# Patient Record
Sex: FEMALE | Race: WHITE | NOT HISPANIC OR LATINO | Employment: OTHER | URBAN - METROPOLITAN AREA
[De-identification: names, ages, dates, MRNs, and addresses within clinical notes are randomized per-mention and may not be internally consistent; named-entity substitution may affect disease eponyms.]

---

## 2017-01-18 ENCOUNTER — TELEPHONE (OUTPATIENT)
Dept: FAMILY MEDICINE | Facility: CLINIC | Age: 62
End: 2017-01-18

## 2017-01-18 DIAGNOSIS — E11.9 TYPE 2 DIABETES MELLITUS WITHOUT COMPLICATION (H): Primary | ICD-10-CM

## 2017-01-18 NOTE — TELEPHONE ENCOUNTER
Left detailed message that pt can do labs as lab only appt, labs orders are placed as future.  Mary BEAL

## 2017-02-15 ENCOUNTER — OFFICE VISIT (OUTPATIENT)
Dept: FAMILY MEDICINE | Facility: CLINIC | Age: 62
End: 2017-02-15
Payer: COMMERCIAL

## 2017-02-15 ENCOUNTER — RADIANT APPOINTMENT (OUTPATIENT)
Dept: MAMMOGRAPHY | Facility: CLINIC | Age: 62
End: 2017-02-15
Attending: FAMILY MEDICINE
Payer: COMMERCIAL

## 2017-02-15 VITALS
SYSTOLIC BLOOD PRESSURE: 139 MMHG | WEIGHT: 137 LBS | TEMPERATURE: 96 F | HEIGHT: 64 IN | BODY MASS INDEX: 23.39 KG/M2 | OXYGEN SATURATION: 99 % | DIASTOLIC BLOOD PRESSURE: 75 MMHG | HEART RATE: 91 BPM

## 2017-02-15 DIAGNOSIS — Z11.59 NEED FOR HEPATITIS C SCREENING TEST: ICD-10-CM

## 2017-02-15 DIAGNOSIS — I10 HYPERTENSION, GOAL BELOW 140/90: Primary | ICD-10-CM

## 2017-02-15 DIAGNOSIS — Z12.31 VISIT FOR SCREENING MAMMOGRAM: ICD-10-CM

## 2017-02-15 DIAGNOSIS — Z13.6 CARDIOVASCULAR SCREENING; LDL GOAL LESS THAN 100: ICD-10-CM

## 2017-02-15 DIAGNOSIS — E11.9 TYPE 2 DIABETES MELLITUS WITHOUT COMPLICATION, WITHOUT LONG-TERM CURRENT USE OF INSULIN (H): ICD-10-CM

## 2017-02-15 LAB
ANION GAP SERPL CALCULATED.3IONS-SCNC: 8 MMOL/L (ref 3–14)
BUN SERPL-MCNC: 13 MG/DL (ref 7–30)
CALCIUM SERPL-MCNC: 10.1 MG/DL (ref 8.5–10.1)
CHLORIDE SERPL-SCNC: 107 MMOL/L (ref 94–109)
CHOLEST SERPL-MCNC: 117 MG/DL
CO2 SERPL-SCNC: 28 MMOL/L (ref 20–32)
CREAT SERPL-MCNC: 0.7 MG/DL (ref 0.52–1.04)
GFR SERPL CREATININE-BSD FRML MDRD: 85 ML/MIN/1.7M2
GLUCOSE SERPL-MCNC: 139 MG/DL (ref 70–99)
HBA1C MFR BLD: 8.9 % (ref 4.3–6)
HDLC SERPL-MCNC: 41 MG/DL
LDLC SERPL CALC-MCNC: 56 MG/DL
NONHDLC SERPL-MCNC: 76 MG/DL
POTASSIUM SERPL-SCNC: 4.4 MMOL/L (ref 3.4–5.3)
SODIUM SERPL-SCNC: 143 MMOL/L (ref 133–144)
TRIGL SERPL-MCNC: 99 MG/DL
TSH SERPL DL<=0.005 MIU/L-ACNC: 1.36 MU/L (ref 0.4–4)

## 2017-02-15 PROCEDURE — 99207 C FOOT EXAM  NO CHARGE: CPT | Mod: 25 | Performed by: FAMILY MEDICINE

## 2017-02-15 PROCEDURE — 80048 BASIC METABOLIC PNL TOTAL CA: CPT | Performed by: FAMILY MEDICINE

## 2017-02-15 PROCEDURE — 83036 HEMOGLOBIN GLYCOSYLATED A1C: CPT | Performed by: FAMILY MEDICINE

## 2017-02-15 PROCEDURE — 86803 HEPATITIS C AB TEST: CPT | Performed by: FAMILY MEDICINE

## 2017-02-15 PROCEDURE — G0202 SCR MAMMO BI INCL CAD: HCPCS | Mod: TC

## 2017-02-15 PROCEDURE — 36415 COLL VENOUS BLD VENIPUNCTURE: CPT | Performed by: FAMILY MEDICINE

## 2017-02-15 PROCEDURE — 80061 LIPID PANEL: CPT | Performed by: FAMILY MEDICINE

## 2017-02-15 PROCEDURE — 99396 PREV VISIT EST AGE 40-64: CPT | Performed by: FAMILY MEDICINE

## 2017-02-15 PROCEDURE — 84443 ASSAY THYROID STIM HORMONE: CPT | Performed by: FAMILY MEDICINE

## 2017-02-15 RX ORDER — LISINOPRIL 10 MG/1
10 TABLET ORAL DAILY
Qty: 90 TABLET | Refills: 3 | Status: SHIPPED | OUTPATIENT
Start: 2017-02-15 | End: 2018-02-19

## 2017-02-15 RX ORDER — ATORVASTATIN CALCIUM 40 MG/1
40 TABLET, FILM COATED ORAL DAILY
Qty: 90 TABLET | Refills: 3 | Status: SHIPPED | OUTPATIENT
Start: 2017-02-15 | End: 2018-03-12

## 2017-02-15 ASSESSMENT — PAIN SCALES - GENERAL: PAINLEVEL: NO PAIN (0)

## 2017-02-15 NOTE — MR AVS SNAPSHOT
After Visit Summary   2/15/2017    Asmita Jerez    MRN: [id number]           Patient Information     Date Of Birth          1955        Visit Information        Provider Department      2/15/2017 12:40 PM Carlos Williamson MD [facility] [place]        Today's Diagnoses     Hypertension, goal below 140/90    -  1    CARDIOVASCULAR SCREENING; LDL GOAL LESS THAN 100        Need for hepatitis C screening test        Type 2 diabetes mellitus without complication, without long-term current use of insulin (H)          Care Instructions      Preventive Health Recommendations  Female Ages 50 - 64    Yearly exam: See your health care provider every year in order to  o Review health changes.   o Discuss preventive care.    o Review your medicines if your doctor has prescribed any.      Get a Pap test every three years (unless you have an abnormal result and your provider advises testing more often).    If you get Pap tests with HPV test, you only need to test every 5 years, unless you have an abnormal result.     You do not need a Pap test if your uterus was removed (hysterectomy) and you have not had cancer.    You should be tested each year for STDs (sexually transmitted diseases) if you're at risk.     Have a mammogram every 1 to 2 years.    Have a colonoscopy at age 50, or have a yearly FIT test (stool test). These exams screen for colon cancer.      Have a cholesterol test every 5 years, or more often if advised.    Have a diabetes test (fasting glucose) every three years. If you are at risk for diabetes, you should have this test more often.     If you are at risk for osteoporosis (brittle bone disease), think about having a bone density scan (DEXA).    Shots: Get a flu shot each year. Get a tetanus shot every 10 years.    Nutrition:     Eat at least 5 servings of fruits and vegetables each day.    Eat whole-grain bread, whole-wheat pasta and brown rice instead of white grains and  "rice.    Talk to your provider about Calcium and Vitamin D.     Lifestyle    Exercise at least 150 minutes a week (30 minutes a day, 5 days a week). This will help you control your weight and prevent disease.    Limit alcohol to one drink per day.    No smoking.     Wear sunscreen to prevent skin cancer.     See your dentist every six months for an exam and cleaning.    See your eye doctor every 1 to 2 years.          Follow-ups after your visit        Who to contact     If you have questions or need follow up information about today's clinic visit or your schedule please contact [facility] directly at [phone].  Normal or non-critical lab and imaging results will be communicated to you by [employer], letter or phone within 4 business days after the clinic has received the results. If you do not hear from us within 7 days, please contact the clinic through [employer] or phone. If you have a critical or abnormal lab result, we will notify you by phone as soon as possible.  Submit refill requests through [employer] or call your pharmacy and they will forward the refill request to us. Please allow 3 business days for your refill to be completed.          Additional Information About Your Visit        [employer] Information     [employer] lets you send messages to your doctor, view your test results, renew your prescriptions, schedule appointments and more. To sign up, go to [email or web address] . Click on \"Log in\" on the left side of the screen, which will take you to the Welcome page. Then click on \"Sign up Now\" on the right side of the page.     You will be asked to enter the access code listed below, as well as some personal information. Please follow the directions to create your username and password.     Your access code is: [email or web address]  Expires: 2017  1:49 PM     Your access code will  in 90 days. If you need help or a new code, please call your [facility] or [phone].        Care " "EveryWhere ID     This is your Care EveryWhere ID. This could be used by other organizations to access your [place] medical records  [id number]        Your Vitals Were     Pulse Temperature Height Last Period Pulse Oximetry Breastfeeding?    91 96  F (35.6  C) (Tympanic) 5' 3.5\" (1.613 m) 12/01/2009 99% No    BMI (Body Mass Index)                   23.89 kg/m2            Blood Pressure from Last 3 Encounters:   02/15/17 139/75   05/25/16 125/68   12/11/15 116/65    Weight from Last 3 Encounters:   02/15/17 137 lb (62.1 kg)   05/25/16 143 lb (64.9 kg)   12/11/15 142 lb (64.4 kg)              We Performed the Following     **Hepatitis C Screen Reflex to RNA FUTURE anytime     Basic metabolic panel  (Ca, Cl, CO2, Creat, Gluc, K, Na, BUN)     FOOT EXAM     Hemoglobin A1c     Lipid Profile with reflex to direct LDL     TSH with free T4 reflex          Where to get your medicines      These medications were sent to [facility] [place] - [address]  [address], [place] [address]    Hours:  [facility] Phone:  [phone]     atorvastatin 40 MG tablet    lisinopril 10 MG tablet    metFORMIN 500 MG tablet          Primary Care Provider Office Phone # Fax #    Carlos Tobias Williamson [phone]       [facility] [address] [place] [address]        Thank you!     Thank you for choosing [facility]  for your care. Our goal is always to provide you with excellent care. Hearing back from our patients is one way we can continue to improve our services. Please take a few minutes to complete the written survey that you may receive in the mail after your visit with us. Thank you!             Your Updated Medication List - Protect others around you: Learn how to safely use, store and throw away your medicines at www.disposemymeds.org.          This list is accurate as of: 2/15/17  2:16 PM.  Always use your most " recent med list.                   Brand Name Dispense Instructions for use    aspirin 81 MG tablet      1 TABLET DAILY       atorvastatin 40 MG tablet    LIPITOR    90 tablet    Take 1 tablet (40 mg) by mouth daily       blood glucose monitoring meter device kit     1 kit    Use to test blood sugars 1 time daily or as directed. To match strips, what Ins will cover.       blood glucose monitoring test strip    no brand specified    1 Box    1 strip by In Vitro route daily *Brand per patient insurance coverage* Asking for Accu chek?       CENTRUM SILVER per tablet      1 TABLET DAILY       * DIABETIC STERILE LANCETS device     1 Box    1 Device daily.       * blood glucose monitoring lancets     1 Box    Use to test blood sugar 1 time daily or as directed. Accu chek, Ins will cover, match machine.       lisinopril 10 MG tablet    PRINIVIL/ZESTRIL    90 tablet    Take 1 tablet (10 mg) by mouth daily       metFORMIN 500 MG tablet    GLUCOPHAGE    270 tablet    Take 1 tablet (500 mg) by mouth 3 times daily (with meals)       * order for DME     1 each    Equipment being ordered: glucometer as per insurance formulary       * order for DME     1 Box    Equipment being ordered: test strips as per insurance formulary       * Notice:  This list has 4 medication(s) that are the same as other medications prescribed for you. Read the directions carefully, and ask your doctor or other care provider to review them with you.

## 2017-02-15 NOTE — PROGRESS NOTES
SUBJECTIVE:     CC: Asmita Jerez is an 61 year old woman who presents for preventive health visit.     Diabetes: Hemoglobin A1c today is 8.9. It was 8.7 last May. She has continued to gradually lose weight. She does not want to take more than 500 mg of metformin daily. She does have trouble with GI symptoms when she has 3 tabs a day. She also does not want to take an additional diabetes medication. She believes she should be able to treat this on her on with diet exercise and weight loss. She only wants to come in 1 time a year for her exam.    Healthy Habits:    Do you get at least three servings of calcium containing foods daily (dairy, green leafy vegetables, etc.)? yes    Amount of exercise or daily activities, outside of work: 3 day(s) per week    Problems taking medications regularly No    Medication side effects: No    Have you had an eye exam in the past two years? no    Do you see a dentist twice per year? 1 time a year    Do you have sleep apnea, excessive snoring or daytime drowsiness?no            Today's PHQ-2 Score:   PHQ-2 ( 1999 Pfizer) 5/25/2016 11/18/2015   Q1: Little interest or pleasure in doing things 0 0   Q2: Feeling down, depressed or hopeless 0 0   PHQ-2 Score 0 0       Abuse: Current or Past(Physical, Sexual or Emotional)-   Do you feel safe in your environment -     Social History   Substance Use Topics     Smoking status: Current Every Day Smoker     Packs/day: 1.00     Years: 40.00     Types: Cigarettes     Smokeless tobacco: Never Used      Comment: pt not interested in quit plan 4/22/09     Alcohol use No         Recent Labs   Lab Test  05/25/16   1138  04/29/15   0958  03/31/14   1649   CHOL  173  117  145   HDL  44*  46*  40*   LDL  100*  49  84   TRIG  144  112  106   CHOLHDLRATIO   --   2.5  4.0   NHDL  129   --    --        Reviewed orders with patient.  Reviewed health maintenance and updated orders accordingly - Yes    Mammo Decision Support:  Patient over age 50, mutual  decision to screen reflected in health maintenance.    Pertinent mammograms are reviewed under the imaging tab.  History of abnormal Pap smear: Pap and cold testing every 3-5 years.  All Histories reviewed and updated in Epic.      ROS:  C: NEGATIVE for fever, chills, change in weight  I: NEGATIVE for worrisome rashes, moles or lesions  E: NEGATIVE for vision changes or irritation  ENT: NEGATIVE for ear, mouth and throat problems  R: NEGATIVE for significant cough or SOB  B: NEGATIVE for masses, tenderness or discharge  CV: NEGATIVE for chest pain, palpitations or peripheral edema  GI: NEGATIVE for nausea, abdominal pain, heartburn, or change in bowel habits  : NEGATIVE for unusual urinary or vaginal symptoms. No vaginal bleeding.  M: NEGATIVE for significant arthralgias or myalgia  N: NEGATIVE for weakness, dizziness or paresthesias  P: NEGATIVE for changes in mood or affect       OBJECTIVE:     LMP 12/01/2009  EXAM:  GENERAL: healthy, alert and no distress  EYES: Eyes grossly normal to inspection, PERRL and conjunctivae and sclerae normal  HENT: ear canals and TM's normal, nose and mouth without ulcers or lesions  NECK: no adenopathy, no asymmetry, masses, or scars and thyroid normal to palpation  RESP: lungs clear to auscultation - no rales, rhonchi or wheezes  BREAST: normal without masses, tenderness or nipple discharge and no palpable axillary masses or adenopathy  CV: regular rate and rhythm, normal S1 S2, no S3 or S4, no murmur, click or rub, no peripheral edema and peripheral pulses strong  ABDOMEN: soft, nontender, no hepatosplenomegaly, no masses and bowel sounds normal   (female): normal female external genitalia, normal urethral meatus, vaginal mucosa pink, moist, well rugated, and normal cervix/adnexa/uterus without masses or discharge  MS: no gross musculoskeletal defects noted, no edema  SKIN: no suspicious lesions or rashes  NEURO: Normal strength and tone, mentation intact and speech  "normal  PSYCH: mentation appears normal, affect normal/bright    ASSESSMENT/PLAN:         ICD-10-CM    1. Hypertension, goal below 140/90 I10 lisinopril (PRINIVIL/ZESTRIL) 10 MG tablet     Basic metabolic panel  (Ca, Cl, CO2, Creat, Gluc, K, Na, BUN)   2. CARDIOVASCULAR SCREENING; LDL GOAL LESS THAN 100 Z13.6 atorvastatin (LIPITOR) 40 MG tablet     Lipid Profile with reflex to direct LDL   3. Need for hepatitis C screening test Z11.59 **Hepatitis C Screen Reflex to RNA FUTURE anytime   4. Type 2 diabetes mellitus without complication, without long-term current use of insulin (H) E11.9 metFORMIN (GLUCOPHAGE) 500 MG tablet       COUNSELING:   Reviewed preventive health counseling, as reflected in patient instructions       Regular exercise       Healthy diet/nutrition         reports that she has been smoking Cigarettes.  She has a 40.00 pack-year smoking history. She has never used smokeless tobacco.  Tobacco Cessation Action Plan: Information offered: Patient not interested at this time  Estimated body mass index is 24.93 kg/(m^2) as calculated from the following:    Height as of 5/25/16: 5' 3.5\" (1.613 m).    Weight as of 5/25/16: 143 lb (64.9 kg).       Counseling Resources:  ATP IV Guidelines  Pooled Cohorts Equation Calculator  Breast Cancer Risk Calculator  FRAX Risk Assessment  ICSI Preventive Guidelines  Dietary Guidelines for Americans, 2010  USDA's MyPlate  ASA Prophylaxis  Lung CA Screening    Carlos Williamson MD  [facility]  "

## 2017-02-15 NOTE — LETTER
[facility]  [address]  [place] [address]  Phone: [phone]    February 17, 2017    Asmita Jerez  [address]  [place] [address]          Dear Asmita,    The results of your recent lab tests were within normal limits, except for HgbA1c which we discussed. Enclosed is a copy of these results.  If you have any further questions or problems, please contact our office.  Results for orders placed or performed in visit on 02/15/17   Hemoglobin A1c   Result Value Ref Range    Hemoglobin A1C 8.9 (H) 4.3 - 6.0 %   **Hepatitis C Screen Reflex to RNA FUTURE anytime   Result Value Ref Range    Hepatitis C Antibody  NR     Nonreactive   Assay performance characteristics have not been established for newborns,   infants, and children     Basic metabolic panel  (Ca, Cl, CO2, Creat, Gluc, K, Na, BUN)   Result Value Ref Range    Sodium 143 133 - 144 mmol/L    Potassium 4.4 3.4 - 5.3 mmol/L    Chloride 107 94 - 109 mmol/L    Carbon Dioxide 28 20 - 32 mmol/L    Anion Gap 8 3 - 14 mmol/L    Glucose 139 (H) 70 - 99 mg/dL    Urea Nitrogen 13 7 - 30 mg/dL    Creatinine 0.70 0.52 - 1.04 mg/dL    GFR Estimate 85 >60 mL/min/1.7m2    GFR Estimate If Black >90   GFR Calc   >60 mL/min/1.7m2    Calcium 10.1 8.5 - 10.1 mg/dL   Lipid Profile with reflex to direct LDL   Result Value Ref Range    Cholesterol 117 <200 mg/dL    Triglycerides 99 <150 mg/dL    HDL Cholesterol 41 (L) >49 mg/dL    LDL Cholesterol Calculated 56 <100 mg/dL    Non HDL Cholesterol 76 <130 mg/dL   TSH with free T4 reflex   Result Value Ref Range    TSH 1.36 0.40 - 4.00 mU/L     Sincerely,      Carlos Williamson MD/ llc

## 2017-02-15 NOTE — NURSING NOTE
"Chief Complaint   Patient presents with     Physical       Initial /75 (BP Location: Right arm, Patient Position: Chair, Cuff Size: Adult Regular)  Pulse 91  Temp 96  F (35.6  C) (Tympanic)  Ht 5' 3.5\" (1.613 m)  Wt 137 lb (62.1 kg)  LMP 12/01/2009  SpO2 99%  Breastfeeding? No  BMI 23.89 kg/m2 Estimated body mass index is 23.89 kg/(m^2) as calculated from the following:    Height as of this encounter: 5' 3.5\" (1.613 m).    Weight as of this encounter: 137 lb (62.1 kg).  Medication Reconciliation: incomplete   Mary BEAL      "

## 2017-02-16 LAB — HCV AB SERPL QL IA: NORMAL

## 2017-07-07 ENCOUNTER — OFFICE VISIT (OUTPATIENT)
Dept: FAMILY MEDICINE | Facility: CLINIC | Age: 62
End: 2017-07-07
Payer: COMMERCIAL

## 2017-07-07 ENCOUNTER — HOSPITAL ENCOUNTER (OUTPATIENT)
Dept: ULTRASOUND IMAGING | Facility: CLINIC | Age: 62
Discharge: HOME | End: 2017-07-07
Attending: FAMILY MEDICINE | Admitting: FAMILY MEDICINE
Payer: COMMERCIAL

## 2017-07-07 VITALS
DIASTOLIC BLOOD PRESSURE: 78 MMHG | WEIGHT: 137 LBS | OXYGEN SATURATION: 98 % | HEART RATE: 89 BPM | BODY MASS INDEX: 23.89 KG/M2 | TEMPERATURE: 97.8 F | SYSTOLIC BLOOD PRESSURE: 124 MMHG

## 2017-07-07 DIAGNOSIS — S76.911A STRAIN OF HIP AND THIGH, RIGHT, INITIAL ENCOUNTER: ICD-10-CM

## 2017-07-07 DIAGNOSIS — S76.011A STRAIN OF HIP AND THIGH, RIGHT, INITIAL ENCOUNTER: ICD-10-CM

## 2017-07-07 DIAGNOSIS — R10.2 PELVIC PAIN IN FEMALE: ICD-10-CM

## 2017-07-07 DIAGNOSIS — E11.9 TYPE 2 DIABETES MELLITUS WITHOUT COMPLICATION, WITHOUT LONG-TERM CURRENT USE OF INSULIN (H): Primary | ICD-10-CM

## 2017-07-07 LAB — HBA1C MFR BLD: 11 % (ref 4.3–6)

## 2017-07-07 PROCEDURE — 83036 HEMOGLOBIN GLYCOSYLATED A1C: CPT | Performed by: FAMILY MEDICINE

## 2017-07-07 PROCEDURE — 76830 TRANSVAGINAL US NON-OB: CPT

## 2017-07-07 PROCEDURE — 99214 OFFICE O/P EST MOD 30 MIN: CPT | Performed by: FAMILY MEDICINE

## 2017-07-07 PROCEDURE — 36415 COLL VENOUS BLD VENIPUNCTURE: CPT | Performed by: FAMILY MEDICINE

## 2017-07-07 ASSESSMENT — PAIN SCALES - GENERAL: PAINLEVEL: MILD PAIN (2)

## 2017-07-07 NOTE — NURSING NOTE
"Chief Complaint   Patient presents with     Abdominal Pain     lower right side pain hurts down to thigh has hurt since feb. after gyno exam       Initial /78 (BP Location: Right arm, Patient Position: Chair, Cuff Size: Adult Large)  Pulse 89  Temp 97.8  F (36.6  C) (Tympanic)  Wt 137 lb (62.1 kg)  LMP 12/01/2009  SpO2 98%  Breastfeeding? No  BMI 23.89 kg/m2 Estimated body mass index is 23.89 kg/(m^2) as calculated from the following:    Height as of 2/15/17: 5' 3.5\" (1.613 m).    Weight as of this encounter: 137 lb (62.1 kg).  Medication Reconciliation: sharmin Kapoor CMA       "

## 2017-07-07 NOTE — LETTER
[facility] [place]  [address]  [facility] [address]  Phone: [phone]    July 7, 2017    Asmita Jerez  [address]  [address]  [place] [address]          Dear Ms. Jerez,    Your hemoglobin A1c was 11. Your average blood sugar in the last 3 months was 270. I would recommend that you start on a long-acting insulin like Lantus insulin, 1 injection per day. Please let us know if your interested or make an appointment to discuss. Enclosed is a copy of these results.  If you have any further questions or problems, please contact our office.    Sincerely,      Carlos Williamson MD/ la

## 2017-07-07 NOTE — PROGRESS NOTES
SUBJECTIVE:                                                    Asmita Jerez is a 61 year old female who presents to clinic today for the following health issues:    For the last 3 months she has had pain with certain movements of her right hip. When she has the pain she has it in the right groin radiating retirement down the anterior thigh.  She gets pain with hyperextending her hip.  The pain started with her last pelvic exam 3 months ago. There was some pain when the speculum was removed possibly from pinching.    Her hemoglobin A1c is 11.0. So far she has been unwilling to start insulin.    Total face to face time: 25 minutes.  Time spent counseling on 15 minutes as outline above.      ABDOMINAL PAIN     Onset: February     Description:   Character: Gnawing  Location: right lower quadrant all the way down thigh  Radiation: None    Intensity: moderate, severe    Progression of Symptoms:  worsening and intermittent    Accompanying Signs & Symptoms:  Fever/Chills?: no   Gas/Bloating: no   Nausea: no   Vomitting: no   Diarrhea?: no   Constipation:no   Dysuria or Hematuria: no    History:     Trauma: no   Previous similar pain: no    Previous tests done: none    Precipitating factors:   Does the pain change with:     Food: no      BM: no     Urination: no     Alleviating factors:  none    Therapies Tried and outcome: none    LMP:  not applicable           Problem list and histories reviewed & adjusted, as indicated.  Additional history: as documented        Reviewed and updated as needed this visit by clinical staff  Tobacco  Allergies  Med Hx  Surg Hx  Fam Hx  Soc Hx      Reviewed and updated as needed this visit by Provider        Medical, surgical, family, social histories, allergies and meds reviewed and updated.    ROS:  General: No change in weight, sleep or appetite.  Normal energy.  No fever or chills  Resp: No coughing, wheezing or shortness of breath  CV: No chest pains or palpitations  GI: No nausea,  vomiting,  heartburn, abdominal pain, diarrhea, constipation or change in bowel habits    Exam:  GENERAL APPEARANCE ADULT: Alert, no acute distress  MS: hip exam pain with hyperextension.    ASSESSMENT:  (E11.9) Type 2 diabetes mellitus without complication, without long-term current use of insulin (H)  (primary encounter diagnosis)  Comment:   Plan: Hemoglobin A1c, CANCELED: Albumin Random Urine         Quantitative            (R10.2) Pelvic pain in female  Comment:   Plan: US Pelvic Complete w Transvaginal            (S76.011A,  S76.911A) Strain of hip and thigh, right, initial encounter  Comment:   Plan: ORTHO  REFERRAL              PLAN:  Orders Placed This Encounter     US Pelvic Complete w Transvaginal     Hemoglobin A1c     ORTHO  REFERRAL       There are no Patient Instructions on file for this visit.      Carlos Williamson

## 2017-07-07 NOTE — MR AVS SNAPSHOT
After Visit Summary   7/7/2017    Asmita Jerez    MRN: [id number]           Patient Information     Date Of Birth          1955        Visit Information        Provider Department      7/7/2017 11:00 AM Carlos Williamson MD [facility]        Today's Diagnoses     Type 2 diabetes mellitus without complication, without long-term current use of insulin (H)    -  1    Pelvic pain in female        Strain of hip and thigh, right, initial encounter           Follow-ups after your visit        Additional Services     ORTHO  REFERRAL       [facility] is referring you to the Orthopedic  Services at [facility].   3 months of right hip pain radiating down leg    The  Representative will assist you in the coordination of your Orthopedic and Musculoskeletal Care as prescribed by your physician.    The  Representative will call you within 1 business day to help schedule your appointment, or you may contact the  Representative at:    All areas ~ [phone]     Type of Referral : Non Surgical       Timeframe requested: Routine    Coverage of these services is subject to the terms and limitations of your health insurance plan.  Please call member services at your health plan with any benefit or coverage questions.      If X-rays, CT or MRI's have been performed, please contact the facility where they were done to arrange for , prior to your scheduled appointment.  Please bring this referral request to your appointment and present it to your specialist.                  Future tests that were ordered for you today     Open Future Orders        Priority Expected Expires Ordered    US Pelvic Complete w Transvaginal Routine  7/7/2018 7/7/2017            Who to contact     If you have questions or need follow up information about today's clinic visit or your schedule please contact [facility]  [facility] directly at [phone].  Normal or non-critical lab and imaging results will be communicated to you by MyChart, letter or phone within 4 business days after the clinic has received the results. If you do not hear from us within 7 days, please contact the clinic through Flaconihart or phone. If you have a critical or abnormal lab result, we will notify you by phone as soon as possible.  Submit refill requests through hopscout or call your pharmacy and they will forward the refill request to us. Please allow 3 business days for your refill to be completed.          Additional Information About Your Visit        FlaconiharBlowtorch Information     hopscout lets you send messages to your doctor, view your test results, renew your prescriptions, schedule appointments and more. To sign up, go to [email or web address] . Click on \"Log in\" on the left side of the screen, which will take you to the Welcome page. Then click on \"Sign up Now\" on the right side of the page.     You will be asked to enter the access code listed below, as well as some personal information. Please follow the directions to create your username and password.     Your access code is: [email or web address]  Expires: 10/5/2017 11:47 AM     Your access code will  in 90 days. If you need help or a new code, please call your [place] clinic or [phone].        Care EveryWhere ID     This is your Care EveryWhere ID. This could be used by other organizations to access your [place] medical records  [id number]        Your Vitals Were     Pulse Temperature Last Period Pulse Oximetry Breastfeeding? BMI (Body Mass Index)    89 97.8  F (36.6  C) (Tympanic) 2009 98% No 23.89 kg/m2       Blood Pressure from Last 3 Encounters:   17 124/78   02/15/17 139/75   16 125/68    Weight from Last 3 Encounters:   17 137 lb (62.1 kg)   02/15/17 137 lb (62.1 kg)   16 143 lb (64.9 kg)              We Performed the Following     Albumin Random Urine " Quantitative     Hemoglobin A1c     ORTHO [facility] REFERRAL        Primary Care Provider Office Phone # Fax #    Carlos Tobias Williamson [phone]       [facility] [address] [address] [facility] [address]        Equal Access to Services     EFREN ADOLPH : Hadii aad ku hadasho Soomaali, waaxda luqadaha, qaybta kaalmada adeegyada, waxay idiin hayaan adeleigh khvanessa lafrancesn america. So [facility] [phone].    ATENCIÓN: Si habla español, tiene a garcia disposición servicios gratuitos de asistencia lingüística. Llame al [phone].    We comply with applicable federal civil rights laws and [place] laws. We do not discriminate on the basis of race, color, national origin, age, disability sex, sexual orientation or gender identity.            Thank you!     Thank you for choosing [facility]  for your care. Our goal is always to provide you with excellent care. Hearing back from our patients is one way we can continue to improve our services. Please take a few minutes to complete the written survey that you may receive in the mail after your visit with us. Thank you!             Your Updated Medication List - Protect others around you: Learn how to safely use, store and throw away your medicines at www.disposemymeds.org.          This list is accurate as of: 7/7/17 11:47 AM.  Always use your most recent med list.                   Brand Name Dispense Instructions for use Diagnosis    aspirin 81 MG tablet      1 TABLET DAILY        atorvastatin 40 MG tablet    LIPITOR    90 tablet    Take 1 tablet (40 mg) by mouth daily    CARDIOVASCULAR SCREENING; LDL GOAL LESS THAN 100       blood glucose monitoring meter device kit     1 kit    Use to test blood sugars 1 time daily or as directed. To match strips, what Ins will cover.    Type 2 diabetes, HbA1c goal < 7% (H)       blood glucose monitoring test strip    no brand specified    1 Box    1 strip by In Vitro route daily *Brand per patient  insurance coverage* Asking for Accu chek?    Type 2 diabetes, HbA1c goal < 7% (H)       CENTRUM SILVER per tablet      1 TABLET DAILY        * DIABETIC STERILE LANCETS device     1 Box    1 Device daily.        * blood glucose monitoring lancets     1 Box    Use to test blood sugar 1 time daily or as directed. Accu chek, Ins will cover, match machine.    Type 2 diabetes, HbA1c goal < 7% (H)       lisinopril 10 MG tablet    PRINIVIL/ZESTRIL    90 tablet    Take 1 tablet (10 mg) by mouth daily    Hypertension, goal below 140/90       metFORMIN 500 MG tablet    GLUCOPHAGE    270 tablet    Take 1 tablet (500 mg) by mouth 3 times daily (with meals)    Type 2 diabetes mellitus without complication, without long-term current use of insulin (H)       * order for DME     1 each    Equipment being ordered: glucometer as per insurance formulary    Type 2 diabetes, HbA1c goal < 7% (H)       * order for DME     1 Box    Equipment being ordered: test strips as per insurance formulary    Type 2 diabetes mellitus without complication (H)       * Notice:  This list has 4 medication(s) that are the same as other medications prescribed for you. Read the directions carefully, and ask your doctor or other care provider to review them with you.

## 2017-07-12 ENCOUNTER — TELEPHONE (OUTPATIENT)
Dept: FAMILY MEDICINE | Facility: CLINIC | Age: 62
End: 2017-07-12

## 2017-07-12 NOTE — TELEPHONE ENCOUNTER
Reason for Call:  Request for results:    Name of test or procedure: Pt has questions about her recent ultrasound findings.  1.  Does anything need to be done about the small fibroid?  2.  Could this affect her health down the road?  3.  Does she need to do any follow-up?      Date of test of procedure: 07/07/17    Location of the test or procedure: [facility]    OK to leave the result message on voice mail or with a family member? NO    Phone number Patient can be reached at:  Work number on file:  [phone] (work)    Additional comments:     Call taken on 7/12/2017 at 8:47 AM by Janet Xiong

## 2017-07-12 NOTE — TELEPHONE ENCOUNTER
Answered pt questions concerning sm fibroid.  Pt is not going to Ortho due to cost.  Pt is f/u with Chiropractor when/if pain comes back.  kPavSissy

## 2017-07-25 ENCOUNTER — OFFICE VISIT (OUTPATIENT)
Dept: FAMILY MEDICINE | Facility: CLINIC | Age: 62
End: 2017-07-25
Payer: COMMERCIAL

## 2017-07-25 VITALS
WEIGHT: 138 LBS | SYSTOLIC BLOOD PRESSURE: 135 MMHG | HEART RATE: 91 BPM | DIASTOLIC BLOOD PRESSURE: 68 MMHG | TEMPERATURE: 96.7 F | BODY MASS INDEX: 24.06 KG/M2

## 2017-07-25 DIAGNOSIS — E11.65 TYPE 2 DIABETES MELLITUS WITH HYPERGLYCEMIA, WITHOUT LONG-TERM CURRENT USE OF INSULIN (H): ICD-10-CM

## 2017-07-25 DIAGNOSIS — E11.9 TYPE 2 DIABETES MELLITUS WITHOUT COMPLICATION, WITHOUT LONG-TERM CURRENT USE OF INSULIN (H): Primary | ICD-10-CM

## 2017-07-25 PROCEDURE — 99213 OFFICE O/P EST LOW 20 MIN: CPT | Performed by: FAMILY MEDICINE

## 2017-07-25 ASSESSMENT — PAIN SCALES - GENERAL: PAINLEVEL: NO PAIN (0)

## 2017-07-25 NOTE — PROGRESS NOTES
SUBJECTIVE:                                                    Asmita Jerez is a 61 year old female who presents to clinic today for the following health issues:    Her hemoglobin A1c has now increased to 11.0. She is willing to try Lantus insulin. She will make an appointment with the diabetic nurse educator. Recheck hemoglobin A1c in 3 months.      Diabetic consult        Problem list and histories reviewed & adjusted, as indicated.  Additional history: as documented        Reviewed and updated as needed this visit by clinical staff  Tobacco  Allergies  Med Hx  Surg Hx  Fam Hx  Soc Hx      Reviewed and updated as needed this visit by Provider        Medical, surgical, family, social histories, allergies and meds reviewed and updated.    ROS:  General: No change in weight, sleep or appetite.  Normal energy.  No fever or chills  Resp: No coughing, wheezing or shortness of breath  CV: No chest pains or palpitations  GI: No nausea, vomiting,  heartburn, abdominal pain, diarrhea, constipation or change in bowel habits    Exam:  GENERAL APPEARANCE ADULT: Alert, no acute distress  NECK: No adenopathy,masses or thyromegaly  RESP: lungs clear to auscultation   CV: normal rate, regular rhythm, no murmur or gallop    ASSESSMENT:  (E11.9) Type 2 diabetes mellitus without complication, without long-term current use of insulin (H)  (primary encounter diagnosis)  Comment:   Plan: DIABETES EDUCATOR REFERRAL            (E11.65) Type 2 diabetes mellitus with hyperglycemia, without long-term current use of insulin (H)  Comment:   Plan: blood glucose monitoring (NO BRAND SPECIFIED)         test strip              PLAN:  Orders Placed This Encounter     DIABETES EDUCATOR REFERRAL     blood glucose monitoring (NO BRAND SPECIFIED) test strip   Hemoglobin A1c in 3 months.    There are no Patient Instructions on file for this visit.      Carlos Williamson

## 2017-07-25 NOTE — MR AVS SNAPSHOT
After Visit Summary   7/25/2017    Asmita Jerez    MRN: [id number]           Patient Information     Date Of Birth          1955        Visit Information        Provider Department      7/25/2017 4:20 PM Carlos Williamson MD [facility]        Today's Diagnoses     Type 2 diabetes mellitus without complication, without long-term current use of insulin (H)    -  1       Follow-ups after your visit        Additional Services     DIABETES EDUCATOR REFERRAL       Your provider has referred you to Diabetes Education: FM[facility]: Diabetes Education - All [facility] [phone]   [email or web address]    This is a Previous Diagnosis: Follow-up DSMT - 2 hours.  Type of diabetes is Type 2 - On Insulin                                                          A1C is: Lab Results       Component                Value               Date                       A1C                      11.0                07/07/2017            If an urgent visit is needed or A1C is above 12, Care Team to call the diabetes education team at [phone] or send a message to the diabetes education pool (P DIAB ED-PATIENT CARE).    Diabetes education focus: Comprehensive Knowledge Assessment and Instruction, Blood glucose meter instruction  and Medication Start: Insulin: Type/Dose/Timing: Lantus or similar.      Education needs: Additional Insulin Training                                                                                                                                                      Please be aware that coverage of these services is subject to the terms and limitations of your health insurance plan.  Call member services at your health plan to determine Diabetes Self-Management Training benefits and ask which blood glucose monitor brands are covered by your plan.      Please bring the following to your appointment:    -   List of current  "medications   -   List of blood glucose monitor brands that are covered by your insurance plan  -   Blood glucose monitor and log book  -   Food records for the 3 days prior to your visit                  Who to contact     If you have questions or need follow up information about today's clinic visit or your schedule please contact [facility] directly at [phone].  Normal or non-critical lab and imaging results will be communicated to you by MyChart, letter or phone within 4 business days after the clinic has received the results. If you do not hear from us within 7 days, please contact the clinic through Qnaryhart or phone. If you have a critical or abnormal lab result, we will notify you by phone as soon as possible.  Submit refill requests through Nimble TV or call your pharmacy and they will forward the refill request to us. Please allow 3 business days for your refill to be completed.          Additional Information About Your Visit        QnaryharFarseer Information     Nimble TV lets you send messages to your doctor, view your test results, renew your prescriptions, schedule appointments and more. To sign up, go to [email or web address] . Click on \"Log in\" on the left side of the screen, which will take you to the Welcome page. Then click on \"Sign up Now\" on the right side of the page.     You will be asked to enter the access code listed below, as well as some personal information. Please follow the directions to create your username and password.     Your access code is: [email or web address]  Expires: 10/5/2017 11:47 AM     Your access code will  in 90 days. If you need help or a new code, please call your [facility] or [phone].        Care EveryWhere ID     This is your Care EveryWhere ID. This could be used by other organizations to access your [place] medical records  [id number]        Your Vitals Were     Pulse Temperature Last Period Breastfeeding? BMI (Body Mass Index)       91 " 96.7  F (35.9  C) (Tympanic) 12/01/2009 No 24.06 kg/m2        Blood Pressure from Last 3 Encounters:   07/25/17 135/68   07/07/17 124/78   02/15/17 139/75    Weight from Last 3 Encounters:   07/25/17 138 lb (62.6 kg)   07/07/17 137 lb (62.1 kg)   02/15/17 137 lb (62.1 kg)              We Performed the Following     DIABETES EDUCATOR REFERRAL        Primary Care Provider Office Phone # Fax #    Carlos Tobias Williamson [phone]       [facility] [address] [facility] [address]        Equal Access to Services     EFREN FARFAN : Hadii monica barbosa hadasho Solambert, waaxda luqadaha, qaybta kaalmada adeegyada, jelani cross. So [facility] [phone].    ATENCIÓN: Si habla español, tiene a garcia disposición servicios gratuitos de asistencia lingüística. Llame al [phone].    We comply with applicable federal civil rights laws and [place] laws. We do not discriminate on the basis of race, color, national origin, age, disability sex, sexual orientation or gender identity.            Thank you!     Thank you for choosing [facility]  for your care. Our goal is always to provide you with excellent care. Hearing back from our patients is one way we can continue to improve our services. Please take a few minutes to complete the written survey that you may receive in the mail after your visit with us. Thank you!             Your Updated Medication List - Protect others around you: Learn how to safely use, store and throw away your medicines at www.disposemymeds.org.          This list is accurate as of: 7/25/17  4:50 PM.  Always use your most recent med list.                   Brand Name Dispense Instructions for use Diagnosis    aspirin 81 MG tablet      1 TABLET DAILY        atorvastatin 40 MG tablet    LIPITOR    90 tablet    Take 1 tablet (40 mg) by mouth daily    CARDIOVASCULAR SCREENING; LDL GOAL LESS THAN 100       blood glucose monitoring meter  device kit     1 kit    Use to test blood sugars 1 time daily or as directed. To match strips, what Ins will cover.    Type 2 diabetes, HbA1c goal < 7% (H)       blood glucose monitoring test strip    no brand specified    1 Box    1 strip by In Vitro route daily *Brand per patient insurance coverage* Asking for Accu chek?    Type 2 diabetes, HbA1c goal < 7% (H)       CENTRUM SILVER per tablet      1 TABLET DAILY        * DIABETIC STERILE LANCETS device     1 Box    1 Device daily.        * blood glucose monitoring lancets     1 Box    Use to test blood sugar 1 time daily or as directed. Accu chek, Ins will cover, match machine.    Type 2 diabetes, HbA1c goal < 7% (H)       lisinopril 10 MG tablet    PRINIVIL/ZESTRIL    90 tablet    Take 1 tablet (10 mg) by mouth daily    Hypertension, goal below 140/90       metFORMIN 500 MG tablet    GLUCOPHAGE    270 tablet    Take 1 tablet (500 mg) by mouth 3 times daily (with meals)    Type 2 diabetes mellitus without complication, without long-term current use of insulin (H)       * order for DME     1 each    Equipment being ordered: glucometer as per insurance formulary    Type 2 diabetes, HbA1c goal < 7% (H)       * order for DME     1 Box    Equipment being ordered: test strips as per insurance formulary    Type 2 diabetes mellitus without complication (H)       * Notice:  This list has 4 medication(s) that are the same as other medications prescribed for you. Read the directions carefully, and ask your doctor or other care provider to review them with you.

## 2017-07-25 NOTE — NURSING NOTE
"Chief Complaint   Patient presents with     Consult     diabetes       Initial /68 (BP Location: Right arm, Patient Position: Chair, Cuff Size: Adult Regular)  Pulse 91  Temp 96.7  F (35.9  C) (Tympanic)  Wt 138 lb (62.6 kg)  LMP 12/01/2009  Breastfeeding? No  BMI 24.06 kg/m2 Estimated body mass index is 24.06 kg/(m^2) as calculated from the following:    Height as of 2/15/17: 5' 3.5\" (1.613 m).    Weight as of this encounter: 138 lb (62.6 kg).  Medication Reconciliation: complete   Mary Kapoor CMA       "

## 2017-08-02 ENCOUNTER — ALLIED HEALTH/NURSE VISIT (OUTPATIENT)
Dept: EDUCATION SERVICES | Facility: CLINIC | Age: 62
End: 2017-08-02
Payer: COMMERCIAL

## 2017-08-02 ENCOUNTER — TELEPHONE (OUTPATIENT)
Dept: EDUCATION SERVICES | Facility: CLINIC | Age: 62
End: 2017-08-02

## 2017-08-02 DIAGNOSIS — E11.9 TYPE 2 DIABETES MELLITUS WITHOUT COMPLICATION, WITHOUT LONG-TERM CURRENT USE OF INSULIN (H): Primary | ICD-10-CM

## 2017-08-02 DIAGNOSIS — E11.9 DIABETES MELLITUS WITHOUT COMPLICATION (H): ICD-10-CM

## 2017-08-02 PROCEDURE — G0108 DIAB MANAGE TRN  PER INDIV: HCPCS

## 2017-08-02 RX ORDER — METFORMIN HCL 500 MG
1000 TABLET, EXTENDED RELEASE 24 HR ORAL 2 TIMES DAILY WITH MEALS
Qty: 360 TABLET | Refills: 3 | Status: SHIPPED | OUTPATIENT
Start: 2017-08-02 | End: 2018-03-23

## 2017-08-02 NOTE — PROGRESS NOTES
Diabetes Self Management Training: Individual Review Visit    Asmita Jerez presents today for education, evaluation of glucose control and modification of medication(s) related to Type 2 diabetes.    She is accompanied by self    Patient's diabetes management related comments/concerns: A1C is 11.0% no life style changes except 4.5 yrs ago lost her .      Patient's emotional response to diabetes: expresses readiness to learn, anger, concern for health and well-being and frustrated    Patient would like this visit to be focused around the following diabetes-related behaviors and goals: Diabetes pathophysiology, Assistance with making lifestyle changes, Self-care behavioral goal setting, Healthy Eating, Being Active, Monitoring, Taking Medication, Problem Solving, Reducing Risks and Healthy Coping    ASSESSMENT:  Patient Problem List and Family Medical History reviewed for relevant medical history, current medical status, and diabetes risk factors.    Current Diabetes Management per Patient:  Taking diabetes medications?   yes:     Diabetes Medication(s)     Biguanides Sig    metFORMIN (GLUCOPHAGE) 500 MG tablet Take 1 tablet (500 mg) by mouth 3 times daily (with meals)      , Oral Medications: Metformin - Dose: 500 mg , Time: breakfast and supper    *Abbreviated insulin dose documentation key: Insulin Trade Name (ezqyvfsur-jwqia-xhpznq-bedtime) - i.e. Humalog 5-5-5-0 (Humalog 5 units at breakfast, 5 units at lunch, and 5 units at dinner).    Past Diabetes Education: Yes, many yrs ago    Patient glucose self monitoring as follows: two times daily.   BG meter: Accu-Chek Margaret meter  BG results: see blood glucose log attached to this encounter     BG values are: Not in goal  Patient's most recent   Lab Results   Component Value Date    A1C 11.0 07/07/2017    is not meeting goal of <7.0    Nutrition:  Patient eats 3 meals per day    Breakfast - English muffin and grapes or 1 egg 2 links and english muffin or  "glucerna shake and cottage cheese and chips.tea with stevia  Lunch -cucumber and grapes , cucumber with ng and grapes and white chicken or PBand J sandwich tator tots and ketchup and cookie  Dinner - tomato sandwich. Or cottage cheese chips and 2 slices of peanut butter and 2 tootsie rolls or cottage cheese and chips  Snacks - coffee all day, and milk and stevia.  water    Beverages: see above    Cultural/Yazidi diet restrictions: No     Biggest Challenge to Healthy Eating: knowing what to eat    Physical Activity:    Type: nothing just daily activities.      Diabetes Risk Factors:  family history and age over 45 years    Diabetes Complications:  Not discussed today.    Vitals:  LMP 12/01/2009  Estimated body mass index is 24.06 kg/(m^2) as calculated from the following:    Height as of 2/15/17: 1.613 m (5' 3.5\").    Weight as of 7/25/17: 62.6 kg (138 lb).   Last 3 BP:   BP Readings from Last 3 Encounters:   07/25/17 135/68   07/07/17 124/78   02/15/17 139/75       History   Smoking Status     Current Every Day Smoker     Packs/day: 1.00     Years: 40.00     Types: Cigarettes   Smokeless Tobacco     Never Used     Comment: pt not interested in quit plan 4/22/09       Labs:  Lab Results   Component Value Date    A1C 11.0 07/07/2017     Lab Results   Component Value Date     02/15/2017     Lab Results   Component Value Date    LDL 56 02/15/2017     HDL Cholesterol   Date Value Ref Range Status   02/15/2017 41 (L) >49 mg/dL Final   ]  GFR Estimate   Date Value Ref Range Status   02/15/2017 85 >60 mL/min/1.7m2 Final     Comment:     Non  GFR Calc     GFR Estimate If Black   Date Value Ref Range Status   02/15/2017 >90   GFR Calc   >60 mL/min/1.7m2 Final     Lab Results   Component Value Date    CR 0.70 02/15/2017     No results found for: MICROALBUMIN    Socio/Economic Considerations:    Support system: family    Health Beliefs and Attitudes:   Patient Activation Measure " Survey Score:  SHANNON Score (Last Two) 5/25/2016   SHANNON Raw Score 39   Activation Score 56.4   SHANNON Level 3       Stage of Change: PREPARATION (Decided to change - considering how)      Diabetes knowledge and skills assessment:     Patient is knowledgeable in diabetes management concepts related to: Taking Medication    Patient needs further education on the following diabetes management concepts: Healthy Eating, Being Active, Monitoring, Taking Medication, Problem Solving, Reducing Risks and Healthy Coping    Barriers to Learning Assessment: No Barriers identified    Based on learning assessment above, most appropriate setting for further diabetes education would be: Individual setting.    INTERVENTION:   Education provided today on:  AADE Self-Care Behaviors:  Healthy Eating: carbohydrate counting, consistency in amount, composition, and timing of food intake, weight reduction, heart healthy diet, eating out, portion control, plate planning method and label reading  Being Active: relationship to blood glucose and describe appropriate activity program  Monitoring: purpose, proper technique, log and interpret results, individual blood glucose targets and frequency of monitoring  Taking Medication: action of prescribed medication, side effects of prescribed medications and when to take medications  Patient was instructed on Accu-Chek Margaret Connect meter and was able to provide an accurate return demonstration. Patient's blood glucose reading today was  mg/dL.    Opportunities for ongoing education and support in diabetes-self management were discussed.    Pt verbalized understanding of concepts discussed and recommendations provided today.       Education Materials Provided:  Mary Understanding Diabetes Booklet, Carbohydrate Counting and My Plate Planner    PLAN:  See Patient Instructions for co-developed, patient-stated behavior change goals.  AVS printed and provided to patient today.    FOLLOW-UP:  Follow-up  appointment scheduled on to follow up in 4-6 wks..  Chart routed to referring provider.    Ongoing plan for education and support: Follow-up visit with diabetes educator in     Theresa Gonzalez RN/ДМИТРИЙ Hernandez Diabetes Educator    Time Spent: 60 minutes  Encounter Type: Individual    Any diabetes medication dose changes were made via the CDE Protocol and Collaborative Practice Agreement with the patient's primary care provider. A copy of this encounter was shared with the provider.

## 2017-08-02 NOTE — MR AVS SNAPSHOT
After Visit Summary   8/2/2017    Asmita Jerez    MRN: [id number]           Patient Information     Date Of Birth          1955        Visit Information        Provider Department      8/2/2017 1:00 PM  DIABETIC ED RESOURCE [facility]        Today's Diagnoses     Type 2 diabetes mellitus without complication, without long-term current use of insulin (H)    -  1      Care Instructions    My Diabetes Care Goals:    Healthy Eating: Make sure protein with all meals.  Have about 45-60 gms of carbs each meal.  2-3 snacks per day, have about 15-30 gms each snack.      Being Active: Try to get in at least 10 minutes each day of power walking.    Monitoring: check in the morning () 2 hrs after a dinner.  ( <180)    Taking Medication: Metformin  mg take 1 pill with dinner and 1 pill with breakfast for 4 days.  If tolerating then increase to 2 pills with breakfast and 2 pills with dinner.      Follow up:  Follow-up diabetes education appointment for 4-6 wks in [facility], only here on Wed.       Bring blood glucose meter and logbook with you to all doctor and follow-up appointments.     [facility] for the [facility]:  For Your Diabetes Education and Nutrition Appointments Call:  [phone]   For Diabetes Education or Nutrition Related Questions:   Phone: [phone]  E-mail: [email or web address]  Fax: [phone]   If you need a medication refill please contact your pharmacy. Please allow 3 business days for your refills to be completed.    Instructions for emailing the Diabetes Educators    If you need to communicate a non-urgent message to a Diabetes Educator via email, please send to [email or web address].    Please follow the following email guidelines:    Subject line: Secure: your clinic name (example: Secure: [facility])  In the email please include: First name, middle initial, last name and date of  "birth.    We will be in touch with you within one (1) business day.             Follow-ups after your visit        Who to contact     If you have questions or need follow up information about today's clinic visit or your schedule please contact [facility] directly at [phone].  Normal or non-critical lab and imaging results will be communicated to you by MyChart, letter or phone within 4 business days after the clinic has received the results. If you do not hear from us within 7 days, please contact the clinic through MyChart or phone. If you have a critical or abnormal lab result, we will notify you by phone as soon as possible.  Submit refill requests through [employer] or call your pharmacy and they will forward the refill request to us. Please allow 3 business days for your refill to be completed.          Additional Information About Your Visit        MyChart Information     [employer] lets you send messages to your doctor, view your test results, renew your prescriptions, schedule appointments and more. To sign up, go to [email or web address] . Click on \"Log in\" on the left side of the screen, which will take you to the Welcome page. Then click on \"Sign up Now\" on the right side of the page.     You will be asked to enter the access code listed below, as well as some personal information. Please follow the directions to create your username and password.     Your access code is: [email or web address]  Expires: 10/5/2017 11:47 AM     Your access code will  in 90 days. If you need help or a new code, please call your [facility] clinic or [phone].        Care EveryWhere ID     This is your Care EveryWhere ID. This could be used by other organizations to access your [facility] medical records  [id number]        Your Vitals Were     Last Period                   2009            Blood Pressure from Last 3 Encounters:   17 135/68   17 124/78   02/15/17 139/75    Weight from Last 3 " Encounters:   07/25/17 62.6 kg (138 lb)   07/07/17 62.1 kg (137 lb)   02/15/17 62.1 kg (137 lb)              Today, you had the following     No orders found for display         Today's Medication Changes          These changes are accurate as of: 8/2/17  2:13 PM.  If you have any questions, ask your nurse or doctor.               Start taking these medicines.        Dose/Directions    metFORMIN 500 MG 24 hr tablet   Commonly known as:  GLUCOPHAGE-XR   Used for:  Type 2 diabetes mellitus without complication, without long-term current use of insulin (H)   Replaces:  metFORMIN 500 MG tablet        Dose:  1000 mg   Take 2 tablets (1,000 mg) by mouth 2 times daily (with meals)   Quantity:  360 tablet   Refills:  3         Stop taking these medicines if you haven't already. Please contact your care team if you have questions.     metFORMIN 500 MG tablet   Commonly known as:  GLUCOPHAGE   Replaced by:  metFORMIN 500 MG 24 hr tablet                Where to get your medicines      These medications were sent to [place] [facility] - [place] - [address]  [address], [place] [address]    Hours:  [facility] Phone:  [phone]     metFORMIN 500 MG 24 hr tablet                Primary Care Provider Office Phone # Fax #    Carlos Tobias iWlliamson [phone]       [address] [address]        Equal Access to Services     [facility]: Hadii monica barbosa hadpérezo Solambert, waaxda luqadaha, qaybta kaalmada adeegyada, jelani valenzuela . So [facility] [phone].    ATENCIÓN: Si habla español, tiene a garcia disposición servicios gratuitos de asistencia lingüística. Llame al [phone].    We comply with applicable federal civil rights laws and [place] laws. We do not discriminate on the basis of race, color, national origin, age, disability sex, sexual orientation or gender identity.            Thank you!      Thank you for choosing [facility]  for your care. Our goal is always to provide you with excellent care. Hearing back from our patients is one way we can continue to improve our services. Please take a few minutes to complete the written survey that you may receive in the mail after your visit with us. Thank you!             Your Updated Medication List - Protect others around you: Learn how to safely use, store and throw away your medicines at www.disposemymeds.org.          This list is accurate as of: 8/2/17  2:13 PM.  Always use your most recent med list.                   Brand Name Dispense Instructions for use Diagnosis    aspirin 81 MG tablet      1 TABLET DAILY        atorvastatin 40 MG tablet    LIPITOR    90 tablet    Take 1 tablet (40 mg) by mouth daily    CARDIOVASCULAR SCREENING; LDL GOAL LESS THAN 100       blood glucose monitoring meter device kit     1 kit    Use to test blood sugars 1 time daily or as directed. To match strips, what Ins will cover.    Type 2 diabetes, HbA1c goal < 7% (H)       blood glucose monitoring test strip    no brand specified    1 Box    1 strip by In Vitro route daily *Brand per patient insurance coverage* Asking for Accu chek?    Type 2 diabetes mellitus with hyperglycemia, without long-term current use of insulin (H)       CENTRUM SILVER per tablet      1 TABLET DAILY        * DIABETIC STERILE LANCETS device     1 Box    1 Device daily.        * blood glucose monitoring lancets     1 Box    Use to test blood sugar 1 time daily or as directed. Accu chek, Ins will cover, match machine.    Type 2 diabetes, HbA1c goal < 7% (H)       lisinopril 10 MG tablet    PRINIVIL/ZESTRIL    90 tablet    Take 1 tablet (10 mg) by mouth daily    Hypertension, goal below 140/90       metFORMIN 500 MG 24 hr tablet    GLUCOPHAGE-XR    360 tablet    Take 2 tablets (1,000 mg) by mouth 2 times daily (with meals)    Type 2 diabetes mellitus without complication, without  long-term current use of insulin (H)       * order for DME     1 each    Equipment being ordered: glucometer as per insurance formulary    Type 2 diabetes, HbA1c goal < 7% (H)       * order for DME     1 Box    Equipment being ordered: test strips as per insurance formulary    Type 2 diabetes mellitus without complication (H)       * Notice:  This list has 4 medication(s) that are the same as other medications prescribed for you. Read the directions carefully, and ask your doctor or other care provider to review them with you.

## 2017-08-02 NOTE — TELEPHONE ENCOUNTER
Met with Asmita today.  She came in quite upset with being here.  She was so worried about taking insulin.  At this time, would like to see if she can change her diet, some life style, and switch up her Metformin to  mg and gradually work up to 2000 mg first.  Then next to take a GLP-1 before insulin.  It was a difficult appointment. With some anger, but I did point out to her that I am here to help, and to be her support to help her make some changes.  We shall see.  I did ask her to call for a follow up appointment. For 4-6 wks time, she needed to check her schedule first.    Any other thoughts or ideas?     Theresa Gonzalez RN/JAYNEE  [place] Diabetes Educator

## 2017-08-02 NOTE — PATIENT INSTRUCTIONS
My Diabetes Care Goals:    Healthy Eating: Make sure protein with all meals.  Have about 45-60 gms of carbs each meal.  2-3 snacks per day, have about 15-30 gms each snack.      Being Active: Try to get in at least 10 minutes each day of power walking.    Monitoring: check in the morning () 2 hrs after a dinner.  ( <180)    Taking Medication: Metformin  mg take 1 pill with dinner and 1 pill with breakfast for 4 days.  If tolerating then increase to 2 pills with breakfast and 2 pills with dinner.      Follow up:  Follow-up diabetes education appointment for 4-6 wks in [facility], only here on Wed.       Bring blood glucose meter and logbook with you to all doctor and follow-up appointments.     [facility] for the [facility]:  For Your Diabetes Education and Nutrition Appointments Call:  [phone]   For Diabetes Education or Nutrition Related Questions:   Phone: [phone]  E-mail: [email or web address]  Fax: [phone]   If you need a medication refill please contact your pharmacy. Please allow 3 business days for your refills to be completed.    Instructions for emailing the Diabetes Educators    If you need to communicate a non-urgent message to a Diabetes Educator via email, please send to [email or web address].    Please follow the following email guidelines:    Subject line: Secure: your clinic name (example: Secure: [facility])  In the email please include: First name, middle initial, last name and date of birth.    We will be in touch with you within one (1) business day.

## 2018-01-10 ENCOUNTER — TELEPHONE (OUTPATIENT)
Dept: FAMILY MEDICINE | Facility: CLINIC | Age: 63
End: 2018-01-10

## 2018-01-10 NOTE — TELEPHONE ENCOUNTER
Please call patient, you may not of had the best experience with the diabetic educator at the [facility].  Would you be interested in seeing a MTM pharmacist at [facility] to help manage her medications?

## 2018-01-10 NOTE — TELEPHONE ENCOUNTER
Panel Management Review      Patient has the following on her problem list:     Diabetes    ASA: Passed    Last A1C  Lab Results   Component Value Date    A1C 11.0 07/07/2017    A1C 8.9 02/15/2017    A1C 8.7 05/25/2016    A1C 9.4 11/18/2015    A1C 8.8 04/29/2015     A1C tested: MONITOR    Last LDL:    Lab Results   Component Value Date    CHOL 117 02/15/2017     Lab Results   Component Value Date    HDL 41 02/15/2017     Lab Results   Component Value Date    LDL 56 02/15/2017     Lab Results   Component Value Date    TRIG 99 02/15/2017     Lab Results   Component Value Date    CHOLHDLRATIO 2.5 04/29/2015     Lab Results   Component Value Date    NHDL 76 02/15/2017       Is the patient on a Statin? YES             Is the patient on Aspirin? YES    Medications     HMG CoA Reductase Inhibitors    atorvastatin (LIPITOR) 40 MG tablet    Salicylates    ASPIRIN 81 MG OR TABS          Last three blood pressure readings:  BP Readings from Last 3 Encounters:   07/25/17 135/68   07/07/17 124/78   02/15/17 139/75       Date of last diabetes office visit: 7/25/17     Tobacco History:     History   Smoking Status     Current Every Day Smoker     Packs/day: 1.00     Years: 40.00     Types: Cigarettes   Smokeless Tobacco     Never Used     Comment: pt not interested in quit plan 4/22/09         Hypertension   Last three blood pressure readings:  BP Readings from Last 3 Encounters:   07/25/17 135/68   07/07/17 124/78   02/15/17 139/75     Blood pressure: MONITOR    HTN Guidelines:  Age 18-59 BP range:  Less than 140/90  Age 60-85 with Diabetes:  Less than 140/90  Age 60-85 without Diabetes:  less than 150/90          Composite cancer screening  Chart review shows that this patient is due/due soon for the following None  Summary:    Patient is due/failing the following:   Wants to know is she would be interested in Medication Therapy Management Pharmacist at [facility] to help manage her medications the phone #  is [phone]    Action needed:   Wants to know is she would be interested in Medication Therapy Management Pharmacist at [facility] to help manage her medications the phone # is [phone]    Type of outreach:    Phone, left message for patient to call back.     Questions for provider review:    None                                                                                                                                    Mary Kapoor CMA

## 2018-02-19 DIAGNOSIS — I10 HYPERTENSION, GOAL BELOW 140/90: ICD-10-CM

## 2018-02-19 NOTE — TELEPHONE ENCOUNTER
"Requested Prescriptions   Pending Prescriptions Disp Refills     lisinopril (PRINIVIL/ZESTRIL) 10 MG tablet  Last Written Prescription Date:  02/15/2017  Last Fill Quantity: 90,  # refills: 3   Last office visit: 7/25/2017 with prescribing provider:  CRISTIAN Williamson   Future Office Visit:     90 tablet 3     Sig: Take 1 tablet (10 mg) by mouth daily    ACE Inhibitors (Including Combos) Protocol Failed    2/19/2018  2:49 PM       Failed - Normal serum creatinine on file in past 12 months    Recent Labs   Lab Test  02/15/17   1308   CR  0.70            Failed - Normal serum potassium on file in past 12 months    Recent Labs   Lab Test  02/15/17   1308   POTASSIUM  4.4            Passed - Blood pressure under 140/90 in past 12 months    BP Readings from Last 3 Encounters:   07/25/17 135/68   07/07/17 124/78   02/15/17 139/75                Passed - Recent or future visit with authorizing provider's specialty    Patient had office visit in the last year or has a visit in the next 30 days with authorizing provider.  See \"Patient Info\" tab in inbasket, or \"Choose Columns\" in Meds & Orders section of the refill encounter.            Passed - Patient is age 18 or older       Passed - No active pregnancy on record       Passed - No positive pregnancy test in past 12 months        Maury KAUR (R)    "

## 2018-02-22 NOTE — TELEPHONE ENCOUNTER
Routing refill request to provider for review/approval because:  Labs not current:  CR and K+    Thank you  Violeta FINLEY RN

## 2018-02-23 RX ORDER — LISINOPRIL 10 MG/1
10 TABLET ORAL DAILY
Qty: 90 TABLET | Refills: 1 | Status: SHIPPED | OUTPATIENT
Start: 2018-02-23 | End: 2018-03-23

## 2018-03-12 ENCOUNTER — TELEPHONE (OUTPATIENT)
Dept: FAMILY MEDICINE | Facility: CLINIC | Age: 63
End: 2018-03-12

## 2018-03-12 DIAGNOSIS — Z13.6 CARDIOVASCULAR SCREENING; LDL GOAL LESS THAN 100: ICD-10-CM

## 2018-03-12 RX ORDER — ATORVASTATIN CALCIUM 40 MG/1
40 TABLET, FILM COATED ORAL DAILY
Qty: 90 TABLET | Refills: 0 | Status: SHIPPED | OUTPATIENT
Start: 2018-03-12 | End: 2018-03-23

## 2018-03-12 NOTE — TELEPHONE ENCOUNTER
Prescription approved per [facility] Refill Protocol.  Refill given to cover until appt on 3/23/18.  Kpavelrn

## 2018-03-12 NOTE — TELEPHONE ENCOUNTER
Reason for Call:  Medication or medication refill:    Do you use a [facility]?  Name of the pharmacy and phone number for the current request:  [facility] [phone]    Name of the medication requested: Atorvastatin    Other request: Pt is calling for a refill.  She is unable to make an appt until the end of the month.  She has 3 tablets left.    Can we leave a detailed message on this number? YES    Phone number patient can be reached at: Home number on file [phone] (home)    Best Time: any    Call taken on 3/12/2018 at 9:35 AM by Luzma Anaya

## 2018-03-23 ENCOUNTER — HOSPITAL ENCOUNTER (OUTPATIENT)
Dept: MAMMOGRAPHY | Facility: CLINIC | Age: 63
Discharge: HOME | End: 2018-03-23
Attending: FAMILY MEDICINE | Admitting: FAMILY MEDICINE
Payer: COMMERCIAL

## 2018-03-23 ENCOUNTER — OFFICE VISIT (OUTPATIENT)
Dept: FAMILY MEDICINE | Facility: CLINIC | Age: 63
End: 2018-03-23
Payer: COMMERCIAL

## 2018-03-23 VITALS
TEMPERATURE: 97.4 F | OXYGEN SATURATION: 99 % | BODY MASS INDEX: 22.71 KG/M2 | WEIGHT: 133 LBS | SYSTOLIC BLOOD PRESSURE: 110 MMHG | DIASTOLIC BLOOD PRESSURE: 72 MMHG | HEIGHT: 64 IN | HEART RATE: 112 BPM | RESPIRATION RATE: 12 BRPM

## 2018-03-23 DIAGNOSIS — E11.9 TYPE 2 DIABETES MELLITUS WITHOUT COMPLICATION, WITHOUT LONG-TERM CURRENT USE OF INSULIN (H): ICD-10-CM

## 2018-03-23 DIAGNOSIS — Z12.31 ENCOUNTER FOR SCREENING MAMMOGRAM FOR BREAST CANCER: ICD-10-CM

## 2018-03-23 DIAGNOSIS — Z00.00 ROUTINE GENERAL MEDICAL EXAMINATION AT A HEALTH CARE FACILITY: Primary | ICD-10-CM

## 2018-03-23 DIAGNOSIS — Z13.6 CARDIOVASCULAR SCREENING; LDL GOAL LESS THAN 100: ICD-10-CM

## 2018-03-23 DIAGNOSIS — I10 HYPERTENSION, GOAL BELOW 140/90: ICD-10-CM

## 2018-03-23 DIAGNOSIS — R87.619 ABNORMAL CERVICAL PAPANICOLAOU SMEAR, UNSPECIFIED ABNORMAL PAP FINDING: ICD-10-CM

## 2018-03-23 LAB
ANION GAP SERPL CALCULATED.3IONS-SCNC: 7 MMOL/L (ref 3–14)
BUN SERPL-MCNC: 18 MG/DL (ref 7–30)
CALCIUM SERPL-MCNC: 9.8 MG/DL (ref 8.5–10.1)
CHLORIDE SERPL-SCNC: 102 MMOL/L (ref 94–109)
CHOLEST SERPL-MCNC: 110 MG/DL
CO2 SERPL-SCNC: 27 MMOL/L (ref 20–32)
CREAT SERPL-MCNC: 0.64 MG/DL (ref 0.52–1.04)
GFR SERPL CREATININE-BSD FRML MDRD: >90 ML/MIN/1.7M2
GLUCOSE SERPL-MCNC: 391 MG/DL (ref 70–99)
HBA1C MFR BLD: 14.1 % (ref 4.3–6)
HDLC SERPL-MCNC: 36 MG/DL
LDLC SERPL CALC-MCNC: 43 MG/DL
NONHDLC SERPL-MCNC: 74 MG/DL
POTASSIUM SERPL-SCNC: 4.5 MMOL/L (ref 3.4–5.3)
SODIUM SERPL-SCNC: 136 MMOL/L (ref 133–144)
TRIGL SERPL-MCNC: 154 MG/DL

## 2018-03-23 PROCEDURE — G0145 SCR C/V CYTO,THINLAYER,RESCR: HCPCS | Performed by: FAMILY MEDICINE

## 2018-03-23 PROCEDURE — 87624 HPV HI-RISK TYP POOLED RSLT: CPT | Performed by: FAMILY MEDICINE

## 2018-03-23 PROCEDURE — 80061 LIPID PANEL: CPT | Performed by: FAMILY MEDICINE

## 2018-03-23 PROCEDURE — 77067 SCR MAMMO BI INCL CAD: CPT

## 2018-03-23 PROCEDURE — 80048 BASIC METABOLIC PNL TOTAL CA: CPT | Performed by: FAMILY MEDICINE

## 2018-03-23 PROCEDURE — 99396 PREV VISIT EST AGE 40-64: CPT | Performed by: FAMILY MEDICINE

## 2018-03-23 PROCEDURE — 36415 COLL VENOUS BLD VENIPUNCTURE: CPT | Performed by: FAMILY MEDICINE

## 2018-03-23 PROCEDURE — 83036 HEMOGLOBIN GLYCOSYLATED A1C: CPT | Performed by: FAMILY MEDICINE

## 2018-03-23 RX ORDER — LISINOPRIL 10 MG/1
10 TABLET ORAL DAILY
Qty: 90 TABLET | Refills: 3 | Status: SHIPPED | OUTPATIENT
Start: 2018-03-23 | End: 2019-04-15

## 2018-03-23 RX ORDER — ATORVASTATIN CALCIUM 40 MG/1
40 TABLET, FILM COATED ORAL DAILY
Qty: 90 TABLET | Refills: 3 | Status: SHIPPED | OUTPATIENT
Start: 2018-03-23 | End: 2019-04-15

## 2018-03-23 RX ORDER — METFORMIN HCL 500 MG
1000 TABLET, EXTENDED RELEASE 24 HR ORAL 2 TIMES DAILY WITH MEALS
Qty: 360 TABLET | Refills: 3 | Status: SHIPPED | OUTPATIENT
Start: 2018-03-23 | End: 2019-04-15

## 2018-03-23 ASSESSMENT — PAIN SCALES - GENERAL: PAINLEVEL: NO PAIN (0)

## 2018-03-23 NOTE — MR AVS SNAPSHOT
After Visit Summary   3/23/2018    sAmita Jerez    MRN: [id number]           Patient Information     Date Of Birth          1955        Visit Information        Provider Department      3/23/2018 9:20 AM Carlos Williamson MD [facility]        Today's Diagnoses     Abnormal cervical Papanicolaou smear, unspecified abnormal pap finding    -  1    CARDIOVASCULAR SCREENING; LDL GOAL LESS THAN 100        Hypertension, goal below 140/90        Type 2 diabetes mellitus without complication, without long-term current use of insulin (H)        Encounter for screening mammogram for breast cancer          Care Instructions      Preventive Health Recommendations  Female Ages 50 - 64    Yearly exam: See your health care provider every year in order to  o Review health changes.   o Discuss preventive care.    o Review your medicines if your doctor has prescribed any.      Get a Pap test every three years (unless you have an abnormal result and your provider advises testing more often).    If you get Pap tests with HPV test, you only need to test every 5 years, unless you have an abnormal result.     You do not need a Pap test if your uterus was removed (hysterectomy) and you have not had cancer.    You should be tested each year for STDs (sexually transmitted diseases) if you're at risk.     Have a mammogram every 1 to 2 years.    Have a colonoscopy at age 50, or have a yearly FIT test (stool test). These exams screen for colon cancer.      Have a cholesterol test every 5 years, or more often if advised.    Have a diabetes test (fasting glucose) every three years. If you are at risk for diabetes, you should have this test more often.     If you are at risk for osteoporosis (brittle bone disease), think about having a bone density scan (DEXA).    Shots: Get a flu shot each year. Get a tetanus shot every 10 years.    Nutrition:     Eat at least 5 servings of fruits and vegetables each  day.    Eat whole-grain bread, whole-wheat pasta and brown rice instead of white grains and rice.    Talk to your provider about Calcium and Vitamin D.     Lifestyle    Exercise at least 150 minutes a week (30 minutes a day, 5 days a week). This will help you control your weight and prevent disease.    Limit alcohol to one drink per day.    No smoking.     Wear sunscreen to prevent skin cancer.     See your dentist every six months for an exam and cleaning.    See your eye doctor every 1 to 2 years.            Follow-ups after your visit        Additional Services     DIABETES EDUCATOR REFERRAL       DIABETES SELF MANAGEMENT TRAINING (DSMT)      Theresa Gonzalez, Please start some kind of long acting insulin, no other meds than this please    Your provider has referred you to Diabetes Education: FMG: Diabetes Education - All [facility] [phone]   [email or web address]     If an urgent visit is needed or A1C is above 12, Care Team to call the Diabetes  Education Team at [phone] or send an In Basket message to the Diabetes Education Pool (P DIAB ED-PATIENT CARE).    A  will call you to make your appointment. If it has been more than 3 business days since your referral was placed, please call the above phone number to schedule.    Type of training and number of hours:     Medicare covers: 10 hours of initial DSMT in 12 month period from the time of first visit, plus 2 hours of follow-up DSMT annually, and additional hours as requested for insulin training.    Diabetes Type: Type 2 - On Oral Medication             Diabetes Co-Morbidities: none               A1C Goal:  <8.0       A1C is: Lab Results       Component                Value               Date                       A1C                      14.1                03/23/2018              Diabetes Education Topics:     Special Educational Needs Requiring Individual DSMT:        MEDICAL NUTRITION  THERAPY (MNT) for Diabetes    Medical Nutrition Therapy with a Registered Dietitian can be provided in coordination with Diabetes Self-Management Training to assist in achieving optimal diabetes management.    MNT Type and Hours:                        Medicare will cover: 3 hours initial MNT in 12 month period after first visit, plus 2 hours of follow-up MNT annually    Please be aware that coverage of these services is subject to the terms and limitations of your health insurance plan.  Call member services at your health plan to determine Diabetes Self-Management Training (Codes  &amp; ) and Medical Nutrition Therapy (Codes 63818 & 88789) benefits and ask which blood glucose monitor brands are covered by your plan.  Please bring the following with you to your appointment:    (1)  List of current medications   (2)  List of Blood Glucose Monitor brands that are covered by your insurance plan  (3)  Blood Glucose Monitor and log book  (4)   Food records for the 3 days prior to your visit    The Certified Diabetes Educator may make diabetes medication adjustments per the CDE Protocol and Collaborative Practice Agreement.                  Your next 10 appointments already scheduled     Mar 23, 2018 12:30 PM CDT   (Arrive by 12:15 PM)   MA SCREENING DIGITAL BILATERAL with [place]MA2   [facility] ([facility])    [address] [address] [phone]           Do not use any powder, lotion or deodorant under your arms or on your breast. If you do, we will ask you to remove it before your exam.  Wear comfortable, two-piece clothing.  If you have any allergies, tell your care team.  Bring any previous mammograms from other facilities or have them mailed to the breast center. Three-dimensional (3D) mammograms are available at [place] locations in [facility], [facility], [facility], [facility], and [place]. [facility] locations include [place]  "[facility] in [place]. Benefits of 3D mammograms include: - Improved rate of cancer detection - Decreases your chance of having to go back for more tests, which means fewer: - \"False-positive\" results (This means that there is an abnormal area but it isn't cancer.) - Invasive testing procedures, such as a biopsy or surgery - Can provide clearer images of the breast if you have dense breast tissue. 3D mammography is an optional exam that anyone can have with a 2D mammogram. It doesn't replace or take the place of a 2D mammogram. 2D mammograms remain an effective screening test for all women.  Not all insurance companies cover the cost of a 3D mammogram. Check with your insurance.              Future tests that were ordered for you today     Open Future Orders        Priority Expected Expires Ordered    *MA Screening Digital Bilateral Routine  3/23/2019 3/23/2018            Who to contact     If you have questions or need follow up information about today's clinic visit or your schedule please contact [facility] directly at [phone].  Normal or non-critical lab and imaging results will be communicated to you by [employer], letter or phone within 4 business days after the clinic has received the results. If you do not hear from us within 7 days, please contact the clinic through [employer] or phone. If you have a critical or abnormal lab result, we will notify you by phone as soon as possible.  Submit refill requests through [employer] or call your pharmacy and they will forward the refill request to us. Please allow 3 business days for your refill to be completed.          Additional Information About Your Visit        [employer] Information     Rad[employer] Watch lets you send messages to your doctor, view your test results, renew your prescriptions, schedule appointments and more. To sign up, go to [email or web address] . Click on \"Log in\" on the left side of the screen, which will take " "you to the Welcome page. Then click on \"Sign up Now\" on the right side of the page.     You will be asked to enter the access code listed below, as well as some personal information. Please follow the directions to create your username and password.     Your access code is: [email or web address]  Expires: 2018 10:30 AM     Your access code will  in 90 days. If you need help or a new code, please call your [facility] clinic or [phone].        Care EveryWhere ID     This is your Care EveryWhere ID. This could be used by other organizations to access your [facility] medical records  [id number]        Your Vitals Were     Pulse Temperature Respirations Height Last Period Pulse Oximetry    112 97.4  F (36.3  C) (Tympanic) 12 5' 3.5\" (1.613 m) 2009 99%    Breastfeeding? BMI (Body Mass Index)                No 23.19 kg/m2           Blood Pressure from Last 3 Encounters:   18 110/72   17 135/68   17 124/78    Weight from Last 3 Encounters:   18 133 lb (60.3 kg)   17 138 lb (62.6 kg)   17 137 lb (62.1 kg)              We Performed the Following     Albumin Random Urine Quantitative with Creat Ratio     Basic metabolic panel  (Ca, Cl, CO2, Creat, Gluc, K, Na, BUN)     DIABETES EDUCATOR REFERRAL     Hemoglobin A1c     HPV High Risk Types DNA Cervical     Lipid panel reflex to direct LDL Non-fasting     Pap imaged thin layer screen with HPV - recommended age 30 - 65          Where to get your medicines      These medications were sent to [facility] [place] - [address]  [address], [place] [address]    Hours:  [facility] Phone:  [phone]     atorvastatin 40 MG tablet    lisinopril 10 MG tablet    metFORMIN 500 MG 24 hr tablet          Primary Care Provider Office Phone # Fax #    Carlos Williamson [phone] [address] [facility] [address]        Equal Access to Services     [facility] " GAAR : Hadii aad ku mendoza Bonds, waaxda luqadaha, qaybta kaalmada adewilliam, waxchris neville hayrubina garzaaryanlizett valenzuela . So [facility] [phone].    ATENCIÓN: Si habla español, tiene a garcia disposición servicios gratuitos de asistencia lingüística. Llame al [phone].    We comply with applicable federal civil rights laws and [place] laws. We do not discriminate on the basis of race, color, national origin, age, disability, sex, sexual orientation, or gender identity.            Thank you!     Thank you for choosing [facility]  for your care. Our goal is always to provide you with excellent care. Hearing back from our patients is one way we can continue to improve our services. Please take a few minutes to complete the written survey that you may receive in the mail after your visit with us. Thank you!             Your Updated Medication List - Protect others around you: Learn how to safely use, store and throw away your medicines at www.disposemymeds.org.          This list is accurate as of 3/23/18 10:30 AM.  Always use your most recent med list.                   Brand Name Dispense Instructions for use Diagnosis    aspirin 81 MG tablet      1 TABLET DAILY        atorvastatin 40 MG tablet    LIPITOR    90 tablet    Take 1 tablet (40 mg) by mouth daily    CARDIOVASCULAR SCREENING; LDL GOAL LESS THAN 100       blood glucose monitoring meter device kit     1 kit    Use to test blood sugars 1 time daily or as directed. To match strips, what Ins will cover.    Type 2 diabetes, HbA1c goal < 7% (H)       blood glucose monitoring test strip    no brand specified    1 Box    1 strip by In Vitro route daily *Brand per patient insurance coverage* Asking for Accu chek?    Type 2 diabetes mellitus with hyperglycemia, without long-term current use of insulin (H)       CENTRUM SILVER per tablet      1 TABLET DAILY        * DIABETIC STERILE LANCETS device     1 Box    1 Device daily.        * blood  glucose monitoring lancets     1 Box    Use to test blood sugar 1 time daily or as directed. Accu chek, Ins will cover, match machine.    Type 2 diabetes, HbA1c goal < 7% (H)       lisinopril 10 MG tablet    PRINIVIL/ZESTRIL    90 tablet    Take 1 tablet (10 mg) by mouth daily    Hypertension, goal below 140/90       metFORMIN 500 MG 24 hr tablet    GLUCOPHAGE-XR    360 tablet    Take 2 tablets (1,000 mg) by mouth 2 times daily (with meals)    Type 2 diabetes mellitus without complication, without long-term current use of insulin (H)       * order for DME     1 each    Equipment being ordered: glucometer as per insurance formulary    Type 2 diabetes, HbA1c goal < 7% (H)       * order for DME     1 Box    Equipment being ordered: test strips as per insurance formulary    Type 2 diabetes mellitus without complication (H)       * Notice:  This list has 4 medication(s) that are the same as other medications prescribed for you. Read the directions carefully, and ask your doctor or other care provider to review them with you.

## 2018-03-23 NOTE — LETTER
[facility]  [address]  [place] [address]  Phone: [phone]      3/26/2018     Asmita Jerez  [address]  [place] [address]      Dear Asmita:    Thank you for allowing me to participate in your care. Your recent test results were reviewed and listed below.      Blood sugar was 391. Bad cholesterol or LDL cholesterol was excellent at 43. Remainder of labs are normal.     Your results are provided below for your review  Results for orders placed or performed in visit on 03/23/18   Basic metabolic panel  (Ca, Cl, CO2, Creat, Gluc, K, Na, BUN)   Result Value Ref Range    Sodium 136 133 - 144 mmol/L    Potassium 4.5 3.4 - 5.3 mmol/L    Chloride 102 94 - 109 mmol/L    Carbon Dioxide 27 20 - 32 mmol/L    Anion Gap 7 3 - 14 mmol/L    Glucose 391 (H) 70 - 99 mg/dL    Urea Nitrogen 18 7 - 30 mg/dL    Creatinine 0.64 0.52 - 1.04 mg/dL    GFR Estimate >90 >60 mL/min/1.7m2    GFR Estimate If Black >90 >60 mL/min/1.7m2    Calcium 9.8 8.5 - 10.1 mg/dL   Lipid panel reflex to direct LDL Non-fasting   Result Value Ref Range    Cholesterol 110 <200 mg/dL    Triglycerides 154 (H) <150 mg/dL    HDL Cholesterol 36 (L) >49 mg/dL    LDL Cholesterol Calculated 43 <100 mg/dL    Non HDL Cholesterol 74 <130 mg/dL   Hemoglobin A1c   Result Value Ref Range    Hemoglobin A1C 14.1 (H) 4.3 - 6.0 %                     Thank you for choosing [facility]. As a result, please continue with the treatment plan discussed in the office. Return as discussed or sooner if symptoms worsen or fail to improve. If you have any further questions or concerns, please do not hesitate to contact us.      Sincerely,        Dr. Carlos Williamson/[facility]

## 2018-03-23 NOTE — PROGRESS NOTES
SUBJECTIVE:   CC: Asmita Jerez is an 62 year old woman who presents for preventive health visit.     Diabetes: Hemoglobin A1c is 14.1.  In the past she has refused to start Lantus insulin.  It sounds like she is willing to see the diabetes educator and start on a long-acting insulin.  Recheck hemoglobin A1c in 3 months.    Healthy Habits:    Do you get at least three servings of calcium containing foods daily (dairy, green leafy vegetables, etc.)? yes    Amount of exercise or daily activities, outside of work: 3 day(s) per week    Problems taking medications regularly No    Medication side effects: No    Have you had an eye exam in the past two years? yes    Do you see a dentist twice per year? no    Do you have sleep apnea, excessive snoring or daytime drowsiness?no          Today's PHQ-2 Score:   PHQ-2 ( 1999 Pfizer) 3/23/2018 7/25/2017   Q1: Little interest or pleasure in doing things 0 0   Q2: Feeling down, depressed or hopeless 0 0   PHQ-2 Score 0 0       Abuse: Current or Past(Physical, Sexual or Emotional)- No  Do you feel safe in your environment - Yes    Social History   Substance Use Topics     Smoking status: Current Every Day Smoker     Packs/day: 1.00     Years: 40.00     Types: Cigarettes     Smokeless tobacco: Never Used      Comment: pt not interested in quit plan 4/22/09     Alcohol use No     If you drink alcohol do you typically have >3 drinks per day or >7 drinks per week? No                     Reviewed orders with patient.  Reviewed health maintenance and updated orders accordingly - Yes  Labs reviewed in [facility]    Patient over age 50, mutual decision to screen reflected in health maintenance.    Pertinent mammograms are reviewed under the imaging tab.  History of abnormal Pap smear: NO - age 30-65 PAP every 5 years with negative HPV co-testing recommended    Reviewed and updated as needed this visit by clinical staff  Tobacco  Allergies  Med Hx  Surg Hx  Fam Hx  Soc Hx        Reviewed  "and updated as needed this visit by Provider            ROS:  C: NEGATIVE for fever, chills, change in weight  I: NEGATIVE for worrisome rashes, moles or lesions  E: NEGATIVE for vision changes or irritation  ENT: NEGATIVE for ear, mouth and throat problems  R: NEGATIVE for significant cough or SOB  B: NEGATIVE for masses, tenderness or discharge  CV: NEGATIVE for chest pain, palpitations or peripheral edema  GI: NEGATIVE for nausea, abdominal pain, heartburn, or change in bowel habits  : NEGATIVE for unusual urinary or vaginal symptoms. No vaginal bleeding.  M: NEGATIVE for significant arthralgias or myalgia  N: NEGATIVE for weakness, dizziness or paresthesias  P: NEGATIVE for changes in mood or affect     OBJECTIVE:   /72 (BP Location: Right arm, Patient Position: Chair, Cuff Size: Adult Large)  Pulse 112  Temp 97.4  F (36.3  C) (Tympanic)  Resp 12  Ht 5' 3.5\" (1.613 m)  Wt 133 lb (60.3 kg)  LMP 12/01/2009  SpO2 99%  Breastfeeding? No  BMI 23.19 kg/m2  EXAM:  GENERAL: healthy, alert and no distress  EYES: Eyes grossly normal to inspection, PERRL and conjunctivae and sclerae normal  HENT: ear canals and TM's normal, nose and mouth without ulcers or lesions  NECK: no adenopathy, no asymmetry, masses, or scars and thyroid normal to palpation  RESP: lungs clear to auscultation - no rales, rhonchi or wheezes  BREAST: normal without masses, tenderness or nipple discharge and no palpable axillary masses or adenopathy  CV: regular rate and rhythm, normal S1 S2, no S3 or S4, no murmur, click or rub, no peripheral edema and peripheral pulses strong  ABDOMEN: soft, nontender, no hepatosplenomegaly, no masses and bowel sounds normal   (female): normal female external genitalia, normal urethral meatus, vaginal mucosa pink, moist, well rugated, and normal cervix/adnexa/uterus without masses or discharge  MS: no gross musculoskeletal defects noted, no edema  SKIN: no suspicious lesions or rashes  NEURO: Normal " "strength and tone, mentation intact and speech normal  PSYCH: mentation appears normal, affect normal/bright    ASSESSMENT/PLAN:       ICD-10-CM    1. Abnormal cervical Papanicolaou smear, unspecified abnormal pap finding R87.619 Pap imaged thin layer screen with HPV - recommended age 30 - 65     HPV High Risk Types DNA Cervical   2. CARDIOVASCULAR SCREENING; LDL GOAL LESS THAN 100 Z13.6 atorvastatin (LIPITOR) 40 MG tablet     Lipid panel reflex to direct LDL Non-fasting   3. Hypertension, goal below 140/90 I10 lisinopril (PRINIVIL/ZESTRIL) 10 MG tablet     Basic metabolic panel  (Ca, Cl, CO2, Creat, Gluc, K, Na, BUN)   4. Type 2 diabetes mellitus without complication, without long-term current use of insulin (H) E11.9 metFORMIN (GLUCOPHAGE-XR) 500 MG 24 hr tablet     Hemoglobin A1c     DIABETES EDUCATOR REFERRAL     Albumin Random Urine Quantitative with Creat Ratio     CANCELED: Albumin Random Urine Quantitative with Creat Ratio     CANCELED: Albumin Random Urine Quantitative with Creat Ratio   5. Encounter for screening mammogram for breast cancer Z12.31 *MA Screening Digital Bilateral       COUNSELING:   Reviewed preventive health counseling, as reflected in patient instructions       Regular exercise       Healthy diet/nutrition         reports that she has been smoking Cigarettes.  She has a 40.00 pack-year smoking history. She has never used smokeless tobacco.  Tobacco Cessation Action Plan: Information offered: Patient not interested at this time  Estimated body mass index is 23.19 kg/(m^2) as calculated from the following:    Height as of this encounter: 5' 3.5\" (1.613 m).    Weight as of this encounter: 133 lb (60.3 kg).       Counseling Resources:  ATP IV Guidelines  Pooled Cohorts Equation Calculator  Breast Cancer Risk Calculator  FRAX Risk Assessment  ICSI Preventive Guidelines  Dietary Guidelines for Americans, 2010  [employer]'s MyPlate  ASA Prophylaxis  Lung CA Screening    Carlos Williamson, " MD  [facility] [place]

## 2018-03-23 NOTE — LETTER
April 1, 2018      Asmita Jerez  [address] [place] [address]    Dear ,      This letter is in regards to the PAP smear and HPV (Human Papillomavirus) test you had done recently. Your PAP test result is normal, but your HPV (Human Papillomavirus) test was positive.     About 80 percent of women have been exposed to HPV virus throughout their lifetime. There is no medication for the treatment of HPV. Typically your own immune system gets rid of the virus before it does harm. HPV is spread by direct skin-to-skin contact, including sexual intercourse, oral sex, anal sex, or any other contact involving the genital area (example: hand to genital contact). It is not possible to become infected with HPV by touching an object, such as a toilet seat. Most people who are infected with HPV have no signs or symptoms.    Things that you can do to boost your immune system and help your body get rid of HPV: get plenty of rest, eat a well-balanced diet of healthy foods, and stop smoking.     Please return in 1 year to repeat your pap smear and HPV test.     If you have additional questions regarding this result, please call [phone].    Sincerely,      Carlos Williamson MD/maddie

## 2018-03-27 LAB
COPATH REPORT: NORMAL
PAP: NORMAL

## 2018-03-30 LAB
FINAL DIAGNOSIS: ABNORMAL
HPV HR 12 DNA CVX QL NAA+PROBE: POSITIVE
HPV16 DNA SPEC QL NAA+PROBE: NEGATIVE
HPV18 DNA SPEC QL NAA+PROBE: NEGATIVE
SPECIMEN DESCRIPTION: ABNORMAL
SPECIMEN SOURCE CVX/VAG CYTO: ABNORMAL

## 2018-03-31 ENCOUNTER — RESULT FOLLOW UP (OUTPATIENT)
Dept: FAMILY MEDICINE | Facility: CLINIC | Age: 63
End: 2018-03-31

## 2018-03-31 DIAGNOSIS — R87.810 CERVICAL HIGH RISK HPV (HUMAN PAPILLOMAVIRUS) TEST POSITIVE: Primary | ICD-10-CM

## 2018-03-31 NOTE — LETTER
March 8, 2019      Asmita WILKINSON Meri  [address] [address]  [place] [address]    Dear ,      At [facility], your health and wellness is our primary concern. That is why we are following up on a positive high risk HPV test from 3/31/18. Your provider had recommended that you have a Pap smear and HPV test completed by 3/31/19. Our records do not show that this has been scheduled.    It is important to complete the follow up that your provider has suggested for you to ensure that there are no worsening changes which may, over time, develop into cancer.      Please contact our office at  [phone] to schedule an appointment for a Pap smear and HPV test at your earliest convenience. If you have questions or concerns, please call the clinic and we will be happy to assist you.    If you have completed the tests outside of [facility], please have the results forwarded to our office. We will update the chart for your primary Physician to review before your next annual physical.     Thank you for choosing [facility]!    Sincerely,      Carlos Williamson MD/maddie

## 2018-03-31 NOTE — PROGRESS NOTES
3/23/18 Pap: NIL, +HR HPV (Neg 16/18). Plan Pap+HPV in 1 year.  3/8/19 Cotest reminder letter sent ([facility])  4/15/19 NIL pap, neg HPV. Plan: [place]est 3 years ([facility]  04/24/19 Result letter sent at request of RN. (jamarcus)

## 2018-04-18 ENCOUNTER — ALLIED HEALTH/NURSE VISIT (OUTPATIENT)
Dept: EDUCATION SERVICES | Facility: CLINIC | Age: 63
End: 2018-04-18
Payer: COMMERCIAL

## 2018-04-18 DIAGNOSIS — E11.9 TYPE 2 DIABETES MELLITUS WITHOUT COMPLICATION, WITHOUT LONG-TERM CURRENT USE OF INSULIN (H): Primary | ICD-10-CM

## 2018-04-18 DIAGNOSIS — E11.9 DIABETES MELLITUS WITHOUT COMPLICATION (H): ICD-10-CM

## 2018-04-18 PROCEDURE — G0108 DIAB MANAGE TRN  PER INDIV: HCPCS

## 2018-04-18 PROCEDURE — 99207 ZZC DROP WITH A PROCEDURE: CPT

## 2018-04-18 NOTE — LETTER
4/18/2018         RE: Asmita Jerez  [address]  [address]  [place] [address]        Dear Colleague,    Thank you for referring your patient, Asmita Jerez, to the [place] [facility]. Please see a copy of my visit note below.    Diabetes Self Management Training: Follow-up Visit    Asmita Jerez presents today for education, evaluation of glucose control and modification of medication(s) related to Type 2 diabetes.    She is accompanied by self    Patient's diabetes management related comments/concerns: wanting to go on Trulicity.  Patient came to my office very upset and no eye contact.  My appointment before her ran 15 minutes late and this upset her.  She was not happy to be here and said such.  Was to follow up with me in the fall, did not.    Does not check her BG. Says that the plan for managing diabetes does not make sense.  As our appointment continued , she did settle down some and we did have a better relationship and some learning began.      Patient would like this visit to be focused around the following diabetes-related behaviors and goals: Healthy Eating, Being Active, Monitoring and Taking Medication    ASSESSMENT:  Patient Problem List reviewed for relevant medical history and current medical status.    Current Diabetes Management per Patient:  Taking diabetes medications?   yes:     Diabetes Medication(s)     Biguanides Sig    metFORMIN (GLUCOPHAGE-XR) 500 MG 24 hr tablet Take 2 tablets (1,000 mg) by mouth 2 times daily (with meals)      , Oral Medications: Metformin - Dose:  mg takes 1000 mg , Time: breakfast and supper,     *Abbreviated insulin dose documentation key: Insulin Trade Name (nqkmnefox-souer-uegewb-bedtime) - i.e. Humalog 5-5-5-0 (Humalog 5 units at breakfast, 5 units at lunch, and 5 units at dinner).    Patient glucose self monitoring as follows: never. She does not like to test.    BG meter: Accu-Chek Margaret Connect meter  BG results: not available  "    BG values are: unable to assess  Patient's most recent   Lab Results   Component Value Date    A1C 14.1 03/23/2018    is not meeting goal of <7.0    Nutrition:  Patient eats 3 meals per day    Breakfast - english muffin some days, or egg summers and hashbrowns, small amount  Lunch - soups, ritz crackers with apple sauce, PB and jelly sandwich.     Dinner - turkey sandwich with tomatoes and some chips, ice cream.     Snacks - 3 musketeers bar or some grapes.      Beverages: coffee, cream and stevia, green tea and cranberry juice.      Cultural/Anabaptism diet restrictions: No     Biggest Challenge to Healthy Eating: none    Physical Activity:    Type: will try to get out with the weather getting better.      Diabetes Complications:  Not discussed today.    Vitals:  [facility] 12/01/2009  Estimated body mass index is 23.19 kg/(m^2) as calculated from the following:    Height as of 3/23/18: 1.613 m (5' 3.5\").    Weight as of 3/23/18: 60.3 kg (133 lb).   Last 3 BP:   BP Readings from Last 3 Encounters:   03/23/18 110/72   07/25/17 135/68   07/07/17 124/78       History   Smoking Status     Current Every Day Smoker     Packs/day: 1.00     Years: 40.00     Types: Cigarettes   Smokeless Tobacco     Never Used     Comment: pt not interested in quit plan 4/22/09       Labs:  Lab Results   Component Value Date    A1C 14.1 03/23/2018     Lab Results   Component Value Date     03/23/2018     Lab Results   Component Value Date    LDL 43 03/23/2018     HDL Cholesterol   Date Value Ref Range Status   03/23/2018 36 (L) >49 mg/dL Final   ]  GFR Estimate   Date Value Ref Range Status   03/23/2018 >90 >60 mL/min/1.7m2 Final     Comment:     Non  GFR Calc     GFR Estimate If Black   Date Value Ref Range Status   03/23/2018 >90 >60 mL/min/1.7m2 Final     Comment:      GFR Calc     Lab Results   Component Value Date    CR 0.64 03/23/2018     No results found for: MICROALBUMIN    Health Beliefs and " Attitudes:   Patient Activation Measure Survey Score:  SHANNON Score (Last Two) 5/25/2016   SHANNON Raw Score 39   Activation Score 56.4   SHANNON Level 3       Stage of Change: MAINTENANCE (Working to maintain change, with risk of relapse)    Progress toward meeting diabetes-related behavioral goals:    GOALS % Met Goal   Healthy Eating  25   Physical Activity  0   Monitoring  0   Medication Taking  75   Problem Solving     Healthy Coping     Risk Reduction           Diabetes knowledge and skills assessment:     Patient is knowledgeable in diabetes management concepts related to: Healthy Eating, Being Active, Monitoring and Taking Medication    Patient needs further education on the following diabetes management concepts: Healthy Eating, Being Active, Monitoring and Taking Medication    Barriers to Learning Assessment: No Barriers identified    Based on learning assessment above, most appropriate setting for further diabetes education would be: Individual setting.    INTERVENTION:    Education provided today on:  AADE Self-Care Behaviors:  Healthy Eating: carbohydrate counting, consistency in amount, composition, and timing of food intake, weight reduction, heart healthy diet, eating out, portion control, plate planning method and label reading  Being Active: relationship to blood glucose and describe appropriate activity program  Monitoring: purpose, proper technique, log and interpret results, individual blood glucose targets and frequency of monitoring  Taking Medication: action of prescribed medication, drawing up, administering and storing injectable diabetes medications, proper site selection and rotation for injections, side effects of prescribed medications and when to take medications    Opportunities for ongoing education and support in diabetes-self management were discussed.    Pt verbalized understanding of concepts discussed and recommendations provided today.       Education Materials Provided:  [facility]  Understanding Diabetes Booklet, Carbohydrate Counting and Medication Information on GLP-1's    PLAN:  See Patient Instructions for co-developed, patient-stated behavior change goals.  AVS printed and provided to patient today.    FOLLOW-UP:  Follow-up appointment scheduled on May 16.  Chart routed to referring provider.    Ongoing plan for education and support: Follow-up visit with diabetes educator in     Theresa Gonzalez RN/ДМИТРИЙ Hernandez Diabetes Educator    Time Spent: 60 minutes  Encounter Type: Individual    Any diabetes medication dose changes were made via the CDE Protocol and Collaborative Practice Agreement with the patient's primary care provider. A copy of this encounter was shared with the provider.

## 2018-04-18 NOTE — PATIENT INSTRUCTIONS
My Diabetes Care Goals:    Healthy Eating: I will to eat to be able to bring BG down    Being Active: I will power walk at least 10 minutes each day.    Monitoring: I will check every morning.  ().      Taking Medication: I will Take Metformin  mg take 2 pills with breakfast and 2 pills with dinner  Trulicity 0.75 mg weekly  Think about insulin daily while we try to help your pancrease lower your BG.      Follow up:  Follow-up diabetes education appointment scheduled on Wed. May 16 @10:00.      Bring blood glucose meter and logbook with you to all doctor and follow-up appointments.     [facility] the [facility]:  For Your Diabetes Education and Nutrition Appointments Call:  [phone]   For Diabetes Education or Nutrition Related Questions:   Phone: [phone]  E-mail: [email or web address]  Fax: [phone]   If you need a medication refill please contact your pharmacy. Please allow 3 business days for your refills to be completed.    Instructions for emailing the Diabetes Educators    If you need to communicate a non-urgent message to a Diabetes Educator via email, please send to [email or web address].    Please follow the following email guidelines:    Subject line: Secure: your clinic name (example: Secure: [facility])  In the email please include: First name, middle initial, last name and date of birth.    We will be in touch with you within one (1) business day.

## 2018-04-18 NOTE — MR AVS SNAPSHOT
After Visit Summary   4/18/2018    Asmita Jerez    MRN: [id number]           Patient Information     Date Of Birth          1955        Visit Information        Provider Department      4/18/2018 10:00 AM  DIABETIC ED RESOURCE [facility]        Today's Diagnoses     Type 2 diabetes mellitus without complication, without long-term current use of insulin (H)    -  1      Care Instructions    My Diabetes Care Goals:    Healthy Eating: I will to eat to be able to bring BG down    Being Active: I will power walk at least 10 minutes each day.    Monitoring: I will check every morning.  ().      Taking Medication: I will Take Metformin  mg take 2 pills with breakfast and 2 pills with dinner  Trulicity 0.75 mg weekly  Think about insulin daily while we try to help your pancrease lower your BG.      Follow up:  Follow-up diabetes education appointment scheduled on Wed. May 16 @10:00.      Bring blood glucose meter and logbook with you to all doctor and follow-up appointments.     [facility] for the [facility]:  For Your Diabetes Education and Nutrition Appointments Call:  [phone]   For Diabetes Education or Nutrition Related Questions:   Phone: [phone]  E-mail: [email or web address]  Fax: [phone]   If you need a medication refill please contact your pharmacy. Please allow 3 business days for your refills to be completed.    Instructions for emailing the Diabetes Educators    If you need to communicate a non-urgent message to a Diabetes Educator via email, please send to [email or web address].    Please follow the following email guidelines:    Subject line: Secure: your clinic name (example: Secure: [facility])  In the email please include: First name, middle initial, last name and date of birth.    We will be in touch with you within one (1) business day.             Follow-ups after your visit       "  Your next 10 appointments already scheduled     May 16, 2018 10:00 AM CDT   Diabetic Education with CL DIABETIC ED RESOURCE   [facility] ([facility])    [address]  [facility] [address] [phone]              Who to contact     If you have questions or need follow up information about today's clinic visit or your schedule please contact [facility] directly at [phone].  Normal or non-critical lab and imaging results will be communicated to you by [employer], letter or phone within 4 business days after the clinic has received the results. If you do not hear from us within 7 days, please contact the clinic through [employer] or phone. If you have a critical or abnormal lab result, we will notify you by phone as soon as possible.  Submit refill requests through [employer] or call your pharmacy and they will forward the refill request to us. Please allow 3 business days for your refill to be completed.          Additional Information About Your Visit        [employer] Information     [employer] lets you send messages to your doctor, view your test results, renew your prescriptions, schedule appointments and more. To sign up, go to [email or web address] . Click on \"Log in\" on the left side of the screen, which will take you to the Welcome page. Then click on \"Sign up Now\" on the right side of the page.     You will be asked to enter the access code listed below, as well as some personal information. Please follow the directions to create your username and password.     Your access code is: [email or web address]  Expires: 2018 10:30 AM     Your access code will  in 90 days. If you need help or a new code, please call your [facility] clinic or [phone].        Care EveryWhere ID     This is your Care EveryWhere ID. This could be used by other organizations to access your [facility] medical records  [id number]        Your Vitals Were     " Last Period                   12/01/2009            Blood Pressure from Last 3 Encounters:   03/23/18 110/72   07/25/17 135/68   07/07/17 124/78    Weight from Last 3 Encounters:   03/23/18 60.3 kg (133 lb)   07/25/17 62.6 kg (138 lb)   07/07/17 62.1 kg (137 lb)              Today, you had the following     No orders found for display         Today's Medication Changes          These changes are accurate as of 4/18/18 11:07 AM.  If you have any questions, ask your nurse or doctor.               Start taking these medicines.        Dose/Directions    blood glucose monitoring test strip   Commonly known as:  ACCU-CHEK SIDDHARTHA PLUS   Used for:  Type 2 diabetes mellitus without complication, without long-term current use of insulin (H)        Use to test blood sugar 2 times daily or as directed.  Ok to substitute alternative if insurance prefers.   Quantity:  200 strip   Refills:  11            Where to get your medicines      [facility] [address]  [address], [place] [address]    Hours:  [facility] Phone:  [phone]     blood glucose monitoring test strip                Primary Care Provider Office Phone # Fax #    Carlos Tobias Williamson [phone] [address] [address] [address]        Equal Access to Services     ALBAN FARFAN AH: Hadii monica ku hadasho Soomaali, waaxda luqadaha, qaybta kaalmada adeegyada, jelani cross. So [facility] [phone].    ATENCIÓN: Si habla español, tiene a garcia disposición servicios gratuitos de asistencia lingüística. Llame al [phone].    We comply with applicable federal civil rights laws and [place] laws. We do not discriminate on the basis of race, color, national origin, age, disability, sex, sexual orientation, or gender identity.            Thank you!     Thank you for choosing [facility]  for your  care. Our goal is always to provide you with excellent care. Hearing back from our patients is one way we can continue to improve our services. Please take a few minutes to complete the written survey that you may receive in the mail after your visit with us. Thank you!             Your Updated Medication List - Protect others around you: Learn how to safely use, store and throw away your medicines at www.disposemymeds.org.          This list is accurate as of 4/18/18 11:07 AM.  Always use your most recent med list.                   Brand Name Dispense Instructions for use Diagnosis    aspirin 81 MG tablet      1 TABLET DAILY        atorvastatin 40 MG tablet    LIPITOR    90 tablet    Take 1 tablet (40 mg) by mouth daily    CARDIOVASCULAR SCREENING; LDL GOAL LESS THAN 100       blood glucose monitoring meter device kit     1 kit    Use to test blood sugars 1 time daily or as directed. To match strips, what Ins will cover.    Type 2 diabetes, HbA1c goal < 7% (H)       blood glucose monitoring test strip    ACCU-CHEK SIDDHARTHA PLUS    200 strip    Use to test blood sugar 2 times daily or as directed.  Ok to substitute alternative if insurance prefers.    Type 2 diabetes mellitus without complication, without long-term current use of insulin (H)       CENTRUM SILVER per tablet      1 TABLET DAILY        * DIABETIC STERILE LANCETS device     1 Box    1 Device daily.        * blood glucose monitoring lancets     1 Box    Use to test blood sugar 1 time daily or as directed. Accu chek, Ins will cover, match machine.    Type 2 diabetes, HbA1c goal < 7% (H)       lisinopril 10 MG tablet    PRINIVIL/ZESTRIL    90 tablet    Take 1 tablet (10 mg) by mouth daily    Hypertension, goal below 140/90       metFORMIN 500 MG 24 hr tablet    GLUCOPHAGE-XR    360 tablet    Take 2 tablets (1,000 mg) by mouth 2 times daily (with meals)    Type 2 diabetes mellitus without complication, without long-term current use of insulin (H)       *  order for DME     1 each    Equipment being ordered: glucometer as per insurance formulary    Type 2 diabetes, HbA1c goal < 7% (H)       * order for DME     1 Box    Equipment being ordered: test strips as per insurance formulary    Type 2 diabetes mellitus without complication (H)       * Notice:  This list has 4 medication(s) that are the same as other medications prescribed for you. Read the directions carefully, and ask your doctor or other care provider to review them with you.

## 2018-04-18 NOTE — PROGRESS NOTES
Diabetes Self Management Training: Follow-up Visit    Amsita Jerez presents today for education, evaluation of glucose control and modification of medication(s) related to Type 2 diabetes.    She is accompanied by self    Patient's diabetes management related comments/concerns: wanting to go on Trulicity.  Patient came to my office very upset and no eye contact.  My appointment before her ran 15 minutes late and this upset her.  She was not happy to be here and said such.  Was to follow up with me in the fall, did not.    Does not check her BG. Says that the plan for managing diabetes does not make sense.  As our appointment continued , she did settle down some and we did have a better relationship and some learning began.      Patient would like this visit to be focused around the following diabetes-related behaviors and goals: Healthy Eating, Being Active, Monitoring and Taking Medication    ASSESSMENT:  Patient Problem List reviewed for relevant medical history and current medical status.    Current Diabetes Management per Patient:  Taking diabetes medications?   yes:     Diabetes Medication(s)     Biguanides Sig    metFORMIN (GLUCOPHAGE-XR) 500 MG 24 hr tablet Take 2 tablets (1,000 mg) by mouth 2 times daily (with meals)      , Oral Medications: Metformin - Dose:  mg takes 1000 mg , Time: breakfast and supper,     *Abbreviated insulin dose documentation key: Insulin Trade Name (tqonjgonr-umifh-gugzia-bedtime) - i.e. Humalog 5-5-5-0 (Humalog 5 units at breakfast, 5 units at lunch, and 5 units at dinner).    Patient glucose self monitoring as follows: never. She does not like to test.    BG meter: Accu-Chek Margaret Connect meter  BG results: not available     BG values are: unable to assess  Patient's most recent   Lab Results   Component Value Date    A1C 14.1 03/23/2018    is not meeting goal of <7.0    Nutrition:  Patient eats 3 meals per day    Breakfast - english muffin some days, or egg summers and  "hashbrowns, small amount  Lunch - soups, ritz crackers with apple sauce, PB and jelly sandwich.     Dinner - turkey sandwich with tomatoes and some chips, ice cream.     Snacks - 3 musketeers bar or some grapes.      Beverages: coffee, cream and stevia, green tea and cranberry juice.      Cultural/Muslim diet restrictions: No     Biggest Challenge to Healthy Eating: none    Physical Activity:    Type: will try to get out with the weather getting better.      Diabetes Complications:  Not discussed today.    Vitals:  LMP 12/01/2009  Estimated body mass index is 23.19 kg/(m^2) as calculated from the following:    Height as of 3/23/18: 1.613 m (5' 3.5\").    Weight as of 3/23/18: 60.3 kg (133 lb).   Last 3 BP:   BP Readings from Last 3 Encounters:   03/23/18 110/72   07/25/17 135/68   07/07/17 124/78       History   Smoking Status     Current Every Day Smoker     Packs/day: 1.00     Years: 40.00     Types: Cigarettes   Smokeless Tobacco     Never Used     Comment: pt not interested in quit plan 4/22/09       Labs:  Lab Results   Component Value Date    A1C 14.1 03/23/2018     Lab Results   Component Value Date     03/23/2018     Lab Results   Component Value Date    LDL 43 03/23/2018     HDL Cholesterol   Date Value Ref Range Status   03/23/2018 36 (L) >49 mg/dL Final   ]  GFR Estimate   Date Value Ref Range Status   03/23/2018 >90 >60 mL/min/1.7m2 Final     Comment:     Non  GFR Calc     GFR Estimate If Black   Date Value Ref Range Status   03/23/2018 >90 >60 mL/min/1.7m2 Final     Comment:      GFR Calc     Lab Results   Component Value Date    CR 0.64 03/23/2018     No results found for: MICROALBUMIN    Health Beliefs and Attitudes:   Patient Activation Measure Survey Score:  SHANNON Score (Last Two) 5/25/2016   SHANNON Raw Score 39   Activation Score 56.4   SHANNON Level 3       Stage of Change: MAINTENANCE (Working to maintain change, with risk of relapse)    Progress toward meeting " diabetes-related behavioral goals:    GOALS % Met Goal   Healthy Eating  25   Physical Activity  0   Monitoring  0   Medication Taking  75   Problem Solving     Healthy Coping     Risk Reduction           Diabetes knowledge and skills assessment:     Patient is knowledgeable in diabetes management concepts related to: Healthy Eating, Being Active, Monitoring and Taking Medication    Patient needs further education on the following diabetes management concepts: Healthy Eating, Being Active, Monitoring and Taking Medication    Barriers to Learning Assessment: No Barriers identified    Based on learning assessment above, most appropriate setting for further diabetes education would be: Individual setting.    INTERVENTION:    Education provided today on:  AADE Self-Care Behaviors:  Healthy Eating: carbohydrate counting, consistency in amount, composition, and timing of food intake, weight reduction, heart healthy diet, eating out, portion control, plate planning method and label reading  Being Active: relationship to blood glucose and describe appropriate activity program  Monitoring: purpose, proper technique, log and interpret results, individual blood glucose targets and frequency of monitoring  Taking Medication: action of prescribed medication, drawing up, administering and storing injectable diabetes medications, proper site selection and rotation for injections, side effects of prescribed medications and when to take medications    Opportunities for ongoing education and support in diabetes-self management were discussed.    Pt verbalized understanding of concepts discussed and recommendations provided today.       Education Materials Provided:  [place] Understanding Diabetes Booklet, Carbohydrate Counting and Medication Information on GLP-1's    PLAN:  See Patient Instructions for co-developed, patient-stated behavior change goals.  AVS printed and provided to patient today.    FOLLOW-UP:  Follow-up appointment  scheduled on May 16.  Chart routed to referring provider.    Ongoing plan for education and support: Follow-up visit with diabetes educator in     Theresa Gonzalez RN/ДМИТРИЙ Hernandez Diabetes Educator    Time Spent: 60 minutes  Encounter Type: Individual    Any diabetes medication dose changes were made via the CDE Protocol and Collaborative Practice Agreement with the patient's primary care provider. A copy of this encounter was shared with the provider.

## 2018-04-25 ENCOUNTER — TELEPHONE (OUTPATIENT)
Dept: EDUCATION SERVICES | Facility: CLINIC | Age: 63
End: 2018-04-25

## 2018-04-25 NOTE — TELEPHONE ENCOUNTER
Pt is having side affects from Trulicity which she takes on Fridays. She is questioning if she wants to take it this Friday.

## 2018-04-25 NOTE — TELEPHONE ENCOUNTER
Left voice message for patient to call back a time frame in which we can connect to discuss her questions.     Gricel Melendez RD LD CDE

## 2018-04-25 NOTE — TELEPHONE ENCOUNTER
Spoke with patient. Reports some vomiting and diarrhea the day after her first dose of Trulicity, now is experiencing constant stomach pains. Stated that up to 20% of patients will experience these side effects with this drug class, although most of the time it will lessen or resolve over time. Stated that it was up to her to decide if she wants to continue the medication or not depending on the severity of her symptoms. Stated that she will continue the medication this week, maybe trying some Pepto Bismol for her symptoms. Patient will check back in within 3-4 weeks if her symptoms are not improving.    Gricel Melendez RD LD CDE

## 2018-04-26 ENCOUNTER — TELEPHONE (OUTPATIENT)
Dept: EDUCATION SERVICES | Facility: CLINIC | Age: 63
End: 2018-04-26

## 2018-04-26 NOTE — TELEPHONE ENCOUNTER
Second call this week. Pt spoke with ДМИТРИЙ Melendez yesterday re Trulicity. Pt wants to speak with ДМИТРИЙ Gonzalez re. Pt said her BG #'s are considerably lower and is asking if she should take Trulicity on Friday.    Called Asmita back, and had to leave a message on her phone that she can discuss with our triage nurses and they certainly can help out.  I did ask for her numbers and if she had any lows, but based on what she left as a message , she should continue the Trulicity .    Be sure to call back if any issues going further    Theresa Gonzalez RN/ДМИТРИЙ  [place] Diabetes Educator

## 2018-05-16 ENCOUNTER — ALLIED HEALTH/NURSE VISIT (OUTPATIENT)
Dept: EDUCATION SERVICES | Facility: CLINIC | Age: 63
End: 2018-05-16
Payer: COMMERCIAL

## 2018-05-16 DIAGNOSIS — E11.9 DIABETES MELLITUS WITHOUT COMPLICATION (H): ICD-10-CM

## 2018-05-16 DIAGNOSIS — E11.9 TYPE 2 DIABETES MELLITUS WITHOUT COMPLICATION, WITHOUT LONG-TERM CURRENT USE OF INSULIN (H): Primary | ICD-10-CM

## 2018-05-16 PROCEDURE — G0108 DIAB MANAGE TRN  PER INDIV: HCPCS

## 2018-05-16 NOTE — PROGRESS NOTES
Diabetes Self Management Training: Follow-up Visit    Asmita Jerez presents today for education, evaluation of glucose control and modification of medication(s) related to Type 2 diabetes.    She is accompanied by self    Patient's diabetes management related comments/concerns: BG are doing very well,     Patient would like this visit to be focused around the following diabetes-related behaviors and goals: Healthy Eating, Being Active, Monitoring and Taking Medication    ASSESSMENT:  Patient Problem List reviewed for relevant medical history and current medical status.    Current Diabetes Management per Patient:  Taking diabetes medications?   yes:     Diabetes Medication(s)     Biguanides Sig    metFORMIN (GLUCOPHAGE-XR) 500 MG 24 hr tablet Take 2 tablets (1,000 mg) by mouth 2 times daily (with meals)    Incretin Mimetic Agents (GLP-1 Receptor Agonists) Sig    dulaglutide (TRULICITY) 0.75 MG/0.5ML pen Inject 0.75 mg Subcutaneous every 7 days      , Oral Medications: Metformin - Dose:  mg takes 1000 mg , Time: breakfast and supper, Injectable Medications: Trulicity 0.75mg once weekly    *Abbreviated insulin dose documentation key: Insulin Trade Name (ijuytazxd-eqeaq-gnymax-bedtime) - i.e. Humalog 5-5-5-0 (Humalog 5 units at breakfast, 5 units at lunch, and 5 units at dinner).    Patient glucose self monitoring as follows: two times daily.   BG meter: One Touch Ultra 2 meter  BG results: see blood glucose log attached to this encounter     BG values are: In goal  Patient's most recent   Lab Results   Component Value Date    A1C 14.1 03/23/2018    is not meeting goal of <7.0    Nutrition:  Patient eats 3 meals per day    Breakfast - 1/2 Glucerna shake1/2, English muffin with PB, 1 egg and raisin toast,   Snack: Banana bread, /cheese  Lunch - as above , Raisin toast and Glu, shake  Dinner - 2 fish filets coffee with milk, grapes, chips and cottage.  3 bones of pork ribs and 1/2 baked potato and grapes.  Chx  "noodle soup, crackers and PB   Snacks - ice cream.    Beverages: water    Cultural/Scientologist diet restrictions: No     Biggest Challenge to Healthy Eating: none    Physical Activity:    Type: walking    Diabetes Complications:  Not discussed today.    Vitals:  LMP 12/01/2009  Estimated body mass index is 23.19 kg/(m^2) as calculated from the following:    Height as of 3/23/18: 1.613 m (5' 3.5\").    Weight as of 3/23/18: 60.3 kg (133 lb).   Last 3 BP:   BP Readings from Last 3 Encounters:   03/23/18 110/72   07/25/17 135/68   07/07/17 124/78       History   Smoking Status     Current Every Day Smoker     Packs/day: 1.00     Years: 40.00     Types: Cigarettes   Smokeless Tobacco     Never Used     Comment: pt not interested in quit plan 4/22/09       Labs:  Lab Results   Component Value Date    A1C 14.1 03/23/2018     Lab Results   Component Value Date     03/23/2018     Lab Results   Component Value Date    LDL 43 03/23/2018     HDL Cholesterol   Date Value Ref Range Status   03/23/2018 36 (L) >49 mg/dL Final   ]  GFR Estimate   Date Value Ref Range Status   03/23/2018 >90 >60 mL/min/1.7m2 Final     Comment:     Non  GFR Calc     GFR Estimate If Black   Date Value Ref Range Status   03/23/2018 >90 >60 mL/min/1.7m2 Final     Comment:      GFR Calc     Lab Results   Component Value Date    CR 0.64 03/23/2018     No results found for: MICROALBUMIN    Health Beliefs and Attitudes:   Patient Activation Measure Survey Score:  SHANNON Score (Last Two) 5/25/2016   SHANNON Raw Score 39   Activation Score 56.4   SHANNON Level 3       Stage of Change: ACTION (Actively working towards change)    Progress toward meeting diabetes-related behavioral goals:      Diabetes knowledge and skills assessment:     Patient is knowledgeable in diabetes management concepts related to: Healthy Eating, Being Active, Monitoring and Taking Medication    Patient needs further education on the following diabetes " management concepts: Healthy Eating, Being Active, Monitoring and Taking Medication    Barriers to Learning Assessment: No Barriers identified    Based on learning assessment above, most appropriate setting for further diabetes education would be: Individual setting.    INTERVENTION:    Education provided today on:  AADE Self-Care Behaviors:  Healthy Eating: carbohydrate counting, consistency in amount, composition, and timing of food intake, weight reduction, heart healthy diet, eating out, portion control, plate planning method and label reading  Being Active: relationship to blood glucose and describe appropriate activity program  Monitoring: purpose, proper technique, log and interpret results, individual blood glucose targets and frequency of monitoring  Taking Medication: action of prescribed medication, drawing up, administering and storing injectable diabetes medications, proper site selection and rotation for injections, side effects of prescribed medications and when to take medications    Opportunities for ongoing education and support in diabetes-self management were discussed.    Pt verbalized understanding of concepts discussed and recommendations provided today.       Education Materials Provided:  My Plate Planner    PLAN:  See Patient Instructions for co-developed, patient-stated behavior change goals.  AVS printed and provided to patient today.  She will need to establish care with another PCP in this clinic due to Dr. Williamson retiring.    FOLLOW-UP:  Follow-up with PCP recommended.  Follow-up as needed.   Chart routed to referring provider.    Ongoing plan for education and support: Follow-up visit with diabetes educator in     Theresa Gonzalez RN/ДМИТРИЙ [place] Diabetes Educator    Time Spent: 60 minutes  Encounter Type: Individual    Any diabetes medication dose changes were made via the ДМИТРИЙ Protocol and Collaborative Practice Agreement with the patient's primary care provider. A copy of this  encounter was shared with the provider.

## 2018-05-16 NOTE — MR AVS SNAPSHOT
After Visit Summary   5/16/2018    Asmita Jerez    MRN: [id number]           Patient Information     Date Of Birth          1955        Visit Information        Provider Department      5/16/2018 10:00 AM  DIABETIC ED RESOURCE [place] [facility]        Today's Diagnoses     Type 2 diabetes mellitus without complication, without long-term current use of insulin (H)    -  1      Care Instructions    My Diabetes Care Goals:    Healthy Eating: it is ok to drink protein shakes,  Getting in 50 gms of protein per day, then at least 130-160 gms of carbs per day.    Monitoring: I will Check every morning    Taking Medication: I will Metformin  mg take 2 pills twice per day  Trulicity 0.75 mg weekly    Follow up:  Follow-up diabetes education appointment schedule in Aug. Some time. Wed.       Bring blood glucose meter and logbook with you to all doctor and follow-up appointments.     [place] [facility] for the [facility] Area:  For Your Diabetes Education and Nutrition Appointments Call:  [phone]   For Diabetes Education or Nutrition Related Questions:   Phone: [phone]  E-mail: [email or web address]  Fax: [phone]   If you need a medication refill please contact your pharmacy. Please allow 3 business days for your refills to be completed.    Instructions for emailing the Diabetes Educators    If you need to communicate a non-urgent message to a Diabetes Educator via email, please send to [email or web address].    Please follow the following email guidelines:    Subject line: Secure: your clinic name (example: Secure: [facility])  In the email please include: First name, middle initial, last name and date of birth.    We will be in touch with you within one (1) business day.               Follow-ups after your visit        Who to contact     If you have questions or need follow up information about today's clinic visit or your  "schedule please contact [place] [facility] directly at [phone].  Normal or non-critical lab and imaging results will be communicated to you by MyChart, letter or phone within 4 business days after the clinic has received the results. If you do not hear from us within 7 days, please contact the clinic through MyChart or phone. If you have a critical or abnormal lab result, we will notify you by phone as soon as possible.  Submit refill requests through [employer] or call your pharmacy and they will forward the refill request to us. Please allow 3 business days for your refill to be completed.          Additional Information About Your Visit        Thanxhart Information     K[employer]ami lets you send messages to your doctor, view your test results, renew your prescriptions, schedule appointments and more. To sign up, go to [email or web address] . Click on \"Log in\" on the left side of the screen, which will take you to the Welcome page. Then click on \"Sign up Now\" on the right side of the page.     You will be asked to enter the access code listed below, as well as some personal information. Please follow the directions to create your username and password.     Your access code is: [email or web address]  Expires: 2018 10:30 AM     Your access code will  in 90 days. If you need help or a new code, please call your [place] clinic or [phone].        Care EveryWhere ID     This is your Care EveryWhere ID. This could be used by other organizations to access your [place] medical records  [id number]        Your Vitals Were     Last Period                   2009            Blood Pressure from Last 3 Encounters:   18 110/72   17 135/68   17 124/78    Weight from Last 3 Encounters:   18 60.3 kg (133 lb)   17 62.6 kg (138 lb)   17 62.1 kg (137 lb)              Today, you had the following     No orders found for display         Today's Medication Changes        "   These changes are accurate as of 5/16/18 10:55 AM.  If you have any questions, ask your nurse or doctor.               These medicines have changed or have updated prescriptions.        Dose/Directions    * blood glucose monitoring test strip   Commonly known as:  ACCU-CHEK SIDDHARTHA PLUS   This may have changed:  Another medication with the same name was added. Make sure you understand how and when to take each.   Used for:  Type 2 diabetes mellitus without complication, without long-term current use of insulin (H)        Use to test blood sugar 2 times daily or as directed.  Ok to substitute alternative if insurance prefers.   Quantity:  200 strip   Refills:  11       * blood glucose monitoring test strip   Commonly known as:  ONETOUCH ULTRA   This may have changed:  You were already taking a medication with the same name, and this prescription was added. Make sure you understand how and when to take each.   Used for:  Type 2 diabetes mellitus without complication, without long-term current use of insulin (H)        Use to test blood sugar 2 times daily or as directed.   Quantity:  200 strip   Refills:  11       * blood glucose monitoring test strip   Commonly known as:  ONETOUCH ULTRA   This may have changed:  You were already taking a medication with the same name, and this prescription was added. Make sure you understand how and when to take each.   Used for:  Type 2 diabetes mellitus without complication, without long-term current use of insulin (H)        Use to test blood sugar 2 times daily or as directed.   Quantity:  200 strip   Refills:  11       * dulaglutide 0.75 MG/0.5ML pen   Commonly known as:  TRULICITY   This may have changed:  Another medication with the same name was added. Make sure you understand how and when to take each.   Used for:  Type 2 diabetes mellitus without complication, without long-term current use of insulin (H)        Dose:  0.75 mg   Inject 0.75 mg Subcutaneous every 7 days    Quantity:  2 mL   Refills:  1       * dulaglutide 0.75 MG/0.5ML pen   Commonly known as:  TRULICITY   This may have changed:  You were already taking a medication with the same name, and this prescription was added. Make sure you understand how and when to take each.   Used for:  Type 2 diabetes mellitus without complication, without long-term current use of insulin (H)        Dose:  0.75 mg   Inject 0.75 mg Subcutaneous every 7 days   Quantity:  2 mL   Refills:  3       * Notice:  This list has 5 medication(s) that are the same as other medications prescribed for you. Read the directions carefully, and ask your doctor or other care provider to review them with you.         Where to get your medicines      [place] [facility] [address]  [address] [facility] [address]     Phone:  [phone]     blood glucose monitoring test strip    dulaglutide 0.75 MG/0.5ML pen         These medications were sent to [facility] [address]  [address], [facility] [address]    Hours:  [facility] Phone:  [phone]     blood glucose monitoring test strip                Primary Care Provider Office Phone # Fax #    Carlos Williamson [phone] [address] [facility] [address]        Equal Access to Services     [facility]: Hadii aad ku hadasho Soomaali, waaxda luqadaha, qaybta kaalmada adeegyada, jelani lozada hayrubina cross. So [facility] [phone].    ATENCIÓN: Si habla español, tiene a garcia disposición servicios gratuitos de asistencia lingüística. Llame al [phone].    We comply with applicable federal civil rights laws and [place] laws. We do not discriminate on the basis of race, color, national origin, age, disability, sex, sexual orientation, or gender identity.            Thank you!     Thank you for choosing  [place] [facility]  for your care. Our goal is always to provide you with excellent care. Hearing back from our patients is one way we can continue to improve our services. Please take a few minutes to complete the written survey that you may receive in the mail after your visit with us. Thank you!             Your Updated Medication List - Protect others around you: Learn how to safely use, store and throw away your medicines at www.disposemymeds.org.          This list is accurate as of 5/16/18 10:55 AM.  Always use your most recent med list.                   Brand Name Dispense Instructions for use Diagnosis    aspirin 81 MG tablet      1 TABLET DAILY        atorvastatin 40 MG tablet    LIPITOR    90 tablet    Take 1 tablet (40 mg) by mouth daily    CARDIOVASCULAR SCREENING; LDL GOAL LESS THAN 100       blood glucose monitoring meter device kit     1 kit    Use to test blood sugars 1 time daily or as directed. To match strips, what Ins will cover.    Type 2 diabetes, HbA1c goal < 7% (H)       * blood glucose monitoring test strip    ACCU-CHEK SIDDHARTHA PLUS    200 strip    Use to test blood sugar 2 times daily or as directed.  Ok to substitute alternative if insurance prefers.    Type 2 diabetes mellitus without complication, without long-term current use of insulin (H)       * blood glucose monitoring test strip    ONETOUCH ULTRA    200 strip    Use to test blood sugar 2 times daily or as directed.    Type 2 diabetes mellitus without complication, without long-term current use of insulin (H)       * blood glucose monitoring test strip    ONETOUCH ULTRA    200 strip    Use to test blood sugar 2 times daily or as directed.    Type 2 diabetes mellitus without complication, without long-term current use of insulin (H)       CENTRUM SILVER per tablet      1 TABLET DAILY        * DIABETIC STERILE LANCETS device     1 Box    1 Device daily.        * blood glucose monitoring lancets     1 Box    Use to  test blood sugar 1 time daily or as directed. Accu chek, Ins will cover, match machine.    Type 2 diabetes, HbA1c goal < 7% (H)       * dulaglutide 0.75 MG/0.5ML pen    TRULICITY    2 mL    Inject 0.75 mg Subcutaneous every 7 days    Type 2 diabetes mellitus without complication, without long-term current use of insulin (H)       * dulaglutide 0.75 MG/0.5ML pen    TRULICITY    2 mL    Inject 0.75 mg Subcutaneous every 7 days    Type 2 diabetes mellitus without complication, without long-term current use of insulin (H)       lisinopril 10 MG tablet    PRINIVIL/ZESTRIL    90 tablet    Take 1 tablet (10 mg) by mouth daily    Hypertension, goal below 140/90       metFORMIN 500 MG 24 hr tablet    GLUCOPHAGE-XR    360 tablet    Take 2 tablets (1,000 mg) by mouth 2 times daily (with meals)    Type 2 diabetes mellitus without complication, without long-term current use of insulin (H)       * order for DME     1 each    Equipment being ordered: glucometer as per insurance formulary    Type 2 diabetes, HbA1c goal < 7% (H)       * order for DME     1 Box    Equipment being ordered: test strips as per insurance formulary    Type 2 diabetes mellitus without complication (H)       * Notice:  This list has 9 medication(s) that are the same as other medications prescribed for you. Read the directions carefully, and ask your doctor or other care provider to review them with you.

## 2018-05-16 NOTE — PATIENT INSTRUCTIONS
My Diabetes Care Goals:    Healthy Eating: it is ok to drink protein shakes,  Getting in 50 gms of protein per day, then at least 130-160 gms of carbs per day.    Monitoring: I will Check every morning    Taking Medication: I will Metformin  mg take 2 pills twice per day  Trulicity 0.75 mg weekly    Follow up:  Follow-up diabetes education appointment schedule in Aug. Some time. Wed.       Bring blood glucose meter and logbook with you to all doctor and follow-up appointments.     [place] [facility] for the [facility] Area:  For Your Diabetes Education and Nutrition Appointments Call:  [phone]   For Diabetes Education or Nutrition Related Questions:   Phone: [phone]  E-mail: [email or web address]  Fax: [phone]   If you need a medication refill please contact your pharmacy. Please allow 3 business days for your refills to be completed.    Instructions for emailing the Diabetes Educators    If you need to communicate a non-urgent message to a Diabetes Educator via email, please send to [email or web address].    Please follow the following email guidelines:    Subject line: Secure: your clinic name (example: Secure: [facility])  In the email please include: First name, middle initial, last name and date of birth.    We will be in touch with you within one (1) business day.

## 2018-09-07 ENCOUNTER — OFFICE VISIT (OUTPATIENT)
Dept: FAMILY MEDICINE | Facility: CLINIC | Age: 63
End: 2018-09-07
Payer: COMMERCIAL

## 2018-09-07 VITALS
SYSTOLIC BLOOD PRESSURE: 138 MMHG | RESPIRATION RATE: 18 BRPM | WEIGHT: 130 LBS | DIASTOLIC BLOOD PRESSURE: 62 MMHG | HEIGHT: 64 IN | TEMPERATURE: 96.6 F | OXYGEN SATURATION: 100 % | BODY MASS INDEX: 22.2 KG/M2 | HEART RATE: 78 BPM

## 2018-09-07 DIAGNOSIS — I10 HYPERTENSION, GOAL BELOW 140/90: ICD-10-CM

## 2018-09-07 DIAGNOSIS — Z87.891 PERSONAL HISTORY OF TOBACCO USE: ICD-10-CM

## 2018-09-07 DIAGNOSIS — E11.9 TYPE 2 DIABETES MELLITUS WITHOUT COMPLICATION, WITHOUT LONG-TERM CURRENT USE OF INSULIN (H): Primary | ICD-10-CM

## 2018-09-07 DIAGNOSIS — Z72.0 TOBACCO USE: ICD-10-CM

## 2018-09-07 LAB
CREAT UR-MCNC: 103 MG/DL
HBA1C MFR BLD: 6.8 % (ref 0–5.6)
MICROALBUMIN UR-MCNC: 18 MG/L
MICROALBUMIN/CREAT UR: 17.28 MG/G CR (ref 0–25)

## 2018-09-07 PROCEDURE — G0296 VISIT TO DETERM LDCT ELIG: HCPCS | Performed by: FAMILY MEDICINE

## 2018-09-07 PROCEDURE — 82043 UR ALBUMIN QUANTITATIVE: CPT | Performed by: FAMILY MEDICINE

## 2018-09-07 PROCEDURE — 99214 OFFICE O/P EST MOD 30 MIN: CPT | Performed by: FAMILY MEDICINE

## 2018-09-07 PROCEDURE — 36415 COLL VENOUS BLD VENIPUNCTURE: CPT | Performed by: FAMILY MEDICINE

## 2018-09-07 PROCEDURE — 83036 HEMOGLOBIN GLYCOSYLATED A1C: CPT | Performed by: FAMILY MEDICINE

## 2018-09-07 ASSESSMENT — PAIN SCALES - GENERAL: PAINLEVEL: NO PAIN (0)

## 2018-09-07 NOTE — MR AVS SNAPSHOT
After Visit Summary   9/7/2018    Asmita Jerez    MRN: [id number]           Patient Information     Date Of Birth          1955        Visit Information        Provider Department      9/7/2018 8:00 AM Melissa Pitts MD [facility] [place]        Today's Diagnoses     Type 2 diabetes mellitus without complication, without long-term current use of insulin (H)    -  1    Tobacco use        Personal history of tobacco use          Care Instructions          Thank you for choosing [facility].  You may be receiving a survey in the mail from [employer] regarding your visit today.  Please take a few minutes to complete and return the survey to let us know how we are doing.      Our Clinic hours are:  Mondays    7:20 am - 7 pm  Tues -  Fri  7:20 am - 5 pm    Clinic Phone: [phone]    The clinic lab opens at 7:30 am Mon - Fri and appointments are required.    [facility]. [phone]  Monday  8 am - 7pm  Tues - Fri 8 am - 5:30 pm           Lung Cancer Screening   Frequently Asked Questions  If you are at high-risk for lung cancer, getting screened with low-dose computed tomography (LDCT) every year can help save your life. This handout offers answers to some of the most common questions about lung cancer screening. If you have other questions, please call [phone] ([phone]).     What is it?  Lung cancer screening uses special X-ray technology to create an image of your lung tissue. The exam is quick and easy and takes less than 10 seconds. We don t give you any medicine or use any needles. You can eat before and after the exam. You don t need to change your clothes as long as the clothing on your chest doesn t contain metal. But, you do need to be able to hold your breath for at least 6 seconds during the exam.    What is the goal of lung cancer screening?  The goal of lung cancer screening is to save lives. Many times, lung cancer is not  found until a person starts having physical symptoms. Lung cancer screening can help detect lung cancer in the earliest stages when it may be easier to treat.    Who should be screened for lung cancer?  We suggest lung cancer screening for anyone who is at high-risk for lung cancer. You are in the high-risk group if you:      are between the ages of 55 and 79, and    have smoked at least 1 pack of cigarettes a day for 30 or more years, and    still smoke or have quit within the past 15 years.    However, if you have a new cough or shortness of breath, you should talk to your doctor before being screened.    Some national lung health advocacy groups also recommend screening for people ages 50 to 79 who have smoked an average of 1 pack of cigarettes a day for 20 years. They must also have at least 1 other risk factor for lung cancer, not including exposure to secondhand smoke. Other risk factors are having had cancer in the past, emphysema, pulmonary fibrosis, COPD, a family history of lung cancer, or exposure to certain materials such as arsenic, asbestos, beryllium, cadmium, chromium, diesel fumes, nickel, radon or silica. Your care team can help you know if you have one of these risk factors.     Why does it matter if I have symptoms?  Certain symptoms can be a sign that you have a condition in your lungs that should be checked and treated by your doctor. These symptoms include fever, chest pain, a new or changing cough, shortness of breath that you have never felt before, coughing up blood or unexplained weight loss. Having any of these symptoms can greatly affect the results of lung cancer screening.       Should all smokers get an LDCT lung cancer screening exam?  It depends. Lung cancer screening is for a very specific group of men and women who have a history of heavy smoking over a long period of time (see  Who should be screened for lung cancer  above).  I am in the high-risk group, but have been diagnosed  with cancer in the past. Is LDCT lung cancer screening right for me?  In some cases, you should not have LDCT lung screening, such as when your doctor is already following your cancer with CT scan studies. Your doctor will help you decide if LDCT lung screening is right for you.  Do I need to have a screening exam every year?  Yes. If you are in the high-risk group described earlier, you should get an LDCT lung cancer screening exam every year until you are 79, or are no longer willing or able to undergo screening and possible procedures to diagnose and treat lung cancer.  How effective is LDCT at preventing death from lung cancer?  Studies have shown that LDCT lung cancer screening can lower the risk of death from lung cancer by 20 percent in people who are at high-risk.  What are the risks?  There are some risks and limitations of LDCT lung cancer screening. We want to make sure you understand the risks and benefits, so please let us know if you have any questions. Your doctor may want to talk with you more about these risks.    Radiation exposure: As with any exam that uses radiation, there is a very small increased risk of cancer. The amount of radiation in LDCT is small--about the same amount a person would get from a mammogram. Your doctor orders the exam when he or she feels the potential benefits outweigh the risks.    False negatives: No test is perfect, including LDCT. It is possible that you may have a medical condition, including lung cancer, that is not found during your exam. This is called a false negative result.    False positives and more testing: LDCT very often finds something in the lung that could be cancer, but in fact is not. This is called a false positive result. False positive tests often cause anxiety. To make sure these findings are not cancer, you may need to have more tests. These tests will be done only if you give us permission. Sometimes patients need a treatment that can have side  effects, such as a biopsy. For more information on false positives, see  What can I expect from the results?     Findings not related to lung cancer: Your LDCT exam also takes pictures of areas of your body next to your lungs. In a very small number of cases, the CT scan will show an abnormal finding in one of these areas, such as your kidneys, adrenal glands, liver or thyroid. This finding may not be serious, but you may need more tests. Your doctor can help you decide what other tests you may need, if any.  What can I expect from the results?  About 1 out of 4 LDCT exams will find something that may need more tests. Most of the time, these findings are lung nodules. Lung nodules are very small collections of tissue in the lung. These nodules are very common, and the vast majority--more than 97 percent--are not cancer (benign). Most are normal lymph nodes or small areas of scarring from past infections.  But, if a small lung nodule is found to be cancer, the cancer can be cured more than 90 percent of the time. To know if the nodule is cancer, we may need to get more images before your next yearly screening exam. If the nodule has suspicious features (for example, it is large, has an odd shape or grows over time), we will refer you to a specialist for further testing.  Will my doctor also get the results?  Yes. Your doctor will get a copy of your results.  Is it okay to keep smoking now that there s a cancer screening exam?  No. Tobacco is one of the strongest cancer-causing agents. It causes not only lung cancer, but other cancers and cardiovascular (heart) diseases as well. The damage caused by smoking builds over time. This means that the longer you smoke, the higher your risk of disease. While it is never too late to quit, the sooner you quit, the better.  Where can I find help to quit smoking?  The best way to prevent lung cancer is to stop smoking. If you have already quit smoking, congratulations and keep it  "up! For help on quitting smoking, please call [employer] at 7-743-083-PPCA ([phone]) or the American [employer] Society at 1-501.579.4531 to find local resources near you.  One-on-one health coaching:  If you d prefer to work individually with a health care provider on tobacco cessation, we offer:      Medication Therapy Management:  Our specially trained pharmacists work closely with you and your doctor to help you quit smoking.  Call [phone] or [phone] (toll free).     Can Do: Health coaching offered by [facility].  [email or web address]            Follow-ups after your visit        Future tests that were ordered for you today     Open Future Orders        Priority Expected Expires Ordered    CT Chest Lung Cancer Scrn Low Dose wo Routine  9/7/2019 9/7/2018            Who to contact     If you have questions or need follow up information about today's clinic visit or your schedule please contact [facility] directly at [phone].  Normal or non-critical lab and imaging results will be communicated to you by Writtenhart, letter or phone within 4 business days after the clinic has received the results. If you do not hear from us within 7 days, please contact the clinic through [employer] or phone. If you have a critical or abnormal lab result, we will notify you by phone as soon as possible.  Submit refill requests through [employer] or call your pharmacy and they will forward the refill request to us. Please allow 3 business days for your refill to be completed.          Additional Information About Your Visit        [employer]Way Information     [employer] lets you send messages to your doctor, view your test results, renew your prescriptions, schedule appointments and more. To sign up, go to [email or web address] . Click on \"Log in\" on the left side of the screen, which will take you to the Welcome page. Then click on \"Sign up Now\" on the right side of the page.     You will be asked to " "enter the access code listed below, as well as some personal information. Please follow the directions to create your username and password.     Your access code is: [email or web address]  Expires: 2018  8:17 AM     Your access code will  in 90 days. If you need help or a new code, please call your [facility] clinic or [phone].        Care EveryWhere ID     This is your Care EveryWhere ID. This could be used by other organizations to access your [facility] medical records  [id number]        Your Vitals Were     Pulse Temperature Respirations Height Last Period Pulse Oximetry    78 96.6  F (35.9  C) (Tympanic) 18 5' 3.5\" (1.613 m) 2009 100%    BMI (Body Mass Index)                   22.67 kg/m2            Blood Pressure from Last 3 Encounters:   18 138/62   18 110/72   17 135/68    Weight from Last 3 Encounters:   18 130 lb (59 kg)   18 133 lb (60.3 kg)   17 138 lb (62.6 kg)              We Performed the Following     Albumin Random Urine Quantitative with Creat Ratio     Hemoglobin A1c     Okay for Smoking Cessation Study (PLUTO) to Contact Patient     Prof fee: Shared Decisionmaking for Lung Cancer Screening          Today's Medication Changes          These changes are accurate as of 18  8:17 AM.  If you have any questions, ask your nurse or doctor.               These medicines have changed or have updated prescriptions.        Dose/Directions    dulaglutide 0.75 MG/0.5ML pen   Commonly known as:  TRULICITY   This may have changed:  Another medication with the same name was removed. Continue taking this medication, and follow the directions you see here.   Used for:  Type 2 diabetes mellitus without complication, without long-term current use of insulin (H)   Changed by:  Melissa Pitts MD        Dose:  0.75 mg   Inject 0.75 mg Subcutaneous every 7 days   Quantity:  2 mL   Refills:  3            Where to get your medicines      These medications were sent to " [facility] - - [place]  [id number] [facility]  [id number] [place] [address], [place] [address]    Hours:  [facility] Phone:  [phone]     dulaglutide 0.75 MG/0.5ML pen                Primary Care Provider    Carlos Williamson MD       No address on file        Equal Access to Services     [facility]: Hadii aad ku hadasho Soomaali, waaxda luqadaha, qaybta kaalmada adeegyada, waxay idiin hayaan adeeg kharash la'kerrien . So [facility] [phone].    ATENCIÓN: Si habla español, tiene a garcia disposición servicios gratuitos de asistencia lingüística. Jessieame al [phone].    We comply with applicable federal civil rights laws and [place] laws. We do not discriminate on the basis of race, color, national origin, age, disability, sex, sexual orientation, or gender identity.            Thank you!     Thank you for choosing [facility]  for your care. Our goal is always to provide you with excellent care. Hearing back from our patients is one way we can continue to improve our services. Please take a few minutes to complete the written survey that you may receive in the mail after your visit with us. Thank you!             Your Updated Medication List - Protect others around you: Learn how to safely use, store and throw away your medicines at www.disposemymeds.org.          This list is accurate as of 9/7/18  8:17 AM.  Always use your most recent med list.                   Brand Name Dispense Instructions for use Diagnosis    aspirin 81 MG tablet      1 TABLET DAILY        atorvastatin 40 MG tablet    LIPITOR    90 tablet    Take 1 tablet (40 mg) by mouth daily    CARDIOVASCULAR SCREENING; LDL GOAL LESS THAN 100       blood glucose monitoring meter device kit     1 kit    Use to test blood sugars 1 time daily or as directed. To match strips, what Ins will cover.    Type 2 diabetes, HbA1c goal < 7% (H)       * blood glucose monitoring test strip    ACCU-CHEK  SIDDHARTHA PLUS    200 strip    Use to test blood sugar 2 times daily or as directed.  Ok to substitute alternative if insurance prefers.    Type 2 diabetes mellitus without complication, without long-term current use of insulin (H)       * blood glucose monitoring test strip    ONETOUCH ULTRA    200 strip    Use to test blood sugar 2 times daily or as directed.    Type 2 diabetes mellitus without complication, without long-term current use of insulin (H)       * blood glucose monitoring test strip    ONETOUCH ULTRA    200 strip    Use to test blood sugar 2 times daily or as directed.    Type 2 diabetes mellitus without complication, without long-term current use of insulin (H)       CENTRUM SILVER per tablet      1 TABLET DAILY        * DIABETIC STERILE LANCETS device     1 Box    1 Device daily.        * blood glucose monitoring lancets     1 Box    Use to test blood sugar 1 time daily or as directed. Accu chek, Ins will cover, match machine.    Type 2 diabetes, HbA1c goal < 7% (H)       dulaglutide 0.75 MG/0.5ML pen    TRULICITY    2 mL    Inject 0.75 mg Subcutaneous every 7 days    Type 2 diabetes mellitus without complication, without long-term current use of insulin (H)       lisinopril 10 MG tablet    PRINIVIL/ZESTRIL    90 tablet    Take 1 tablet (10 mg) by mouth daily    Hypertension, goal below 140/90       metFORMIN 500 MG 24 hr tablet    GLUCOPHAGE-XR    360 tablet    Take 2 tablets (1,000 mg) by mouth 2 times daily (with meals)    Type 2 diabetes mellitus without complication, without long-term current use of insulin (H)       * order for DME     1 each    Equipment being ordered: glucometer as per insurance formulary    Type 2 diabetes, HbA1c goal < 7% (H)       * order for DME     1 Box    Equipment being ordered: test strips as per insurance formulary    Type 2 diabetes mellitus without complication (H)       * Notice:  This list has 7 medication(s) that are the same as other medications prescribed for  you. Read the directions carefully, and ask your doctor or other care provider to review them with you.

## 2018-09-07 NOTE — PROGRESS NOTES
"  SUBJECTIVE:   Asmita Jerez is a 62 year old female who presents to clinic today for the following health issues:      Diabetes Follow-up    Patient is checking blood sugars: once daily.  Results are as follows:         am - 120-150    Diabetic concerns: None     Symptoms of hypoglycemia (low blood sugar): none     Paresthesias (numbness or burning in feet) or sores: No     Date of last diabetic eye exam: due    Diabetes Management Resources    Hyperlipidemia Follow-Up      Rate your low fat/cholesterol diet?: good    Taking statin?  Yes, no muscle aches from statin    Other lipid medications/supplements?:  none    Hypertension Follow-up      Outpatient blood pressures are not being checked.    Low Salt Diet: not monitoring salt    BP Readings from Last 2 Encounters:   09/07/18 138/62   03/23/18 110/72     Hemoglobin A1C (%)   Date Value   09/07/2018 6.8 (H)   03/23/2018 14.1 (H)     LDL Cholesterol Calculated (mg/dL)   Date Value   03/23/2018 43   02/15/2017 56       Amount of exercise or physical activity: None    Problems taking medications regularly: No    Medication side effects: none    Diet: regular (no restrictions)    /62  Pulse 78  Temp 96.6  F (35.9  C) (Tympanic)  Resp 18  Ht 5' 3.5\" (1.613 m)  Wt 130 lb (59 kg)  LMP 12/01/2009  SpO2 100%  BMI 22.67 kg/m2  EXAM: GENERAL APPEARANCE: Alert, no acute distress  RESP: lungs clear to auscultation   CV: normal rate, regular rhythm, no murmur or gallop  ABDOMEN: soft, no organomegaly, masses or tenderness    Diabetic Foot Screen:  Any complaints of increased pain or numbness ?  YES occasionally gets a little numbness in her toes at night, then it resolves  Is there a foot ulcer now or a history of foot ulcer? No  Does the foot have an abnormal shape? No  Are the nails thick, too long or ingrown?  YES few toenails are thickened  Are there any redness or open areas? No         Sensation Testing done at all points on the diagram with monofilament "     Right Foot: Sensation Normal at all points  Left Foot: Sensation Normal at all points     Risk Category: 0- No loss of protective sensation  Performed by Melissa Pitts MD        ASSESSMENT/PLAN:      ICD-10-CM    1. Type 2 diabetes mellitus without complication, without long-term current use of insulin (H) E11.9 Hemoglobin A1c     dulaglutide (TRULICITY) 0.75 MG/0.5ML pen   2. Tobacco use Z72.0 Prof fee: Shared Decisionmaking for Lung Cancer Screening     CT Chest Lung Cancer Scrn Low Dose wo     Okay for Smoking Cessation Study (PLUTO) to Contact Patient   3. Personal history of tobacco use Z87.891    4. Hypertension, goal below 140/90 I10      Offered assistance with smoking cessation, declined.     Blood pressure well controlled, on an ace.    On atorvastatin.     HgbA1c has dramatically improved on the trulicity.      CT scan for lung cancer screening recommended, information and order given.  Recheck 6 months.    Melissa Pitts M.D.      Patient Instructions         Thank you for choosing [facility].  You may be receiving a survey in the mail from [employer] regarding your visit today.  Please take a few minutes to complete and return the survey to let us know how we are doing.      Our Clinic hours are:  Mondays    7:20 am - 7 pm  Tues -  Fri  7:20 am - 5 pm    Clinic Phone: [phone]    The clinic lab opens at 7:30 am Mon - Fri and appointments are required.    [facility]. [phone]  Monday  8 am - 7pm  Tues - Fri 8 am - 5:30 pm           Lung Cancer Screening   Frequently Asked Questions  If you are at high-risk for lung cancer, getting screened with low-dose computed tomography (LDCT) every year can help save your life. This handout offers answers to some of the most common questions about lung cancer screening. If you have other questions, please call [phone] ([phone]).     What is it?  Lung cancer screening uses special X-ray technology to  create an image of your lung tissue. The exam is quick and easy and takes less than 10 seconds. We don t give you any medicine or use any needles. You can eat before and after the exam. You don t need to change your clothes as long as the clothing on your chest doesn t contain metal. But, you do need to be able to hold your breath for at least 6 seconds during the exam.    What is the goal of lung cancer screening?  The goal of lung cancer screening is to save lives. Many times, lung cancer is not found until a person starts having physical symptoms. Lung cancer screening can help detect lung cancer in the earliest stages when it may be easier to treat.    Who should be screened for lung cancer?  We suggest lung cancer screening for anyone who is at high-risk for lung cancer. You are in the high-risk group if you:      are between the ages of 55 and 79, and    have smoked at least 1 pack of cigarettes a day for 30 or more years, and    still smoke or have quit within the past 15 years.    However, if you have a new cough or shortness of breath, you should talk to your doctor before being screened.    Some national lung health advocacy groups also recommend screening for people ages 50 to 79 who have smoked an average of 1 pack of cigarettes a day for 20 years. They must also have at least 1 other risk factor for lung cancer, not including exposure to secondhand smoke. Other risk factors are having had cancer in the past, emphysema, pulmonary fibrosis, COPD, a family history of lung cancer, or exposure to certain materials such as arsenic, asbestos, beryllium, cadmium, chromium, diesel fumes, nickel, radon or silica. Your care team can help you know if you have one of these risk factors.     Why does it matter if I have symptoms?  Certain symptoms can be a sign that you have a condition in your lungs that should be checked and treated by your doctor. These symptoms include fever, chest pain, a new or changing cough,  shortness of breath that you have never felt before, coughing up blood or unexplained weight loss. Having any of these symptoms can greatly affect the results of lung cancer screening.       Should all smokers get an LDCT lung cancer screening exam?  It depends. Lung cancer screening is for a very specific group of men and women who have a history of heavy smoking over a long period of time (see  Who should be screened for lung cancer  above).  I am in the high-risk group, but have been diagnosed with cancer in the past. Is LDCT lung cancer screening right for me?  In some cases, you should not have LDCT lung screening, such as when your doctor is already following your cancer with CT scan studies. Your doctor will help you decide if LDCT lung screening is right for you.  Do I need to have a screening exam every year?  Yes. If you are in the high-risk group described earlier, you should get an LDCT lung cancer screening exam every year until you are 79, or are no longer willing or able to undergo screening and possible procedures to diagnose and treat lung cancer.  How effective is LDCT at preventing death from lung cancer?  Studies have shown that LDCT lung cancer screening can lower the risk of death from lung cancer by 20 percent in people who are at high-risk.  What are the risks?  There are some risks and limitations of LDCT lung cancer screening. We want to make sure you understand the risks and benefits, so please let us know if you have any questions. Your doctor may want to talk with you more about these risks.    Radiation exposure: As with any exam that uses radiation, there is a very small increased risk of cancer. The amount of radiation in LDCT is small--about the same amount a person would get from a mammogram. Your doctor orders the exam when he or she feels the potential benefits outweigh the risks.    False negatives: No test is perfect, including LDCT. It is possible that you may have a medical  condition, including lung cancer, that is not found during your exam. This is called a false negative result.    False positives and more testing: LDCT very often finds something in the lung that could be cancer, but in fact is not. This is called a false positive result. False positive tests often cause anxiety. To make sure these findings are not cancer, you may need to have more tests. These tests will be done only if you give us permission. Sometimes patients need a treatment that can have side effects, such as a biopsy. For more information on false positives, see  What can I expect from the results?     Findings not related to lung cancer: Your LDCT exam also takes pictures of areas of your body next to your lungs. In a very small number of cases, the CT scan will show an abnormal finding in one of these areas, such as your kidneys, adrenal glands, liver or thyroid. This finding may not be serious, but you may need more tests. Your doctor can help you decide what other tests you may need, if any.  What can I expect from the results?  About 1 out of 4 LDCT exams will find something that may need more tests. Most of the time, these findings are lung nodules. Lung nodules are very small collections of tissue in the lung. These nodules are very common, and the vast majority--more than 97 percent--are not cancer (benign). Most are normal lymph nodes or small areas of scarring from past infections.  But, if a small lung nodule is found to be cancer, the cancer can be cured more than 90 percent of the time. To know if the nodule is cancer, we may need to get more images before your next yearly screening exam. If the nodule has suspicious features (for example, it is large, has an odd shape or grows over time), we will refer you to a specialist for further testing.  Will my doctor also get the results?  Yes. Your doctor will get a copy of your results.  Is it okay to keep smoking now that there s a cancer screening  exam?  No. Tobacco is one of the strongest cancer-causing agents. It causes not only lung cancer, but other cancers and cardiovascular (heart) diseases as well. The damage caused by smoking builds over time. This means that the longer you smoke, the higher your risk of disease. While it is never too late to quit, the sooner you quit, the better.  Where can I find help to quit smoking?  The best way to prevent lung cancer is to stop smoking. If you have already quit smoking, congratulations and keep it up! For help on quitting smoking, please call [employer] at 7-334-944-BUAI ([phone]) or the American Cancer Society at 1-938.544.1987 to find local resources near you.  One-on-one health coaching:  If you d prefer to work individually with a health care provider on tobacco cessation, we offer:      Medication Therapy Management:  Our specially trained pharmacists work closely with you and your doctor to help you quit smoking.  Call [phone] or [phone] (toll free).     Can Do: Health coaching offered by [facility].  [email or web address]                      Lung Cancer Screening Shared Decision Making Visit     Asmita Jerez is eligible for lung cancer screening on the basis of the information provided in my signed lung cancer screening order.     I have discussed with patient the risks and benefits of screening for lung cancer with low-dose CT.     The risks include:  radiation exposure: one low dose chest CT has as much ionizing radiation as about 15 chest x-rays or 6 months of background radiation living in [place]    false positives: 96% of positive findings/nodules are NOT cancer, but some might still require additional diagnostic evaluation, including biopsy  over-diagnosis: some slow growing cancers that might never have been clinically significant will be detected and treated unnecessarily     The benefit of early detection of lung cancer is contingent upon adherence to annual screening  or more frequent follow up if indicated.     Furthermore, reaping the benefits of screening requires Asmita Jerez to be willing and physically able to undergo diagnostic procedures, if indicated. Although no specific guide is available for determining severity of comorbidities, it is reasonable to withhold screening in patients who have greater mortality risk from other diseases.     We did discuss that the only way to prevent lung cancer is to not smoke. Smoking cessation assistance was offered.    I did offer risk estimation using a calculator such as this one:    ShouldIScreen

## 2018-09-07 NOTE — PROGRESS NOTES
HgbA1c is 6.8% as we discussed, keep up the good work.     No significant amounts of protein in the urine.    Melissa Pitts M.D.

## 2018-09-07 NOTE — PATIENT INSTRUCTIONS
Thank you for choosing [facility].  You may be receiving a survey in the mail from [employer] regarding your visit today.  Please take a few minutes to complete and return the survey to let us know how we are doing.      Our Clinic hours are:  Mondays    7:20 am - 7 pm  Tues - Fri  7:20 am - 5 pm    Clinic Phone: [phone]    The clinic lab opens at 7:30 am Mon - Fri and appointments are required.    [facility]. [phone]  Monday  8 am - 7pm  Tues - Fri 8 am - 5:30 pm           Lung Cancer Screening   Frequently Asked Questions  If you are at high-risk for lung cancer, getting screened with low-dose computed tomography (LDCT) every year can help save your life. This handout offers answers to some of the most common questions about lung cancer screening. If you have other questions, please call [phone] ([phone]).     What is it?  Lung cancer screening uses special X-ray technology to create an image of your lung tissue. The exam is quick and easy and takes less than 10 seconds. We don t give you any medicine or use any needles. You can eat before and after the exam. You don t need to change your clothes as long as the clothing on your chest doesn t contain metal. But, you do need to be able to hold your breath for at least 6 seconds during the exam.    What is the goal of lung cancer screening?  The goal of lung cancer screening is to save lives. Many times, lung cancer is not found until a person starts having physical symptoms. Lung cancer screening can help detect lung cancer in the earliest stages when it may be easier to treat.    Who should be screened for lung cancer?  We suggest lung cancer screening for anyone who is at high-risk for lung cancer. You are in the high-risk group if you:      are between the ages of 55 and 79, and    have smoked at least 1 pack of cigarettes a day for 30 or more years, and    still smoke or have quit within the past  15 years.    However, if you have a new cough or shortness of breath, you should talk to your doctor before being screened.    Some national lung health advocacy groups also recommend screening for people ages 50 to 79 who have smoked an average of 1 pack of cigarettes a day for 20 years. They must also have at least 1 other risk factor for lung cancer, not including exposure to secondhand smoke. Other risk factors are having had cancer in the past, emphysema, pulmonary fibrosis, COPD, a family history of lung cancer, or exposure to certain materials such as arsenic, asbestos, beryllium, cadmium, chromium, diesel fumes, nickel, radon or silica. Your care team can help you know if you have one of these risk factors.     Why does it matter if I have symptoms?  Certain symptoms can be a sign that you have a condition in your lungs that should be checked and treated by your doctor. These symptoms include fever, chest pain, a new or changing cough, shortness of breath that you have never felt before, coughing up blood or unexplained weight loss. Having any of these symptoms can greatly affect the results of lung cancer screening.       Should all smokers get an LDCT lung cancer screening exam?  It depends. Lung cancer screening is for a very specific group of men and women who have a history of heavy smoking over a long period of time (see  Who should be screened for lung cancer  above).  I am in the high-risk group, but have been diagnosed with cancer in the past. Is LDCT lung cancer screening right for me?  In some cases, you should not have LDCT lung screening, such as when your doctor is already following your cancer with CT scan studies. Your doctor will help you decide if LDCT lung screening is right for you.  Do I need to have a screening exam every year?  Yes. If you are in the high-risk group described earlier, you should get an LDCT lung cancer screening exam every year until you are 79, or are no longer  willing or able to undergo screening and possible procedures to diagnose and treat lung cancer.  How effective is LDCT at preventing death from lung cancer?  Studies have shown that LDCT lung cancer screening can lower the risk of death from lung cancer by 20 percent in people who are at high-risk.  What are the risks?  There are some risks and limitations of LDCT lung cancer screening. We want to make sure you understand the risks and benefits, so please let us know if you have any questions. Your doctor may want to talk with you more about these risks.    Radiation exposure: As with any exam that uses radiation, there is a very small increased risk of cancer. The amount of radiation in LDCT is small--about the same amount a person would get from a mammogram. Your doctor orders the exam when he or she feels the potential benefits outweigh the risks.    False negatives: No test is perfect, including LDCT. It is possible that you may have a medical condition, including lung cancer, that is not found during your exam. This is called a false negative result.    False positives and more testing: LDCT very often finds something in the lung that could be cancer, but in fact is not. This is called a false positive result. False positive tests often cause anxiety. To make sure these findings are not cancer, you may need to have more tests. These tests will be done only if you give us permission. Sometimes patients need a treatment that can have side effects, such as a biopsy. For more information on false positives, see  What can I expect from the results?     Findings not related to lung cancer: Your LDCT exam also takes pictures of areas of your body next to your lungs. In a very small number of cases, the CT scan will show an abnormal finding in one of these areas, such as your kidneys, adrenal glands, liver or thyroid. This finding may not be serious, but you may need more tests. Your doctor can help you decide what  other tests you may need, if any.  What can I expect from the results?  About 1 out of 4 LDCT exams will find something that may need more tests. Most of the time, these findings are lung nodules. Lung nodules are very small collections of tissue in the lung. These nodules are very common, and the vast majority--more than 97 percent--are not cancer (benign). Most are normal lymph nodes or small areas of scarring from past infections.  But, if a small lung nodule is found to be cancer, the cancer can be cured more than 90 percent of the time. To know if the nodule is cancer, we may need to get more images before your next yearly screening exam. If the nodule has suspicious features (for example, it is large, has an odd shape or grows over time), we will refer you to a specialist for further testing.  Will my doctor also get the results?  Yes. Your doctor will get a copy of your results.  Is it okay to keep smoking now that there s a cancer screening exam?  No. Tobacco is one of the strongest cancer-causing agents. It causes not only lung cancer, but other cancers and cardiovascular (heart) diseases as well. The damage caused by smoking builds over time. This means that the longer you smoke, the higher your risk of disease. While it is never too late to quit, the sooner you quit, the better.  Where can I find help to quit smoking?  The best way to prevent lung cancer is to stop smoking. If you have already quit smoking, congratulations and keep it up! For help on quitting smoking, please call [employer] at 0-639-486-UXQW (4713) or the American Cancer Society at 1-833.787.5141 to find local resources near you.  One-on-one health coaching:  If you d prefer to work individually with a health care provider on tobacco cessation, we offer:      Medication Therapy Management:  Our specially trained pharmacists work closely with you and your doctor to help you quit smoking.  Call [phone] or [phone] (toll free).      Can Do: Health coaching offered by [facility].  [email or web address]

## 2018-09-07 NOTE — LETTER
[facility]  [address]  [facility] [address]  Phone: [phone]      9/7/2018     Asmita Jerez  [address]  [address]  [place] [address]      Dear Asmita:    Thank you for allowing me to participate in your care. Your recent test results were reviewed and listed below.      Your results are provided below for your review  Results for orders placed or performed in visit on 09/07/18   Hemoglobin A1c   Result Value Ref Range    Hemoglobin A1C 6.8 (H) 0 - 5.6 %   Albumin Random Urine Quantitative with Creat Ratio   Result Value Ref Range    Creatinine Urine 103 mg/dL    Albumin Urine mg/L 18 mg/L    Albumin Urine mg/g Cr 17.28 0 - 25 mg/g Cr                 Thank you for choosing [facility]. As a result, please continue with the treatment plan discussed in the office. Return as discussed or sooner if symptoms worsen or fail to improve.     If you have any further questions or concerns, please do not hesitate to contact us.      Sincerely,        Dr. Melissa Pitts/[facility]

## 2018-09-17 ENCOUNTER — HOSPITAL ENCOUNTER (OUTPATIENT)
Dept: CT IMAGING | Facility: CLINIC | Age: 63
Discharge: HOME | End: 2018-09-17
Attending: FAMILY MEDICINE | Admitting: FAMILY MEDICINE
Payer: COMMERCIAL

## 2018-09-17 DIAGNOSIS — Z72.0 TOBACCO USE: ICD-10-CM

## 2018-09-17 PROCEDURE — G0297 LDCT FOR LUNG CA SCREEN: HCPCS

## 2018-12-10 ENCOUNTER — TELEPHONE (OUTPATIENT)
Dept: FAMILY MEDICINE | Facility: CLINIC | Age: 63
End: 2018-12-10

## 2018-12-10 DIAGNOSIS — E11.9 TYPE 2 DIABETES MELLITUS WITHOUT COMPLICATION, WITHOUT LONG-TERM CURRENT USE OF INSULIN (H): ICD-10-CM

## 2018-12-10 NOTE — TELEPHONE ENCOUNTER
Last Written Prescription Date:  Trulicity 9/7/2018  Last Fill Quantity: 2 ml,  # refills: 3   Last office visit: 9/7/2018 with prescribing provider:  Hong   Future Office Visit:      Patient has about two weeks left on her Trulicity Pen. She needs refills.  Carolina Barker  [facility]

## 2018-12-24 ENCOUNTER — TELEPHONE (OUTPATIENT)
Dept: FAMILY MEDICINE | Facility: CLINIC | Age: 63
End: 2018-12-24

## 2018-12-24 DIAGNOSIS — E11.9 TYPE 2 DIABETES MELLITUS WITHOUT COMPLICATION (H): Primary | ICD-10-CM

## 2018-12-24 NOTE — TELEPHONE ENCOUNTER
Reason for Call: Request for an order or referral:    Order or referral being requested: Test Strips    Date needed: as soon as possible    Has the patient been seen by the PCP for this problem? YES    Additional comments: Pt insurance changed brand of meter so pt will need new RX for Accucheck Aveva Plus test strips sent to [facility] No need to call pt unless there are questions    Phone number Patient can be reached at:  Home number on file [phone] (home)    Best Time:  any    Can we leave a detailed message on this number?  YES    Call taken on 12/24/2018 at 8:17 AM by Meeta Truong

## 2019-01-07 DIAGNOSIS — E11.9 TYPE 2 DIABETES MELLITUS WITHOUT COMPLICATION, WITHOUT LONG-TERM CURRENT USE OF INSULIN (H): ICD-10-CM

## 2019-01-07 DIAGNOSIS — E11.9 TYPE 2 DIABETES, HBA1C GOAL < 7% (H): ICD-10-CM

## 2019-01-09 NOTE — TELEPHONE ENCOUNTER
"Need a New Prescription, per medicaid can not have an or.    Requested Prescriptions   Pending Prescriptions Disp Refills     blood glucose (ACCU-CHEK SIDDHARTHA PLUS) test strip 200 strip 11     Sig: Use to test blood sugar 2 times daily or as directed.  Ok to substitute alternative if insurance prefers.    Diabetic Supplies Protocol Passed - 1/7/2019  5:02 PM       Passed - Medication is active on med list       Passed - Patient is 18 years of age or older       Passed - Recent (6 mo) or future (30 days) visit within the authorizing provider's specialty    Patient had office visit in the last 6 months or has a visit in the next 30 days with authorizing provider.  See \"Patient Info\" tab in inbasket, or \"Choose Columns\" in Meds & Orders section of the refill encounter.      Last Written Prescription Date:  5/16/18  Last Fill Quantity: 30,  # refills: 0   Last office visit: 9/7/2018 with prescribing provider:     Future Office Visit:              blood glucose monitoring (ACCU-CHEK MULTICLIX) lancets       Sig: Use to test blood sugar 1 time daily or as directed. Accu chek, Ins will cover, match machine.    Diabetic Supplies Protocol Passed - 1/7/2019  5:02 PM       Passed - Medication is active on med list       Passed - Patient is 18 years of age or older       Passed - Recent (6 mo) or future (30 days) visit within the authorizing provider's specialty    Patient had office visit in the last 6 months or has a visit in the next 30 days with authorizing provider.  See \"Patient Info\" tab in inbasket, or \"Choose Columns\" in Meds & Orders section of the refill encounter.      Last Written Prescription Date:  10/6/18  Last Fill Quantity: 1,  # refills: 12   Last office visit: 9/7/2018 with prescribing provider:     Future Office Visit:                  "

## 2019-03-15 ENCOUNTER — TELEPHONE (OUTPATIENT)
Dept: FAMILY MEDICINE | Facility: CLINIC | Age: 64
End: 2019-03-15

## 2019-03-15 DIAGNOSIS — E11.9 TYPE 2 DIABETES MELLITUS WITHOUT COMPLICATION, WITHOUT LONG-TERM CURRENT USE OF INSULIN (H): ICD-10-CM

## 2019-03-15 NOTE — TELEPHONE ENCOUNTER
Prescription approved per [facility] Refill Protocol.    Violeta FINLEY RN

## 2019-03-15 NOTE — TELEPHONE ENCOUNTER
Pt has appointment on 4/15/19 and she only has two weeks medication. Please fill until appointment.

## 2019-03-27 ENCOUNTER — ANCILLARY PROCEDURE (OUTPATIENT)
Dept: MAMMOGRAPHY | Facility: CLINIC | Age: 64
End: 2019-03-27
Attending: FAMILY MEDICINE
Payer: COMMERCIAL

## 2019-03-27 DIAGNOSIS — Z12.31 VISIT FOR SCREENING MAMMOGRAM: ICD-10-CM

## 2019-03-27 PROCEDURE — 77067 SCR MAMMO BI INCL CAD: CPT | Mod: TC

## 2019-04-11 ENCOUNTER — TELEPHONE (OUTPATIENT)
Dept: FAMILY MEDICINE | Facility: CLINIC | Age: 64
End: 2019-04-11

## 2019-04-11 NOTE — TELEPHONE ENCOUNTER
Met in consultation for:  Diabetes Mellitus    Parameters Addressed:  B/P management, LDL, A1C and Tobacco    Recommended follow-up:  PCP    Considerations:  due for a six month diabetes follow up     Melissa Pitts M.D.

## 2019-04-15 ENCOUNTER — OFFICE VISIT (OUTPATIENT)
Dept: FAMILY MEDICINE | Facility: CLINIC | Age: 64
End: 2019-04-15
Payer: COMMERCIAL

## 2019-04-15 VITALS
WEIGHT: 129 LBS | TEMPERATURE: 97.6 F | DIASTOLIC BLOOD PRESSURE: 74 MMHG | BODY MASS INDEX: 22.02 KG/M2 | OXYGEN SATURATION: 97 % | HEART RATE: 108 BPM | HEIGHT: 64 IN | SYSTOLIC BLOOD PRESSURE: 120 MMHG | RESPIRATION RATE: 18 BRPM

## 2019-04-15 DIAGNOSIS — R87.810 CERVICAL HIGH RISK HPV (HUMAN PAPILLOMAVIRUS) TEST POSITIVE: ICD-10-CM

## 2019-04-15 DIAGNOSIS — Z00.00 ROUTINE GENERAL MEDICAL EXAMINATION AT A HEALTH CARE FACILITY: Primary | ICD-10-CM

## 2019-04-15 DIAGNOSIS — I10 HYPERTENSION, GOAL BELOW 140/90: ICD-10-CM

## 2019-04-15 DIAGNOSIS — Z12.4 SCREENING FOR CERVICAL CANCER: ICD-10-CM

## 2019-04-15 DIAGNOSIS — Z13.6 CARDIOVASCULAR SCREENING; LDL GOAL LESS THAN 100: ICD-10-CM

## 2019-04-15 DIAGNOSIS — E11.9 TYPE 2 DIABETES MELLITUS WITHOUT COMPLICATION, WITHOUT LONG-TERM CURRENT USE OF INSULIN (H): ICD-10-CM

## 2019-04-15 LAB
ANION GAP SERPL CALCULATED.3IONS-SCNC: 4 MMOL/L (ref 3–14)
BUN SERPL-MCNC: 17 MG/DL (ref 7–30)
CALCIUM SERPL-MCNC: 9.8 MG/DL (ref 8.5–10.1)
CHLORIDE SERPL-SCNC: 106 MMOL/L (ref 94–109)
CHOLEST SERPL-MCNC: 108 MG/DL
CO2 SERPL-SCNC: 26 MMOL/L (ref 20–32)
CREAT SERPL-MCNC: 0.71 MG/DL (ref 0.52–1.04)
GFR SERPL CREATININE-BSD FRML MDRD: 89 ML/MIN/{1.73_M2}
GLUCOSE SERPL-MCNC: 92 MG/DL (ref 70–99)
HBA1C MFR BLD: 6.9 % (ref 0–5.6)
HDLC SERPL-MCNC: 45 MG/DL
LDLC SERPL CALC-MCNC: 45 MG/DL
NONHDLC SERPL-MCNC: 63 MG/DL
POTASSIUM SERPL-SCNC: 4.1 MMOL/L (ref 3.4–5.3)
SODIUM SERPL-SCNC: 136 MMOL/L (ref 133–144)
TRIGL SERPL-MCNC: 90 MG/DL
TSH SERPL DL<=0.005 MIU/L-ACNC: 1.95 MU/L (ref 0.4–4)

## 2019-04-15 PROCEDURE — 80061 LIPID PANEL: CPT | Performed by: FAMILY MEDICINE

## 2019-04-15 PROCEDURE — 36415 COLL VENOUS BLD VENIPUNCTURE: CPT | Performed by: FAMILY MEDICINE

## 2019-04-15 PROCEDURE — G0145 SCR C/V CYTO,THINLAYER,RESCR: HCPCS | Performed by: FAMILY MEDICINE

## 2019-04-15 PROCEDURE — 99396 PREV VISIT EST AGE 40-64: CPT | Performed by: FAMILY MEDICINE

## 2019-04-15 PROCEDURE — 87624 HPV HI-RISK TYP POOLED RSLT: CPT | Performed by: FAMILY MEDICINE

## 2019-04-15 PROCEDURE — 84443 ASSAY THYROID STIM HORMONE: CPT | Performed by: FAMILY MEDICINE

## 2019-04-15 PROCEDURE — 83036 HEMOGLOBIN GLYCOSYLATED A1C: CPT | Performed by: FAMILY MEDICINE

## 2019-04-15 PROCEDURE — 80048 BASIC METABOLIC PNL TOTAL CA: CPT | Performed by: FAMILY MEDICINE

## 2019-04-15 RX ORDER — METFORMIN HCL 500 MG
1000 TABLET, EXTENDED RELEASE 24 HR ORAL 2 TIMES DAILY WITH MEALS
Qty: 360 TABLET | Refills: 3 | Status: SHIPPED | OUTPATIENT
Start: 2019-04-15 | End: 2019-12-16

## 2019-04-15 RX ORDER — ATORVASTATIN CALCIUM 40 MG/1
40 TABLET, FILM COATED ORAL DAILY
Qty: 90 TABLET | Refills: 3 | Status: SHIPPED | OUTPATIENT
Start: 2019-04-15 | End: 2019-12-16

## 2019-04-15 RX ORDER — LISINOPRIL 10 MG/1
10 TABLET ORAL DAILY
Qty: 90 TABLET | Refills: 3 | Status: SHIPPED | OUTPATIENT
Start: 2019-04-15 | End: 2020-02-20 | Stop reason: DRUGHIGH

## 2019-04-15 ASSESSMENT — MIFFLIN-ST. JEOR: SCORE: 1112.2

## 2019-04-15 ASSESSMENT — PAIN SCALES - GENERAL: PAINLEVEL: NO PAIN (0)

## 2019-04-15 NOTE — LETTER
April 24, 2019    Asmita Jerez  [address] [address] [address]    Dear ,  This letter is regarding your recent Pap smear (cervical cancer screening) and Human Papillomavirus (HPV) test.  We are happy to inform you that your Pap smear result is normal. Cervical cancer is closely linked with certain types of HPV. Your results showed no evidence of high-risk HPV.  Therefore we recommend you return in 3 years for your next pap smear and HPV test.  You will still need to return to the clinic every year for an annual exam and other preventive tests.  If you have additional questions regarding this result, please call our registered nurse, Jessica at [phone].  Sincerely,    Melissa Pitts MD/[facility]

## 2019-04-15 NOTE — PATIENT INSTRUCTIONS
Our Clinic hours are:  Mondays    7:20 am - 7 pm  Tues - Fri  7:20 am - 5 pm    Clinic Phone: [phone]    The clinic lab opens at 7:30 am Mon - Fri and appointments are required.    [facility]. [phone]  Monday  8 am - 7pm  Tues - Fri 8 am - 5:30 pm         Preventive Health Recommendations  Female Ages 50 - 64    Yearly exam: See your health care provider every year in order to  o Review health changes.   o Discuss preventive care.    o Review your medicines if your doctor has prescribed any.      Get a Pap test every three years (unless you have an abnormal result and your provider advises testing more often).    If you get Pap tests with HPV test, you only need to test every 5 years, unless you have an abnormal result.     You do not need a Pap test if your uterus was removed (hysterectomy) and you have not had cancer.    You should be tested each year for STDs (sexually transmitted diseases) if you're at risk.     Have a mammogram every 1 to 2 years.    Have a colonoscopy at age 50, or have a yearly FIT test (stool test). These exams screen for colon cancer.      Have a cholesterol test every 5 years, or more often if advised.    Have a diabetes test (fasting glucose) every three years. If you are at risk for diabetes, you should have this test more often.     If you are at risk for osteoporosis (brittle bone disease), think about having a bone density scan (DEXA).    Shots: Get a flu shot each year. Get a tetanus shot every 10 years.    Nutrition:     Eat at least 5 servings of fruits and vegetables each day.    Eat whole-grain bread, whole-wheat pasta and brown rice instead of white grains and rice.    Get adequate Calcium and Vitamin D.     Lifestyle    Exercise at least 150 minutes a week (30 minutes a day, 5 days a week). This will help you control your weight and prevent disease.    Limit alcohol to one drink per day.    No smoking.     Wear sunscreen to prevent skin  cancer.     See your dentist every six months for an exam and cleaning.    See your eye doctor every 1 to 2 years.

## 2019-04-15 NOTE — LETTER
[facility]  [address]  [place] [address]  Phone: [phone]      4/17/2019     Asmita Jerez  [address]  [place] [address]      Dear Asmita:    Thank you for allowing me to participate in your care. Your recent test results were reviewed and listed below.      Your results are provided below for your review  Your Basic metabolic lab results:  Lab Results   Component Value Date     04/15/2019     (desirable 133-144) - SODIUM  Lab Results   Component Value Date    POTASSIUM 4.1 04/15/2019    (desirable 3.4-5.3)  Lab Results   Component Value Date    GLC 92 04/15/2019     (normal value without eating is below 100; concerning if greater than 126 while fasting) - GLUCOSE  Lab Results   Component Value Date    BUN 17 04/15/2019    (kidney function test and for hydration; desirable 7-30)  Lab Results   Component Value Date    CR 0.71 04/15/2019     (kidney function test; desirable 0.52-1.04) - CREATNINE  Lab Results   Component Value Date    JORY 9.8 04/15/2019     (desirable 8.5-10.4) - CALCIUM   Your Cholesterol Results:  Lab Results   Component Value Date    CHOL 108 04/15/2019                  desired cholesterol level less than 200  Lab Results   Component Value Date    HDL 45 04/15/2019                      desired greater than 40   (good chol)  Lab Results   Component Value Date    LDL 45 04/15/2019                      desired less than 130, less [id number] for diabetic patients and less than 70 for heart disease patients  (bad chol)  Lab Results   Component Value Date    TRIG 90 04/15/2019                    desired less [id number]; fat in the blood  Your long term Diabetes control results:  Lab Results   Component Value Date    A1C 6.9 04/15/2019                      desired less than 8.0 if diagnosed with Diabetes     The results of your TSH:    Lab Results   Component Value Date    TSH 1.95 04/15/2019     (Desirable TSH level is  0.4-5  mU-L, if you have  symptoms of an underactive thyroid >3 may be abnormal, talk to your provider)       Normal/acceptable results.          Thank you for choosing [facility]. As a result, please continue with the treatment plan discussed in the office. Return as discussed or sooner if symptoms worsen or fail to improve.     If you have any further questions or concerns, please do not hesitate to contact us.      Sincerely,        Dr. Melissa Pitts

## 2019-04-15 NOTE — PROGRESS NOTES
SUBJECTIVE:   CC: Asmita Jerez is an 63 year old woman who presents for preventive health visit.     Healthy Habits:    Do you get at least three servings of calcium containing foods daily (dairy, green leafy vegetables, etc.)? yes    Amount of exercise or daily activities, outside of work: nothing specific    Problems taking medications regularly No    Medication side effects: No    Have you had an eye exam in the past two years? No- will send report when completed    Do you see a dentist twice per year? yes    Do you have sleep apnea, excessive snoring or daytime drowsiness?no      Diabetes Follow-up    Patient is checking blood sugars: once daily.  Results are as follows:         am -  averaging 112-118    Diabetic concerns: other - runs low when she forgets to eat     Symptoms of hypoglycemia (low blood sugar): none     Paresthesias (numbness or burning in feet) or sores: No     Date of last diabetic eye exam: DUE- Will send report when completed    Diabetes Management Resources    Hyperlipidemia Follow-Up      Rate your low fat/cholesterol diet?: good    Taking statin?  Yes, no muscle aches from statin    Other lipid medications/supplements?:  none    Hypertension Follow-up      Outpatient blood pressures are not being checked.    Low Salt Diet: not monitoring salt    BP Readings from Last 2 Encounters:   04/15/19 120/74   09/07/18 138/62     Hemoglobin A1C (%)   Date Value   09/07/2018 6.8 (H)   03/23/2018 14.1 (H)     LDL Cholesterol Calculated (mg/dL)   Date Value   03/23/2018 43   02/15/2017 56       Today's PHQ-2 Score:   PHQ-2 ( 1999 [employer]) 4/15/2019 9/7/2018   Q1: Little interest or pleasure in doing things 0 0   Q2: Feeling down, depressed or hopeless 0 0   PHQ-2 Score 0 0       Abuse: Current or Past(Physical, Sexual or Emotional)- No  Do you feel safe in your environment? Yes    Social History     Tobacco Use     Smoking status: Current Every Day Smoker     Packs/day: 1.00     Years: 40.00      Pack years: 40.00     Types: Cigarettes     Smokeless tobacco: Never Used     Tobacco comment: pt not interested in quit plan 4/22/09   Substance Use Topics     Alcohol use: No     If you drink alcohol do you typically have >3 drinks per day or >7 drinks per week? No                     Reviewed orders with patient.  Reviewed health maintenance and updated orders accordingly - Yes  Labs reviewed in EPIC  BP Readings from Last 3 Encounters:   04/15/19 120/74   09/07/18 138/62   03/23/18 110/72    Wt Readings from Last 3 Encounters:   04/15/19 58.5 kg (129 lb)   09/07/18 59 kg (130 lb)   03/23/18 60.3 kg (133 lb)                    Mammogram Screening: Patient over age 50, mutual decision to screen reflected in health maintenance.    Pertinent mammograms are reviewed under the imaging tab.  History of abnormal Pap smear: YES - updated in Problem List and Health Maintenance accordingly  PAP / HPV Latest Ref Rng & Units 3/23/2018 4/29/2015 4/2/2014   PAP - NIL NIL NIL   HPV 16 DNA NEG:Negative Negative Negative -   HPV 18 DNA NEG:Negative Negative Negative -   OTHER HR HPV NEG:Negative Positive(A) Negative -     Reviewed and updated as needed this visit by clinical staff  Tobacco  Allergies  Meds  Problems  Med Hx  Surg Hx  Fam Hx  Soc Hx          Reviewed and updated as needed this visit by Provider  Problems            ROS:  CONSTITUTIONAL: NEGATIVE for fever, chills, change in weight  INTEGUMENTARY/SKIN: NEGATIVE for worrisome rashes, moles or lesions  EYES: NEGATIVE for vision changes or irritation  ENT: NEGATIVE for ear, mouth and throat problems  RESP: NEGATIVE for significant cough or SOB  BREAST: NEGATIVE for masses, tenderness or discharge  CV: NEGATIVE for chest pain, palpitations or peripheral edema  GI: NEGATIVE for nausea, abdominal pain, heartburn, or change in bowel habits  : NEGATIVE for unusual urinary or vaginal symptoms. No vaginal bleeding.  MUSCULOSKELETAL: NEGATIVE for significant  "arthralgias or myalgia  NEURO: NEGATIVE for weakness, dizziness or paresthesias  PSYCHIATRIC: NEGATIVE for changes in mood or affect     OBJECTIVE:   /74   Pulse 108   Temp 97.6  F (36.4  C) (Tympanic)   Resp 18   Ht 1.613 m (5' 3.5\")   Wt 58.5 kg (129 lb)   LMP 12/01/2009   SpO2 97%   BMI 22.49 kg/m    EXAM:  GENERAL: healthy, alert and no distress  EYES: Eyes grossly normal to inspection, PERRL and conjunctivae and sclerae normal  HENT: ear canals and TM's normal, nose and mouth without ulcers or lesions  NECK: no adenopathy, no asymmetry, masses, or scars and thyroid normal to palpation  RESP: lungs clear to auscultation - no rales, rhonchi or wheezes  BREAST: normal without masses, tenderness or nipple discharge and no palpable axillary masses or adenopathy  CV: regular rate and rhythm, normal S1 S2, no S3 or S4, no murmur, click or rub, no peripheral edema and peripheral pulses strong  ABDOMEN: soft, nontender, no hepatosplenomegaly, no masses and bowel sounds normal   (female): normal female external genitalia, normal urethral meatus , vaginal mucosa pink, moist, well rugated, normal cervix, adnexae, and uterus without masses. and pap obtained  MS: no gross musculoskeletal defects noted, no edema  SKIN: no suspicious lesions or rashes  NEURO: Normal strength and tone, mentation intact and speech normal  PSYCH: mentation appears normal, affect normal/bright    Diagnostic Test Results:  pending    ASSESSMENT/PLAN:   1. Routine general medical examination at a health care facility       2. CARDIOVASCULAR SCREENING; LDL GOAL LESS THAN 100     - atorvastatin (LIPITOR) 40 MG tablet; Take 1 tablet (40 mg) by mouth daily  Dispense: 90 tablet; Refill: 3    3. Type 2 diabetes mellitus without complication, without long-term current use of insulin (H)     - dulaglutide (TRULICITY) 0.75 MG/0.5ML pen; Inject 0.75 mg Subcutaneous every 7 days  Dispense: 3 mL; Refill: 0  - metFORMIN (GLUCOPHAGE-XR) 500 MG 24 " "hr tablet; Take 2 tablets (1,000 mg) by mouth 2 times daily (with meals)  Dispense: 360 tablet; Refill: 3  - Lipid panel reflex to direct LDL Fasting  - Hemoglobin A1c  - Basic metabolic panel  - TSH with free T4 reflex    4. Hypertension, goal below 140/90     - lisinopril (PRINIVIL/ZESTRIL) 10 MG tablet; Take 1 tablet (10 mg) by mouth daily  Dispense: 90 tablet; Refill: 3    5. Cervical high risk HPV (human papillomavirus) test positive     - Pap imaged thin layer screen with HPV - recommended age 30 - 65  - HPV High Risk Types DNA Cervical    6. Screening for cervical cancer     - Pap imaged thin layer screen with HPV - recommended age 30 - 65  - HPV High Risk Types DNA Cervical    COUNSELING:   Reviewed preventive health counseling, as reflected in patient instructions       Regular exercise       Healthy diet/nutrition    BP Readings from Last 1 Encounters:   04/15/19 120/74     Estimated body mass index is 22.49 kg/m  as calculated from the following:    Height as of this encounter: 1.613 m (5' 3.5\").    Weight as of this encounter: 58.5 kg (129 lb).           reports that she has been smoking cigarettes.  She has a 40.00 pack-year smoking history. She has never used smokeless tobacco.  Tobacco Cessation Action Plan: Information offered: Patient not interested at this time    Counseling Resources:  ATP IV Guidelines  Pooled Cohorts Equation Calculator  Breast Cancer Risk Calculator  FRAX Risk Assessment  ICSI Preventive Guidelines  Dietary Guidelines for Americans, 2010  USDA's MyPlate  ASA Prophylaxis  Lung CA Screening    Melissa Pitts MD  [facility]  "

## 2019-04-17 LAB
COPATH REPORT: NORMAL
PAP: NORMAL

## 2019-04-19 LAB
FINAL DIAGNOSIS: NORMAL
HPV HR 12 DNA CVX QL NAA+PROBE: NEGATIVE
HPV16 DNA SPEC QL NAA+PROBE: NEGATIVE
HPV18 DNA SPEC QL NAA+PROBE: NEGATIVE
SPECIMEN DESCRIPTION: NORMAL
SPECIMEN SOURCE CVX/VAG CYTO: NORMAL

## 2019-05-13 DIAGNOSIS — E11.9 TYPE 2 DIABETES MELLITUS WITHOUT COMPLICATION, WITHOUT LONG-TERM CURRENT USE OF INSULIN (H): ICD-10-CM

## 2019-05-14 ENCOUNTER — APPOINTMENT (OUTPATIENT)
Dept: GENERAL RADIOLOGY | Facility: CLINIC | Age: 64
End: 2019-05-14
Attending: PHYSICIAN ASSISTANT
Payer: COMMERCIAL

## 2019-05-14 ENCOUNTER — HOSPITAL ENCOUNTER (EMERGENCY)
Facility: CLINIC | Age: 64
Discharge: HOME | End: 2019-05-14
Attending: PHYSICIAN ASSISTANT | Admitting: PHYSICIAN ASSISTANT
Payer: COMMERCIAL

## 2019-05-14 VITALS — TEMPERATURE: 98.4 F | SYSTOLIC BLOOD PRESSURE: 157 MMHG | DIASTOLIC BLOOD PRESSURE: 86 MMHG | OXYGEN SATURATION: 99 %

## 2019-05-14 DIAGNOSIS — S60.152A CONTUSION OF LEFT LITTLE FINGER WITH DAMAGE TO NAIL, INITIAL ENCOUNTER: ICD-10-CM

## 2019-05-14 DIAGNOSIS — S60.10XA SUBUNGUAL HEMATOMA OF FINGER, INITIAL ENCOUNTER: ICD-10-CM

## 2019-05-14 PROCEDURE — 11740 EVACUATION SUBUNGUAL HMTMA: CPT | Mod: F4 | Performed by: PHYSICIAN ASSISTANT

## 2019-05-14 PROCEDURE — G0463 HOSPITAL OUTPT CLINIC VISIT: HCPCS | Mod: 25 | Performed by: PHYSICIAN ASSISTANT

## 2019-05-14 PROCEDURE — 29130 APPL FINGER SPLINT STATIC: CPT | Mod: F4 | Performed by: PHYSICIAN ASSISTANT

## 2019-05-14 PROCEDURE — 73140 X-RAY EXAM OF FINGER(S): CPT | Mod: LT

## 2019-05-14 PROCEDURE — 99214 OFFICE O/P EST MOD 30 MIN: CPT | Mod: 25 | Performed by: PHYSICIAN ASSISTANT

## 2019-05-14 RX ORDER — HYDROCODONE BITARTRATE AND ACETAMINOPHEN 5; 325 MG/1; MG/1
1 TABLET ORAL EVERY 6 HOURS PRN
Qty: 10 TABLET | Refills: 0 | Status: SHIPPED | OUTPATIENT
Start: 2019-05-14 | End: 2019-12-16

## 2019-05-14 ASSESSMENT — ENCOUNTER SYMPTOMS
WEAKNESS: 0
NUMBNESS: 0

## 2019-05-14 NOTE — ED AVS SNAPSHOT
[facility] Emergency Department  [address] [address]  Phone:  [phone]  Fax:  [phone]                                    Asmita Jerez   MRN: [id number]    Department:  [facility] Emergency Department   Date of Visit:  5/14/2019           After Visit Summary Signature Page    I have received my discharge instructions, and my questions have been answered. I have discussed any challenges I see with this plan with the nurse or doctor.    ..........................................................................................................................................  Patient/Patient Representative Signature      ..........................................................................................................................................  Patient Representative Print Name and Relationship to Patient    ..................................................               ................................................  Date                                   Time    ..........................................................................................................................................  Reviewed by Signature/Title    ...................................................              ..............................................  Date                                               Time          22EPIC Rev 08/18

## 2019-05-14 NOTE — TELEPHONE ENCOUNTER
"Requested Prescriptions   Pending Prescriptions Disp Refills     TRULICITY 0.75 MG/0.5ML pen [Pharmacy Med Name: TRULICITY 0.75MG/0.5 PEN INJCTR]  Last Written Prescription Date:  04/15/19  Last Fill Quantity: 2,  # refills: 0   Last office visit: 4/15/2019 with prescribing provider:  MUSHTAQ Pitts   Future Office Visit:     2 mL      Sig: Inject 0.75 mg Subcutaneous every 7 days       GLP-1 Agonists Protocol Passed - 5/13/2019  8:06 AM        Passed - Blood pressure less than 140/90 in past 6 months     BP Readings from Last 3 Encounters:   04/15/19 120/74   09/07/18 138/62   03/23/18 110/72                 Passed - LDL on file in past 12 months     Recent Labs   Lab Test 04/15/19  1637   LDL 45             Passed - Microalbumin on file in past 12 months     Recent Labs   Lab Test 09/07/18  0755   MICROL 18   UMALCR 17.28             Passed - HgbA1C in past 3 or 6 months     If HgbA1C is 8 or greater, it needs to be on file within the past 3 months.  If less than 8, must be on file within the past 6 months.     Recent Labs   Lab Test 04/15/19  1637   A1C 6.9*             Passed - Medication is active on med list        Passed - Patient is age 18 or older        Passed - No active pregnancy on record        Passed - Normal serum creatinine on file in past 12 months     Recent Labs   Lab Test 04/15/19  1637   CR 0.71             Passed - No positive pregnancy test in past 12 months        Passed - Recent (6 mo) or future (30 days) visit within the authorizing provider's specialty     Patient had office visit in the last 6 months or has a visit in the next 30 days with authorizing provider.  See \"Patient Info\" tab in inbasket, or \"Choose Columns\" in Meds & Orders section of the refill encounter.              "

## 2019-05-15 RX ORDER — DULAGLUTIDE 0.75 MG/.5ML
INJECTION, SOLUTION SUBCUTANEOUS
Qty: 3 ML | Refills: 1 | Status: SHIPPED | OUTPATIENT
Start: 2019-05-15 | End: 2019-07-27

## 2019-05-15 NOTE — ED PROVIDER NOTES
History     Chief Complaint   Patient presents with     Hand Pain     left pinky, smashed between 2 pieces of concrete earlier today     HPI    Asmita Jerez is a 63 year old female who sustained a left 5th finger injury today around noon at home.    Mechanism of injury: patient states she smashed her finger between 2 pieces of concrete accidentally.   Immediate symptoms: immediate pain, immediate swelling, was able to use arm directly after injury, was able to use hand directly after injury, decreased range of motion in fifth finger with swelling, bruising, and subungual hematoma noted to the distal aspect of the finger from the DIP joint to the tip of the finger.   Symptoms have been sudden since that time.   Prior history of related problems: no prior problems with this area in the past.    Patient states some mild bleeding noted from underneath the nail during initial injury, but no bleeding currently.  Patient states she has not taken any Tylenol or ibuprofen for symptom relief and has not iced the finger today.    last tetanus booster within 10 years (2015)     Patient denies numbness, weakness, hand pain.     Problem list, Medication list, Allergies, and Medical/Social/Surgical histories reviewed in [facility] and updated as appropriate.    Allergies:  Allergies   Allergen Reactions     Amoxicillin Rash       Problem List:    Patient Active Problem List    Diagnosis Date Noted     Tobacco use 09/07/2018     Priority: Medium     Cervical high risk HPV (human papillomavirus) test positive 03/31/2018     Priority: Medium     3/23/18 Pap: NIL, +HR HPV (Neg 16/18). Plan Pap+HPV in 1 year.  4/15/19 NIL, neg HPV. Plan: cotest 3 years       Essential hypertension with goal blood pressure less than 140/90 05/25/2016     Priority: Medium     Type 2 diabetes mellitus without complication (H) 10/28/2015     Priority: Medium     Hypertension, goal below 140/90 02/21/2013     Priority: Medium     Advanced directives,  counseling/discussion 01/24/2012     Priority: Medium     Patient does not have an Advance/Health Care Directive (HCD), declines information/referral.    Nani Brown  January 24, 2012         CARDIOVASCULAR SCREENING; LDL GOAL LESS THAN 100 10/31/2010     Priority: Medium     Syringoma 07/15/2010     Priority: Medium     Digital mucinous cyst 07/12/2010     Priority: Medium     Eyelid lesion 07/12/2010     Priority: Medium        Past Medical History:    Past Medical History:   Diagnosis Date     Cervical high risk HPV (human papillomavirus) test positive 3/31/2018     Peptic ulcer, unspecified site, unspecified as acute or chronic, without mention of hemorrhage, perforation, or obstruction      Polycystic ovaries      Pure hypercholesterolemia      Tobacco use disorder      Unspecified multiple gestation, unspecified as to episode of care        Past Surgical History:    Past Surgical History:   Procedure Laterality Date     APPENDECTOMY       COLONOSCOPY N/A 12/11/2015    Procedure: COLONOSCOPY;  Surgeon: Tino Pryor MD;  Location: [facility]     RELEASE TRIGGER FINGER  5/23/2014    Procedure: RELEASE TRIGGER FINGER;  Surgeon: Ag Hoffman MD;  Location: [facility]     SURGICAL HISTORY OF -   1970    appendectomy     SURGICAL HISTORY OF -   1970    pilonidal cyst and sinuses       Family History:    Family History   Problem Relation Age of Onset     Unknown/Adopted Father        Social History:  Marital Status:  Single [1]  Social History     Tobacco Use     Smoking status: Current Every Day Smoker     Packs/day: 1.00     Years: 40.00     Pack years: 40.00     Types: Cigarettes     Smokeless tobacco: Never Used     Tobacco comment: pt not interested in quit plan 4/22/09   Substance Use Topics     Alcohol use: No     Drug use: No        Medications:      HYDROcodone-acetaminophen (NORCO) 5-325 MG tablet   ASPIRIN 81 MG OR TABS   atorvastatin (LIPITOR) 40 MG tablet   blood glucose (ACCU-CHEK SIDDHARTHA PLUS)  test strip   blood glucose (NO BRAND SPECIFIED) test strip   blood glucose monitoring (ACCU-CHEK SIDDHARTHA PLUS) meter device kit   blood glucose monitoring (ACCU-CHEK MULTICLIX) lancets   blood glucose monitoring (ONETOUCH ULTRA) test strip   blood glucose monitoring (ONETOUCH ULTRA) test strip   CENTRUM SILVER OR TABS   DIABETIC STERILE LANCETS device   dulaglutide (TRULICITY) 0.75 MG/0.5ML pen   lisinopril (PRINIVIL/ZESTRIL) 10 MG tablet   metFORMIN (GLUCOPHAGE-XR) 500 MG 24 hr tablet   order for DME   order for DME         Review of Systems   Skin:        Injury to tip of 5th thing her left hand.   Neurological: Negative for weakness and numbness.   All other systems reviewed and are negative.      Physical Exam   BP: 157/86  Heart Rate: 90  Temp: 98.4  F (36.9  C)  SpO2: 99 %      Physical Exam   Constitutional: She is oriented to person, place, and time. She appears well-developed and well-nourished. No distress.   Musculoskeletal:        Left hand: She exhibits decreased range of motion (Due to pain and swelling of the fifth finger), tenderness, bony tenderness and swelling. She exhibits normal capillary refill and no laceration. Normal sensation noted. Normal strength noted.        Hands:  Patient able to flex and extend all fingers of the left hand with and without resistance.  Positive pain with flexion and extension of the fifth finger due to injury and swelling.   Neurological: She is alert and oriented to person, place, and time. She has normal strength. No sensory deficit. GCS eye subscore is 4. GCS verbal subscore is 5. GCS motor subscore is 6.   Skin: Skin is warm. Capillary refill takes less than 2 seconds. Bruising noted. No laceration and no rash noted. She is not diaphoretic.   Psychiatric: She has a normal mood and affect. Her behavior is normal. Judgment and thought content normal.   Nursing note and vitals reviewed.      ED Course        Procedures  Finger cleaned with saline solution and  Hibiclens.  Using cautery pen single hole was placed in the nail of the fifth left finger without complications.  Patient tolerated this well.  Patient then soaked finger and saline solution and Hibiclens.  Dressing and a finger splint applied in office today, but patient finger splint off stating it hurt too much.            Critical Care time:  none               Results for orders placed or performed during the hospital encounter of 05/14/19 (from the past 24 hour(s))   Fingers XR, 2-3 views, left    Narrative    FINGER(S) TWO-THREE VIEWS LEFT  5/14/2019 8:36 PM     HISTORY: patient smashed 5th finger between 2 pieces of concrete  today; rule out fracture and dislocation    COMPARISON: None.    FINDINGS: There is normal osseous alignment.  No fractures are  identified.  Mild degenerative changes seen in the PIP joint.      Impression    IMPRESSION: No fractures are identified.    JIM JO MD       Medications - No data to display    Assessments & Plan (with Medical Decision Making)     I have reviewed the nursing notes.    I have reviewed the findings, diagnosis, plan and need for follow up with the patient.   63-year-old female presents to the urgent care with left fifth finger injury and subungual hematoma that occurred around noon today.  Patient states she has not taken anything for her symptoms and states that there was bleeding coming from underneath the nail earlier in the day.  She is up-to-date with her tetanus.  X-ray obtained in office today with no fractures identified.  Cautery pen used to place a hole in the nail of the fifth finger of left hand to allow drainage of subungual hematoma.  Patient tolerated procedure well.  Patient's finger soaked in saline and Hibiclens solution with bacitracin, bandage, and aluminum foam splint applied to finger.  Patient took off the finger splint and states she will try and put it on later as needed.  Patient to ice, rest, elevate, Tylenol and ibuprofen  over-the-counter as needed for pain.  Patient given prescription Norco for pain control.  Side effects discussed with patient regarding [place].  Patient to not drive while using his medication these were discussed with patient and given on discharge paperwork.  Patient follow-up with primary care doctor for recheck in 1 week.  Patient return sooner if signs or symptoms of infection occur these were discussed with patient and given on discharge paperwork.  Patient discharged in stable condition.       Medication List      Started    HYDROcodone-acetaminophen 5-325 MG tablet  Commonly known as:  NORCO  1 tablet, Oral, EVERY 6 HOURS PRN            Final diagnoses:   Contusion of left little finger with damage to nail, initial encounter   Subungual hematoma of finger, initial encounter       5/14/2019   [facility] EMERGENCY DEPARTMENT     Lindsay Dwyer PA-C  05/14/19 [id number]

## 2019-05-15 NOTE — DISCHARGE INSTRUCTIONS
Ice, elevate, rest, Tylenol and ibuprofen over-the-counter as needed.    Given Norco prescription to use for breakthrough pain.  Make sure you add the Tylenol and the Norco with your daily intake of Tylenol over-the-counter.    Daily soaks to prevent infection.    Patient to return if signs or symptoms of infection occur this includes redness, swelling, red streaking, hot to the touch, fevers or purulent drainage.    Patient to return to the emergency department if numbness, pain out of proportion, change in symptoms occur.    Follow-up with primary care doctor in 3 days for recheck.  Aluminum/foam splint to finger for the next 1 to 3 days as needed.

## 2019-07-08 ENCOUNTER — TELEPHONE (OUTPATIENT)
Dept: FAMILY MEDICINE | Facility: CLINIC | Age: 64
End: 2019-07-08

## 2019-07-08 NOTE — TELEPHONE ENCOUNTER
Panel Management Review      Patient has the following on her problem list:     Diabetes    ASA: Passed    Last A1C  Lab Results   Component Value Date    A1C 6.9 04/15/2019    A1C 6.8 09/07/2018    A1C 14.1 03/23/2018    A1C 11.0 07/07/2017    A1C 8.9 02/15/2017     A1C tested: MONITOR    Last LDL:    Lab Results   Component Value Date    CHOL 108 04/15/2019     Lab Results   Component Value Date    HDL 45 04/15/2019     Lab Results   Component Value Date    LDL 45 04/15/2019     Lab Results   Component Value Date    TRIG 90 04/15/2019     Lab Results   Component Value Date    CHOLHDLRATIO 2.5 04/29/2015     Lab Results   Component Value Date    NHDL 63 04/15/2019       Is the patient on a Statin? YES             Is the patient on Aspirin? YES    Medications     HMG CoA Reductase Inhibitors     atorvastatin (LIPITOR) 40 MG tablet       Salicylates     ASPIRIN 81 MG OR TABS             Last three blood pressure readings:  BP Readings from Last 3 Encounters:   05/14/19 157/86   04/15/19 120/74   09/07/18 138/62       Date of last diabetes office visit: 4/15/19     Tobacco History:     History   Smoking Status     Current Every Day Smoker     Packs/day: 1.00     Years: 40.00     Types: Cigarettes   Smokeless Tobacco     Never Used     Comment: pt not interested in quit plan 4/22/09           Composite cancer screening  Chart review shows that this patient is due/due soon for the following DM eye exam  Summary:    Patient is due/failing the following:   Eye exam      Action needed:   Need eye exam reports  Ask if patient had flu vaccine within the [place].    Type of outreach:    Phone, left message for patient to call back.     Questions for provider review:    None                                                                                                                                    Meeta Ku MA       Chart routed to Care Team .

## 2019-07-27 DIAGNOSIS — E11.9 TYPE 2 DIABETES MELLITUS WITHOUT COMPLICATION, WITHOUT LONG-TERM CURRENT USE OF INSULIN (H): ICD-10-CM

## 2019-07-29 RX ORDER — DULAGLUTIDE 0.75 MG/.5ML
INJECTION, SOLUTION SUBCUTANEOUS
Qty: 3 ML | Refills: 1 | Status: SHIPPED | OUTPATIENT
Start: 2019-07-29 | End: 2019-10-26

## 2019-07-29 NOTE — TELEPHONE ENCOUNTER
"Requested Prescriptions   Pending Prescriptions Disp Refills     TRULICITY 0.75 MG/0.5ML pen [Pharmacy Med Name: TRULICITY 0.75MG/0.5 PEN INJCTR] 3 mL 1     Sig: Inject 0.75 mg Subcutaneous every 7 days       GLP-1 Agonists Protocol Failed - 7/27/2019  8:00 AM        Failed - Blood pressure less than 140/90 in past 6 months     BP Readings from Last 3 Encounters:   05/14/19 157/86   04/15/19 120/74   09/07/18 138/62                 Passed - LDL on file in past 12 months     Recent Labs   Lab Test 04/15/19  1637   LDL 45             Passed - Microalbumin on file in past 12 months     Recent Labs   Lab Test 09/07/18  0755   MICROL 18   UMALCR 17.28             Passed - HgbA1C in past 3 or 6 months     If HgbA1C is 8 or greater, it needs to be on file within the past 3 months.  If less than 8, must be on file within the past 6 months.     Recent Labs   Lab Test 04/15/19  1637   A1C 6.9*             Passed - Medication is active on med list        Passed - Patient is age 18 or older        Passed - No active pregnancy on record        Passed - Normal serum creatinine on file in past 12 months     Recent Labs   Lab Test 04/15/19  1637   CR 0.71             Passed - No positive pregnancy test in past 12 months        Passed - Recent (6 mo) or future (30 days) visit within the authorizing provider's specialty     Patient had office visit in the last 6 months or has a visit in the next 30 days with authorizing provider.  See \"Patient Info\" tab in inbasket, or \"Choose Columns\" in Meds & Orders section of the refill encounter.            Last Written Prescription Date:  5/15/19  Last Fill Quantity: 3 ml,  # refills: 1   Last office visit: 4/15/2019 with prescribing provider:     Future Office Visit:      "

## 2019-10-26 DIAGNOSIS — E11.9 TYPE 2 DIABETES MELLITUS WITHOUT COMPLICATION, WITHOUT LONG-TERM CURRENT USE OF INSULIN (H): ICD-10-CM

## 2019-10-26 NOTE — LETTER
[facility]  [address]  [facility] [address]  Phone: [phone]       October 28, 2019         Asmita Jerez  [address] [address]            Dear Asmita:    We are concerned about your health care.  We recently provided you with medication refills.  Many medications require routine follow-up with your doctor.    Your prescription(s) have been refilled for 30 days so you may have time for the above noted follow-up. Please call to schedule soon so we can assure you have an appointment before your next refills are needed.    Thank you,      Melissa Pitts MD/ kian

## 2019-10-26 NOTE — TELEPHONE ENCOUNTER
"Requested Prescriptions   Pending Prescriptions Disp Refills     TRULICITY 0.75 MG/0.5ML pen [Pharmacy Med Name: TRULICITY 0.75MG/0.5 PEN INJCTR]  Last Written Prescription Date:  10/07/19  Last Fill Quantity: 2,  # refills: 0   Last office visit: 4/15/2019 with prescribing provider:  MUSHTAQ Pitts   Future Office Visit:     2 mL      Sig: Inject 0.75 mg Subcutaneous every 7 days       GLP-1 Agonists Protocol Failed - 10/26/2019  8:00 AM        Failed - Blood pressure less than 140/90 in past 6 months     BP Readings from Last 3 Encounters:   05/14/19 157/86   04/15/19 120/74   09/07/18 138/62                 Failed - HgbA1C in past 3 or 6 months     If HgbA1C is 8 or greater, it needs to be on file within the past 3 months.  If less than 8, must be on file within the past 6 months.     Recent Labs   Lab Test 04/15/19  1637   A1C 6.9*             Failed - Recent (6 mo) or future (30 days) visit within the authorizing provider's specialty     Patient had office visit in the last 6 months or has a visit in the next 30 days with authorizing provider.  See \"Patient Info\" tab in inbasket, or \"Choose Columns\" in Meds & Orders section of the refill encounter.            Passed - LDL on file in past 12 months     Recent Labs   Lab Test 04/15/19  1637   LDL 45             Passed - Microalbumin on file in past 12 months     Recent Labs   Lab Test 09/07/18  0755   MICROL 18   UMALCR 17.28             Passed - Medication is active on med list        Passed - Patient is age 18 or older        Passed - No active pregnancy on record        Passed - Normal serum creatinine on file in past 12 months     Recent Labs   Lab Test 04/15/19  1637   CR 0.71             Passed - No positive pregnancy test in past 12 months          "

## 2019-10-28 RX ORDER — DULAGLUTIDE 0.75 MG/.5ML
INJECTION, SOLUTION SUBCUTANEOUS
Qty: 2 ML | Refills: 0 | Status: SHIPPED | OUTPATIENT
Start: 2019-10-28 | End: 2019-11-18

## 2019-11-18 ENCOUNTER — DOCUMENTATION ONLY (OUTPATIENT)
Dept: FAMILY MEDICINE | Facility: CLINIC | Age: 64
End: 2019-11-18

## 2019-11-18 DIAGNOSIS — E11.9 TYPE 2 DIABETES MELLITUS WITHOUT COMPLICATION, WITHOUT LONG-TERM CURRENT USE OF INSULIN (H): ICD-10-CM

## 2019-11-18 NOTE — PROGRESS NOTES
Patient was scheduled with provider. Patient will wait for OV for labs.    Patient did not appreciate the letter and does not want to have this again.  Will bring to manager.  Patient was given small refill.      Violeta FINLEY RN

## 2019-11-18 NOTE — PROGRESS NOTES
Pt has a lab appt for an A1C and fasting labs on 11-20. Please place future orders if you want these ahead of time.    Thank you

## 2019-12-16 ENCOUNTER — OFFICE VISIT (OUTPATIENT)
Dept: FAMILY MEDICINE | Facility: CLINIC | Age: 64
End: 2019-12-16
Payer: MEDICARE

## 2019-12-16 VITALS
DIASTOLIC BLOOD PRESSURE: 88 MMHG | HEART RATE: 86 BPM | OXYGEN SATURATION: 96 % | TEMPERATURE: 97.7 F | BODY MASS INDEX: 21.85 KG/M2 | RESPIRATION RATE: 18 BRPM | SYSTOLIC BLOOD PRESSURE: 150 MMHG | HEIGHT: 64 IN | WEIGHT: 128 LBS

## 2019-12-16 DIAGNOSIS — E11.9 TYPE 2 DIABETES MELLITUS WITHOUT COMPLICATION, WITHOUT LONG-TERM CURRENT USE OF INSULIN (H): Primary | ICD-10-CM

## 2019-12-16 DIAGNOSIS — I10 ESSENTIAL HYPERTENSION WITH GOAL BLOOD PRESSURE LESS THAN 140/90: ICD-10-CM

## 2019-12-16 DIAGNOSIS — Z13.6 CARDIOVASCULAR SCREENING; LDL GOAL LESS THAN 100: ICD-10-CM

## 2019-12-16 DIAGNOSIS — Z72.0 TOBACCO USE: ICD-10-CM

## 2019-12-16 DIAGNOSIS — Z87.891 PERSONAL HISTORY OF TOBACCO USE: ICD-10-CM

## 2019-12-16 LAB
ALBUMIN SERPL-MCNC: 3.9 G/DL (ref 3.4–5)
ALP SERPL-CCNC: 64 U/L (ref 40–150)
ALT SERPL W P-5'-P-CCNC: 29 U/L (ref 0–50)
ANION GAP SERPL CALCULATED.3IONS-SCNC: 3 MMOL/L (ref 3–14)
AST SERPL W P-5'-P-CCNC: 22 U/L (ref 0–45)
BILIRUB SERPL-MCNC: 0.6 MG/DL (ref 0.2–1.3)
BUN SERPL-MCNC: 11 MG/DL (ref 7–30)
CALCIUM SERPL-MCNC: 9.9 MG/DL (ref 8.5–10.1)
CHLORIDE SERPL-SCNC: 107 MMOL/L (ref 94–109)
CHOLEST SERPL-MCNC: 96 MG/DL
CO2 SERPL-SCNC: 31 MMOL/L (ref 20–32)
CREAT SERPL-MCNC: 0.82 MG/DL (ref 0.52–1.04)
GFR SERPL CREATININE-BSD FRML MDRD: 75 ML/MIN/{1.73_M2}
GLUCOSE SERPL-MCNC: 111 MG/DL (ref 70–99)
HBA1C MFR BLD: 6.4 % (ref 0–5.6)
HDLC SERPL-MCNC: 49 MG/DL
LDLC SERPL CALC-MCNC: 31 MG/DL
NONHDLC SERPL-MCNC: 47 MG/DL
POTASSIUM SERPL-SCNC: 4.6 MMOL/L (ref 3.4–5.3)
PROT SERPL-MCNC: 7.4 G/DL (ref 6.8–8.8)
SODIUM SERPL-SCNC: 141 MMOL/L (ref 133–144)
TRIGL SERPL-MCNC: 82 MG/DL
VIT B12 SERPL-MCNC: 787 PG/ML (ref 193–986)

## 2019-12-16 PROCEDURE — 80061 LIPID PANEL: CPT | Performed by: FAMILY MEDICINE

## 2019-12-16 PROCEDURE — 36415 COLL VENOUS BLD VENIPUNCTURE: CPT | Performed by: FAMILY MEDICINE

## 2019-12-16 PROCEDURE — 80053 COMPREHEN METABOLIC PANEL: CPT | Performed by: FAMILY MEDICINE

## 2019-12-16 PROCEDURE — 83036 HEMOGLOBIN GLYCOSYLATED A1C: CPT | Performed by: FAMILY MEDICINE

## 2019-12-16 PROCEDURE — 82607 VITAMIN B-12: CPT | Performed by: FAMILY MEDICINE

## 2019-12-16 PROCEDURE — 99214 OFFICE O/P EST MOD 30 MIN: CPT | Performed by: FAMILY MEDICINE

## 2019-12-16 PROCEDURE — G0296 VISIT TO DETERM LDCT ELIG: HCPCS | Performed by: FAMILY MEDICINE

## 2019-12-16 RX ORDER — LISINOPRIL 10 MG/1
10 TABLET ORAL DAILY
Qty: 90 TABLET | Refills: 3 | Status: CANCELLED | OUTPATIENT
Start: 2019-12-16

## 2019-12-16 RX ORDER — LISINOPRIL 20 MG/1
20 TABLET ORAL DAILY
Qty: 90 TABLET | Refills: 3 | Status: SHIPPED | OUTPATIENT
Start: 2019-12-16 | End: 2020-02-20

## 2019-12-16 RX ORDER — ATORVASTATIN CALCIUM 40 MG/1
40 TABLET, FILM COATED ORAL DAILY
Qty: 90 TABLET | Refills: 3 | Status: SHIPPED | OUTPATIENT
Start: 2019-12-16 | End: 2021-02-25

## 2019-12-16 RX ORDER — METFORMIN HCL 500 MG
1000 TABLET, EXTENDED RELEASE 24 HR ORAL 2 TIMES DAILY WITH MEALS
Qty: 360 TABLET | Refills: 1 | Status: SHIPPED | OUTPATIENT
Start: 2019-12-16 | End: 2020-08-06

## 2019-12-16 ASSESSMENT — PAIN SCALES - GENERAL: PAINLEVEL: NO PAIN (0)

## 2019-12-16 ASSESSMENT — MIFFLIN-ST. JEOR: SCORE: 1102.66

## 2019-12-16 NOTE — PATIENT INSTRUCTIONS
Increase lisinopril to 20 mg daily - you may take two of the 10 mg pills until they are gone.  Next prescription will be for 20 mg pills    Schedule CT for lung cancer screening - do this annually.  [phone]    Our Clinic hours are:  Mondays    7:20 am - 7 pm  Tues - Fri  7:20 am - 5 pm    Clinic Phone: [phone]    The clinic lab opens at 7:30 am Mon - Fri and appointments are required.    [facility], [place]. [phone]  Monday  8 am - 7pm  Tues - Fri 8 am - 5:30 pm         Lung Cancer Screening   Frequently Asked Questions  If you are at high-risk for lung cancer, getting screened with low-dose computed tomography (LDCT) every year can help save your life. This handout offers answers to some of the most common questions about lung cancer screening. If you have other questions, please call [phone] ([phone]).     What is it?  Lung cancer screening uses special X-ray technology to create an image of your lung tissue. The exam is quick and easy and takes less than 10 seconds. We don t give you any medicine or use any needles. You can eat before and after the exam. You don t need to change your clothes as long as the clothing on your chest doesn t contain metal. But, you do need to be able to hold your breath for at least 6 seconds during the exam.    What is the goal of lung cancer screening?  The goal of lung cancer screening is to save lives. Many times, lung cancer is not found until a person starts having physical symptoms. Lung cancer screening can help detect lung cancer in the earliest stages when it may be easier to treat.    Who should be screened for lung cancer?  We suggest lung cancer screening for anyone who is at high-risk for lung cancer. You are in the high-risk group if you:      are between the ages of 55 and 79, and    have smoked at least 1 pack of cigarettes a day for 30 or more years, and    still smoke or have quit within the past 15 years.    However,  if you have a new cough or shortness of breath, you should talk to your doctor before being screened.    Some national lung health advocacy groups also recommend screening for people ages 50 to 79 who have smoked an average of 1 pack of cigarettes a day for 20 years. They must also have at least 1 other risk factor for lung cancer, not including exposure to secondhand smoke. Other risk factors are having had cancer in the past, emphysema, pulmonary fibrosis, COPD, a family history of lung cancer, or exposure to certain materials such as arsenic, asbestos, beryllium, cadmium, chromium, diesel fumes, nickel, radon or silica. Your care team can help you know if you have one of these risk factors.     Why does it matter if I have symptoms?  Certain symptoms can be a sign that you have a condition in your lungs that should be checked and treated by your doctor. These symptoms include fever, chest pain, a new or changing cough, shortness of breath that you have never felt before, coughing up blood or unexplained weight loss. Having any of these symptoms can greatly affect the results of lung cancer screening.       Should all smokers get an LDCT lung cancer screening exam?  It depends. Lung cancer screening is for a very specific group of men and women who have a history of heavy smoking over a long period of time (see  Who should be screened for lung cancer  above).  I am in the high-risk group, but have been diagnosed with cancer in the past. Is LDCT lung cancer screening right for me?  In some cases, you should not have LDCT lung screening, such as when your doctor is already following your cancer with CT scan studies. Your doctor will help you decide if LDCT lung screening is right for you.  Do I need to have a screening exam every year?  Yes. If you are in the high-risk group described earlier, you should get an LDCT lung cancer screening exam every year until you are 79, or are no longer willing or able to undergo  screening and possible procedures to diagnose and treat lung cancer.  How effective is LDCT at preventing death from lung cancer?  Studies have shown that LDCT lung cancer screening can lower the risk of death from lung cancer by 20 percent in people who are at high-risk.  What are the risks?  There are some risks and limitations of LDCT lung cancer screening. We want to make sure you understand the risks and benefits, so please let us know if you have any questions. Your doctor may want to talk with you more about these risks.    Radiation exposure: As with any exam that uses radiation, there is a very small increased risk of cancer. The amount of radiation in LDCT is small--about the same amount a person would get from a mammogram. Your doctor orders the exam when he or she feels the potential benefits outweigh the risks.    False negatives: No test is perfect, including LDCT. It is possible that you may have a medical condition, including lung cancer, that is not found during your exam. This is called a false negative result.    False positives and more testing: LDCT very often finds something in the lung that could be cancer, but in fact is not. This is called a false positive result. False positive tests often cause anxiety. To make sure these findings are not cancer, you may need to have more tests. These tests will be done only if you give us permission. Sometimes patients need a treatment that can have side effects, such as a biopsy. For more information on false positives, see  What can I expect from the results?     Findings not related to lung cancer: Your LDCT exam also takes pictures of areas of your body next to your lungs. In a very small number of cases, the CT scan will show an abnormal finding in one of these areas, such as your kidneys, adrenal glands, liver or thyroid. This finding may not be serious, but you may need more tests. Your doctor can help you decide what other tests you may need, if  any.  What can I expect from the results?  About 1 out of 4 LDCT exams will find something that may need more tests. Most of the time, these findings are lung nodules. Lung nodules are very small collections of tissue in the lung. These nodules are very common, and the vast majority--more than 97 percent--are not cancer (benign). Most are normal lymph nodes or small areas of scarring from past infections.  But, if a small lung nodule is found to be cancer, the cancer can be cured more than 90 percent of the time. To know if the nodule is cancer, we may need to get more images before your next yearly screening exam. If the nodule has suspicious features (for example, it is large, has an odd shape or grows over time), we will refer you to a specialist for further testing.  Will my doctor also get the results?  Yes. Your doctor will get a copy of your results.  Is it okay to keep smoking now that there s a cancer screening exam?  No. Tobacco is one of the strongest cancer-causing agents. It causes not only lung cancer, but other cancers and cardiovascular (heart) diseases as well. The damage caused by smoking builds over time. This means that the longer you smoke, the higher your risk of disease. While it is never too late to quit, the sooner you quit, the better.  Where can I find help to quit smoking?  The best way to prevent lung cancer is to stop smoking. If you have already quit smoking, congratulations and keep it up! For help on quitting smoking, please call [facility] at 1-428-513-ZWHH ([phone]) or the American [employer] Society at 1-376.822.5734 to find local resources near you.  One-on-one health coaching:  If you d prefer to work individually with a health care provider on tobacco cessation, we offer:      Medication Therapy Management:  Our specially trained pharmacists work closely with you and your doctor to help you quit smoking.  Call [phone] or [phone] (toll free).     Can Do: Health coaching offered  by [facility].  [email or web address]

## 2019-12-16 NOTE — LETTER
[facility]  [address]  [facility] [address]  Phone: [phone]      12/18/2019     Asmita Jerez  [address]  [place] [address]      Dear Asmita:    Thank you for allowing me to participate in your care. Your recent test results were reviewed and listed below.      Your results are provided below for your review  Results for orders placed or performed in visit on 12/16/19   Comprehensive metabolic panel     Status: Abnormal   Result Value Ref Range    Sodium 141 133 - 144 mmol/L    Potassium 4.6 3.4 - 5.3 mmol/L    Chloride 107 94 - 109 mmol/L    Carbon Dioxide 31 20 - 32 mmol/L    Anion Gap 3 3 - 14 mmol/L    Glucose 111 (H) 70 - 99 mg/dL    Urea Nitrogen 11 7 - 30 mg/dL    Creatinine 0.82 0.52 - 1.04 mg/dL    GFR Estimate 75 >60 mL/min/[1.73_m2]    GFR Estimate If Black 86 >60 mL/min/[1.73_m2]    Calcium 9.9 8.5 - 10.1 mg/dL    Bilirubin Total 0.6 0.2 - 1.3 mg/dL    Albumin 3.9 3.4 - 5.0 g/dL    Protein Total 7.4 6.8 - 8.8 g/dL    Alkaline Phosphatase 64 40 - 150 U/L    ALT 29 0 - 50 U/L    AST 22 0 - 45 U/L   Lipid panel reflex to direct LDL Non-fasting     Status: Abnormal   Result Value Ref Range    Cholesterol 96 <200 mg/dL    Triglycerides 82 <150 mg/dL    HDL Cholesterol 49 (L) >49 mg/dL    LDL Cholesterol Calculated 31 <100 mg/dL    Non HDL Cholesterol 47 <130 mg/dL   Hemoglobin A1c     Status: Abnormal   Result Value Ref Range    Hemoglobin A1C 6.4 (H) 0 - 5.6 %   Vitamin B12     Status: None   Result Value Ref Range    Vitamin B12 787 193 - 986 pg/mL                 Thank you for choosing [facility]. As a result, please continue with the treatment plan discussed in the office. Return as discussed or sooner if symptoms worsen or fail to improve.     If you have any further questions or concerns, please do not hesitate to contact us.      Sincerely,        Dr. Melissa Pitts/[facility]

## 2019-12-16 NOTE — PROGRESS NOTES
Subjective     Asmita Jerez is a 64 year old female who presents to clinic today for the following health issues:  Chief Complaint   Patient presents with     Diabetes     Flu Shot     declined       HPI   Diabetes Follow-up    How often are you checking your blood sugar? One time daily  What time of day are you checking your blood sugars (select all that apply)?  Before meals  Have you had any blood sugars above 200?  No  Have you had any blood sugars below 70?  No    What symptoms do you notice when your blood sugar is low?  None    What concerns do you have today about your diabetes? None     Do you have any of these symptoms? (Select all that apply)  No numbness or tingling in feet.  No redness, sores or blisters on feet.  No complaints of excessive thirst.  No reports of blurry vision.  No significant changes to weight.     Have you had a diabetic eye exam in the last 12 months? No    Diabetes Management Resources    Hyperlipidemia Follow-Up      Are you regularly taking any medication or supplement to lower your cholesterol?   Yes- lipitor    Are you having muscle aches or other side effects that you think could be caused by your cholesterol lowering medication?  No    Hypertension Follow-up      Do you check your blood pressure regularly outside of the clinic? No     Are you following a low salt diet? No    Are your blood pressures ever more than 140 on the top number (systolic) OR more   than 90 on the bottom number (diastolic), for example 140/90? No    BP Readings from Last 2 Encounters:   12/16/19 (!) 150/88   05/14/19 157/86     Hemoglobin A1C (%)   Date Value   12/16/2019 6.4 (H)   04/15/2019 6.9 (H)     LDL Cholesterol Calculated (mg/dL)   Date Value   04/15/2019 45   03/23/2018 43         How many servings of fruits and vegetables do you eat daily?  0-1    On average, how many sweetened beverages do you drink each day (Examples: soda, juice, sweet tea, etc.  Do NOT count diet or artificially  "sweetened beverages)?   0    How many days per week do you miss taking your medication? 0              Reviewed and updated as needed this visit by Provider         Review of Systems   ROS COMP: Constitutional, HEENT, cardiovascular, pulmonary, gi and gu systems are negative, except as otherwise noted.      Objective    BP (!) 150/88   Pulse 86   Temp 97.7  F (36.5  C) (Tympanic)   Resp 18   Ht 1.613 m (5' 3.5\")   Wt 58.1 kg (128 lb)   LMP 12/01/2009   SpO2 96%   Breastfeeding No   BMI 22.32 kg/m    Body mass index is 22.32 kg/m .  Physical Exam   GENERAL: healthy, alert and no distress  NECK: no adenopathy, no asymmetry, masses, or scars and thyroid normal to palpation  RESP: lungs clear to auscultation - no rales, rhonchi or wheezes  CV: regular rate and rhythm, normal S1 S2, no S3 or S4, no murmur, click or rub, no peripheral edema and peripheral pulses strong  ABDOMEN: soft, nontender, no hepatosplenomegaly, no masses and bowel sounds normal  MS: no gross musculoskeletal defects noted, no edema    Diagnostic Test Results:  Labs reviewed in Epic  Results for orders placed or performed in visit on 12/16/19 (from the past 24 hour(s))   Hemoglobin A1c   Result Value Ref Range    Hemoglobin A1C 6.4 (H) 0 - 5.6 %           Assessment & Plan     1. Type 2 diabetes mellitus without complication, without long-term current use of insulin (H)  Recheck 6 months  Well controlled  - Albumin Random Urine Quantitative with Creat Ratio  - Comprehensive metabolic panel  - Lipid panel reflex to direct LDL Non-fasting  - Hemoglobin A1c  - Vitamin B12  - dulaglutide (TRULICITY) 0.75 MG/0.5ML pen; Inject 0.75 mg Subcutaneous every 7 days  Dispense: 10 mL; Refill: 1  - metFORMIN (GLUCOPHAGE-XR) 500 MG 24 hr tablet; Take 2 tablets (1,000 mg) by mouth 2 times daily (with meals)  Dispense: 360 tablet; Refill: 1    2. Essential hypertension with goal blood pressure less than 140/90  Uncontrolled- increase lisinopril to 20 mg " daily  Recheck blood pressure with RN in 2-3 weeks  - Comprehensive metabolic panel  - lisinopril (PRINIVIL/ZESTRIL) 20 MG tablet; Take 1 tablet (20 mg) by mouth daily  Dispense: 90 tablet; Refill: 3    3. Tobacco use   patient refused cessation help  - Prof fee: Shared Decisionmaking for Lung Cancer Screening  - CT Chest Lung Cancer Scrn Low Dose wo; Future    4. CARDIOVASCULAR SCREENING; LDL GOAL LESS THAN 100     - Lipid panel reflex to direct LDL Non-fasting  - atorvastatin (LIPITOR) 40 MG tablet; Take 1 tablet (40 mg) by mouth daily  Dispense: 90 tablet; Refill: 3    5. Hypertension, goal below 140/90   as above    6. Personal history of tobacco use  CT lung cancer screening due             Return in about 6 months (around 6/16/2020) for Physical Exam, diabetes recheck.    Melissa Pitts MD  [facility]

## 2019-12-16 NOTE — PROGRESS NOTES
Lung Cancer Screening Shared Decision Making Visit     Asmita Jerez is eligible for lung cancer screening on the basis of the information provided in my signed lung cancer screening order.     I have discussed with patient the risks and benefits of screening for lung cancer with low-dose CT.     The risks include:  radiation exposure: one low dose chest CT has as much ionizing radiation as about 15 chest x-rays or 6 months of background radiation living in [place]    false positives: 96% of positive findings/nodules are NOT cancer, but some might still require additional diagnostic evaluation, including biopsy  over-diagnosis: some slow growing cancers that might never have been clinically significant will be detected and treated unnecessarily     The benefit of early detection of lung cancer is contingent upon adherence to annual screening or more frequent follow up if indicated.     Furthermore, reaping the benefits of screening requires Asmita Jerez to be willing and physically able to undergo diagnostic procedures, if indicated. Although no specific guide is available for determining severity of comorbidities, it is reasonable to withhold screening in patients who have greater mortality risk from other diseases.     We did discuss that the only way to prevent lung cancer is to not smoke. Smoking cessation assistance was offered.    I did not offer risk estimation using a calculator such as this one:    ShouldIScreen

## 2019-12-18 ENCOUNTER — HOSPITAL ENCOUNTER (OUTPATIENT)
Dept: CT IMAGING | Facility: CLINIC | Age: 64
Discharge: HOME | End: 2019-12-18
Attending: FAMILY MEDICINE | Admitting: FAMILY MEDICINE
Payer: MEDICARE

## 2019-12-18 DIAGNOSIS — Z72.0 TOBACCO USE: ICD-10-CM

## 2019-12-18 PROCEDURE — G0297 LDCT FOR LUNG CA SCREEN: HCPCS

## 2020-02-20 ENCOUNTER — OFFICE VISIT (OUTPATIENT)
Dept: FAMILY MEDICINE | Facility: CLINIC | Age: 65
End: 2020-02-20
Payer: MEDICARE

## 2020-02-20 VITALS
RESPIRATION RATE: 16 BRPM | BODY MASS INDEX: 23.39 KG/M2 | HEART RATE: 104 BPM | HEIGHT: 63 IN | SYSTOLIC BLOOD PRESSURE: 138 MMHG | OXYGEN SATURATION: 99 % | TEMPERATURE: 97.2 F | WEIGHT: 132 LBS | DIASTOLIC BLOOD PRESSURE: 86 MMHG

## 2020-02-20 DIAGNOSIS — I10 ESSENTIAL HYPERTENSION WITH GOAL BLOOD PRESSURE LESS THAN 140/90: Primary | ICD-10-CM

## 2020-02-20 DIAGNOSIS — F41.1 GAD (GENERALIZED ANXIETY DISORDER): ICD-10-CM

## 2020-02-20 DIAGNOSIS — R22.2 NODULE OF SKIN OF ABDOMEN: ICD-10-CM

## 2020-02-20 PROCEDURE — 99214 OFFICE O/P EST MOD 30 MIN: CPT | Performed by: NURSE PRACTITIONER

## 2020-02-20 RX ORDER — LISINOPRIL 20 MG/1
40 TABLET ORAL DAILY
Qty: 180 TABLET | Refills: 0 | Status: SHIPPED | OUTPATIENT
Start: 2020-02-20 | End: 2020-06-17

## 2020-02-20 RX ORDER — LORAZEPAM 0.5 MG/1
0.5 TABLET ORAL EVERY 6 HOURS PRN
Qty: 20 TABLET | Refills: 0 | Status: SHIPPED | OUTPATIENT
Start: 2020-02-20 | End: 2021-03-01

## 2020-02-20 ASSESSMENT — MIFFLIN-ST. JEOR: SCORE: 1112.88

## 2020-02-20 ASSESSMENT — ANXIETY QUESTIONNAIRES
5. BEING SO RESTLESS THAT IT IS HARD TO SIT STILL: MORE THAN HALF THE DAYS
6. BECOMING EASILY ANNOYED OR IRRITABLE: NEARLY EVERY DAY
7. FEELING AFRAID AS IF SOMETHING AWFUL MIGHT HAPPEN: NEARLY EVERY DAY
3. WORRYING TOO MUCH ABOUT DIFFERENT THINGS: NEARLY EVERY DAY
2. NOT BEING ABLE TO STOP OR CONTROL WORRYING: MORE THAN HALF THE DAYS
1. FEELING NERVOUS, ANXIOUS, OR ON EDGE: NEARLY EVERY DAY
GAD7 TOTAL SCORE: 18
IF YOU CHECKED OFF ANY PROBLEMS ON THIS QUESTIONNAIRE, HOW DIFFICULT HAVE THESE PROBLEMS MADE IT FOR YOU TO DO YOUR WORK, TAKE CARE OF THINGS AT HOME, OR GET ALONG WITH OTHER PEOPLE: SOMEWHAT DIFFICULT

## 2020-02-20 ASSESSMENT — ENCOUNTER SYMPTOMS
CARDIOVASCULAR NEGATIVE: 1
RESPIRATORY NEGATIVE: 1
CONSTITUTIONAL NEGATIVE: 1
AGITATION: 1
NERVOUS/ANXIOUS: 1

## 2020-02-20 ASSESSMENT — PATIENT HEALTH QUESTIONNAIRE - PHQ9
5. POOR APPETITE OR OVEREATING: MORE THAN HALF THE DAYS
SUM OF ALL RESPONSES TO PHQ QUESTIONS 1-9: 14

## 2020-02-20 ASSESSMENT — PAIN SCALES - GENERAL: PAINLEVEL: MILD PAIN (3)

## 2020-02-20 NOTE — PATIENT INSTRUCTIONS
Your blood pressure was elevated today. It is likely due to your anxiety.  1. Recheck was still high: 138/86. Lisinopril increased to 40mg.   2. You can make a blood pressure only appointment here in the clinic.  3. Continue to monitor salt intake.     Anxiety:  1. Take lorazepam as prescribed.   2. If anxiety persists, follow-up with PCP.    Mass:  1. Appears to be scar tissue.   2. Follow-up with symptoms worsen or signs of infection.    **On your way out make an appt with PCP for 1 month.

## 2020-02-21 ASSESSMENT — ANXIETY QUESTIONNAIRES: GAD7 TOTAL SCORE: 18

## 2020-05-25 ENCOUNTER — VIRTUAL VISIT (OUTPATIENT)
Dept: URGENT CARE | Facility: CLINIC | Age: 65
End: 2020-05-25
Payer: MEDICARE

## 2020-05-25 ENCOUNTER — NURSE TRIAGE (OUTPATIENT)
Dept: NURSING | Facility: CLINIC | Age: 65
End: 2020-05-25

## 2020-05-25 DIAGNOSIS — N39.0 URINARY TRACT INFECTION WITHOUT HEMATURIA, SITE UNSPECIFIED: Primary | ICD-10-CM

## 2020-05-25 PROCEDURE — 99441 ZZC PHYSICIAN TELEPHONE EVALUATION 5-10 MIN GOVT: CPT | Performed by: PHYSICIAN ASSISTANT

## 2020-05-25 RX ORDER — NITROFURANTOIN 25; 75 MG/1; MG/1
100 CAPSULE ORAL 2 TIMES DAILY
Qty: 14 CAPSULE | Refills: 0 | Status: SHIPPED | OUTPATIENT
Start: 2020-05-25 | End: 2020-08-06

## 2020-05-25 NOTE — PROGRESS NOTES
"Asmita Jerez is a 64 year old female who is being evaluated via a billable telephone visit.      The patient has been notified of following:     \"This telephone visit will be conducted via a call between you and your physician/provider. We have found that certain health care needs can be provided without the need for a physical exam.  This service lets us provide the care you need with a short phone conversation.  If a prescription is necessary we can send it directly to your pharmacy.  If lab work is needed we can place an order for that and you can then stop by our lab to have the test done at a later time.    Telephone visits are billed at different rates depending on your insurance coverage. During this emergency period, for some insurers they may be billed the same as an in-person visit.  Please reach out to your insurance provider with any questions.    If during the course of the call the physician/provider feels a telephone visit is not appropriate, you will not be charged for this service.\"    Patient has given verbal consent for Telephone visit?  Yes    How would you like to obtain your AVS? Mail a copy    Subjective     Asmita Jerez is a 64 year old female who presents via phone visit today for the following health issues:    HPI  URINARY TRACT SYMPTOMS  Onset: Friday afternoon     Description:   Painful urination (Dysuria): YES           Frequency: YES  Blood in urine (Hematuria): no   Delay in urine (Hesitency): no     Intensity: moderate    Progression of Symptoms:  worsening    Accompanying Signs & Symptoms:  Fever/chills: no   Flank pain no   Nausea and vomiting: no   Any vaginal symptoms: none  Abdominal/Pelvic Pain: no     History:   History of frequent UTI's: no   History of kidney stones: no   Sexually Active: no   Possibility of pregnancy: No    Precipitating factors:   none    Therapies Tried and outcome: Increase fluid intake    BP Readings from Last 3 Encounters:   02/20/20 138/86 "   12/16/19 (!) 150/88   05/14/19 157/86    Wt Readings from Last 3 Encounters:   02/20/20 59.9 kg (132 lb)   12/16/19 58.1 kg (128 lb)   04/15/19 58.5 kg (129 lb)                    Reviewed and updated as needed this visit by Provider  Tobacco  Allergies  Meds  Med Hx  Surg Hx  Fam Hx  Soc Hx        Review of Systems   Constitutional, HEENT, cardiovascular, pulmonary, GI, , musculoskeletal, neuro, skin, endocrine and psych systems are negative, except as otherwise noted.       Objective   Reported vitals:  LMP 12/01/2009    healthy, alert and no distress  PSYCH: Alert and oriented times 3; coherent speech, normal   rate and volume, able to articulate logical thoughts, able   to abstract reason, no tangential thoughts, no hallucinations   or delusions  Her affect is normal  RESP: No cough, no audible wheezing, able to talk in full sentences  Remainder of exam unable to be completed due to telephone visits    Diagnostic Test Results:  none         Assessment/Plan:  ASSESSMENT AND PLAN    ICD-10-CM    1. Urinary tract infection without hematuria, site unspecified  N39.0 nitroFURantoin macrocrystal-monohydrate (MACROBID) 100 MG capsule         Return in about 3 days (around 5/28/2020) for urine test if symptoms are persisting or worsening.      Phone call duration:  10 minutes    Tiff Harp PA-C

## 2020-05-25 NOTE — TELEPHONE ENCOUNTER
Pt calling with what she believes is a bladder infection.   Pt states she has pressure, only able to urinate a little bit and bladder feels full, more frequent urination.   Pt states she is able to urinate more than a few drops, however, going more frequently.   Pt at the end of the call did say she has burning with urination. Pt to be seen within 4 hours.   RN assisted with setting up a phone visit with UC provider to discuss symptoms.    Neena Moseley RN   [facility] RN Triage      COVID 19 Nurse Triage Plan/Patient Instructions    Please be aware that novel coronavirus (COVID-19) may be circulating in the community. If you develop symptoms such as fever, cough, or SOB or if you have concerns about the presence of another infection including coronavirus (COVID-19), please contact your health care provider or visit [email or web address].     Disposition/Instructions    Patient to have an Urgent Care Telephone Visit with a provider. Follow System Ambulatory Workflow for COVID 19.     Urgent Care Telephone Visits are available between the hours of 8 am to 9 pm. Staff will assist patent in scheduling an appointment for this Urgent Care Telephone Visit.     Call Back If: Your symptoms worsen before you are able to complete your Urgent Care Telephone Visit with a provider.    Thank you for limiting contact with others, wearing a simple mask to cover your cough, practice good hand hygiene habits and accessing our virtual services where possible to limit the spread of this virus.    For more information about COVID19 and options for caring for yourself at home, please visit the CDC website at https://www.cdc.gov/coronavirus/2019-ncov/about/steps-when-sick.html  For more options for care at [facility], please visit our website at [email or web address]    For more information, please use the [place] Department of Health COVID-19 Website:  https://www.health.state.[place]/diseases/coronavirus/index.html  [place] Department of Health ([facility]) COVID-19 Hotlines (Interpreters available):      Health questions: Phone Number: [phone] or [phone] and Hours: 7 a.m. to 7 p.m.    Schools and  questions: Phone Number: [phone] or [phone] and Hours 7 a.m. to 7 p.m.                      Additional Information    Negative: Shock suspected (e.g., cold/pale/clammy skin, too weak to stand, low BP, rapid pulse)    Negative: Sounds like a life-threatening emergency to the triager    Negative: Followed a genital area injury    Negative: Followed a genital area injury (penis, scrotum)    Negative: Vaginal discharge    Negative: Pus (white, yellow) or bloody discharge from end of penis    Negative: [1] Taking antibiotic for urinary tract infection (UTI) AND [2] female    Negative: [1] Taking antibiotic for urinary tract infection (UTI) AND [2] male    Negative: [1] Discomfort (pain, burning or stinging) when passing urine AND [2] pregnant    Negative: [1] Discomfort (pain, burning or stinging) when passing urine AND [2] postpartum (from 0 to 6 weeks after delivery)    Negative: [1] Discomfort (pain, burning or stinging) when passing urine AND [2] female    Negative: [1] Discomfort (pain, burning or stinging) when passing urine AND [2] male    Negative: Pain or itching in the vulvar area    Negative: Pain in scrotum is main symptom    Negative: Blood in the urine is main symptom    Negative: Symptoms arising from use of a urinary catheter (Esquivel or Coude)    Negative: [1] Unable to urinate (or only a few drops) > 4 hours AND     [2] bladder feels very full (e.g., palpable bladder or strong urge to urinate)    Negative: [1] Decreased urination and [2] drinking very little AND [2] dehydration suspected (e.g., dark urine, no urine > 12 hours, very dry mouth, very lightheaded)    Negative: Patient sounds very sick or weak to the triager     Negative: Fever > 100.5 F (38.1 C)    Negative: Side (flank) or lower back pain present    Urinating more frequently than usual (i.e., frequency)    Negative: [1] Can't control passage of urine (i.e., urinary incontinence) AND [2] new onset (< 2 weeks) or worsening    Negative: Shock suspected (e.g., cold/pale/clammy skin, too weak to stand, low BP, rapid pulse)    Negative: Sounds like a life-threatening emergency to the triager    Negative: Followed a genital area injury    Negative: Taking antibiotic for urinary tract infection (UTI)    Negative: Pregnant    Negative: Postpartum (from 0 to 6 weeks after delivery)    Negative: [1] Unable to urinate (or only a few drops) > 4 hours AND [2] bladder feels very full (e.g., palpable bladder or strong urge to urinate)    Negative: Vomiting    Negative: Patient sounds very sick or weak to the triager    Negative: [1] SEVERE pain with urination  (e.g., excruciating) AND [2] not improved after 2 hours of pain medicine and Sitz bath    Negative: Fever > 100.5 F (38.1 C)    Negative: Side (flank) or lower back pain present    Diabetes mellitus or weak immune system (e.g., HIV positive, cancer chemo, splenectomy, organ transplant, chronic steroids)    Protocols used: URINARY SYMPTOMS-A-AH, URINATION PAIN - FEMALE-A-AH

## 2020-05-25 NOTE — PATIENT INSTRUCTIONS
I will treat for UTI with Macrobid and if worsening or not improving symptoms over the next 2-3 days would have you follow up with your primary provider to get a urine sample done.

## 2020-06-08 DIAGNOSIS — E11.9 TYPE 2 DIABETES MELLITUS WITHOUT COMPLICATION, WITHOUT LONG-TERM CURRENT USE OF INSULIN (H): ICD-10-CM

## 2020-06-09 RX ORDER — BLOOD SUGAR DIAGNOSTIC
STRIP MISCELLANEOUS
Qty: 200 STRIP | Refills: 3 | Status: SHIPPED | OUTPATIENT
Start: 2020-06-09 | End: 2021-07-02

## 2020-06-17 DIAGNOSIS — I10 ESSENTIAL HYPERTENSION WITH GOAL BLOOD PRESSURE LESS THAN 140/90: ICD-10-CM

## 2020-06-17 RX ORDER — LISINOPRIL 20 MG/1
TABLET ORAL
Qty: 90 TABLET | Refills: 1 | Status: SHIPPED | OUTPATIENT
Start: 2020-06-17 | End: 2020-09-09

## 2020-07-15 ENCOUNTER — TELEPHONE (OUTPATIENT)
Dept: FAMILY MEDICINE | Facility: CLINIC | Age: 65
End: 2020-07-15

## 2020-07-15 DIAGNOSIS — E11.9 TYPE 2 DIABETES MELLITUS WITHOUT COMPLICATION, WITHOUT LONG-TERM CURRENT USE OF INSULIN (H): ICD-10-CM

## 2020-07-15 RX ORDER — DULAGLUTIDE 0.75 MG/.5ML
0.75 INJECTION, SOLUTION SUBCUTANEOUS
Qty: 5 ML | Refills: 0 | Status: SHIPPED | OUTPATIENT
Start: 2020-07-15 | End: 2020-08-06

## 2020-07-15 NOTE — TELEPHONE ENCOUNTER
Routing refill request to provider for review/approval because:  Labs not current: A1C last done on  12/6/19 with result of 6.4.  Pt was seen for HTN on 2/20/20.  Advise.  Kale

## 2020-08-06 ENCOUNTER — OFFICE VISIT (OUTPATIENT)
Dept: FAMILY MEDICINE | Facility: CLINIC | Age: 65
End: 2020-08-06
Payer: MEDICARE

## 2020-08-06 VITALS
TEMPERATURE: 96.6 F | OXYGEN SATURATION: 100 % | BODY MASS INDEX: 23.21 KG/M2 | RESPIRATION RATE: 16 BRPM | WEIGHT: 131 LBS | DIASTOLIC BLOOD PRESSURE: 80 MMHG | HEART RATE: 94 BPM | SYSTOLIC BLOOD PRESSURE: 132 MMHG | HEIGHT: 63 IN

## 2020-08-06 DIAGNOSIS — I10 HYPERTENSION, GOAL BELOW 140/90: ICD-10-CM

## 2020-08-06 DIAGNOSIS — Z72.0 TOBACCO USE: ICD-10-CM

## 2020-08-06 DIAGNOSIS — L82.1 SEBORRHEIC KERATOSES: ICD-10-CM

## 2020-08-06 DIAGNOSIS — D48.5 NEOPLASM OF UNCERTAIN BEHAVIOR OF SKIN: ICD-10-CM

## 2020-08-06 DIAGNOSIS — E11.9 TYPE 2 DIABETES MELLITUS WITHOUT COMPLICATION, WITHOUT LONG-TERM CURRENT USE OF INSULIN (H): Primary | ICD-10-CM

## 2020-08-06 LAB
ANION GAP SERPL CALCULATED.3IONS-SCNC: 5 MMOL/L (ref 3–14)
BUN SERPL-MCNC: 15 MG/DL (ref 7–30)
CALCIUM SERPL-MCNC: 10.3 MG/DL (ref 8.5–10.1)
CHLORIDE SERPL-SCNC: 105 MMOL/L (ref 94–109)
CHOLEST SERPL-MCNC: 131 MG/DL
CO2 SERPL-SCNC: 27 MMOL/L (ref 20–32)
CREAT SERPL-MCNC: 0.73 MG/DL (ref 0.52–1.04)
CREAT UR-MCNC: 24 MG/DL
GFR SERPL CREATININE-BSD FRML MDRD: 86 ML/MIN/{1.73_M2}
GLUCOSE SERPL-MCNC: 105 MG/DL (ref 70–99)
HBA1C MFR BLD: 7.1 % (ref 0–5.6)
HDLC SERPL-MCNC: 46 MG/DL
LDLC SERPL CALC-MCNC: 57 MG/DL
MICROALBUMIN UR-MCNC: <5 MG/L
MICROALBUMIN/CREAT UR: NORMAL MG/G CR (ref 0–25)
NONHDLC SERPL-MCNC: 85 MG/DL
POTASSIUM SERPL-SCNC: 4 MMOL/L (ref 3.4–5.3)
SODIUM SERPL-SCNC: 137 MMOL/L (ref 133–144)
TRIGL SERPL-MCNC: 142 MG/DL

## 2020-08-06 PROCEDURE — 80061 LIPID PANEL: CPT | Performed by: FAMILY MEDICINE

## 2020-08-06 PROCEDURE — 83036 HEMOGLOBIN GLYCOSYLATED A1C: CPT | Performed by: FAMILY MEDICINE

## 2020-08-06 PROCEDURE — 82043 UR ALBUMIN QUANTITATIVE: CPT | Performed by: FAMILY MEDICINE

## 2020-08-06 PROCEDURE — 99214 OFFICE O/P EST MOD 30 MIN: CPT | Performed by: FAMILY MEDICINE

## 2020-08-06 PROCEDURE — 80048 BASIC METABOLIC PNL TOTAL CA: CPT | Performed by: FAMILY MEDICINE

## 2020-08-06 PROCEDURE — 36415 COLL VENOUS BLD VENIPUNCTURE: CPT | Performed by: FAMILY MEDICINE

## 2020-08-06 RX ORDER — DULAGLUTIDE 0.75 MG/.5ML
0.75 INJECTION, SOLUTION SUBCUTANEOUS
Qty: 5 ML | Refills: 2 | Status: SHIPPED | OUTPATIENT
Start: 2020-08-06 | End: 2021-02-02

## 2020-08-06 RX ORDER — METFORMIN HCL 500 MG
1000 TABLET, EXTENDED RELEASE 24 HR ORAL 2 TIMES DAILY WITH MEALS
Qty: 360 TABLET | Refills: 1 | Status: SHIPPED | OUTPATIENT
Start: 2020-08-06 | End: 2021-02-16

## 2020-08-06 ASSESSMENT — MIFFLIN-ST. JEOR: SCORE: 1108.34

## 2020-08-06 ASSESSMENT — PAIN SCALES - GENERAL: PAINLEVEL: NO PAIN (0)

## 2020-08-06 NOTE — LETTER
August 7, 2020      Asmita Jerez  [address]  [address]  [place] [address]        Dear ,    We are writing to inform you of your test results.    HgbA1c up a bit at 7.1%, continue to work on diet/exercise. Recheck 6 months.     Kidney function stable.     No urinary protein.     Lipids are acceptable.     Resulted Orders   Albumin Random Urine Quantitative with Creat Ratio   Result Value Ref Range    Creatinine Urine 24 mg/dL    Albumin Urine mg/L <5 mg/L    Albumin Urine mg/g Cr Unable to calculate due to low value 0 - 25 mg/g Cr   Hemoglobin A1c   Result Value Ref Range    Hemoglobin A1C 7.1 (H) 0 - 5.6 %      Comment:      Normal <5.7% Prediabetes 5.7-6.4%  Diabetes 6.5% or higher - adopted from ADA   consensus guidelines.     Basic metabolic panel   Result Value Ref Range    Sodium 137 133 - 144 mmol/L    Potassium 4.0 3.4 - 5.3 mmol/L    Chloride 105 94 - 109 mmol/L    Carbon Dioxide 27 20 - 32 mmol/L    Anion Gap 5 3 - 14 mmol/L    Glucose 105 (H) 70 - 99 mg/dL      Comment:      Non Fasting    Urea Nitrogen 15 7 - 30 mg/dL    Creatinine 0.73 0.52 - 1.04 mg/dL    GFR Estimate 86 >60 mL/min/[1.73_m2]      Comment:      Non  GFR Calc  Starting 12/18/2018, serum creatinine based estimated GFR (eGFR) will be   calculated using the Chronic Kidney Disease Epidemiology Collaboration   (CKD-EPI) equation.      GFR Estimate If Black >90 >60 mL/min/[1.73_m2]      Comment:       GFR Calc  Starting 12/18/2018, serum creatinine based estimated GFR (eGFR) will be   calculated using the Chronic Kidney Disease Epidemiology Collaboration   (CKD-EPI) equation.      Calcium 10.3 (H) 8.5 - 10.1 mg/dL   Lipid panel reflex to direct LDL Non-fasting   Result Value Ref Range    Cholesterol 131 <200 mg/dL    Triglycerides 142 <150 mg/dL      Comment:      Non Fasting    HDL Cholesterol 46 (L) >49 mg/dL    LDL Cholesterol Calculated 57 <100 mg/dL      Comment:      Desirable:       <100  mg/dl    Non HDL Cholesterol 85 <130 mg/dL       If you have any questions or concerns, please call the clinic at the number listed above.       Sincerely,        Melissa Pitts MD/[facility]

## 2020-08-06 NOTE — PROGRESS NOTES
Subjective     Asmita Jerez is a 64 year old female who presents to clinic today for the following health issues:    HPI     Chief Complaint   Patient presents with     Diabetes     Vaginal Problem     Patient will notice after urinating and wiping she has a bump. Patient has no discomfort or pain.      Diabetes Follow-up    How often are you checking your blood sugar? One time daily  What time of day are you checking your blood sugars (select all that apply)?  Before meals  Have you had any blood sugars above 200?  No  Have you had any blood sugars below 70?  No    What symptoms do you notice when your blood sugar is low?  None    What concerns do you have today about your diabetes? None     Do you have any of these symptoms? (Select all that apply)  No numbness or tingling in feet.  No redness, sores or blisters on feet.  No complaints of excessive thirst.  No reports of blurry vision.  No significant changes to weight.    Have you had a diabetic eye exam in the last 12 months? No                Hyperlipidemia Follow-Up      Are you regularly taking any medication or supplement to lower your cholesterol?   Yes- lipitor    Are you having muscle aches or other side effects that you think could be caused by your cholesterol lowering medication?  No    Hypertension Follow-up      Do you check your blood pressure regularly outside of the clinic? No     Are you following a low salt diet? Yes    Are your blood pressures ever more than 140 on the top number (systolic) OR more   than 90 on the bottom number (diastolic), for example 140/90? No    BP Readings from Last 2 Encounters:   08/06/20 132/80   02/20/20 138/86     Hemoglobin A1C (%)   Date Value   12/16/2019 6.4 (H)   04/15/2019 6.9 (H)     LDL Cholesterol Calculated (mg/dL)   Date Value   12/16/2019 31   04/15/2019 45         How many servings of fruits and vegetables do you eat daily?  2-3    On average, how many sweetened beverages do you drink each day  "(Examples: soda, juice, sweet tea, etc.  Do NOT count diet or artificially sweetened beverages)?   0    How many days per week do you exercise enough to make your heart beat faster? 3 or less    How many minutes a day do you exercise enough to make your heart beat faster? 20 - 29    How many days per week do you miss taking your medication? 0      Vaginal \"bump\" that she noticed with wiping after urinating.  Not painful,  No itching/burning.  Not able to feel it other than with wiping.  She had one vaginal delivery with a tear/repair many years ago. No vaginal bleeding.    Reviewed and updated as needed this visit by Provider         Review of Systems   Constitutional, HEENT, cardiovascular, pulmonary, gi and gu systems are negative, except as otherwise noted.      Objective    /80   Pulse 94   Temp 96.6  F (35.9  C) (Tympanic)   Resp 16   Ht 1.6 m (5' 3\")   Wt 59.4 kg (131 lb)   LMP 12/01/2009   SpO2 100%   Breastfeeding No   BMI 23.21 kg/m    Body mass index is 23.21 kg/m .  Physical Exam   GENERAL: healthy, alert and no distress  NECK: no adenopathy, no asymmetry, masses, or scars and thyroid normal to palpation  RESP: lungs clear to auscultation - no rales, rhonchi or wheezes  CV: regular rate and rhythm, normal S1 S2, no S3 or S4, no murmur, click or rub, no peripheral edema and peripheral pulses strong  ABDOMEN: soft, nontender, no hepatosplenomegaly, no masses and bowel sounds normal   (female): normal female external genitalia, normal urethral meatus , vaginal mucosa pink, moist, well rugated, normal cervix, adnexae, and uterus without masses. and palpable small papule of the posterior vaginal fornix, however the mucosa is normal appearing, no leukoplakia, etc.   MS: no gross musculoskeletal defects noted, no edema  SKIN: right upper shoulder lesion - recommend tangential biopsy to r/o squamous cell carcinoma  Left temple, likely seborrheic keratosis measuring 3 mm.      Diagnostic Test " Results:  Labs reviewed in Epic  Results for orders placed or performed in visit on 08/06/20 (from the past 24 hour(s))   Hemoglobin A1c   Result Value Ref Range    Hemoglobin A1C 7.1 (H) 0 - 5.6 %           Assessment & Plan     1. Type 2 diabetes mellitus without complication, without long-term current use of insulin (H)   fair control.    - Albumin Random Urine Quantitative with Creat Ratio  - Hemoglobin A1c  - blood glucose monitoring (ACCU-CHEK MULTICLIX) lancets; Use to test blood sugar 1 time daily or as directed. Accu chek, Ins will cover, match machine.  Dispense: 100 each; Refill: 2  - dulaglutide (TRULICITY) 0.75 MG/0.5ML pen; Inject 0.75 mg Subcutaneous every 7 days  Dispense: 5 mL; Refill: 2  - metFORMIN (GLUCOPHAGE-XR) 500 MG 24 hr tablet; Take 2 tablets (1,000 mg) by mouth 2 times daily (with meals)  Dispense: 360 tablet; Refill: 1  - Basic metabolic panel  - Lipid panel reflex to direct LDL Non-fasting    2. Hypertension, goal below 140/90  Well controlled    3. Tobacco use  No interest in quitting    4. Neoplasm of uncertain behavior of skin  Recommend tangential biopsy    5. Seborrheic keratoses  Will biopsy with next visit       Tobacco Cessation:   reports that she has been smoking cigarettes. She has a 40.00 pack-year smoking history. She has never used smokeless tobacco.  Tobacco Cessation Action Plan: Information offered: Patient not interested at this time            No follow-ups on file.    Melissa Pitts MD  [facility]

## 2020-08-06 NOTE — PATIENT INSTRUCTIONS
Our Clinic hours are:  Mondays    7:20 am - 7 pm  Tues -  Fri  7:20 am - 5 pm    Clinic Phone: [phone]    The clinic lab opens at 7:30 am Mon - Fri and appointments are required.    [facility]. [phone]  Monday  8 am - 7pm  Tues - Fri 8 am - 5:30 pm

## 2020-08-07 NOTE — RESULT ENCOUNTER NOTE
HgbA1c up a bit at 7.1%, continue to work on diet/exercise. Recheck 6 months.    Kidney function stable.     No urinary protein.    Lipids are acceptable.      Melissa Pitts M.D.

## 2020-08-13 ENCOUNTER — OFFICE VISIT (OUTPATIENT)
Dept: FAMILY MEDICINE | Facility: CLINIC | Age: 65
End: 2020-08-13
Payer: MEDICARE

## 2020-08-13 VITALS
WEIGHT: 131 LBS | BODY MASS INDEX: 23.21 KG/M2 | HEIGHT: 63 IN | HEART RATE: 107 BPM | TEMPERATURE: 97 F | SYSTOLIC BLOOD PRESSURE: 130 MMHG | RESPIRATION RATE: 16 BRPM | DIASTOLIC BLOOD PRESSURE: 84 MMHG | OXYGEN SATURATION: 97 %

## 2020-08-13 DIAGNOSIS — D48.5 NEOPLASM OF UNCERTAIN BEHAVIOR OF SKIN: Primary | ICD-10-CM

## 2020-08-13 PROCEDURE — 11310 SHAVE SKIN LESION 0.5 CM/<: CPT | Performed by: FAMILY MEDICINE

## 2020-08-13 PROCEDURE — 88305 TISSUE EXAM BY PATHOLOGIST: CPT | Mod: 26 | Performed by: FAMILY MEDICINE

## 2020-08-13 PROCEDURE — 11300 SHAVE SKIN LESION 0.5 CM/<: CPT | Performed by: FAMILY MEDICINE

## 2020-08-13 PROCEDURE — 88305 TISSUE EXAM BY PATHOLOGIST: CPT | Performed by: FAMILY MEDICINE

## 2020-08-13 ASSESSMENT — MIFFLIN-ST. JEOR: SCORE: 1108.34

## 2020-08-13 ASSESSMENT — PAIN SCALES - GENERAL: PAINLEVEL: NO PAIN (0)

## 2020-08-13 NOTE — PROGRESS NOTES
"Subjective     Asmita Jerez is a 64 year old female who presents to clinic today for the following health issues:    HPI     Chief Complaint   Patient presents with     Derm Problem     Patient has a mole on left side of face and right shoulder that she would like to have removed.      Recently seen for her annual wellness, recommend biopsy/tangential excision.   Reviewed and updated as needed this visit by Provider         Review of Systems   Constitutional, HEENT, cardiovascular, pulmonary, gi and gu systems are negative, except as otherwise noted.      Objective    /84   Pulse 107   Temp 97  F (36.1  C) (Tympanic)   Resp 16   Ht 1.6 m (5' 3\")   Wt 59.4 kg (131 lb)   LMP 12/01/2009   SpO2 97%   BMI 23.21 kg/m    Body mass index is 23.21 kg/m .  Physical Exam   SKIN: right shoulder 0.6 cm scaling lesion - r/o squamous cell carcinoma  Left temple 3 mm lesion - suspect seborrheic keratosis             Tangential bx in typical fashion for both lesions .  Area cleaned with betadyne and anesthetized with 1% lidocaine with epi . Then a straight blade used to remove the lesion and it was sent to pathology.  Bleeding was stopped with aluminum chloride solution.  Dressed with bacitracin and bandage. Pt tolerated procedure well.        Assessment & Plan     1. Neoplasm of uncertain behavior of skin     - Surgical pathology exam           No follow-ups on file.    Melissa Pitts MD  [facility]  "

## 2020-08-17 LAB — COPATH REPORT: NORMAL

## 2020-09-06 DIAGNOSIS — I10 ESSENTIAL HYPERTENSION WITH GOAL BLOOD PRESSURE LESS THAN 140/90: ICD-10-CM

## 2020-09-09 RX ORDER — LISINOPRIL 20 MG/1
TABLET ORAL
Qty: 180 TABLET | Refills: 1 | Status: SHIPPED | OUTPATIENT
Start: 2020-09-09 | End: 2021-03-01 | Stop reason: DRUGHIGH

## 2020-09-09 NOTE — TELEPHONE ENCOUNTER
"Requested Prescriptions   Pending Prescriptions Disp Refills     lisinopril (ZESTRIL) 20 MG tablet [Pharmacy Med Name: lisinopril 20 mg tablet] 90 tablet 1     Sig: TAKE 2 TABLETS BY MOUTH EVERY DAY       ACE Inhibitors (Including Combos) Protocol Passed - 9/6/2020  8:01 AM        Passed - Blood pressure under 140/90 in past 12 months     BP Readings from Last 3 Encounters:   08/13/20 130/84   08/06/20 132/80   02/20/20 138/86                 Passed - Recent (12 mo) or future (30 days) visit within the authorizing provider's specialty     Patient has had an office visit with the authorizing provider or a provider within the authorizing providers department within the previous 12 mos or has a future within next 30 days. See \"Patient Info\" tab in inbasket, or \"Choose Columns\" in Meds & Orders section of the refill encounter.              Passed - Medication is active on med list        Passed - Patient is age 18 or older        Passed - No active pregnancy on record        Passed - Normal serum creatinine on file in past 12 months     Recent Labs   Lab Test 08/06/20  1137   CR 0.73       Ok to refill medication if creatinine is low          Passed - Normal serum potassium on file in past 12 months     Recent Labs   Lab Test 08/06/20  1137   POTASSIUM 4.0             Passed - No positive pregnancy test within past 12 months             "

## 2021-02-02 DIAGNOSIS — E11.9 TYPE 2 DIABETES MELLITUS WITHOUT COMPLICATION, WITHOUT LONG-TERM CURRENT USE OF INSULIN (H): ICD-10-CM

## 2021-02-02 RX ORDER — DULAGLUTIDE 0.75 MG/.5ML
0.75 INJECTION, SOLUTION SUBCUTANEOUS
Qty: 5 ML | Refills: 0 | Status: SHIPPED | OUTPATIENT
Start: 2021-02-02 | End: 2021-03-01

## 2021-02-02 NOTE — TELEPHONE ENCOUNTER
"Requested Prescriptions   Pending Prescriptions Disp Refills     dulaglutide (TRULICITY) 0.75 MG/0.5ML pen 5 mL 2     Sig: Inject 0.75 mg Subcutaneous every 7 days       GLP-1 Agonists Protocol Passed - 2/2/2021  9:50 AM        Passed - HgbA1C in past 3 or 6 months     If HgbA1C is 8 or greater, it needs to be on file within the past 3 months.  If less than 8, must be on file within the past 6 months.     Recent Labs   Lab Test 08/06/20  1137   A1C 7.1*             Passed - Medication is active on med list        Passed - Patient is age 18 or older        Passed - No active pregnancy on record        Passed - Normal serum creatinine on file in past 12 months     Recent Labs   Lab Test 08/06/20  1137   CR 0.73       Ok to refill medication if creatinine is low          Passed - No positive pregnancy test in past 12 months        Passed - Recent (6 mo) or future (30 days) visit within the authorizing provider's specialty     Patient had office visit in the last 6 months or has a visit in the next 30 days with authorizing provider.  See \"Patient Info\" tab in inbasket, or \"Choose Columns\" in Meds & Orders section of the refill encounter.                 "

## 2021-02-02 NOTE — LETTER
[facility]  [address]  [facility] [address]  Phone: [phone]        February 2, 2021      Asmita Jerez                                                                                                                                [address] [address] [address]            Dear Ms. Jerez,    We recently provided you with a 30-day medication refill of Trulictiy.  This medication requires routine follow-up with your care provider.  You are due for your 6 month diabetic check up.    Please schedule an appointment for follow up of your diabetes before this refill supply is out.    Thank you,        Melissa Pitts MD/ sravani lassiter

## 2021-02-03 NOTE — TELEPHONE ENCOUNTER
Medication is being filled for 1 time refill only due to:  Patient needs to be seen because due for 6 month diabetic follow up.     Reminder letter sent to pt.  Addie Oleary RN

## 2021-02-22 DIAGNOSIS — Z13.6 CARDIOVASCULAR SCREENING; LDL GOAL LESS THAN 100: ICD-10-CM

## 2021-02-22 NOTE — TELEPHONE ENCOUNTER
"Requested Prescriptions   Pending Prescriptions Disp Refills     atorvastatin (LIPITOR) 40 MG tablet [Pharmacy Med Name: atorvastatin 40 mg tablet] 90 tablet 3     Sig: Take 1 tablet (40 mg) by mouth daily       Statins Protocol Passed - 2/22/2021  8:01 AM        Passed - LDL on file in past 12 months     Recent Labs   Lab Test 08/06/20  1137   LDL 57             Passed - No abnormal creatine kinase in past 12 months     No lab results found.             Passed - Recent (12 mo) or future (30 days) visit within the authorizing provider's specialty     Patient has had an office visit with the authorizing provider or a provider within the authorizing providers department within the previous 12 mos or has a future within next 30 days. See \"Patient Info\" tab in inbasket, or \"Choose Columns\" in Meds & Orders section of the refill encounter.              Passed - Medication is active on med list        Passed - Patient is age 18 or older        Passed - No active pregnancy on record        Passed - No positive pregnancy test in past 12 months             "

## 2021-02-25 RX ORDER — ATORVASTATIN CALCIUM 40 MG/1
40 TABLET, FILM COATED ORAL DAILY
Qty: 90 TABLET | Refills: 3 | Status: SHIPPED | OUTPATIENT
Start: 2021-02-25 | End: 2022-02-17

## 2021-03-01 ENCOUNTER — OFFICE VISIT (OUTPATIENT)
Dept: FAMILY MEDICINE | Facility: CLINIC | Age: 66
End: 2021-03-01
Payer: COMMERCIAL

## 2021-03-01 VITALS
HEART RATE: 99 BPM | OXYGEN SATURATION: 98 % | RESPIRATION RATE: 16 BRPM | TEMPERATURE: 95.8 F | WEIGHT: 130 LBS | SYSTOLIC BLOOD PRESSURE: 124 MMHG | HEIGHT: 63 IN | DIASTOLIC BLOOD PRESSURE: 70 MMHG | BODY MASS INDEX: 23.04 KG/M2

## 2021-03-01 DIAGNOSIS — E11.9 TYPE 2 DIABETES MELLITUS WITHOUT COMPLICATION, WITHOUT LONG-TERM CURRENT USE OF INSULIN (H): ICD-10-CM

## 2021-03-01 DIAGNOSIS — F41.1 GAD (GENERALIZED ANXIETY DISORDER): ICD-10-CM

## 2021-03-01 DIAGNOSIS — Z00.00 ENCOUNTER FOR MEDICARE ANNUAL WELLNESS EXAM: Primary | ICD-10-CM

## 2021-03-01 DIAGNOSIS — Z87.891 PERSONAL HISTORY OF TOBACCO USE: ICD-10-CM

## 2021-03-01 DIAGNOSIS — I10 ESSENTIAL HYPERTENSION WITH GOAL BLOOD PRESSURE LESS THAN 140/90: ICD-10-CM

## 2021-03-01 LAB
ANION GAP SERPL CALCULATED.3IONS-SCNC: 3 MMOL/L (ref 3–14)
BUN SERPL-MCNC: 18 MG/DL (ref 7–30)
CALCIUM SERPL-MCNC: 9.8 MG/DL (ref 8.5–10.1)
CHLORIDE SERPL-SCNC: 112 MMOL/L (ref 94–109)
CO2 SERPL-SCNC: 26 MMOL/L (ref 20–32)
CREAT SERPL-MCNC: 0.79 MG/DL (ref 0.52–1.04)
GFR SERPL CREATININE-BSD FRML MDRD: 77 ML/MIN/{1.73_M2}
GLUCOSE SERPL-MCNC: 142 MG/DL (ref 70–99)
HBA1C MFR BLD: 6.9 % (ref 0–5.6)
POTASSIUM SERPL-SCNC: 4.2 MMOL/L (ref 3.4–5.3)
SODIUM SERPL-SCNC: 141 MMOL/L (ref 133–144)

## 2021-03-01 PROCEDURE — G0296 VISIT TO DETERM LDCT ELIG: HCPCS | Mod: 25 | Performed by: FAMILY MEDICINE

## 2021-03-01 PROCEDURE — 80048 BASIC METABOLIC PNL TOTAL CA: CPT | Performed by: FAMILY MEDICINE

## 2021-03-01 PROCEDURE — 99214 OFFICE O/P EST MOD 30 MIN: CPT | Mod: 25 | Performed by: FAMILY MEDICINE

## 2021-03-01 PROCEDURE — 36415 COLL VENOUS BLD VENIPUNCTURE: CPT | Performed by: FAMILY MEDICINE

## 2021-03-01 PROCEDURE — 99397 PER PM REEVAL EST PAT 65+ YR: CPT | Performed by: FAMILY MEDICINE

## 2021-03-01 PROCEDURE — 83036 HEMOGLOBIN GLYCOSYLATED A1C: CPT | Performed by: FAMILY MEDICINE

## 2021-03-01 RX ORDER — LORAZEPAM 0.5 MG/1
0.5 TABLET ORAL EVERY 6 HOURS PRN
Qty: 20 TABLET | Refills: 0 | Status: CANCELLED | OUTPATIENT
Start: 2021-03-01

## 2021-03-01 RX ORDER — DULAGLUTIDE 0.75 MG/.5ML
0.75 INJECTION, SOLUTION SUBCUTANEOUS
Qty: 5 ML | Refills: 2 | Status: SHIPPED | OUTPATIENT
Start: 2021-03-01 | End: 2021-08-10

## 2021-03-01 RX ORDER — LISINOPRIL 20 MG/1
TABLET ORAL
Qty: 180 TABLET | Refills: 1 | Status: CANCELLED | OUTPATIENT
Start: 2021-03-01

## 2021-03-01 RX ORDER — LISINOPRIL 40 MG/1
40 TABLET ORAL DAILY
Qty: 90 TABLET | Refills: 3 | Status: SHIPPED | OUTPATIENT
Start: 2021-03-01 | End: 2022-03-11

## 2021-03-01 RX ORDER — HYDROXYZINE HYDROCHLORIDE 25 MG/1
25 TABLET, FILM COATED ORAL 3 TIMES DAILY PRN
Qty: 10 TABLET | Refills: 1 | Status: SHIPPED | OUTPATIENT
Start: 2021-03-01 | End: 2021-05-24

## 2021-03-01 RX ORDER — METFORMIN HCL 500 MG
1000 TABLET, EXTENDED RELEASE 24 HR ORAL 2 TIMES DAILY WITH MEALS
Qty: 360 TABLET | Refills: 1 | Status: SHIPPED | OUTPATIENT
Start: 2021-03-01 | End: 2021-10-07

## 2021-03-01 ASSESSMENT — PATIENT HEALTH QUESTIONNAIRE - PHQ9
5. POOR APPETITE OR OVEREATING: NOT AT ALL
SUM OF ALL RESPONSES TO PHQ QUESTIONS 1-9: 2

## 2021-03-01 ASSESSMENT — ACTIVITIES OF DAILY LIVING (ADL): CURRENT_FUNCTION: NO ASSISTANCE NEEDED

## 2021-03-01 ASSESSMENT — ANXIETY QUESTIONNAIRES
7. FEELING AFRAID AS IF SOMETHING AWFUL MIGHT HAPPEN: NOT AT ALL
2. NOT BEING ABLE TO STOP OR CONTROL WORRYING: MORE THAN HALF THE DAYS
1. FEELING NERVOUS, ANXIOUS, OR ON EDGE: NEARLY EVERY DAY
6. BECOMING EASILY ANNOYED OR IRRITABLE: SEVERAL DAYS
GAD7 TOTAL SCORE: 9
IF YOU CHECKED OFF ANY PROBLEMS ON THIS QUESTIONNAIRE, HOW DIFFICULT HAVE THESE PROBLEMS MADE IT FOR YOU TO DO YOUR WORK, TAKE CARE OF THINGS AT HOME, OR GET ALONG WITH OTHER PEOPLE: VERY DIFFICULT
5. BEING SO RESTLESS THAT IT IS HARD TO SIT STILL: NOT AT ALL
3. WORRYING TOO MUCH ABOUT DIFFERENT THINGS: NEARLY EVERY DAY

## 2021-03-01 ASSESSMENT — PAIN SCALES - GENERAL: PAINLEVEL: NO PAIN (0)

## 2021-03-01 ASSESSMENT — MIFFLIN-ST. JEOR: SCORE: 1098.81

## 2021-03-01 NOTE — PATIENT INSTRUCTIONS
Health Maintenance reviewed and plan for update discussed.  Patient asked to schedule her mammogram [phone]      Sertraline 50 mg - for the week, cut in 1/2 and then go to a full tablet.     Hydroxyzine 25 up to 3 times daily as needed for anxiety/panic    Our Clinic hours are:  Mondays    7:20 am - 7 pm  Tues - Fri  7:20 am - 5 pm    Clinic Phone: [phone]    The clinic lab opens at 7:30 am Mon - Fri and appointments are required.    [facility]. [phone]  Monday  8 am - 7pm  Tues - Fri 8 am - 5:30 pm         Lung Cancer Screening   Frequently Asked Questions  If you are at high-risk for lung cancer, getting screened with low-dose computed tomography (LDCT) every year can help save your life. This handout offers answers to some of the most common questions about lung cancer screening. If you have other questions, please call [phone] ([phone]).     What is it?  Lung cancer screening uses special X-ray technology to create an image of your lung tissue. The exam is quick and easy and takes less than 10 seconds. We don t give you any medicine or use any needles. You can eat before and after the exam. You don t need to change your clothes as long as the clothing on your chest doesn t contain metal. But, you do need to be able to hold your breath for at least 6 seconds during the exam.    What is the goal of lung cancer screening?  The goal of lung cancer screening is to save lives. Many times, lung cancer is not found until a person starts having physical symptoms. Lung cancer screening can help detect lung cancer in the earliest stages when it may be easier to treat.    Who should be screened for lung cancer?  We suggest lung cancer screening for anyone who is at high-risk for lung cancer. You are in the high-risk group if you:      are between the ages of 55 and 79, and    have smoked at least 1 pack of cigarettes a day for 30 or more years, and    still smoke or  have quit within the past 15 years.    However, if you have a new cough or shortness of breath, you should talk to your doctor before being screened.    Some national lung health advocacy groups also recommend screening for people ages 50 to 79 who have smoked an average of 1 pack of cigarettes a day for 20 years. They must also have at least 1 other risk factor for lung cancer, not including exposure to secondhand smoke. Other risk factors are having had cancer in the past, emphysema, pulmonary fibrosis, COPD, a family history of lung cancer, or exposure to certain materials such as arsenic, asbestos, beryllium, cadmium, chromium, diesel fumes, nickel, radon or silica. Your care team can help you know if you have one of these risk factors.     Why does it matter if I have symptoms?  Certain symptoms can be a sign that you have a condition in your lungs that should be checked and treated by your doctor. These symptoms include fever, chest pain, a new or changing cough, shortness of breath that you have never felt before, coughing up blood or unexplained weight loss. Having any of these symptoms can greatly affect the results of lung cancer screening.       Should all smokers get an LDCT lung cancer screening exam?  It depends. Lung cancer screening is for a very specific group of men and women who have a history of heavy smoking over a long period of time (see  Who should be screened for lung cancer  above).  I am in the high-risk group, but have been diagnosed with cancer in the past. Is LDCT lung cancer screening right for me?  In some cases, you should not have LDCT lung screening, such as when your doctor is already following your cancer with CT scan studies. Your doctor will help you decide if LDCT lung screening is right for you.  Do I need to have a screening exam every year?  Yes. If you are in the high-risk group described earlier, you should get an LDCT lung cancer screening exam every year until you are  79, or are no longer willing or able to undergo screening and possible procedures to diagnose and treat lung cancer.  How effective is LDCT at preventing death from lung cancer?  Studies have shown that LDCT lung cancer screening can lower the risk of death from lung cancer by 20 percent in people who are at high-risk.  What are the risks?  There are some risks and limitations of LDCT lung cancer screening. We want to make sure you understand the risks and benefits, so please let us know if you have any questions. Your doctor may want to talk with you more about these risks.    Radiation exposure: As with any exam that uses radiation, there is a very small increased risk of cancer. The amount of radiation in LDCT is small--about the same amount a person would get from a mammogram. Your doctor orders the exam when he or she feels the potential benefits outweigh the risks.    False negatives: No test is perfect, including LDCT. It is possible that you may have a medical condition, including lung cancer, that is not found during your exam. This is called a false negative result.    False positives and more testing: LDCT very often finds something in the lung that could be cancer, but in fact is not. This is called a false positive result. False positive tests often cause anxiety. To make sure these findings are not cancer, you may need to have more tests. These tests will be done only if you give us permission. Sometimes patients need a treatment that can have side effects, such as a biopsy. For more information on false positives, see  What can I expect from the results?     Findings not related to lung cancer: Your LDCT exam also takes pictures of areas of your body next to your lungs. In a very small number of cases, the CT scan will show an abnormal finding in one of these areas, such as your kidneys, adrenal glands, liver or thyroid. This finding may not be serious, but you may need more tests. Your doctor can  help you decide what other tests you may need, if any.  What can I expect from the results?  About 1 out of 4 LDCT exams will find something that may need more tests. Most of the time, these findings are lung nodules. Lung nodules are very small collections of tissue in the lung. These nodules are very common, and the vast majority--more than 97 percent--are not cancer (benign). Most are normal lymph nodes or small areas of scarring from past infections.  But, if a small lung nodule is found to be cancer, the cancer can be cured more than 90 percent of the time. To know if the nodule is cancer, we may need to get more images before your next yearly screening exam. If the nodule has suspicious features (for example, it is large, has an odd shape or grows over time), we will refer you to a specialist for further testing.  Will my doctor also get the results?  Yes. Your doctor will get a copy of your results.  Is it okay to keep smoking now that there s a cancer screening exam?  No. Tobacco is one of the strongest cancer-causing agents. It causes not only lung cancer, but other cancers and cardiovascular (heart) diseases as well. The damage caused by smoking builds over time. This means that the longer you smoke, the higher your risk of disease. While it is never too late to quit, the sooner you quit, the better.  Where can I find help to quit smoking?  The best way to prevent lung cancer is to stop smoking. If you have already quit smoking, congratulations and keep it up! For help on quitting smoking, please call [employer] at 8-825-414-PVJX (3143) or the American [employer] Society at 1-563.429.7775 to find local resources near you.  One-on-one health coaching:  If you d prefer to work individually with a health care provider on tobacco cessation, we offer:      Medication Therapy Management:  Our specially trained pharmacists work closely with you and your doctor to help you quit smoking.  Call [phone] or  [phone] (toll free).     Can Do: Health coaching offered by [employer] [facility].  [email or web address]

## 2021-03-01 NOTE — PROGRESS NOTES
"SUBJECTIVE:   Asmita Jerez is a 65 year old female who presents for Preventive Visit.      Patient has been advised of split billing requirements and indicates understanding: Yes   Are you in the first 12 months of your Medicare coverage?  No    Healthy Habits:    In general, how would you rate your overall health?  Very good    Frequency of exercise:  2-3 days/week    Duration of exercise:  Less than 15 minutes    Do you usually eat at least 4 servings of fruit and vegetables a day, include whole grains    & fiber and avoid regularly eating high fat or \"junk\" foods?  Yes    Taking medications regularly:  No    Barriers to taking medications:  None    Medication side effects:  Other (electrial shock feeling at times)    Ability to successfully perform activities of daily living:  No assistance needed    Home Safety:  No safety concerns identified    Hearing Impairment:  No hearing concerns    In the past 6 months, have you been bothered by leaking of urine?  No    In general, how would you rate your overall mental or emotional health?  Good      PHQ-2 Total Score:    Additional concerns today:  No     Chief Complaint   Patient presents with     Physical     Medication Request     Ativan     Diabetes       Do you feel safe in your environment? Yes    Have you ever done Advance Care Planning? (For example, a Health Directive, POLST, or a discussion with a medical provider or your loved ones about your wishes): No, advance care planning information given to patient to review.  Advanced care planning was discussed at today's visit.       Fall risk  Fallen 2 or more times in the past year?: No  Any fall with injury in the past year?: No    Cognitive Screening   1) Repeat 3 items (Leader, Season, Table)    2) Clock draw: NORMAL  3) 3 item recall: Recalls 3 objects  Results: 3 items recalled: COGNITIVE IMPAIRMENT LESS LIKELY    Mini-CogTM Copyright DENVER Neil. Licensed by the author for use in [facility]; " reprinted with permission ([email or web address].[email or web address]). All rights reserved.      Do you have sleep apnea, excessive snoring or daytime drowsiness?: no    Reviewed and updated as needed this visit by clinical staff  Tobacco  Allergies  Meds  Problems  Med Hx  Surg Hx  Fam Hx  Soc Hx          Reviewed and updated as needed this visit by Provider                Social History     Tobacco Use     Smoking status: Current Every Day Smoker     Packs/day: 1.00     Years: 40.00     Pack years: 40.00     Types: Cigarettes     Smokeless tobacco: Never Used     Tobacco comment: pt not interested in quit plan 4/22/09   Substance Use Topics     Alcohol use: No     If you drink alcohol do you typically have >3 drinks per day or >7 drinks per week? No    Alcohol Use 5/25/2016   Prescreen: >3 drinks/day or >7 drinks/week? The patient does not drink >3 drinks per day nor >7 drinks per week.   No flowsheet data found.        Diabetes Follow-up    How often are you checking your blood sugar? One time daily  What time of day are you checking your blood sugars (select all that apply)?  Before meals  Have you had any blood sugars above 200?  No  Have you had any blood sugars below 70?  No    What symptoms do you notice when your blood sugar is low?  None    What concerns do you have today about your diabetes? None     Do you have any of these symptoms? (Select all that apply)  No numbness or tingling in feet.  No redness, sores or blisters on feet.  No complaints of excessive thirst.  No reports of blurry vision.  No significant changes to weight.    Have you had a diabetic eye exam in the last 12 months? No          Hyperlipidemia Follow-Up      Are you regularly taking any medication or supplement to lower your cholesterol?   Yes- lipitor    Are you having muscle aches or other side effects that you think could be caused by your cholesterol lowering medication?  Yes- possibly due to medication. She will get a electrical shock from lower legs  up to hips    Hypertension Follow-up      Do you check your blood pressure regularly outside of the clinic? No     Are you following a low salt diet? Yes    Are your blood pressures ever more than 140 on the top number (systolic) OR more   than 90 on the bottom number (diastolic), for example 140/90? No    BP Readings from Last 2 Encounters:   03/01/21 124/70   08/13/20 130/84     Hemoglobin A1C (%)   Date Value   03/01/2021 6.9 (H)   08/06/2020 7.1 (H)     LDL Cholesterol Calculated (mg/dL)   Date Value   08/06/2020 57   12/16/2019 31     Anxiety Follow-Up    How are you doing with your anxiety since your last visit? Worsened - daily anxiety, not sleeping well    Are you having other symptoms that might be associated with anxiety? No    Have you had a significant life event? No     Are you feeling depressed? No    Do you have any concerns with your use of alcohol or other drugs? No    Social History     Tobacco Use     Smoking status: Current Every Day Smoker     Packs/day: 1.00     Years: 40.00     Pack years: 40.00     Types: Cigarettes     Smokeless tobacco: Never Used     Tobacco comment: pt not interested in quit plan 4/22/09   Substance Use Topics     Alcohol use: No     Drug use: No     RENETTA-7 SCORE 2/20/2020 3/1/2021   Total Score 18 9     PHQ 2/20/2020 3/1/2021   PHQ-9 Total Score 14 2   Q9: Thoughts of better off dead/self-harm past 2 weeks Not at all Not at all         Current providers sharing in care for this patient include:   Patient Care Team:  Melissa Pitts MD as PCP - General (Family Practice)  Melissa Pitts MD as Assigned PCP    The following health maintenance items are reviewed in Epic and correct as of today:  Health Maintenance   Topic Date Due     DEXA  1955     EYE EXAM  1955     Pneumococcal Vaccine: Pediatrics (0 to 5 Years) and At-Risk Patients (6 to 64 Years) (1 of 2 - PPSV23) 12/06/1960     Pneumococcal Vaccine: 65+ Years (1 of 2 - PPSV23) 12/06/1960     ZOSTER  "IMMUNIZATION (1 of 2) 12/06/2004     ADVANCE CARE PLANNING  01/24/2017     DIABETIC FOOT EXAM  02/15/2018     INFLUENZA VACCINE (1) 09/01/2020     FALL RISK ASSESSMENT  12/16/2020     LUNG CANCER SCREENING ANNUAL  12/18/2020     MAMMO SCREENING  03/27/2021     LIPID  08/06/2021     MICROALBUMIN  08/06/2021     A1C  09/01/2021     PHQ-9  09/01/2021     MEDICARE ANNUAL WELLNESS VISIT  03/01/2022     BMP  03/01/2022     DTAP/TDAP/TD IMMUNIZATION (4 - Td) 11/18/2025     COLORECTAL CANCER SCREENING  12/11/2025     HEPATITIS C SCREENING  Completed     IPV IMMUNIZATION  Aged Out     MENINGITIS IMMUNIZATION  Aged Out     Lab work is in process  Mammogram Screening: Recommended mammography every 1-2 years with patient discussion and risk factor consideration    Review of Systems  Constitutional, HEENT, cardiovascular, pulmonary, gi and gu systems are negative, except as otherwise noted.    OBJECTIVE:   /70   Pulse 99   Temp 95.8  F (35.4  C) (Tympanic)   Resp 16   Ht 1.6 m (5' 3\")   Wt 59 kg (130 lb)   LMP 12/01/2009   SpO2 98%   BMI 23.03 kg/m   Estimated body mass index is 23.03 kg/m  as calculated from the following:    Height as of this encounter: 1.6 m (5' 3\").    Weight as of this encounter: 59 kg (130 lb).  Physical Exam  GENERAL: healthy, alert and no distress  NECK: no adenopathy, no asymmetry, masses, or scars and thyroid normal to palpation  RESP: lungs clear to auscultation - no rales, rhonchi or wheezes  CV: regular rate and rhythm, normal S1 S2, no S3 or S4, no murmur, click or rub, no peripheral edema and peripheral pulses strong  ABDOMEN: soft, nontender, no hepatosplenomegaly, no masses and bowel sounds normal  MS: no gross musculoskeletal defects noted, no edema    Diagnostic Test Results:  Labs reviewed in Epic  Results for orders placed or performed in visit on 03/01/21 (from the past 24 hour(s))   Hemoglobin A1c   Result Value Ref Range    Hemoglobin A1C 6.9 (H) 0 - 5.6 %       ASSESSMENT / " "PLAN:   1. Encounter for Medicare annual wellness exam       2. Type 2 diabetes mellitus without complication, without long-term current use of insulin (H)  Well controlled  - Hemoglobin A1c  - Basic metabolic panel  - metFORMIN (GLUCOPHAGE-XR) 500 MG 24 hr tablet; Take 2 tablets (1,000 mg) by mouth 2 times daily (with meals)  Dispense: 360 tablet; Refill: 1  - dulaglutide (TRULICITY) 0.75 MG/0.5ML pen; Inject 0.75 mg Subcutaneous every 7 days  Dispense: 5 mL; Refill: 2  - OFFICE/OUTPT VISIT,EST,LEVL IV    3. Essential hypertension with goal blood pressure less than 140/90  Well controlled  - lisinopril (ZESTRIL) 40 MG tablet; Take 1 tablet (40 mg) by mouth daily  Dispense: 90 tablet; Refill: 3  - OFFICE/OUTPT VISIT,EST,LEVL IV    4. RENETTA (generalized anxiety disorder)  No order for lorazepam - would avoid benzos  Recommend daily prescription for ssri  Hydroxyzine prn for panic  - sertraline (ZOLOFT) 50 MG tablet; Take 1 tablet (50 mg) by mouth daily  Dispense: 30 tablet; Refill: 1  - hydrOXYzine (ATARAX) 25 MG tablet; Take 1 tablet (25 mg) by mouth 3 times daily as needed for anxiety  Dispense: 10 tablet; Refill: 1  - OFFICE/OUTPT VISIT,EST,LEVL IV    5. Personal history of tobacco use     - Prof fee: Shared Decisionmaking for Lung Cancer Screening  - CT Chest Lung Cancer Scrn Low Dose wo; Future    Patient has been advised of split billing requirements and indicates understanding: Yes  COUNSELING:  Reviewed preventive health counseling, as reflected in patient instructions       Regular exercise       Healthy diet/nutrition    Estimated body mass index is 23.03 kg/m  as calculated from the following:    Height as of this encounter: 1.6 m (5' 3\").    Weight as of this encounter: 59 kg (130 lb).        She reports that she has been smoking cigarettes. She has a 40.00 pack-year smoking history. She has never used smokeless tobacco.  Tobacco Cessation Action Plan:   Information offered: Patient not interested at this " time      Appropriate preventive services were discussed with this patient, including applicable screening as appropriate for cardiovascular disease, diabetes, osteopenia/osteoporosis, and glaucoma.  As appropriate for age/gender, discussed screening for colorectal cancer, prostate cancer, breast cancer, and cervical cancer. Checklist reviewing preventive services available has been given to the patient.    Reviewed patients plan of care and provided an AVS. The Basic Care Plan (routine screening as documented in Health Maintenance) for Asmita meets the Care Plan requirement. This Care Plan has been established and reviewed with the Patient.    Counseling Resources:  ATP IV Guidelines  Pooled Cohorts Equation Calculator  Breast Cancer Risk Calculator  Breast Cancer: Medication to Reduce Risk  FRAX Risk Assessment  ICSI Preventive Guidelines  Dietary Guidelines for Americans, 2010  USDA's MyPlate  ASA Prophylaxis  Lung CA Screening    Melissa Pitts MD  [facility]    Identified Health Risks:

## 2021-03-01 NOTE — PROGRESS NOTES
Lung Cancer Screening Shared Decision Making Visit     Asmita Jerez is eligible for lung cancer screening on the basis of the information provided in my signed lung cancer screening order.     I have discussed with patient the risks and benefits of screening for lung cancer with low-dose CT.     The risks include:  radiation exposure: one low dose chest CT has as much ionizing radiation as about 15 chest x-rays or 6 months of background radiation living in [place]    false positives: 96% of positive findings/nodules are NOT cancer, but some might still require additional diagnostic evaluation, including biopsy  over-diagnosis: some slow growing cancers that might never have been clinically significant will be detected and treated unnecessarily     The benefit of early detection of lung cancer is contingent upon adherence to annual screening or more frequent follow up if indicated.     Furthermore, reaping the benefits of screening requires Asmita Jerez to be willing and physically able to undergo diagnostic procedures, if indicated. Although no specific guide is available for determining severity of comorbidities, it is reasonable to withhold screening in patients who have greater mortality risk from other diseases.     We did discuss that the only way to prevent lung cancer is to not smoke. Smoking cessation counseling was given, duration < 3 minutes.      I did not offer risk estimation using a calculator such as this one:    ShouldIScreen

## 2021-03-01 NOTE — LETTER
March 1, 2021      Asmita Jerez  [address]  [place] [address]        Dear ,    We are writing to inform you of your test results.    Your test results fall within the expected range(s) or remain unchanged from previous results.  Please continue with current treatment plan.    Resulted Orders   Hemoglobin A1c   Result Value Ref Range    Hemoglobin A1C 6.9 (H) 0 - 5.6 %      Comment:      Normal <5.7% Prediabetes 5.7-6.4%  Diabetes 6.5% or higher - adopted from ADA   consensus guidelines.     Basic metabolic panel   Result Value Ref Range    Sodium 141 133 - 144 mmol/L    Potassium 4.2 3.4 - 5.3 mmol/L    Chloride 112 (H) 94 - 109 mmol/L    Carbon Dioxide 26 20 - 32 mmol/L    Anion Gap 3 3 - 14 mmol/L    Glucose 142 (H) 70 - 99 mg/dL      Comment:      Fasting specimen    Urea Nitrogen 18 7 - 30 mg/dL    Creatinine 0.79 0.52 - 1.04 mg/dL    GFR Estimate 77 >60 mL/min/[1.73_m2]      Comment:      Non  GFR Calc  Starting 12/18/2018, serum creatinine based estimated GFR (eGFR) will be   calculated using the Chronic Kidney Disease Epidemiology Collaboration   (CKD-EPI) equation.      GFR Estimate If Black 90 >60 mL/min/[1.73_m2]      Comment:       GFR Calc  Starting 12/18/2018, serum creatinine based estimated GFR (eGFR) will be   calculated using the Chronic Kidney Disease Epidemiology Collaboration   (CKD-EPI) equation.      Calcium 9.8 8.5 - 10.1 mg/dL       If you have any questions or concerns, please call the clinic at the number listed above.       Sincerely,      Melissa Pitts MD/[facility]

## 2021-03-02 ASSESSMENT — ANXIETY QUESTIONNAIRES: GAD7 TOTAL SCORE: 9

## 2021-03-29 ENCOUNTER — VIRTUAL VISIT (OUTPATIENT)
Dept: FAMILY MEDICINE | Facility: CLINIC | Age: 66
End: 2021-03-29
Payer: COMMERCIAL

## 2021-03-29 DIAGNOSIS — F41.1 GENERALIZED ANXIETY DISORDER: Primary | ICD-10-CM

## 2021-03-29 PROCEDURE — 99214 OFFICE O/P EST MOD 30 MIN: CPT | Mod: TEL | Performed by: FAMILY MEDICINE

## 2021-03-29 RX ORDER — SERTRALINE HYDROCHLORIDE 100 MG/1
100 TABLET, FILM COATED ORAL DAILY
Qty: 90 TABLET | Refills: 1 | Status: SHIPPED | OUTPATIENT
Start: 2021-03-29 | End: 2021-10-07

## 2021-03-29 ASSESSMENT — ANXIETY QUESTIONNAIRES
5. BEING SO RESTLESS THAT IT IS HARD TO SIT STILL: NOT AT ALL
IF YOU CHECKED OFF ANY PROBLEMS ON THIS QUESTIONNAIRE, HOW DIFFICULT HAVE THESE PROBLEMS MADE IT FOR YOU TO DO YOUR WORK, TAKE CARE OF THINGS AT HOME, OR GET ALONG WITH OTHER PEOPLE: NOT DIFFICULT AT ALL
GAD7 TOTAL SCORE: 5
3. WORRYING TOO MUCH ABOUT DIFFERENT THINGS: NEARLY EVERY DAY
1. FEELING NERVOUS, ANXIOUS, OR ON EDGE: SEVERAL DAYS
2. NOT BEING ABLE TO STOP OR CONTROL WORRYING: NOT AT ALL
6. BECOMING EASILY ANNOYED OR IRRITABLE: SEVERAL DAYS
7. FEELING AFRAID AS IF SOMETHING AWFUL MIGHT HAPPEN: NOT AT ALL

## 2021-03-29 ASSESSMENT — PATIENT HEALTH QUESTIONNAIRE - PHQ9
SUM OF ALL RESPONSES TO PHQ QUESTIONS 1-9: 3
5. POOR APPETITE OR OVEREATING: NOT AT ALL

## 2021-03-29 NOTE — PATIENT INSTRUCTIONS
COVID Vaccine:  --I strongly encourage you to get a COVID vaccine when it is available to you.    --I have received the vaccine and will vaccinate my family  --The COVID vaccine is safe.  Most serious adverse reactions happen within the first few days or weeks.  Your chance of having a serious complication related to COVID is much higher than having a serious adverse reaction to the vaccine.  --Many people will have low grade fevers, general body aches and fatigue for 1-2 days after getting the vaccine.  This is a sign that your immune system is activated - this is a good thing!  --If you have an allergy to Miralax, you should not get the vaccine.    --Stopping a pandemic requires all of us to do our part. Getting vaccinated is another step you can take to help reduce your chance of being exposed to the virus and spreading it to others. Together, the COVID-19 vaccination and following CDC's recommendations to protect yourself and others will offer the best protection from COVID-19. Wear a mask, wash your hands, stay at least 6 feet from others, and remain home if you're sick.  --If you encounter information about the vaccine that you would like further clarification on, please reach out to my team!  Send me a My Chart message or make an appointment with me.      Vaccine appointments are being added as they become available. Please check your [employer] account frequently for availability. If you have technical difficulty using [employer], call [phone] for assistance.    You can learn more about the [facility]'s phased approach to administering the vaccine, with details on each phase, [email or web address]    As vaccine supply increases and we are able to open appointments to more groups, we will share that information on our website [email or web address] Check this website to stay up to date on COVID-19 vaccination information.    MyChart to  schedule vaccine or Call for appointments @ [phone]    Or    -[facility]  [email or web address]   Or call [phone]

## 2021-03-29 NOTE — PROGRESS NOTES
Asmita is a 65 year old who is being evaluated via a billable telephone visit.      What phone number would you like to be contacted at? [phone]  How would you like to obtain your AVS? MyChart    Assessment & Plan     Generalized anxiety disorder  Increase sertraline to 100 mg daily  Follow up in 1 month  Use the hydroxyzine at bedtime for sleep. Let me know if not effective (has not tried yet)  - sertraline (ZOLOFT) 100 MG tablet; Take 1 tablet (100 mg) by mouth daily                 No follow-ups on file.    Melissa Pitts MD  [facility]   Asmita is a 65 year old who presents for the following health issues:    HPI         Anxiety Follow-Up - new prescription for Sertraline on 3/1    How are you doing with your anxiety since your last visit? No change    Are you having other symptoms that might be associated with anxiety? Yes:  insomnia     Have you had a significant life event? No     Are you feeling depressed? No    Do you have any concerns with your use of alcohol or other drugs? No    Social History     Tobacco Use     Smoking status: Current Every Day Smoker     Packs/day: 1.00     Years: 40.00     Pack years: 40.00     Types: Cigarettes     Smokeless tobacco: Never Used     Tobacco comment: pt not interested in quit plan 4/22/09   Substance Use Topics     Alcohol use: No     Drug use: No     RENETTA-7 SCORE 2/20/2020 3/1/2021 3/29/2021   Total Score 18 9 5     PHQ 2/20/2020 3/1/2021 3/29/2021   PHQ-9 Total Score 14 2 3   Q9: Thoughts of better off dead/self-harm past 2 weeks Not at all Not at all Not at all       Review of Systems   Constitutional, HEENT, cardiovascular, pulmonary, gi and gu systems are negative, except as otherwise noted.      Objective           Vitals:  No vitals were obtained today due to virtual visit.    Physical Exam   healthy, alert and no distress  PSYCH: Alert and oriented times 3; coherent speech, normal   rate and volume, able to  articulate logical thoughts, able   to abstract reason, no tangential thoughts, no hallucinations   or delusions  Her affect is normal  RESP: No cough, no audible wheezing, able to talk in full sentences  Remainder of exam unable to be completed due to telephone visits                Phone call duration: 7 minutes

## 2021-03-30 ASSESSMENT — ANXIETY QUESTIONNAIRES: GAD7 TOTAL SCORE: 5

## 2021-05-03 ENCOUNTER — HOSPITAL ENCOUNTER (OUTPATIENT)
Dept: MAMMOGRAPHY | Facility: CLINIC | Age: 66
End: 2021-05-03
Attending: FAMILY MEDICINE
Payer: COMMERCIAL

## 2021-05-03 ENCOUNTER — HOSPITAL ENCOUNTER (OUTPATIENT)
Dept: CT IMAGING | Facility: CLINIC | Age: 66
End: 2021-05-03
Attending: FAMILY MEDICINE
Payer: COMMERCIAL

## 2021-05-03 DIAGNOSIS — Z12.31 VISIT FOR SCREENING MAMMOGRAM: ICD-10-CM

## 2021-05-03 DIAGNOSIS — Z87.891 PERSONAL HISTORY OF TOBACCO USE: ICD-10-CM

## 2021-05-03 PROCEDURE — 77067 SCR MAMMO BI INCL CAD: CPT

## 2021-05-03 PROCEDURE — 71271 CT THORAX LUNG CANCER SCR C-: CPT

## 2021-05-05 ENCOUNTER — TELEPHONE (OUTPATIENT)
Dept: FAMILY MEDICINE | Facility: CLINIC | Age: 66
End: 2021-05-05

## 2021-05-05 NOTE — TELEPHONE ENCOUNTER
Informed pt we would be unable to recommend CBD products @ this time.  Not enough research per Pharmacist.  KPavelRN  
Reason for Call:  Other call back    Detailed comments: Pt was wondering is it okay to use a CBT honey stick with all her meds.     Phone Number Patient can be reached at: Cell number on file:    Telephone Information:   Mobile [phone]       Best Time: any time     Can we leave a detailed message on this number? YES    Call taken on 5/5/2021 at 4:23 PM by Genna Mendez      
Statement Selected

## 2021-05-24 DIAGNOSIS — F41.1 GAD (GENERALIZED ANXIETY DISORDER): ICD-10-CM

## 2021-05-24 RX ORDER — HYDROXYZINE HYDROCHLORIDE 25 MG/1
25 TABLET, FILM COATED ORAL 3 TIMES DAILY PRN
Qty: 10 TABLET | Refills: 1 | Status: SHIPPED | OUTPATIENT
Start: 2021-05-24 | End: 2021-07-02

## 2021-07-02 DIAGNOSIS — F41.1 GAD (GENERALIZED ANXIETY DISORDER): ICD-10-CM

## 2021-07-02 DIAGNOSIS — E11.9 TYPE 2 DIABETES MELLITUS WITHOUT COMPLICATION, WITHOUT LONG-TERM CURRENT USE OF INSULIN (H): ICD-10-CM

## 2021-07-02 RX ORDER — HYDROXYZINE HYDROCHLORIDE 25 MG/1
25 TABLET, FILM COATED ORAL 3 TIMES DAILY PRN
Qty: 10 TABLET | Refills: 1 | Status: SHIPPED | OUTPATIENT
Start: 2021-07-02 | End: 2021-10-07

## 2021-07-02 RX ORDER — BLOOD SUGAR DIAGNOSTIC
STRIP MISCELLANEOUS
Qty: 200 STRIP | Refills: 3 | Status: SHIPPED | OUTPATIENT
Start: 2021-07-02 | End: 2022-10-12

## 2021-08-07 DIAGNOSIS — E11.9 TYPE 2 DIABETES MELLITUS WITHOUT COMPLICATION, WITHOUT LONG-TERM CURRENT USE OF INSULIN (H): ICD-10-CM

## 2021-08-10 RX ORDER — DULAGLUTIDE 0.75 MG/.5ML
0.75 INJECTION, SOLUTION SUBCUTANEOUS
Qty: 4 ML | Refills: 1 | Status: SHIPPED | OUTPATIENT
Start: 2021-08-10 | End: 2021-11-24

## 2021-10-07 ENCOUNTER — OFFICE VISIT (OUTPATIENT)
Dept: FAMILY MEDICINE | Facility: CLINIC | Age: 66
End: 2021-10-07
Payer: COMMERCIAL

## 2021-10-07 VITALS
SYSTOLIC BLOOD PRESSURE: 164 MMHG | HEIGHT: 63 IN | HEART RATE: 102 BPM | OXYGEN SATURATION: 97 % | DIASTOLIC BLOOD PRESSURE: 94 MMHG | WEIGHT: 129 LBS | BODY MASS INDEX: 22.86 KG/M2 | TEMPERATURE: 97.8 F | RESPIRATION RATE: 14 BRPM

## 2021-10-07 DIAGNOSIS — I10 ESSENTIAL HYPERTENSION WITH GOAL BLOOD PRESSURE LESS THAN 140/90: ICD-10-CM

## 2021-10-07 DIAGNOSIS — R91.8 PULMONARY NODULES: ICD-10-CM

## 2021-10-07 DIAGNOSIS — E11.9 TYPE 2 DIABETES MELLITUS WITHOUT COMPLICATION, WITHOUT LONG-TERM CURRENT USE OF INSULIN (H): Primary | ICD-10-CM

## 2021-10-07 DIAGNOSIS — F41.1 GAD (GENERALIZED ANXIETY DISORDER): ICD-10-CM

## 2021-10-07 DIAGNOSIS — G56.03 BILATERAL CARPAL TUNNEL SYNDROME: ICD-10-CM

## 2021-10-07 DIAGNOSIS — Z72.0 TOBACCO USE: ICD-10-CM

## 2021-10-07 LAB
ALBUMIN SERPL-MCNC: 3.6 G/DL (ref 3.4–5)
ALP SERPL-CCNC: 74 U/L (ref 40–150)
ALT SERPL W P-5'-P-CCNC: 30 U/L (ref 0–50)
ANION GAP SERPL CALCULATED.3IONS-SCNC: 6 MMOL/L (ref 3–14)
AST SERPL W P-5'-P-CCNC: 25 U/L (ref 0–45)
BILIRUB SERPL-MCNC: 0.4 MG/DL (ref 0.2–1.3)
BUN SERPL-MCNC: 17 MG/DL (ref 7–30)
CALCIUM SERPL-MCNC: 9.2 MG/DL (ref 8.5–10.1)
CHLORIDE BLD-SCNC: 107 MMOL/L (ref 94–109)
CHOLEST SERPL-MCNC: 103 MG/DL
CO2 SERPL-SCNC: 26 MMOL/L (ref 20–32)
CREAT SERPL-MCNC: 0.74 MG/DL (ref 0.52–1.04)
CREAT UR-MCNC: 54 MG/DL
FASTING STATUS PATIENT QL REPORTED: NO
GFR SERPL CREATININE-BSD FRML MDRD: 85 ML/MIN/1.73M2
GLUCOSE BLD-MCNC: 179 MG/DL (ref 70–99)
HBA1C MFR BLD: 7 % (ref 0–5.6)
HDLC SERPL-MCNC: 55 MG/DL
LDLC SERPL CALC-MCNC: 32 MG/DL
MICROALBUMIN UR-MCNC: <5 MG/L
MICROALBUMIN/CREAT UR: NORMAL MG/G{CREAT}
NONHDLC SERPL-MCNC: 48 MG/DL
POTASSIUM BLD-SCNC: 4.7 MMOL/L (ref 3.4–5.3)
PROT SERPL-MCNC: 7.3 G/DL (ref 6.8–8.8)
SODIUM SERPL-SCNC: 139 MMOL/L (ref 133–144)
TRIGL SERPL-MCNC: 79 MG/DL
VIT B12 SERPL-MCNC: 870 PG/ML (ref 193–986)

## 2021-10-07 PROCEDURE — 82607 VITAMIN B-12: CPT | Performed by: FAMILY MEDICINE

## 2021-10-07 PROCEDURE — 80061 LIPID PANEL: CPT | Performed by: FAMILY MEDICINE

## 2021-10-07 PROCEDURE — 99207 PR FOOT EXAM NO CHARGE: CPT | Performed by: FAMILY MEDICINE

## 2021-10-07 PROCEDURE — 99214 OFFICE O/P EST MOD 30 MIN: CPT | Performed by: FAMILY MEDICINE

## 2021-10-07 PROCEDURE — 36415 COLL VENOUS BLD VENIPUNCTURE: CPT | Performed by: FAMILY MEDICINE

## 2021-10-07 PROCEDURE — 83036 HEMOGLOBIN GLYCOSYLATED A1C: CPT | Performed by: FAMILY MEDICINE

## 2021-10-07 PROCEDURE — 82043 UR ALBUMIN QUANTITATIVE: CPT | Performed by: FAMILY MEDICINE

## 2021-10-07 PROCEDURE — 80053 COMPREHEN METABOLIC PANEL: CPT | Performed by: FAMILY MEDICINE

## 2021-10-07 RX ORDER — METFORMIN HCL 500 MG
1000 TABLET, EXTENDED RELEASE 24 HR ORAL 2 TIMES DAILY WITH MEALS
Qty: 360 TABLET | Refills: 1 | Status: SHIPPED | OUTPATIENT
Start: 2021-10-07 | End: 2022-05-18

## 2021-10-07 RX ORDER — METOPROLOL SUCCINATE 25 MG/1
25 TABLET, EXTENDED RELEASE ORAL DAILY
Qty: 90 TABLET | Refills: 0 | Status: SHIPPED | OUTPATIENT
Start: 2021-10-07 | End: 2022-01-05

## 2021-10-07 RX ORDER — HYDROXYZINE HYDROCHLORIDE 25 MG/1
25 TABLET, FILM COATED ORAL 3 TIMES DAILY PRN
Qty: 30 TABLET | Refills: 4 | Status: SHIPPED | OUTPATIENT
Start: 2021-10-07 | End: 2021-10-27

## 2021-10-07 ASSESSMENT — MIFFLIN-ST. JEOR: SCORE: 1094.27

## 2021-10-07 NOTE — PROGRESS NOTES
Assessment & Plan     Type 2 diabetes mellitus without complication, without long-term current use of insulin (H)  stable  - Albumin Random Urine Quantitative with Creat Ratio; Future  - Comprehensive metabolic panel; Future  - Lipid panel reflex to direct LDL Fasting; Future  - Hemoglobin A1c; Future  - Vitamin B12; Future  - FOOT EXAM  - Comprehensive metabolic panel  - Lipid panel reflex to direct LDL Fasting  - Hemoglobin A1c  - Vitamin B12  - metFORMIN (GLUCOPHAGE-XR) 500 MG 24 hr tablet; Take 2 tablets (1,000 mg) by mouth 2 times daily (with meals)  - Albumin Random Urine Quantitative with Creat Ratio    Bilateral carpal tunnel syndrome  See orthopedics, likely needs EMG  - Orthopedic  Referral; Future    Pulmonary nodules   due for 6 month follow up soon  - CT Chest Low Dose Non Contrast; Future    Essential hypertension with goal blood pressure less than 140/90     - Comprehensive metabolic panel; Future  - Comprehensive metabolic panel  - metoprolol succinate ER (TOPROL-XL) 25 MG 24 hr tablet; Take 1 tablet (25 mg) by mouth daily    RENETTA (generalized anxiety disorder)     - hydrOXYzine (ATARAX) 25 MG tablet; Take 1 tablet (25 mg) by mouth 3 times daily as needed for anxiety    Tobacco use     - CT Chest Low Dose Non Contrast; Future                 Return in about 4 weeks (around 11/4/2021) for recheck with me for BP recheck.    Melissa Pitts MD  [facility]   Asmita is a 65 year old who presents for the following health issues     HPI   Chief Complaint   Patient presents with     Diabetes     Musculoskeletal Problem     Patient has bilateral wrist pain since July and has never had carpal tunnel previously. Patient did a lot of heavy work with hands and it would wake her up in the middle of the night due to it feeling numb and she would have to hang off side of bed.      Flu Shot     declined       Diabetes Follow-up    How often are you checking your  blood sugar? One time daily  What time of day are you checking your blood sugars (select all that apply)?  Before meals  Have you had any blood sugars above 200?  No  Have you had any blood sugars below 70?  No    What symptoms do you notice when your blood sugar is low?  None    What concerns do you have today about your diabetes? None     Do you have any of these symptoms? (Select all that apply)  No numbness or tingling in feet.  No redness, sores or blisters on feet.  No complaints of excessive thirst.  No reports of blurry vision.  No significant changes to weight.    Have you had a diabetic eye exam in the last 12 months? No          Hyperlipidemia Follow-Up      Are you regularly taking any medication or supplement to lower your cholesterol?   Yes- lipitor    Are you having muscle aches or other side effects that you think could be caused by your cholesterol lowering medication?  Yes- cramping    Hypertension Follow-up      Do you check your blood pressure regularly outside of the clinic? No     Are you following a low salt diet? Yes    Are your blood pressures ever more than 140 on the top number (systolic) OR more   than 90 on the bottom number (diastolic), for example 140/90? No    BP Readings from Last 2 Encounters:   10/07/21 (!) 164/94   03/01/21 124/70     Hemoglobin A1C (%)   Date Value   10/07/2021 7.0 (H)   03/01/2021 6.9 (H)   08/06/2020 7.1 (H)     LDL Cholesterol Calculated (mg/dL)   Date Value   08/06/2020 57   12/16/2019 31         How many servings of fruits and vegetables do you eat daily?  2-3    On average, how many sweetened beverages do you drink each day (Examples: soda, juice, sweet tea, etc.  Do NOT count diet or artificially sweetened beverages)?   0    How many days per week do you exercise enough to make your heart beat faster? 3 or less    How many minutes a day do you exercise enough to make your heart beat faster? 9 or less/ active doing daily things    How many days per week do  "you miss taking your medication? 0        Review of Systems   Constitutional, HEENT, cardiovascular, pulmonary, gi and gu systems are negative, except as otherwise noted.      Objective    BP (!) 164/94   Pulse 102   Temp 97.8  F (36.6  C) (Tympanic)   Resp 14   Ht 1.6 m (5' 3\")   Wt 58.5 kg (129 lb)   LMP 12/01/2009   SpO2 97%   Breastfeeding No   BMI 22.85 kg/m    Body mass index is 22.85 kg/m .  Physical Exam   GENERAL: healthy, alert and no distress  NECK: no adenopathy, no asymmetry, masses, or scars and thyroid normal to palpation  RESP: lungs clear to auscultation - no rales, rhonchi or wheezes  CV: regular rate and rhythm, normal S1 S2, no S3 or S4, no murmur, click or rub, no peripheral edema and peripheral pulses strong  ABDOMEN: soft, nontender, no hepatosplenomegaly, no masses and bowel sounds normal  MS: numbness of bilateral hands in median nerve distribution  Phalen/tinel equivocal due to constant paresthesias                "

## 2021-10-07 NOTE — PATIENT INSTRUCTIONS
Start metoprolol xl 25 mg daily    Recheck in 1 month on blood pressure    See ortho hand surgeon - someone should call you    CT chest - early November [phone]    Our Clinic hours are:  Mondays    7:20 am - 7 pm  Tues -  Fri  7:20 am - 5 pm    Clinic Phone: [phone]    The clinic lab opens at 7:30 am Mon - Fri and appointments are required.    [facility]  Ph. [phone]  Monday  8 am - 7pm  Tues - Fri 8 am - 5:30 pm

## 2021-10-07 NOTE — LETTER
October 7, 2021      Asmita Jerez  [address]  [place] [address]        Dear ,    We are writing to inform you of your test results.    Your test results fall within the expected range(s) or remain unchanged from previous results.  Please continue with current treatment plan.    Resulted Orders   Comprehensive metabolic panel   Result Value Ref Range    Sodium 139 133 - 144 mmol/L    Potassium 4.7 3.4 - 5.3 mmol/L    Chloride 107 94 - 109 mmol/L    Carbon Dioxide (CO2) 26 20 - 32 mmol/L    Anion Gap 6 3 - 14 mmol/L    Urea Nitrogen 17 7 - 30 mg/dL    Creatinine 0.74 0.52 - 1.04 mg/dL    Calcium 9.2 8.5 - 10.1 mg/dL    Glucose 179 (H) 70 - 99 mg/dL    Alkaline Phosphatase 74 40 - 150 U/L    AST 25 0 - 45 U/L    ALT 30 0 - 50 U/L    Protein Total 7.3 6.8 - 8.8 g/dL    Albumin 3.6 3.4 - 5.0 g/dL    Bilirubin Total 0.4 0.2 - 1.3 mg/dL    GFR Estimate 85 >60 mL/min/1.73m2      Comment:      As of July 11, 2021, eGFR is calculated by the CKD-EPI creatinine equation, without race adjustment. eGFR can be influenced by muscle mass, exercise, and diet. The reported eGFR is an estimation only and is only applicable if the renal function is stable.   Lipid panel reflex to direct LDL Fasting   Result Value Ref Range    Cholesterol 103 <200 mg/dL    Triglycerides 79 <150 mg/dL    Direct Measure HDL 55 >=50 mg/dL    LDL Cholesterol Calculated 32 <=100 mg/dL    Non HDL Cholesterol 48 <130 mg/dL    Patient Fasting > 8hrs? No     Narrative    Cholesterol  Desirable:  <200 mg/dL    Triglycerides  Normal:  Less than 150 mg/dL  Borderline High:  150-199 mg/dL  High:  200-499 mg/dL  Very High:  Greater than or equal to 500 mg/dL    Direct Measure HDL  Female:  Greater than or equal to 50 mg/dL   Male:  Greater than or equal to 40 mg/dL    LDL Cholesterol  Desirable:  <100mg/dL  Above Desirable:  100-129 mg/dL   Borderline High:  130-159 mg/dL   High:  160-189 mg/dL   Very High:  >= 190 mg/dL    Non HDL  Cholesterol  Desirable:  130 mg/dL  Above Desirable:  130-159 mg/dL  Borderline High:  160-189 mg/dL  High:  190-219 mg/dL  Very High:  Greater than or equal to 220 mg/dL   Hemoglobin A1c   Result Value Ref Range    Hemoglobin A1C 7.0 (H) 0.0 - 5.6 %      Comment:      Normal <5.7%   Prediabetes 5.7-6.4%    Diabetes 6.5% or higher     Note: Adopted from ADA consensus guidelines.   Albumin Random Urine Quantitative with Creat Ratio   Result Value Ref Range    Creatinine Urine mg/dL 54 mg/dL    Albumin Urine mg/L <5 mg/L    Albumin Urine mg/g Cr        Comment:      Unable to calculate:  Urine creatinine or albumin value below detectable level       If you have any questions or concerns, please call the clinic at the number listed above.       Sincerely,      Melissa Pitts MD/[facility]

## 2021-10-18 ENCOUNTER — TRANSFERRED RECORDS (OUTPATIENT)
Dept: HEALTH INFORMATION MANAGEMENT | Facility: CLINIC | Age: 66
End: 2021-10-18

## 2021-10-19 DIAGNOSIS — Z11.59 ENCOUNTER FOR SCREENING FOR OTHER VIRAL DISEASES: ICD-10-CM

## 2021-10-21 DIAGNOSIS — Z11.59 ENCOUNTER FOR SCREENING FOR OTHER VIRAL DISEASES: ICD-10-CM

## 2021-10-25 ENCOUNTER — OFFICE VISIT (OUTPATIENT)
Dept: FAMILY MEDICINE | Facility: CLINIC | Age: 66
End: 2021-10-25
Payer: COMMERCIAL

## 2021-10-25 VITALS
BODY MASS INDEX: 23.04 KG/M2 | OXYGEN SATURATION: 98 % | HEIGHT: 63 IN | DIASTOLIC BLOOD PRESSURE: 72 MMHG | WEIGHT: 130 LBS | RESPIRATION RATE: 14 BRPM | HEART RATE: 86 BPM | TEMPERATURE: 97.8 F | SYSTOLIC BLOOD PRESSURE: 134 MMHG

## 2021-10-25 DIAGNOSIS — G56.03 BILATERAL CARPAL TUNNEL SYNDROME: ICD-10-CM

## 2021-10-25 DIAGNOSIS — E11.9 TYPE 2 DIABETES MELLITUS WITHOUT COMPLICATION, WITHOUT LONG-TERM CURRENT USE OF INSULIN (H): ICD-10-CM

## 2021-10-25 DIAGNOSIS — Z01.818 PREOP GENERAL PHYSICAL EXAM: Primary | ICD-10-CM

## 2021-10-25 DIAGNOSIS — I10 HYPERTENSION, GOAL BELOW 140/90: ICD-10-CM

## 2021-10-25 PROCEDURE — 99213 OFFICE O/P EST LOW 20 MIN: CPT | Performed by: FAMILY MEDICINE

## 2021-10-25 ASSESSMENT — MIFFLIN-ST. JEOR: SCORE: 1098.81

## 2021-10-25 ASSESSMENT — PAIN SCALES - GENERAL: PAINLEVEL: NO PAIN (0)

## 2021-10-25 NOTE — PATIENT INSTRUCTIONS
Our Clinic hours are:      7:20 am - 7 pm    7:20 am - 5 pm    Clinic Phone: [phone]    The clinic lab opens at 7:30 am Mon - Fri and appointments are required.    [facility]. [phone]  Monday  8 am - 7pm   8 am - 5:30 pm         Preparing for Your Surgery  Getting started  A nurse will call you to review your health history and instructions. They will give you an arrival time based on your scheduled surgery time.  Please be ready to share the following:    Your doctor's clinic name and phone number    Your medical, surgical and anesthesia history    A list of allergies and sensitivities    A list of medicines, including herbal treatments and over-the-counter drugs    Whether the patient has a legal guardian (ask how to send us the papers in advance)  If you have a child who's having surgery, please ask for a copy of Preparing for Your Child's Surgery.    Preparing for surgery    Within 30 days of surgery: Have a pre-op exam (sometimes called an H&P, or History and Physical). This can be done at a clinic or pre-operative center.  ? If you're having a , you may not need this exam. Talk to your care team    At your pre-op exam, talk to your care team about all medicines you take. If you need to stop any medicines before surgery, ask when to start taking them again.  ? We do this for your safety. Many medicines can make you bleed too much during surgery. Some change how well surgery (anesthesia) drugs work.    Call your insurance company to let them know you're having surgery. (If you don't have insurance, call [phone].)    Call your clinic if there's any change in your health. This includes signs of a cold or flu (sore throat, runny nose, cough, rash, fever). It also includes a scrape or scratch near the surgery site.    If you have questions on the day of surgery, call your hospital or surgery center.  Eating and drinking guidelines  For  your safety: Unless your surgeon tells you otherwise, follow the guidelines below.    Eat and drink as usual until 8 hours before surgery. After that, no food or milk.    Drink clear liquids until 2 hours before surgery. These are liquids you can see through, like water, Gatorade and Propel Water. You may also have black coffee and tea (no cream or milk).    Nothing by mouth within 2 hours of surgery. This includes gum, candy and breath mints.    If you drink, stop drinking alcohol the night before surgery.    If your care team tells you to take medicine on the morning of surgery, it's okay to take it with a sip of water.  Preventing infection    Shower or bathe the night before and morning of your surgery. Follow the instructions your clinic gave you. (If no instructions, use regular soap.)    Don't shave or clip hair near your surgery site. We'll remove the hair if needed.    Don't smoke or vape the morning of surgery. You may chew nicotine gum up to 2 hours before surgery. A nicotine patch is okay.  ? Note: Some surgeries require you to completely quit smoking and nicotine. Check with your surgeon.    Your care team will make every effort to keep you safe from infection. We will:  ? Clean our hands often with soap and water (or an alcohol-based hand rub).  ? Clean the skin at your surgery site with a special soap that kills germs.  ? Give you a special gown to keep you warm. (Cold raises the risk of infection.)  ? Wear special hair covers, masks, gowns and gloves during surgery.  ? Give antibiotic medicine, if prescribed. Not all surgeries need antibiotics.  What to bring on the day of surgery    Photo ID and insurance card    Copy of your health care directive, if you have one    Glasses and hearing aides (bring cases)  ? You can't wear contacts during surgery    Inhaler and eye drops, if you use them (tell us about these when you arrive)    CPAP machine or breathing device, if you use them    A few personal  items, if spending the night    If you have . . .  ? A pacemaker or ICD (cardiac defibrillator): Bring the ID card.  ? An implanted stimulator: Bring the remote control.  ? A legal guardian: Bring a copy of the certified (court-stamped) guardianship papers.  Please remove any jewelry, including body piercings. Leave jewelry and other valuables at home.  If you're going home the day of surgery  Important: If you don't follow the rules below, we must cancel your surgery.     Arrange for someone to drive you home after surgery. You may not drive, take a taxi or take public transportation by yourself (unless you'll have local anesthesia only).    Arrange for a responsible adult to stay with you overnight. If you don't, we may keep you in the hospital overnight, and you may need to pay the costs yourself.  Questions?   If you have any questions for your care team, list them here: _________________________________________________________________________________________________________________________________________________________________________________________________________________________________________________________________________________________________________________________  For informational purposes only. Not to replace the advice of your health care provider. Copyright   2003, 2019 [employer] [facility]. All rights reserved. Clinically reviewed by Caren Gotti MD. [employer] [id number] - REV 4/20.

## 2021-10-25 NOTE — H&P (VIEW-ONLY)
[facility]  [address]  [facility] [address]  Phone: [phone]  Primary Provider: Melissa James  Pre-op Performing Provider: MELISSA JAMES      PREOPERATIVE EVALUATION:  Today's date: 10/25/2021    Asmita Jerez is a 65 year old female who presents for a preoperative evaluation.    Surgical Information:  Surgery/Procedure: Endoscopic carpal tunnel release, right followed by left a few weeks later  Surgery Location: [facility]  Surgeon: Mariah  Surgery Date: 11/1/2021  Time of Surgery: 1:00 PM  Where patient plans to recover: At home alone  Fax number for surgical facility: Note does not need to be faxed, will be available electronically in Epic.    Type of Anesthesia Anticipated: Combined MAC with Local    Assessment & Plan     The proposed surgical procedure is considered LOW risk.    Preop general physical exam       Carpal tunnel syndrome, bilateral       Type 2 diabetes mellitus without complication, without long-term current use of insulin (H)  Well controlled - HgbA1c 7% recently    Hypertension, goal below 140/90  Well controlled  Metoprolol added 10/7 and has tolerated this well.    Tobacco use  Recommended cessation       Risks and Recommendations:  The patient has the following additional risks and recommendations for perioperative complications:    Diabetes:  - Patient is not on insulin therapy: regular NPO guidelines can be followed.     Medication Instructions:  Patient is to take all scheduled medications on the day of surgery EXCEPT for modifications listed below:   - aspirin: Discontinue aspirin 7-10 days prior to procedure to reduce bleeding risk. It should be resumed postoperatively.    - metformin: HOLD day of surgery.   - GLP-1 Injectable (exenitide, liraglutide, semaglutide, dulaglutide, etc.): HOLD day of surgery     RECOMMENDATION:  APPROVAL GIVEN to proceed with proposed procedure, without further diagnostic  evaluation.      Subjective     HPI related to upcoming procedure: bilateral carpal tunnel syndrome.  Increased pain in hands and constant numbness/tingling in her hands in the median nerve distribution.     Preop Questions 10/25/2021   1. Have you ever had a heart attack or stroke? No   2. Have you ever had surgery on your heart or blood vessels, such as a stent placement, a coronary artery bypass, or surgery on an artery in your head, neck, heart, or legs? No   3. Do you have chest pain with activity? No   4. Do you have a history of  heart failure? No   5. Do you currently have a cold, bronchitis or symptoms of other infection? No   6. Do you have a cough, shortness of breath, or wheezing? No   7. Do you or anyone in your family have previous history of blood clots? No   8. Do you or does anyone in your family have a serious bleeding problem such as prolonged bleeding following surgeries or cuts? No   9. Have you ever had problems with anemia or been told to take iron pills? No   10. Have you had any abnormal blood loss such as black, tarry or bloody stools, or abnormal vaginal bleeding? No   11. Have you ever had a blood transfusion? No   12. Are you willing to have a blood transfusion if it is medically needed before, during, or after your surgery? Yes   13. Have you or any of your relatives ever had problems with anesthesia? No   14. Do you have sleep apnea, excessive snoring or daytime drowsiness? No   15. Do you have any artifical heart valves or other implanted medical devices like a pacemaker, defibrillator, or continuous glucose monitor? No   16. Do you have artificial joints? No   17. Are you allergic to latex? No       Health Care Directive:  Patient does not have a Health Care Directive or Living Will: Discussed advance care planning with patient; information given to patient to review.    Preoperative Review of :   reviewed - no record of controlled substances prescribed.      Status of Chronic  Conditions:  See problem list for active medical problems.  Problems all longstanding and stable, except as noted/documented.  See ROS for pertinent symptoms related to these conditions.      Review of Systems  CONSTITUTIONAL: NEGATIVE for fever, chills, change in weight  ENT/MOUTH: NEGATIVE for ear, mouth and throat problems  RESP: NEGATIVE for significant cough or SOB  CV: NEGATIVE for chest pain, palpitations or peripheral edema    Patient Active Problem List    Diagnosis Date Noted     Generalized anxiety disorder 03/29/2021     Priority: Medium     Tobacco use 09/07/2018     Priority: Medium     Cervical high risk HPV (human papillomavirus) test positive 03/31/2018     Priority: Medium     3/23/18 Pap: NIL, +HR HPV (Neg 16/18). Plan Pap+HPV in 1 year.  4/15/19 NIL, neg HPV. Plan: cotest 3 years       Essential hypertension with goal blood pressure less than 140/90 05/25/2016     Priority: Medium     Type 2 diabetes mellitus without complication (H) 10/28/2015     Priority: Medium     Hypertension, goal below 140/90 02/21/2013     Priority: Medium     Advanced directives, counseling/discussion 01/24/2012     Priority: Medium     Patient does not have an Advance/Health Care Directive (HCD), declines information/referral.    Nani Brown  January 24, 2012         CARDIOVASCULAR SCREENING; LDL GOAL LESS THAN 100 10/31/2010     Priority: Medium     Syringoma 07/15/2010     Priority: Medium     Digital mucinous cyst 07/12/2010     Priority: Medium     Eyelid lesion 07/12/2010     Priority: Medium      Past Medical History:   Diagnosis Date     Cervical high risk HPV (human papillomavirus) test positive 3/31/2018     Peptic ulcer, unspecified site, unspecified as acute or chronic, without mention of hemorrhage, perforation, or obstruction      Polycystic ovaries      Pure hypercholesterolemia      Tobacco use disorder      Unspecified multiple gestation, unspecified as to episode of care     Multiple gestation      Past Surgical History:   Procedure Laterality Date     APPENDECTOMY       COLONOSCOPY N/A 12/11/2015    Procedure: COLONOSCOPY;  Surgeon: Tino Pryor MD;  Location: [facility]     RELEASE TRIGGER FINGER  5/23/2014    Procedure: RELEASE TRIGGER FINGER;  Surgeon: Ag Hoffman MD;  Location: [facility]     SURGICAL HISTORY OF - 1970    appendectomy     SURGICAL HISTORY OF - 1970    pilonidal cyst and sinuses     Current Outpatient Medications   Medication Sig Dispense Refill     ACCU-CHEK SIDDHARTHA PLUS test strip Use to test blood sugar 2 times daily or as directed. 200 strip 3     ASPIRIN 81 MG OR TABS 1 TABLET DAILY       atorvastatin (LIPITOR) 40 MG tablet Take 1 tablet (40 mg) by mouth daily 90 tablet 3     blood glucose monitoring (ACCU-CHEK MULTICLIX) lancets Use to test blood sugar 1 time daily or as directed. Accu chek, Ins will cover, match machine. 100 each 2     CENTRUM SILVER OR TABS 1 TABLET DAILY       hydrOXYzine (ATARAX) 25 MG tablet Take 1 tablet (25 mg) by mouth 3 times daily as needed for anxiety 30 tablet 4     KRILL OIL PO        lisinopril (ZESTRIL) 40 MG tablet Take 1 tablet (40 mg) by mouth daily 90 tablet 3     metFORMIN (GLUCOPHAGE-XR) 500 MG 24 hr tablet Take 2 tablets (1,000 mg) by mouth 2 times daily (with meals) 360 tablet 1     metoprolol succinate ER (TOPROL-XL) 25 MG 24 hr tablet Take 1 tablet (25 mg) by mouth daily 90 tablet 0     TRULICITY 0.75 MG/0.5ML pen Inject 0.75 mg Subcutaneous every 7 days 4 mL 1       Allergies   Allergen Reactions     Amoxicillin Rash        Social History     Tobacco Use     Smoking status: Current Every Day Smoker     Packs/day: 1.00     Years: 40.00     Pack years: 40.00     Types: Cigarettes     Smokeless tobacco: Never Used     Tobacco comment: pt not interested in quit plan 4/22/09   Substance Use Topics     Alcohol use: No     Family History   Problem Relation Age of Onset     Unknown/Adopted Father      Dementia Father      History  "  Drug Use No         Objective     /72   Pulse 86   Temp 97.8  F (36.6  C) (Tympanic)   Resp 14   Ht 1.6 m (5' 3\")   Wt 59 kg (130 lb)   LMP 12/01/2009   SpO2 98%   Breastfeeding No   BMI 23.03 kg/m      Physical Exam  GENERAL APPEARANCE: healthy, alert and no distress  HENT: ear canals and TM's normal and nose and mouth without ulcers or lesions  RESP: lungs clear to auscultation - no rales, rhonchi or wheezes  CV: regular rate and rhythm, normal S1 S2, no S3 or S4 and no murmur, click or rub   ABDOMEN: soft, nontender, no HSM or masses and bowel sounds normal  NEURO: Normal strength and tone, sensory exam grossly normal, mentation intact and speech normal    Recent Labs   Lab Test 10/07/21  1106 03/01/21  0854    141   POTASSIUM 4.7 4.2   CR 0.74 0.79   A1C 7.0* 6.9*        Diagnostics:  No labs were ordered during this visit.   No EKG required for low risk surgery (cataract, skin procedure, breast biopsy, etc).    Revised Cardiac Risk Index (RCRI):  The patient has the following serious cardiovascular risks for perioperative complications:   - No serious cardiac risks = 0 points     RCRI Interpretation: 0 points: Class I (very low risk - 0.4% complication rate)           Signed Electronically by: Melissa Pitts MD  Copy of this evaluation report is provided to requesting physician.      "

## 2021-10-25 NOTE — PROGRESS NOTES
[facility]  [address]  [facility] [address]  Phone: [phone]  Primary Provider: Melissa James  Pre-op Performing Provider: MELISSA JAMES      PREOPERATIVE EVALUATION:  Today's date: 10/25/2021    Asmita Jerez is a 65 year old female who presents for a preoperative evaluation.    Surgical Information:  Surgery/Procedure: Endoscopic carpal tunnel release, right followed by left a few weeks later  Surgery Location: [facility]  Surgeon: Mariah  Surgery Date: 11/1/2021  Time of Surgery: 1:00 PM  Where patient plans to recover: At home alone  Fax number for surgical facility: Note does not need to be faxed, will be available electronically in Epic.    Type of Anesthesia Anticipated: Combined MAC with Local    Assessment & Plan     The proposed surgical procedure is considered LOW risk.    Preop general physical exam       Carpal tunnel syndrome, bilateral       Type 2 diabetes mellitus without complication, without long-term current use of insulin (H)  Well controlled - HgbA1c 7% recently    Hypertension, goal below 140/90  Well controlled  Metoprolol added 10/7 and has tolerated this well.    Tobacco use  Recommended cessation       Risks and Recommendations:  The patient has the following additional risks and recommendations for perioperative complications:    Diabetes:  - Patient is not on insulin therapy: regular NPO guidelines can be followed.     Medication Instructions:  Patient is to take all scheduled medications on the day of surgery EXCEPT for modifications listed below:   - aspirin: Discontinue aspirin 7-10 days prior to procedure to reduce bleeding risk. It should be resumed postoperatively.    - metformin: HOLD day of surgery.   - GLP-1 Injectable (exenitide, liraglutide, semaglutide, dulaglutide, etc.): HOLD day of surgery     RECOMMENDATION:  APPROVAL GIVEN to proceed with proposed procedure, without further diagnostic  evaluation.      Subjective     HPI related to upcoming procedure: bilateral carpal tunnel syndrome.  Increased pain in hands and constant numbness/tingling in her hands in the median nerve distribution.     Preop Questions 10/25/2021   1. Have you ever had a heart attack or stroke? No   2. Have you ever had surgery on your heart or blood vessels, such as a stent placement, a coronary artery bypass, or surgery on an artery in your head, neck, heart, or legs? No   3. Do you have chest pain with activity? No   4. Do you have a history of  heart failure? No   5. Do you currently have a cold, bronchitis or symptoms of other infection? No   6. Do you have a cough, shortness of breath, or wheezing? No   7. Do you or anyone in your family have previous history of blood clots? No   8. Do you or does anyone in your family have a serious bleeding problem such as prolonged bleeding following surgeries or cuts? No   9. Have you ever had problems with anemia or been told to take iron pills? No   10. Have you had any abnormal blood loss such as black, tarry or bloody stools, or abnormal vaginal bleeding? No   11. Have you ever had a blood transfusion? No   12. Are you willing to have a blood transfusion if it is medically needed before, during, or after your surgery? Yes   13. Have you or any of your relatives ever had problems with anesthesia? No   14. Do you have sleep apnea, excessive snoring or daytime drowsiness? No   15. Do you have any artifical heart valves or other implanted medical devices like a pacemaker, defibrillator, or continuous glucose monitor? No   16. Do you have artificial joints? No   17. Are you allergic to latex? No       Health Care Directive:  Patient does not have a Health Care Directive or Living Will: Discussed advance care planning with patient; information given to patient to review.    Preoperative Review of :   reviewed - no record of controlled substances prescribed.      Status of Chronic  Conditions:  See problem list for active medical problems.  Problems all longstanding and stable, except as noted/documented.  See ROS for pertinent symptoms related to these conditions.      Review of Systems  CONSTITUTIONAL: NEGATIVE for fever, chills, change in weight  ENT/MOUTH: NEGATIVE for ear, mouth and throat problems  RESP: NEGATIVE for significant cough or SOB  CV: NEGATIVE for chest pain, palpitations or peripheral edema    Patient Active Problem List    Diagnosis Date Noted     Generalized anxiety disorder 03/29/2021     Priority: Medium     Tobacco use 09/07/2018     Priority: Medium     Cervical high risk HPV (human papillomavirus) test positive 03/31/2018     Priority: Medium     3/23/18 Pap: NIL, +HR HPV (Neg 16/18). Plan Pap+HPV in 1 year.  4/15/19 NIL, neg HPV. Plan: cotest 3 years       Essential hypertension with goal blood pressure less than 140/90 05/25/2016     Priority: Medium     Type 2 diabetes mellitus without complication (H) 10/28/2015     Priority: Medium     Hypertension, goal below 140/90 02/21/2013     Priority: Medium     Advanced directives, counseling/discussion 01/24/2012     Priority: Medium     Patient does not have an Advance/Health Care Directive (HCD), declines information/referral.    Nani Brown  January 24, 2012         CARDIOVASCULAR SCREENING; LDL GOAL LESS THAN 100 10/31/2010     Priority: Medium     Syringoma 07/15/2010     Priority: Medium     Digital mucinous cyst 07/12/2010     Priority: Medium     Eyelid lesion 07/12/2010     Priority: Medium      Past Medical History:   Diagnosis Date     Cervical high risk HPV (human papillomavirus) test positive 3/31/2018     Peptic ulcer, unspecified site, unspecified as acute or chronic, without mention of hemorrhage, perforation, or obstruction      Polycystic ovaries      Pure hypercholesterolemia      Tobacco use disorder      Unspecified multiple gestation, unspecified as to episode of care     Multiple gestation      Past Surgical History:   Procedure Laterality Date     APPENDECTOMY       COLONOSCOPY N/A 12/11/2015    Procedure: COLONOSCOPY;  Surgeon: Tino Pryor MD;  Location: [facility]     RELEASE TRIGGER FINGER  5/23/2014    Procedure: RELEASE TRIGGER FINGER;  Surgeon: Ag Hoffman MD;  Location: [facility]     SURGICAL HISTORY OF - 1970    appendectomy     SURGICAL HISTORY OF - 1970    pilonidal cyst and sinuses     Current Outpatient Medications   Medication Sig Dispense Refill     ACCU-CHEK SIDDHARTHA PLUS test strip Use to test blood sugar 2 times daily or as directed. 200 strip 3     ASPIRIN 81 MG OR TABS 1 TABLET DAILY       atorvastatin (LIPITOR) 40 MG tablet Take 1 tablet (40 mg) by mouth daily 90 tablet 3     blood glucose monitoring (ACCU-CHEK MULTICLIX) lancets Use to test blood sugar 1 time daily or as directed. Accu chek, Ins will cover, match machine. 100 each 2     CENTRUM SILVER OR TABS 1 TABLET DAILY       hydrOXYzine (ATARAX) 25 MG tablet Take 1 tablet (25 mg) by mouth 3 times daily as needed for anxiety 30 tablet 4     KRILL OIL PO        lisinopril (ZESTRIL) 40 MG tablet Take 1 tablet (40 mg) by mouth daily 90 tablet 3     metFORMIN (GLUCOPHAGE-XR) 500 MG 24 hr tablet Take 2 tablets (1,000 mg) by mouth 2 times daily (with meals) 360 tablet 1     metoprolol succinate ER (TOPROL-XL) 25 MG 24 hr tablet Take 1 tablet (25 mg) by mouth daily 90 tablet 0     TRULICITY 0.75 MG/0.5ML pen Inject 0.75 mg Subcutaneous every 7 days 4 mL 1       Allergies   Allergen Reactions     Amoxicillin Rash        Social History     Tobacco Use     Smoking status: Current Every Day Smoker     Packs/day: 1.00     Years: 40.00     Pack years: 40.00     Types: Cigarettes     Smokeless tobacco: Never Used     Tobacco comment: pt not interested in quit plan 4/22/09   Substance Use Topics     Alcohol use: No     Family History   Problem Relation Age of Onset     Unknown/Adopted Father      Dementia Father      History  "  Drug Use No         Objective     /72   Pulse 86   Temp 97.8  F (36.6  C) (Tympanic)   Resp 14   Ht 1.6 m (5' 3\")   Wt 59 kg (130 lb)   LMP 12/01/2009   SpO2 98%   Breastfeeding No   BMI 23.03 kg/m      Physical Exam  GENERAL APPEARANCE: healthy, alert and no distress  HENT: ear canals and TM's normal and nose and mouth without ulcers or lesions  RESP: lungs clear to auscultation - no rales, rhonchi or wheezes  CV: regular rate and rhythm, normal S1 S2, no S3 or S4 and no murmur, click or rub   ABDOMEN: soft, nontender, no HSM or masses and bowel sounds normal  NEURO: Normal strength and tone, sensory exam grossly normal, mentation intact and speech normal    Recent Labs   Lab Test 10/07/21  1106 03/01/21  0854    141   POTASSIUM 4.7 4.2   CR 0.74 0.79   A1C 7.0* 6.9*        Diagnostics:  No labs were ordered during this visit.   No EKG required for low risk surgery (cataract, skin procedure, breast biopsy, etc).    Revised Cardiac Risk Index (RCRI):  The patient has the following serious cardiovascular risks for perioperative complications:   - No serious cardiac risks = 0 points     RCRI Interpretation: 0 points: Class I (very low risk - 0.4% complication rate)           Signed Electronically by: Melissa Pitts MD  Copy of this evaluation report is provided to requesting physician.      "

## 2021-10-25 NOTE — H&P (VIEW-ONLY)
[facility]  [address]  [facility] [address]  Phone: [phone]  Primary Provider: Melissa James  Pre-op Performing Provider: MELISSA JAMSE      PREOPERATIVE EVALUATION:  Today's date: 10/25/2021    Asmita Jerez is a 65 year old female who presents for a preoperative evaluation.    Surgical Information:  Surgery/Procedure: Endoscopic carpal tunnel release, right followed by left a few weeks later  Surgery Location: [facility]  Surgeon: Mariah  Surgery Date: 11/1/2021  Time of Surgery: 1:00 PM  Where patient plans to recover: At home alone  Fax number for surgical facility: Note does not need to be faxed, will be available electronically in Epic.    Type of Anesthesia Anticipated: Combined MAC with Local    Assessment & Plan     The proposed surgical procedure is considered LOW risk.    Preop general physical exam       Carpal tunnel syndrome, bilateral       Type 2 diabetes mellitus without complication, without long-term current use of insulin (H)  Well controlled - HgbA1c 7% recently    Hypertension, goal below 140/90  Well controlled  Metoprolol added 10/7 and has tolerated this well.    Tobacco use  Recommended cessation       Risks and Recommendations:  The patient has the following additional risks and recommendations for perioperative complications:    Diabetes:  - Patient is not on insulin therapy: regular NPO guidelines can be followed.     Medication Instructions:  Patient is to take all scheduled medications on the day of surgery EXCEPT for modifications listed below:   - aspirin: Discontinue aspirin 7-10 days prior to procedure to reduce bleeding risk. It should be resumed postoperatively.    - metformin: HOLD day of surgery.   - GLP-1 Injectable (exenitide, liraglutide, semaglutide, dulaglutide, etc.): HOLD day of surgery     RECOMMENDATION:  APPROVAL GIVEN to proceed with proposed procedure, without further diagnostic  evaluation.      Subjective     HPI related to upcoming procedure: bilateral carpal tunnel syndrome.  Increased pain in hands and constant numbness/tingling in her hands in the median nerve distribution.     Preop Questions 10/25/2021   1. Have you ever had a heart attack or stroke? No   2. Have you ever had surgery on your heart or blood vessels, such as a stent placement, a coronary artery bypass, or surgery on an artery in your head, neck, heart, or legs? No   3. Do you have chest pain with activity? No   4. Do you have a history of  heart failure? No   5. Do you currently have a cold, bronchitis or symptoms of other infection? No   6. Do you have a cough, shortness of breath, or wheezing? No   7. Do you or anyone in your family have previous history of blood clots? No   8. Do you or does anyone in your family have a serious bleeding problem such as prolonged bleeding following surgeries or cuts? No   9. Have you ever had problems with anemia or been told to take iron pills? No   10. Have you had any abnormal blood loss such as black, tarry or bloody stools, or abnormal vaginal bleeding? No   11. Have you ever had a blood transfusion? No   12. Are you willing to have a blood transfusion if it is medically needed before, during, or after your surgery? Yes   13. Have you or any of your relatives ever had problems with anesthesia? No   14. Do you have sleep apnea, excessive snoring or daytime drowsiness? No   15. Do you have any artifical heart valves or other implanted medical devices like a pacemaker, defibrillator, or continuous glucose monitor? No   16. Do you have artificial joints? No   17. Are you allergic to latex? No       Health Care Directive:  Patient does not have a Health Care Directive or Living Will: Discussed advance care planning with patient; information given to patient to review.    Preoperative Review of :   reviewed - no record of controlled substances prescribed.      Status of Chronic  Conditions:  See problem list for active medical problems.  Problems all longstanding and stable, except as noted/documented.  See ROS for pertinent symptoms related to these conditions.      Review of Systems  CONSTITUTIONAL: NEGATIVE for fever, chills, change in weight  ENT/MOUTH: NEGATIVE for ear, mouth and throat problems  RESP: NEGATIVE for significant cough or SOB  CV: NEGATIVE for chest pain, palpitations or peripheral edema    Patient Active Problem List    Diagnosis Date Noted     Generalized anxiety disorder 03/29/2021     Priority: Medium     Tobacco use 09/07/2018     Priority: Medium     Cervical high risk HPV (human papillomavirus) test positive 03/31/2018     Priority: Medium     3/23/18 Pap: NIL, +HR HPV (Neg 16/18). Plan Pap+HPV in 1 year.  4/15/19 NIL, neg HPV. Plan: cotest 3 years       Essential hypertension with goal blood pressure less than 140/90 05/25/2016     Priority: Medium     Type 2 diabetes mellitus without complication (H) 10/28/2015     Priority: Medium     Hypertension, goal below 140/90 02/21/2013     Priority: Medium     Advanced directives, counseling/discussion 01/24/2012     Priority: Medium     Patient does not have an Advance/Health Care Directive (HCD), declines information/referral.    Nani Brown  January 24, 2012         CARDIOVASCULAR SCREENING; LDL GOAL LESS THAN 100 10/31/2010     Priority: Medium     Syringoma 07/15/2010     Priority: Medium     Digital mucinous cyst 07/12/2010     Priority: Medium     Eyelid lesion 07/12/2010     Priority: Medium      Past Medical History:   Diagnosis Date     Cervical high risk HPV (human papillomavirus) test positive 3/31/2018     Peptic ulcer, unspecified site, unspecified as acute or chronic, without mention of hemorrhage, perforation, or obstruction      Polycystic ovaries      Pure hypercholesterolemia      Tobacco use disorder      Unspecified multiple gestation, unspecified as to episode of care     Multiple gestation      Past Surgical History:   Procedure Laterality Date     APPENDECTOMY       COLONOSCOPY N/A 12/11/2015    Procedure: COLONOSCOPY;  Surgeon: Tino Pryor MD;  Location: [facility]     RELEASE TRIGGER FINGER  5/23/2014    Procedure: RELEASE TRIGGER FINGER;  Surgeon: Ag Hoffman MD;  Location: [facility]     SURGICAL HISTORY OF - 1970    appendectomy     SURGICAL HISTORY OF - 1970    pilonidal cyst and sinuses     Current Outpatient Medications   Medication Sig Dispense Refill     ACCU-CHEK SIDDHARTHA PLUS test strip Use to test blood sugar 2 times daily or as directed. 200 strip 3     ASPIRIN 81 MG OR TABS 1 TABLET DAILY       atorvastatin (LIPITOR) 40 MG tablet Take 1 tablet (40 mg) by mouth daily 90 tablet 3     blood glucose monitoring (ACCU-CHEK MULTICLIX) lancets Use to test blood sugar 1 time daily or as directed. Accu chek, Ins will cover, match machine. 100 each 2     CENTRUM SILVER OR TABS 1 TABLET DAILY       hydrOXYzine (ATARAX) 25 MG tablet Take 1 tablet (25 mg) by mouth 3 times daily as needed for anxiety 30 tablet 4     KRILL OIL PO        lisinopril (ZESTRIL) 40 MG tablet Take 1 tablet (40 mg) by mouth daily 90 tablet 3     metFORMIN (GLUCOPHAGE-XR) 500 MG 24 hr tablet Take 2 tablets (1,000 mg) by mouth 2 times daily (with meals) 360 tablet 1     metoprolol succinate ER (TOPROL-XL) 25 MG 24 hr tablet Take 1 tablet (25 mg) by mouth daily 90 tablet 0     TRULICITY 0.75 MG/0.5ML pen Inject 0.75 mg Subcutaneous every 7 days 4 mL 1       Allergies   Allergen Reactions     Amoxicillin Rash        Social History     Tobacco Use     Smoking status: Current Every Day Smoker     Packs/day: 1.00     Years: 40.00     Pack years: 40.00     Types: Cigarettes     Smokeless tobacco: Never Used     Tobacco comment: pt not interested in quit plan 4/22/09   Substance Use Topics     Alcohol use: No     Family History   Problem Relation Age of Onset     Unknown/Adopted Father      Dementia Father      History  "  Drug Use No         Objective     /72   Pulse 86   Temp 97.8  F (36.6  C) (Tympanic)   Resp 14   Ht 1.6 m (5' 3\")   Wt 59 kg (130 lb)   LMP 12/01/2009   SpO2 98%   Breastfeeding No   BMI 23.03 kg/m      Physical Exam  GENERAL APPEARANCE: healthy, alert and no distress  HENT: ear canals and TM's normal and nose and mouth without ulcers or lesions  RESP: lungs clear to auscultation - no rales, rhonchi or wheezes  CV: regular rate and rhythm, normal S1 S2, no S3 or S4 and no murmur, click or rub   ABDOMEN: soft, nontender, no HSM or masses and bowel sounds normal  NEURO: Normal strength and tone, sensory exam grossly normal, mentation intact and speech normal    Recent Labs   Lab Test 10/07/21  1106 03/01/21  0854    141   POTASSIUM 4.7 4.2   CR 0.74 0.79   A1C 7.0* 6.9*        Diagnostics:  No labs were ordered during this visit.   No EKG required for low risk surgery (cataract, skin procedure, breast biopsy, etc).    Revised Cardiac Risk Index (RCRI):  The patient has the following serious cardiovascular risks for perioperative complications:   - No serious cardiac risks = 0 points     RCRI Interpretation: 0 points: Class I (very low risk - 0.4% complication rate)           Signed Electronically by: Melissa Pitts MD  Copy of this evaluation report is provided to requesting physician.      "

## 2021-10-28 ENCOUNTER — LAB (OUTPATIENT)
Dept: LAB | Facility: CLINIC | Age: 66
End: 2021-10-28
Payer: COMMERCIAL

## 2021-10-28 DIAGNOSIS — Z11.59 ENCOUNTER FOR SCREENING FOR OTHER VIRAL DISEASES: ICD-10-CM

## 2021-10-28 LAB — SARS-COV-2 RNA RESP QL NAA+PROBE: NEGATIVE

## 2021-10-28 PROCEDURE — U0005 INFEC AGEN DETEC AMPLI PROBE: HCPCS

## 2021-10-28 PROCEDURE — U0003 INFECTIOUS AGENT DETECTION BY NUCLEIC ACID (DNA OR RNA); SEVERE ACUTE RESPIRATORY SYNDROME CORONAVIRUS 2 (SARS-COV-2) (CORONAVIRUS DISEASE [COVID-19]), AMPLIFIED PROBE TECHNIQUE, MAKING USE OF HIGH THROUGHPUT TECHNOLOGIES AS DESCRIBED BY CMS-2020-01-R: HCPCS

## 2021-10-29 ENCOUNTER — ANESTHESIA EVENT (OUTPATIENT)
Dept: SURGERY | Facility: CLINIC | Age: 66
End: 2021-10-29
Payer: COMMERCIAL

## 2021-11-01 ENCOUNTER — ANESTHESIA (OUTPATIENT)
Dept: SURGERY | Facility: CLINIC | Age: 66
End: 2021-11-01
Payer: COMMERCIAL

## 2021-11-01 ENCOUNTER — HOSPITAL ENCOUNTER (OUTPATIENT)
Facility: CLINIC | Age: 66
Discharge: HOME | End: 2021-11-01
Attending: ORTHOPAEDIC SURGERY | Admitting: ORTHOPAEDIC SURGERY
Payer: COMMERCIAL

## 2021-11-01 VITALS
BODY MASS INDEX: 23.04 KG/M2 | WEIGHT: 130 LBS | OXYGEN SATURATION: 95 % | HEIGHT: 63 IN | DIASTOLIC BLOOD PRESSURE: 55 MMHG | SYSTOLIC BLOOD PRESSURE: 113 MMHG | TEMPERATURE: 98.4 F | HEART RATE: 80 BPM | RESPIRATION RATE: 16 BRPM

## 2021-11-01 DIAGNOSIS — G56.03 CARPAL TUNNEL SYNDROME, BILATERAL: Primary | ICD-10-CM

## 2021-11-01 LAB — GLUCOSE BLDC GLUCOMTR-MCNC: 98 MG/DL (ref 70–99)

## 2021-11-01 PROCEDURE — 272N000001 HC OR GENERAL SUPPLY STERILE: Performed by: ORTHOPAEDIC SURGERY

## 2021-11-01 PROCEDURE — 710N000012 HC RECOVERY PHASE 2, PER MINUTE: Performed by: ORTHOPAEDIC SURGERY

## 2021-11-01 PROCEDURE — 250N000009 HC RX 250: Performed by: ORTHOPAEDIC SURGERY

## 2021-11-01 PROCEDURE — 250N000013 HC RX MED GY IP 250 OP 250 PS 637: Performed by: NURSE ANESTHETIST, CERTIFIED REGISTERED

## 2021-11-01 PROCEDURE — 250N000011 HC RX IP 250 OP 636: Performed by: NURSE ANESTHETIST, CERTIFIED REGISTERED

## 2021-11-01 PROCEDURE — 999N000141 HC STATISTIC PRE-PROCEDURE NURSING ASSESSMENT: Performed by: ORTHOPAEDIC SURGERY

## 2021-11-01 PROCEDURE — 360N000076 HC SURGERY LEVEL 3, PER MIN: Performed by: ORTHOPAEDIC SURGERY

## 2021-11-01 PROCEDURE — 82962 GLUCOSE BLOOD TEST: CPT

## 2021-11-01 PROCEDURE — 250N000009 HC RX 250: Performed by: NURSE ANESTHETIST, CERTIFIED REGISTERED

## 2021-11-01 PROCEDURE — 250N000011 HC RX IP 250 OP 636: Performed by: ORTHOPAEDIC SURGERY

## 2021-11-01 PROCEDURE — 370N000017 HC ANESTHESIA TECHNICAL FEE, PER MIN: Performed by: ORTHOPAEDIC SURGERY

## 2021-11-01 PROCEDURE — 258N000003 HC RX IP 258 OP 636: Performed by: NURSE ANESTHETIST, CERTIFIED REGISTERED

## 2021-11-01 RX ORDER — HYDROXYZINE HYDROCHLORIDE 10 MG/1
10 TABLET, FILM COATED ORAL
Status: DISCONTINUED | OUTPATIENT
Start: 2021-11-01 | End: 2021-11-01 | Stop reason: HOSPADM

## 2021-11-01 RX ORDER — MEPERIDINE HYDROCHLORIDE 25 MG/ML
12.5 INJECTION INTRAMUSCULAR; INTRAVENOUS; SUBCUTANEOUS
Status: DISCONTINUED | OUTPATIENT
Start: 2021-11-01 | End: 2021-11-01 | Stop reason: HOSPADM

## 2021-11-01 RX ORDER — ONDANSETRON 2 MG/ML
INJECTION INTRAMUSCULAR; INTRAVENOUS PRN
Status: DISCONTINUED | OUTPATIENT
Start: 2021-11-01 | End: 2021-11-01

## 2021-11-01 RX ORDER — CEFAZOLIN SODIUM 2 G/100ML
2 INJECTION, SOLUTION INTRAVENOUS SEE ADMIN INSTRUCTIONS
Status: DISCONTINUED | OUTPATIENT
Start: 2021-11-01 | End: 2021-11-01 | Stop reason: HOSPADM

## 2021-11-01 RX ORDER — ALBUTEROL SULFATE 0.83 MG/ML
2.5 SOLUTION RESPIRATORY (INHALATION) EVERY 4 HOURS PRN
Status: DISCONTINUED | OUTPATIENT
Start: 2021-11-01 | End: 2021-11-01 | Stop reason: HOSPADM

## 2021-11-01 RX ORDER — ACETAMINOPHEN 325 MG/1
975 TABLET ORAL ONCE
Status: COMPLETED | OUTPATIENT
Start: 2021-11-01 | End: 2021-11-01

## 2021-11-01 RX ORDER — HYDROCODONE BITARTRATE AND ACETAMINOPHEN 5; 325 MG/1; MG/1
1 TABLET ORAL
Status: DISCONTINUED | OUTPATIENT
Start: 2021-11-01 | End: 2021-11-01 | Stop reason: HOSPADM

## 2021-11-01 RX ORDER — FENTANYL CITRATE 50 UG/ML
INJECTION, SOLUTION INTRAMUSCULAR; INTRAVENOUS PRN
Status: DISCONTINUED | OUTPATIENT
Start: 2021-11-01 | End: 2021-11-01

## 2021-11-01 RX ORDER — SODIUM CHLORIDE, SODIUM LACTATE, POTASSIUM CHLORIDE, CALCIUM CHLORIDE 600; 310; 30; 20 MG/100ML; MG/100ML; MG/100ML; MG/100ML
INJECTION, SOLUTION INTRAVENOUS CONTINUOUS
Status: DISCONTINUED | OUTPATIENT
Start: 2021-11-01 | End: 2021-11-01 | Stop reason: HOSPADM

## 2021-11-01 RX ORDER — LIDOCAINE HYDROCHLORIDE 10 MG/ML
INJECTION, SOLUTION INFILTRATION; PERINEURAL PRN
Status: DISCONTINUED | OUTPATIENT
Start: 2021-11-01 | End: 2021-11-01 | Stop reason: HOSPADM

## 2021-11-01 RX ORDER — HYDROXYZINE HYDROCHLORIDE 25 MG/1
25 TABLET, FILM COATED ORAL EVERY 6 HOURS PRN
Status: DISCONTINUED | OUTPATIENT
Start: 2021-11-01 | End: 2021-11-01 | Stop reason: HOSPADM

## 2021-11-01 RX ORDER — LIDOCAINE 40 MG/G
CREAM TOPICAL
Status: DISCONTINUED | OUTPATIENT
Start: 2021-11-01 | End: 2021-11-01 | Stop reason: HOSPADM

## 2021-11-01 RX ORDER — HYDROXYZINE HYDROCHLORIDE 25 MG/1
25 TABLET, FILM COATED ORAL EVERY 6 HOURS PRN
COMMUNITY
End: 2021-11-10

## 2021-11-01 RX ORDER — HYDROMORPHONE HCL IN WATER/PF 6 MG/30 ML
0.2 PATIENT CONTROLLED ANALGESIA SYRINGE INTRAVENOUS EVERY 5 MIN PRN
Status: DISCONTINUED | OUTPATIENT
Start: 2021-11-01 | End: 2021-11-01 | Stop reason: HOSPADM

## 2021-11-01 RX ORDER — ONDANSETRON 4 MG/1
4 TABLET, ORALLY DISINTEGRATING ORAL
Status: DISCONTINUED | OUTPATIENT
Start: 2021-11-01 | End: 2021-11-01 | Stop reason: HOSPADM

## 2021-11-01 RX ORDER — HYDROXYZINE HYDROCHLORIDE 50 MG/1
50 TABLET, FILM COATED ORAL EVERY 6 HOURS PRN
Status: DISCONTINUED | OUTPATIENT
Start: 2021-11-01 | End: 2021-11-01 | Stop reason: HOSPADM

## 2021-11-01 RX ORDER — OXYCODONE HYDROCHLORIDE 5 MG/1
10 TABLET ORAL EVERY 4 HOURS PRN
Status: DISCONTINUED | OUTPATIENT
Start: 2021-11-01 | End: 2021-11-01 | Stop reason: ALTCHOICE

## 2021-11-01 RX ORDER — ONDANSETRON 4 MG/1
4 TABLET, ORALLY DISINTEGRATING ORAL EVERY 30 MIN PRN
Status: DISCONTINUED | OUTPATIENT
Start: 2021-11-01 | End: 2021-11-01 | Stop reason: HOSPADM

## 2021-11-01 RX ORDER — BUPIVACAINE HYDROCHLORIDE 5 MG/ML
INJECTION, SOLUTION PERINEURAL PRN
Status: DISCONTINUED | OUTPATIENT
Start: 2021-11-01 | End: 2021-11-01 | Stop reason: HOSPADM

## 2021-11-01 RX ORDER — HYDROCODONE BITARTRATE AND ACETAMINOPHEN 5; 325 MG/1; MG/1
1-2 TABLET ORAL EVERY 4 HOURS PRN
Qty: 6 TABLET | Refills: 0 | Status: SHIPPED | OUTPATIENT
Start: 2021-11-01 | End: 2021-11-10

## 2021-11-01 RX ORDER — DIMENHYDRINATE 50 MG/ML
25 INJECTION, SOLUTION INTRAMUSCULAR; INTRAVENOUS
Status: DISCONTINUED | OUTPATIENT
Start: 2021-11-01 | End: 2021-11-01 | Stop reason: HOSPADM

## 2021-11-01 RX ORDER — ONDANSETRON 2 MG/ML
4 INJECTION INTRAMUSCULAR; INTRAVENOUS EVERY 30 MIN PRN
Status: DISCONTINUED | OUTPATIENT
Start: 2021-11-01 | End: 2021-11-01 | Stop reason: HOSPADM

## 2021-11-01 RX ORDER — PROPOFOL 10 MG/ML
INJECTION, EMULSION INTRAVENOUS PRN
Status: DISCONTINUED | OUTPATIENT
Start: 2021-11-01 | End: 2021-11-01

## 2021-11-01 RX ORDER — GLYCOPYRROLATE 0.2 MG/ML
INJECTION, SOLUTION INTRAMUSCULAR; INTRAVENOUS PRN
Status: DISCONTINUED | OUTPATIENT
Start: 2021-11-01 | End: 2021-11-01

## 2021-11-01 RX ADMIN — ACETAMINOPHEN 975 MG: 325 TABLET, FILM COATED ORAL at 13:08

## 2021-11-01 RX ADMIN — GLYCOPYRROLATE 0.1 MG: 0.2 INJECTION, SOLUTION INTRAMUSCULAR; INTRAVENOUS at 14:12

## 2021-11-01 RX ADMIN — LIDOCAINE HYDROCHLORIDE 1 ML: 10 INJECTION, SOLUTION EPIDURAL; INFILTRATION; INTRACAUDAL; PERINEURAL at 13:11

## 2021-11-01 RX ADMIN — SODIUM CHLORIDE, POTASSIUM CHLORIDE, SODIUM LACTATE AND CALCIUM CHLORIDE: 600; 310; 30; 20 INJECTION, SOLUTION INTRAVENOUS at 13:11

## 2021-11-01 RX ADMIN — PROPOFOL 30 MG: 10 INJECTION, EMULSION INTRAVENOUS at 14:32

## 2021-11-01 RX ADMIN — ONDANSETRON 4 MG: 2 INJECTION INTRAMUSCULAR; INTRAVENOUS at 14:28

## 2021-11-01 RX ADMIN — PROPOFOL 40 MG: 10 INJECTION, EMULSION INTRAVENOUS at 14:12

## 2021-11-01 RX ADMIN — FENTANYL CITRATE 100 MCG: 50 INJECTION, SOLUTION INTRAMUSCULAR; INTRAVENOUS at 14:12

## 2021-11-01 RX ADMIN — MIDAZOLAM 2 MG: 1 INJECTION INTRAMUSCULAR; INTRAVENOUS at 14:14

## 2021-11-01 RX ADMIN — CEFAZOLIN SODIUM 2 G: 2 INJECTION, SOLUTION INTRAVENOUS at 14:15

## 2021-11-01 RX ADMIN — MIDAZOLAM 2 MG: 1 INJECTION INTRAMUSCULAR; INTRAVENOUS at 14:11

## 2021-11-01 ASSESSMENT — MIFFLIN-ST. JEOR: SCORE: 1098.81

## 2021-11-01 ASSESSMENT — LIFESTYLE VARIABLES: TOBACCO_USE: 1

## 2021-11-01 NOTE — ANESTHESIA POSTPROCEDURE EVALUATION
Patient: Asmita Jerez    Procedure: Procedure(s):  Endoscopic carpal tunnel release       Diagnosis:Carpal tunnel syndrome of right wrist [G56.01]  Diagnosis Additional Information: No value filed.    Anesthesia Type:  MAC    Note:  Disposition: Outpatient   Postop Pain Control: Uneventful            Sign Out: Well controlled pain   PONV: No   Neuro/Psych: Uneventful            Sign Out: Acceptable/Baseline neuro status   Airway/Respiratory: Uneventful            Sign Out: Acceptable/Baseline resp. status   CV/Hemodynamics: Uneventful            Sign Out: Acceptable CV status; No obvious hypovolemia; No obvious fluid overload   Other NRE: NONE   DID A NON-ROUTINE EVENT OCCUR? No           Last vitals:  Vitals Value Taken Time   /52 11/01/21 1447   Temp     Pulse 73 11/01/21 1447   Resp     SpO2 97 % 11/01/21 1459   Vitals shown include unvalidated device data.    Electronically Signed By: GAURANG Holland CRNA  November 1, 2021  3:00 PM

## 2021-11-01 NOTE — ANESTHESIA CARE TRANSFER NOTE
Patient: Asmita Jerez    Procedure: Procedure(s):  Endoscopic carpal tunnel release       Diagnosis: Carpal tunnel syndrome of right wrist [G56.01]  Diagnosis Additional Information: No value filed.    Anesthesia Type:   MAC     Note:    Oropharynx: oropharynx clear of all foreign objects and spontaneously breathing  Level of Consciousness: drowsy  Oxygen Supplementation: room air    Independent Airway: airway patency satisfactory and stable  Dentition: dentition unchanged  Vital Signs Stable: post-procedure vital signs reviewed and stable  Report to RN Given: handoff report given  Patient transferred to: Phase II    Handoff Report: Identifed the Patient, Identified the Reponsible Provider, Reviewed the pertinent medical history, Discussed the surgical course, Reviewed Intra-OP anesthesia mangement and issues during anesthesia, Set expectations for post-procedure period and Allowed opportunity for questions and acknowledgement of understanding      Vitals:  Vitals Value Taken Time   BP     Temp     Pulse     Resp     SpO2 91 % 11/01/21 1448   Vitals shown include unvalidated device data.    Electronically Signed By: GAURANG Holland CRNA  November 1, 2021  2:49 PM

## 2021-11-01 NOTE — OP NOTE
POST OPERATIVE NOTE-IMMEDIATE  AND PROCEDURE      Date:   November 1, 2021       Preoperative Diagnosis: Right  Carpal Tunnel Syndrome       Postoperative Diagnosis: Right  Carpal Tunnel Syndrome       Procedures:  Right Endoscopic Carpal Tunnel Release        Prosthetic Devices:  None      Surgeon(s) and Assistants (if any):   Circulator: Usman Mosher RN  Scrub Person: Josealbertaramez Mel M      Anesthesia:   MAC     Drains:  none      Specimens: none      Complications: None      Estimated Blood Loss:  Minimal         Condition on discharge from OR: Satisfactory         Procedure:   The risks and benefits of the procedure were discussed with the patient preoperatively.  Discussion included the potential for neurovascular injury, wound healing problems, infection, discomfort, recurrence or persistence of symptoms, and loss of motion.  The patient consented to proceed with the surgery described below.   The patient was brought to the operating room and was place on the operating table in a supine position. A time out was called and the patient and procedure were verified.  A tourniquet was applied to the right upper extremity.  Anesthesia was administered as described above. The right hand, wrist, and distal forearm were then prepped with Hibiclens and were draped in the usual sterile manner. The hand and wrist were exsanguinated and the tourniquet was inflated to 250mmHg. A 1 to 1.5 cm transverse incision was made distal at the proximal wrist flexor crease at the base of the palm. The subcutaneous tissue was bluntly dissected and the underlying antebrachial fascia was identified. This was incised in a distally based U-shaped fashion with scissors protecting the underlying median nerve. The synovial elevator from the Froy endoscopic carpal tunnel release set was then introduced beneath the transverse carpal ligament in line with the radial longitudinal axis of the ring finger. The washboard texture of the transverse  ligament was felt. Sequential dilators were then introduced beneath the transverse carpal ligament in line with the radial axis of the ring finger. The Froy endoscopic carpal tunnel release device was then inserted and the distal extent of the transverse carpal ligament was identified visually and with palpation in the palm. The knife at the tip of the endoscope was then elevated and the transverse carpal ligament was cut in a distal to proximal fashion under Endoscopic visualization. The scope was reintroduced and the radial and ulnar flaps of the transverse carpal ligament were then visualized and complete transaction of the ligament was confirmed. The skin proximal to the incision was then undermined and the antebrachial fascia was isolated. A scissors was used to carefully cut the antebrachial fascia in a longitudinal direction approximately 2 cm proximal to the incision. The wound was thoroughly irrigated and was closed with a running 4-0 monocryl suture. A light compressive dressing was applied to the hand and wrist. The patient was then taken to the recovery room in good condition.        Ac Lemus MD

## 2021-11-01 NOTE — ANESTHESIA PREPROCEDURE EVALUATION
Anesthesia Pre-Procedure Evaluation    Patient: Asmita Jerez   MRN: [id number] : 1955        Preoperative Diagnosis: Carpal tunnel syndrome of right wrist [G56.01]    Procedure : Procedure(s):  Endoscopic carpal tunnel release          Past Medical History:   Diagnosis Date     Cervical high risk HPV (human papillomavirus) test positive 3/31/2018     Diabetes (H)      Hypertension      Peptic ulcer, unspecified site, unspecified as acute or chronic, without mention of hemorrhage, perforation, or obstruction      Polycystic ovaries      Pure hypercholesterolemia      Tobacco use disorder      Unspecified multiple gestation, unspecified as to episode of care     Multiple gestation      Past Surgical History:   Procedure Laterality Date     APPENDECTOMY       COLONOSCOPY N/A 2015    Procedure: COLONOSCOPY;  Surgeon: Tino Pryor MD;  Location: [facility]     RELEASE TRIGGER FINGER  2014    Procedure: RELEASE TRIGGER FINGER;  Surgeon: Ag Hoffman MD;  Location: [facility]     SURGICAL HISTORY OF -       appendectomy     SURGICAL HISTORY OF 1970    pilonidal cyst and sinuses      Allergies   Allergen Reactions     Amoxicillin Rash      Social History     Tobacco Use     Smoking status: Current Every Day Smoker     Packs/day: 1.00     Years: 40.00     Pack years: 40.00     Types: Cigarettes     Smokeless tobacco: Never Used     Tobacco comment: pt not interested in quit plan 09   Substance Use Topics     Alcohol use: No      Wt Readings from Last 1 Encounters:   21 59 kg (130 lb)        Anesthesia Evaluation   Pt has had prior anesthetic. Type: MAC and General.    No history of anesthetic complications       ROS/MED HX  ENT/Pulmonary:     (+) tobacco use, Current use,     Neurologic:  - neg neurologic ROS     Cardiovascular:     (+) Dyslipidemia hypertension-----    METS/Exercise Tolerance:     Hematologic:  - neg hematologic  ROS     Musculoskeletal:  - neg musculoskeletal  ROS     GI/Hepatic:     (+) GERD,     Renal/Genitourinary:  - neg Renal ROS     Endo: Comment: POD    (+) type II DM, Not using insulin, - not using insulin pump.     Psychiatric/Substance Use:     (+) psychiatric history anxiety     Infectious Disease:  - neg infectious disease ROS     Malignancy:  - neg malignancy ROS     Other:  - neg other ROS             OUTSIDE LABS:  CBC:   Lab Results   Component Value Date    WBC 11.1 (H) 04/29/2015    WBC 17.6 (H) 07/30/2013    HGB 13.5 04/29/2015    HGB 12.8 07/30/2013    HCT 40.8 04/29/2015    HCT 39.6 07/30/2013     04/29/2015     07/30/2013     BMP:   Lab Results   Component Value Date     10/07/2021     03/01/2021    POTASSIUM 4.7 10/07/2021    POTASSIUM 4.2 03/01/2021    CHLORIDE 107 10/07/2021    CHLORIDE 112 (H) 03/01/2021    CO2 26 10/07/2021    CO2 26 03/01/2021    BUN 17 10/07/2021    BUN 18 03/01/2021    CR 0.74 10/07/2021    CR 0.79 03/01/2021    GLC 98 11/01/2021     (H) 10/07/2021     COAGS: No results found for: PTT, INR, FIBR  POC:   Lab Results   Component Value Date     (H) 05/23/2014    HCGS Negative 07/30/2013     HEPATIC:   Lab Results   Component Value Date    ALBUMIN 3.6 10/07/2021    PROTTOTAL 7.3 10/07/2021    ALT 30 10/07/2021    AST 25 10/07/2021    ALKPHOS 74 10/07/2021    BILITOTAL 0.4 10/07/2021     OTHER:   Lab Results   Component Value Date    A1C 7.0 (H) 10/07/2021    JORY 9.2 10/07/2021    LIPASE 62 07/30/2013    TSH 1.95 04/15/2019       Anesthesia Plan    ASA Status:  3   NPO Status:  NPO Appropriate    Anesthesia Type: MAC.      Maintenance: Balanced.        Consents    Anesthesia Plan(s) and associated risks, benefits, and realistic alternatives discussed. Questions answered and patient/representative(s) expressed understanding.     - Discussed with:  Patient         Postoperative Care    Pain management: IV analgesics, Oral pain medications.   PONV prophylaxis: Ondansetron (or other 5HT-3),  Dexamethasone or Solumedrol     Comments:                GAURANG Holland CRNA

## 2021-11-01 NOTE — DISCHARGE INSTRUCTIONS
Same Day Surgery Discharge Instructions  Special Precautions After Surgery - Adult    1. It is not unusual to feel lightheaded or faint, up to 24 hours after surgery or while taking pain medication.  If you have these symptoms; sit for a few minutes before standing and have someone assist you when getting up.  2. You should rest and relax for the next 24 hours and must have someone stay with you for at least 24 hours after your discharge.  3. DO NOT DRIVE any vehicle or operate mechanical equipment for 24 hours following the end of your surgery.  DO NOT DRIVE while taking narcotic pain medications that have been prescribed by your physician.  If you had a limb operated on, you must be able to use it fully to drive.  4. DO NOT drink alcoholic beverages for 24 hours following surgery or while taking prescription pain medication.  5. Drink clear liquids (apple juice, ginger ale, broth, 7-Up, etc.).  Progress to your regular diet as you feel able.  6. Any questions call your physician and do not make important decisions for 24 hours.        ___________________________________________________________________  IMPORTANT NUMBERS:    [facility] Main Number:  [phone], [phone]  Pharmacy:  [phone]  Same Day Surgery:  [phone], Monday - Friday until 8:30 p.m.  Urgent Care:  [phone]  Emergency Room:  [phone]      [facility]:  [phone]                                                                             [facility]:  [phone] option 1  [facility]:  [phone]     [facility]:  [phone]   [facility]:  [phone]   Home Medical Equipment: [phone]  [facility]:  [phone]

## 2021-11-02 ENCOUNTER — HOSPITAL ENCOUNTER (OUTPATIENT)
Dept: CT IMAGING | Facility: CLINIC | Age: 66
Discharge: HOME | End: 2021-11-02
Attending: FAMILY MEDICINE | Admitting: FAMILY MEDICINE
Payer: COMMERCIAL

## 2021-11-02 DIAGNOSIS — Z72.0 TOBACCO USE: ICD-10-CM

## 2021-11-02 DIAGNOSIS — R91.8 PULMONARY NODULES: ICD-10-CM

## 2021-11-02 PROCEDURE — 71250 CT THORAX DX C-: CPT

## 2021-11-11 ENCOUNTER — LAB (OUTPATIENT)
Dept: LAB | Facility: CLINIC | Age: 66
End: 2021-11-11
Payer: COMMERCIAL

## 2021-11-11 DIAGNOSIS — Z11.59 ENCOUNTER FOR SCREENING FOR OTHER VIRAL DISEASES: ICD-10-CM

## 2021-11-11 PROCEDURE — U0005 INFEC AGEN DETEC AMPLI PROBE: HCPCS

## 2021-11-11 PROCEDURE — U0003 INFECTIOUS AGENT DETECTION BY NUCLEIC ACID (DNA OR RNA); SEVERE ACUTE RESPIRATORY SYNDROME CORONAVIRUS 2 (SARS-COV-2) (CORONAVIRUS DISEASE [COVID-19]), AMPLIFIED PROBE TECHNIQUE, MAKING USE OF HIGH THROUGHPUT TECHNOLOGIES AS DESCRIBED BY CMS-2020-01-R: HCPCS

## 2021-11-12 ENCOUNTER — ANESTHESIA EVENT (OUTPATIENT)
Dept: SURGERY | Facility: CLINIC | Age: 66
End: 2021-11-12
Payer: COMMERCIAL

## 2021-11-12 LAB — SARS-COV-2 RNA RESP QL NAA+PROBE: NEGATIVE

## 2021-11-12 ASSESSMENT — LIFESTYLE VARIABLES: TOBACCO_USE: 1

## 2021-11-12 NOTE — ANESTHESIA PREPROCEDURE EVALUATION
Anesthesia Pre-Procedure Evaluation    Patient: Asmita Jerez   MRN: [id number] : 1955        Preoperative Diagnosis: Carpal tunnel syndrome, left [G56.02]    Procedure : Procedure(s):  , ENDOSCOPIC Carpal Tunnel Release          Past Medical History:   Diagnosis Date     Cervical high risk HPV (human papillomavirus) test positive 3/31/2018     Diabetes (H)      Hypertension      Peptic ulcer, unspecified site, unspecified as acute or chronic, without mention of hemorrhage, perforation, or obstruction      Polycystic ovaries      Pure hypercholesterolemia      Tobacco use disorder      Unspecified multiple gestation, unspecified as to episode of care     Multiple gestation      Past Surgical History:   Procedure Laterality Date     APPENDECTOMY       COLONOSCOPY N/A 2015    Procedure: COLONOSCOPY;  Surgeon: Tino Pryor MD;  Location: [facility]     ENDOSCOPIC RELEASE CARPAL TUNNEL Right 2021    Procedure: Endoscopic carpal tunnel release;  Surgeon: Ac Lemus MD;  Location: [facility]     RELEASE TRIGGER FINGER  2014    Procedure: RELEASE TRIGGER FINGER;  Surgeon: Ag Hoffman MD;  Location: [facility]     SURGICAL HISTORY OF -       appendectomy     SURGICAL HISTORY OF 1970    pilonidal cyst and sinuses      Allergies   Allergen Reactions     Amoxicillin Rash      Social History     Tobacco Use     Smoking status: Current Every Day Smoker     Packs/day: 1.00     Years: 40.00     Pack years: 40.00     Types: Cigarettes     Smokeless tobacco: Never Used     Tobacco comment: pt not interested in quit plan 09   Substance Use Topics     Alcohol use: No      Wt Readings from Last 1 Encounters:   21 59 kg (130 lb)        Anesthesia Evaluation   Pt has had prior anesthetic. Type: General and MAC.    No history of anesthetic complications       ROS/MED HX  ENT/Pulmonary:     (+) tobacco use, Current use, 1 packs/day,     Neurologic:  - neg neurologic ROS      Cardiovascular:     (+) Dyslipidemia hypertension-----    METS/Exercise Tolerance:     Hematologic:  - neg hematologic  ROS     Musculoskeletal:  - neg musculoskeletal ROS     GI/Hepatic: Comment: PUD      Renal/Genitourinary:  - neg Renal ROS     Endo:     (+) type II DM, Last HgA1c: 7.0, date: 10/7/21,     Psychiatric/Substance Use:     (+) psychiatric history anxiety     Infectious Disease:  - neg infectious disease ROS     Malignancy:  - neg malignancy ROS     Other:            Physical Exam    Airway        Mallampati: II   TM distance: > 3 FB   Neck ROM: full   Mouth opening: > 3 cm    Respiratory Devices and Support         Dental  no notable dental history         Cardiovascular   cardiovascular exam normal       Rhythm and rate: regular and normal     Pulmonary   pulmonary exam normal        breath sounds clear to auscultation           OUTSIDE LABS:  CBC:   Lab Results   Component Value Date    WBC 11.1 (H) 04/29/2015    WBC 17.6 (H) 07/30/2013    HGB 13.5 04/29/2015    HGB 12.8 07/30/2013    HCT 40.8 04/29/2015    HCT 39.6 07/30/2013     04/29/2015     07/30/2013     BMP:   Lab Results   Component Value Date     10/07/2021     03/01/2021    POTASSIUM 4.7 10/07/2021    POTASSIUM 4.2 03/01/2021    CHLORIDE 107 10/07/2021    CHLORIDE 112 (H) 03/01/2021    CO2 26 10/07/2021    CO2 26 03/01/2021    BUN 17 10/07/2021    BUN 18 03/01/2021    CR 0.74 10/07/2021    CR 0.79 03/01/2021    GLC 98 11/01/2021     (H) 10/07/2021     COAGS: No results found for: PTT, INR, FIBR  POC:   Lab Results   Component Value Date     (H) 05/23/2014    HCGS Negative 07/30/2013     HEPATIC:   Lab Results   Component Value Date    ALBUMIN 3.6 10/07/2021    PROTTOTAL 7.3 10/07/2021    ALT 30 10/07/2021    AST 25 10/07/2021    ALKPHOS 74 10/07/2021    BILITOTAL 0.4 10/07/2021     OTHER:   Lab Results   Component Value Date    A1C 7.0 (H) 10/07/2021    JORY 9.2 10/07/2021    LIPASE 62 07/30/2013     TSH 1.95 04/15/2019       Anesthesia Plan    ASA Status:  2      Anesthesia Type: General.     - Airway: Native airway   Induction: Intravenous, Propofol.   Maintenance: TIVA.        Consents    Anesthesia Plan(s) and associated risks, benefits, and realistic alternatives discussed. Questions answered and patient/representative(s) expressed understanding.     - Discussed with:  Patient         Postoperative Care    Pain management: Oral pain medications.   PONV prophylaxis: Ondansetron (or other 5HT-3)     Comments:                GAURANG Hopson CRNA

## 2021-11-15 ENCOUNTER — ANESTHESIA (OUTPATIENT)
Dept: SURGERY | Facility: CLINIC | Age: 66
End: 2021-11-15
Payer: COMMERCIAL

## 2021-11-15 ENCOUNTER — HOSPITAL ENCOUNTER (OUTPATIENT)
Facility: CLINIC | Age: 66
Discharge: HOME | End: 2021-11-15
Attending: ORTHOPAEDIC SURGERY | Admitting: ORTHOPAEDIC SURGERY
Payer: COMMERCIAL

## 2021-11-15 VITALS
TEMPERATURE: 98.3 F | BODY MASS INDEX: 23.04 KG/M2 | OXYGEN SATURATION: 95 % | WEIGHT: 130 LBS | HEART RATE: 71 BPM | RESPIRATION RATE: 16 BRPM | SYSTOLIC BLOOD PRESSURE: 135 MMHG | DIASTOLIC BLOOD PRESSURE: 72 MMHG | HEIGHT: 63 IN

## 2021-11-15 LAB — GLUCOSE BLDC GLUCOMTR-MCNC: 107 MG/DL (ref 70–99)

## 2021-11-15 PROCEDURE — 370N000017 HC ANESTHESIA TECHNICAL FEE, PER MIN: Performed by: ORTHOPAEDIC SURGERY

## 2021-11-15 PROCEDURE — 250N000013 HC RX MED GY IP 250 OP 250 PS 637: Performed by: NURSE ANESTHETIST, CERTIFIED REGISTERED

## 2021-11-15 PROCEDURE — 272N000001 HC OR GENERAL SUPPLY STERILE: Performed by: ORTHOPAEDIC SURGERY

## 2021-11-15 PROCEDURE — 250N000009 HC RX 250: Performed by: NURSE ANESTHETIST, CERTIFIED REGISTERED

## 2021-11-15 PROCEDURE — 360N000076 HC SURGERY LEVEL 3, PER MIN: Performed by: ORTHOPAEDIC SURGERY

## 2021-11-15 PROCEDURE — 250N000011 HC RX IP 250 OP 636: Performed by: ORTHOPAEDIC SURGERY

## 2021-11-15 PROCEDURE — 710N000012 HC RECOVERY PHASE 2, PER MINUTE: Performed by: ORTHOPAEDIC SURGERY

## 2021-11-15 PROCEDURE — 999N000141 HC STATISTIC PRE-PROCEDURE NURSING ASSESSMENT: Performed by: ORTHOPAEDIC SURGERY

## 2021-11-15 PROCEDURE — 258N000003 HC RX IP 258 OP 636: Performed by: NURSE ANESTHETIST, CERTIFIED REGISTERED

## 2021-11-15 PROCEDURE — 82962 GLUCOSE BLOOD TEST: CPT

## 2021-11-15 PROCEDURE — 250N000011 HC RX IP 250 OP 636: Performed by: NURSE ANESTHETIST, CERTIFIED REGISTERED

## 2021-11-15 PROCEDURE — 250N000009 HC RX 250: Performed by: ORTHOPAEDIC SURGERY

## 2021-11-15 RX ORDER — HYDROXYZINE HYDROCHLORIDE 10 MG/1
10 TABLET, FILM COATED ORAL
Status: CANCELLED | OUTPATIENT
Start: 2021-11-15

## 2021-11-15 RX ORDER — GABAPENTIN 100 MG/1
100 CAPSULE ORAL
Status: COMPLETED | OUTPATIENT
Start: 2021-11-15 | End: 2021-11-15

## 2021-11-15 RX ORDER — LIDOCAINE HYDROCHLORIDE 10 MG/ML
INJECTION, SOLUTION EPIDURAL; INFILTRATION; INTRACAUDAL; PERINEURAL PRN
Status: DISCONTINUED | OUTPATIENT
Start: 2021-11-15 | End: 2021-11-15

## 2021-11-15 RX ORDER — ONDANSETRON 4 MG/1
4 TABLET, ORALLY DISINTEGRATING ORAL
Status: CANCELLED | OUTPATIENT
Start: 2021-11-15

## 2021-11-15 RX ORDER — PROPOFOL 10 MG/ML
INJECTION, EMULSION INTRAVENOUS CONTINUOUS PRN
Status: DISCONTINUED | OUTPATIENT
Start: 2021-11-15 | End: 2021-11-15

## 2021-11-15 RX ORDER — HYDROCODONE BITARTRATE AND ACETAMINOPHEN 5; 325 MG/1; MG/1
1 TABLET ORAL
Status: CANCELLED | OUTPATIENT
Start: 2021-11-15

## 2021-11-15 RX ORDER — SODIUM CHLORIDE, SODIUM LACTATE, POTASSIUM CHLORIDE, CALCIUM CHLORIDE 600; 310; 30; 20 MG/100ML; MG/100ML; MG/100ML; MG/100ML
INJECTION, SOLUTION INTRAVENOUS CONTINUOUS
Status: DISCONTINUED | OUTPATIENT
Start: 2021-11-15 | End: 2021-11-15 | Stop reason: HOSPADM

## 2021-11-15 RX ORDER — MAGNESIUM HYDROXIDE 1200 MG/15ML
LIQUID ORAL PRN
Status: DISCONTINUED | OUTPATIENT
Start: 2021-11-15 | End: 2021-11-15 | Stop reason: HOSPADM

## 2021-11-15 RX ORDER — ACETAMINOPHEN 325 MG/1
975 TABLET ORAL ONCE
Status: COMPLETED | OUTPATIENT
Start: 2021-11-15 | End: 2021-11-15

## 2021-11-15 RX ORDER — CEFAZOLIN SODIUM 2 G/100ML
2 INJECTION, SOLUTION INTRAVENOUS SEE ADMIN INSTRUCTIONS
Status: DISCONTINUED | OUTPATIENT
Start: 2021-11-15 | End: 2021-11-15 | Stop reason: HOSPADM

## 2021-11-15 RX ORDER — LIDOCAINE 40 MG/G
CREAM TOPICAL
Status: DISCONTINUED | OUTPATIENT
Start: 2021-11-15 | End: 2021-11-15 | Stop reason: HOSPADM

## 2021-11-15 RX ORDER — PROPOFOL 10 MG/ML
INJECTION, EMULSION INTRAVENOUS PRN
Status: DISCONTINUED | OUTPATIENT
Start: 2021-11-15 | End: 2021-11-15

## 2021-11-15 RX ORDER — LIDOCAINE HYDROCHLORIDE 10 MG/ML
INJECTION, SOLUTION INFILTRATION; PERINEURAL PRN
Status: DISCONTINUED | OUTPATIENT
Start: 2021-11-15 | End: 2021-11-15 | Stop reason: HOSPADM

## 2021-11-15 RX ORDER — CEFAZOLIN SODIUM 2 G/100ML
2 INJECTION, SOLUTION INTRAVENOUS
Status: COMPLETED | OUTPATIENT
Start: 2021-11-15 | End: 2021-11-15

## 2021-11-15 RX ORDER — BUPIVACAINE HYDROCHLORIDE 5 MG/ML
INJECTION, SOLUTION PERINEURAL PRN
Status: DISCONTINUED | OUTPATIENT
Start: 2021-11-15 | End: 2021-11-15 | Stop reason: HOSPADM

## 2021-11-15 RX ADMIN — GABAPENTIN 100 MG: 100 CAPSULE ORAL at 12:50

## 2021-11-15 RX ADMIN — ACETAMINOPHEN 975 MG: 325 TABLET, FILM COATED ORAL at 12:50

## 2021-11-15 RX ADMIN — PROPOFOL 150 MCG/KG/MIN: 10 INJECTION, EMULSION INTRAVENOUS at 14:04

## 2021-11-15 RX ADMIN — LIDOCAINE HYDROCHLORIDE 50 MG: 10 INJECTION, SOLUTION EPIDURAL; INFILTRATION; INTRACAUDAL; PERINEURAL at 14:04

## 2021-11-15 RX ADMIN — PROPOFOL 100 MG: 10 INJECTION, EMULSION INTRAVENOUS at 14:04

## 2021-11-15 RX ADMIN — SODIUM CHLORIDE, POTASSIUM CHLORIDE, SODIUM LACTATE AND CALCIUM CHLORIDE 10 ML: 600; 310; 30; 20 INJECTION, SOLUTION INTRAVENOUS at 13:03

## 2021-11-15 RX ADMIN — CEFAZOLIN SODIUM 2 G: 2 INJECTION, SOLUTION INTRAVENOUS at 14:04

## 2021-11-15 ASSESSMENT — MIFFLIN-ST. JEOR: SCORE: 1098.81

## 2021-11-15 NOTE — ANESTHESIA POSTPROCEDURE EVALUATION
Patient: Asmita Jerez    Procedure: Procedure(s):  , ENDOSCOPIC Carpal Tunnel Release, LEFT, removal suture right wrist       Diagnosis:Carpal tunnel syndrome, left [G56.02]  Diagnosis Additional Information: No value filed.    Anesthesia Type:  General    Note:  Disposition: Outpatient   Postop Pain Control: Uneventful            Sign Out: Well controlled pain   PONV: No   Neuro/Psych: Uneventful            Sign Out: Acceptable/Baseline neuro status   Airway/Respiratory: Uneventful            Sign Out: Acceptable/Baseline resp. status   CV/Hemodynamics: Uneventful            Sign Out: Acceptable CV status; No obvious hypovolemia; No obvious fluid overload   Other NRE: NONE   DID A NON-ROUTINE EVENT OCCUR? No           Last vitals:  Vitals Value Taken Time   /57 11/15/21 1434   Temp     Pulse 83 11/15/21 1434   Resp     SpO2 94 % 11/15/21 1440   Vitals shown include unvalidated device data.    Electronically Signed By: Leroy Gaytan CRNA, APRN CRNA  November 15, 2021  2:42 PM

## 2021-11-15 NOTE — ANESTHESIA CARE TRANSFER NOTE
Patient: Asmita Jerez    Procedure: Procedure(s):  , ENDOSCOPIC Carpal Tunnel Release, LEFT, removal suture right wrist       Diagnosis: Carpal tunnel syndrome, left [G56.02]  Diagnosis Additional Information: No value filed.    Anesthesia Type:   General     Note:    Oropharynx: oropharynx clear of all foreign objects and spontaneously breathing  Level of Consciousness: drowsy  Oxygen Supplementation: face mask    Independent Airway: airway patency satisfactory and stable  Dentition: dentition unchanged  Vital Signs Stable: post-procedure vital signs reviewed and stable  Report to RN Given: handoff report given  Patient transferred to: Phase II    Handoff Report: Identifed the Patient, Identified the Reponsible Provider, Reviewed the pertinent medical history, Discussed the surgical course, Reviewed Intra-OP anesthesia mangement and issues during anesthesia, Set expectations for post-procedure period and Allowed opportunity for questions and acknowledgement of understanding      Vitals:  Vitals Value Taken Time   /57 11/15/21 1434   Temp     Pulse 83 11/15/21 1434   Resp     SpO2 94 % 11/15/21 1435   Vitals shown include unvalidated device data.    Electronically Signed By: Leroy Gaytan CRNA, APRN CRNA  November 15, 2021  2:37 PM

## 2021-11-15 NOTE — OP NOTE
POST OPERATIVE NOTE-IMMEDIATE  AND PROCEDURE      Date:   November 15, 2021       Preoperative Diagnosis: Left Carpal Tunnel Syndrome       Postoperative Diagnosis: Left Carpal Tunnel Syndrome       Procedures:  Left Endoscopic Carpal Tunnel Release        Prosthetic Devices:  None      Surgeon(s) and Assistants (if any):   Circulator: GUIDO GIORDANO Person: Savanah Fan      Anesthesia:   Local anesthesia with sedation     Drains:  none      Specimens: none      Complications: None      Estimated Blood Loss:  Minimal         Condition on discharge from OR: Satisfactory         Procedure:   The risks and benefits of the procedure were discussed with the patient preoperatively.  Discussion included the potential for neurovascular injury, wound healing problems, infection, discomfort, recurrence or persistence of symptoms, and loss of motion.  The patient consented to proceed with the surgery described below.   The patient was brought to the operating room and was place on the operating table in a supine position. A time out was called and the patient and procedure were verified.  A tourniquet was applied to the left upper extremity.  Anesthesia was administered as described above. The left hand, wrist, and distal forearm were then prepped with Hibiclens and were draped in the usual sterile manner. The hand and wrist were exsanguinated and the tourniquet was inflated to 250mmHg. A 1 to 1.5 cm transverse incision was made distal at the proximal wrist flexor crease at the base of the palm. The subcutaneous tissue was bluntly dissected and the underlying antebrachial fascia was identified. This was incised in a distally based U-shaped fashion with scissors protecting the underlying median nerve. The synovial elevator from the Froy endoscopic carpal tunnel release set was then introduced beneath the transverse carpal ligament in line with the radial longitudinal axis of the ring finger. The washboard  texture of the transverse ligament was felt. Sequential dilators were then introduced beneath the transverse carpal ligament in line with the radial axis of the ring finger. The Froy endoscopic carpal tunnel release device was then inserted and the distal extent of the transverse carpal ligament was identified visually and with palpation in the palm. The knife at the tip of the endoscope was then elevated and the transverse carpal ligament was cut in a distal to proximal fashion under Endoscopic visualization. The scope was reintroduced and the radial and ulnar flaps of the transverse carpal ligament were then visualized and complete transaction of the ligament was confirmed. The skin proximal to the incision was then undermined and the antebrachial fascia was isolated. A scissors was used to carefully cut the antebrachial fascia in a longitudinal direction approximately 2 cm proximal to the incision. The wound was thoroughly irrigated and was closed with a running 4-0 monocryl suture. A light compressive dressing was applied to the hand and wrist.    We removed the sutures from the right carpal tunnel incision without difficulty.  Her incision was well healed without erythema.  Sensation was intact in the median nerve distribution, although the patient still feels some paresthesia in the median nerve distribution.     The patient was then taken to the recovery room in good condition.        Ac Lemus MD

## 2021-11-15 NOTE — DISCHARGE INSTRUCTIONS
Same Day Surgery Discharge Instructions  Special Precautions After Surgery - Adult    1. It is not unusual to feel lightheaded or faint, up to 24 hours after surgery or while taking pain medication.  If you have these symptoms; sit for a few minutes before standing and have someone assist you when getting up.  2. You should rest and relax for the next 24 hours and must have someone stay with you for at least 24 hours after your discharge.  3. DO NOT DRIVE any vehicle or operate mechanical equipment for 24 hours following the end of your surgery.  DO NOT DRIVE while taking narcotic pain medications that have been prescribed by your physician.  If you had a limb operated on, you must be able to use it fully to drive.  4. DO NOT drink alcoholic beverages for 24 hours following surgery or while taking prescription pain medication.  5. Drink clear liquids (apple juice, ginger ale, broth, 7-Up, etc.).  Progress to your regular diet as you feel able.  6. Any questions call your physician and do not make important decisions for 24 hours.      __________________________________________________________________________________________________________________________________  IMPORTANT NUMBERS:    [facility] Main Number:  [phone], [phone]  Pharmacy:  [phone]  Same Day Surgery:  [phone], Monday - Friday until 8:30 p.m.  Urgent Care:  [phone]  Emergency Room:  [phone]      [facility]:  [phone]                                                                             [facility]:  [phone] option 1  [facility]:  [phone]     [facility]:  [phone]   [facility]:  [phone]   Home Medical Equipment: [phone]  [facility]:  [phone]

## 2022-01-05 ENCOUNTER — TELEPHONE (OUTPATIENT)
Dept: FAMILY MEDICINE | Facility: CLINIC | Age: 67
End: 2022-01-05
Payer: COMMERCIAL

## 2022-01-05 NOTE — TELEPHONE ENCOUNTER
Pt is out of this medication below.   Disp Refills Start End NIXON   metoprolol succinate ER (TOPROL-XL) 25 MG 24 hr tablet 90 tablet 0 10/7/2021  --   Sig - Route: Take 1 tablet (25 mg) by mouth daily - Oral   Sent to pharmacy as: Metoprolol Succinate ER 25 MG Oral Tablet Extended Release 24 Hour (TOPROL-XL)   Class: E-Prescribe   Order: [id number]   E-Prescribing Status: Receipt confirmed by pharmacy (10/7/2021 11:41 AM CDT     Does need to refill today. Connor Garcia

## 2022-02-10 ENCOUNTER — TRANSFERRED RECORDS (OUTPATIENT)
Dept: MULTI SPECIALTY CLINIC | Facility: CLINIC | Age: 67
End: 2022-02-10

## 2022-02-10 LAB — RETINOPATHY: NORMAL

## 2022-02-17 DIAGNOSIS — Z13.6 CARDIOVASCULAR SCREENING; LDL GOAL LESS THAN 100: ICD-10-CM

## 2022-02-17 RX ORDER — ATORVASTATIN CALCIUM 40 MG/1
40 TABLET, FILM COATED ORAL DAILY
Qty: 90 TABLET | Refills: 0 | Status: SHIPPED | OUTPATIENT
Start: 2022-02-17 | End: 2022-05-24

## 2022-03-11 DIAGNOSIS — I10 ESSENTIAL HYPERTENSION WITH GOAL BLOOD PRESSURE LESS THAN 140/90: ICD-10-CM

## 2022-03-11 RX ORDER — LISINOPRIL 40 MG/1
40 TABLET ORAL DAILY
Qty: 90 TABLET | Refills: 3 | Status: SHIPPED | OUTPATIENT
Start: 2022-03-11 | End: 2022-10-12

## 2022-03-29 DIAGNOSIS — I10 ESSENTIAL (PRIMARY) HYPERTENSION: ICD-10-CM

## 2022-03-31 RX ORDER — METOPROLOL SUCCINATE 25 MG/1
25 TABLET, EXTENDED RELEASE ORAL DAILY
Qty: 90 TABLET | Refills: 0 | Status: SHIPPED | OUTPATIENT
Start: 2022-03-31 | End: 2022-06-27

## 2022-05-10 ENCOUNTER — HOSPITAL ENCOUNTER (OUTPATIENT)
Dept: MAMMOGRAPHY | Facility: CLINIC | Age: 67
Discharge: HOME | End: 2022-05-10
Attending: FAMILY MEDICINE | Admitting: FAMILY MEDICINE
Payer: COMMERCIAL

## 2022-05-10 DIAGNOSIS — Z12.31 VISIT FOR SCREENING MAMMOGRAM: ICD-10-CM

## 2022-05-10 PROCEDURE — 77067 SCR MAMMO BI INCL CAD: CPT

## 2022-05-23 DIAGNOSIS — Z13.6 CARDIOVASCULAR SCREENING; LDL GOAL LESS THAN 100: ICD-10-CM

## 2022-05-24 RX ORDER — ATORVASTATIN CALCIUM 40 MG/1
40 TABLET, FILM COATED ORAL DAILY
Qty: 90 TABLET | Refills: 1 | Status: SHIPPED | OUTPATIENT
Start: 2022-05-24 | End: 2022-10-12

## 2022-06-21 DIAGNOSIS — F41.1 GAD (GENERALIZED ANXIETY DISORDER): ICD-10-CM

## 2022-06-21 RX ORDER — HYDROXYZINE HYDROCHLORIDE 25 MG/1
25 TABLET, FILM COATED ORAL 3 TIMES DAILY PRN
Qty: 30 TABLET | Refills: 4 | OUTPATIENT
Start: 2022-06-21

## 2022-06-27 DIAGNOSIS — I10 ESSENTIAL (PRIMARY) HYPERTENSION: ICD-10-CM

## 2022-06-27 RX ORDER — METOPROLOL SUCCINATE 25 MG/1
25 TABLET, EXTENDED RELEASE ORAL DAILY
Qty: 90 TABLET | Refills: 0 | Status: SHIPPED | OUTPATIENT
Start: 2022-06-27 | End: 2022-08-11

## 2022-08-10 DIAGNOSIS — E11.9 TYPE 2 DIABETES MELLITUS WITHOUT COMPLICATION, WITHOUT LONG-TERM CURRENT USE OF INSULIN (H): ICD-10-CM

## 2022-08-10 DIAGNOSIS — I10 ESSENTIAL (PRIMARY) HYPERTENSION: ICD-10-CM

## 2022-08-10 NOTE — TELEPHONE ENCOUNTER
Medication Question or Refill        What medication are you calling about (include dose and sig)?: Pending Prescriptions:                       Disp   Refills    metFORMIN (GLUCOPHAGE XR) 500 MG 24 hr ta*360 ta*0            Sig: Take 2 tablets (1,000 mg) by mouth 2 times daily           (with meals) VISIT DUE    dulaglutide (TRULICITY) 0.75 MG/0.5ML pen 2 mL   0            Sig: Inject 0.75 mg Subcutaneous every 7 days VISIT           DUE    metoprolol succinate ER (TOPROL XL) 25 MG*90 tab*0            Sig: Take 1 tablet (25 mg) by mouth daily    hydrOXYzine HCl 25 MG Oral Tablet (ATARAX)    Controlled Substance Agreement on file:   CSA -- Patient Level:    CSA: None found at the patient level.       Who prescribed the medication?: Hong    Do you need a refill? Yes:     When did you use the medication last? 10/27/2021, 6/27/2022,5/18/2022    Patient offered an appointment? No, has one    Do you have any questions or concerns?  No    Preferred Pharmacy:   [facility] [id number] [address] [address]  Phone: [phone] Fax: [phone]    Okay to leave a detailed message?: Yes at Home number on file [phone] (home)

## 2022-08-11 RX ORDER — METFORMIN HCL 500 MG
1000 TABLET, EXTENDED RELEASE 24 HR ORAL 2 TIMES DAILY WITH MEALS
Qty: 360 TABLET | Refills: 0 | Status: SHIPPED | OUTPATIENT
Start: 2022-08-11 | End: 2022-10-12

## 2022-08-11 RX ORDER — METOPROLOL SUCCINATE 25 MG/1
25 TABLET, EXTENDED RELEASE ORAL DAILY
Qty: 90 TABLET | Refills: 0 | Status: SHIPPED | OUTPATIENT
Start: 2022-08-11 | End: 2022-09-13

## 2022-08-11 RX ORDER — DULAGLUTIDE 0.75 MG/.5ML
0.75 INJECTION, SOLUTION SUBCUTANEOUS
Qty: 2 ML | Refills: 0 | Status: SHIPPED | OUTPATIENT
Start: 2022-08-11 | End: 2022-09-13

## 2022-08-11 NOTE — TELEPHONE ENCOUNTER
"Requested Prescriptions   Pending Prescriptions Disp Refills    metFORMIN (GLUCOPHAGE XR) 500 MG 24 hr tablet 360 tablet 0     Sig: Take 2 tablets (1,000 mg) by mouth 2 times daily (with meals) VISIT DUE        Biguanide Agents Failed - 8/10/2022 10:55 AM        Failed - Patient has documented A1c within the specified period of time.     If HgbA1C is 8 or greater, it needs to be on file within the past 3 months.  If less than 8, must be on file within the past 6 months.     Recent Labs   Lab Test 10/07/21  1106   A1C 7.0*             Failed - Recent (6 mo) or future (30 days) visit within the authorizing provider's specialty     Patient had office visit in the last 6 months or has a visit in the next 30 days with authorizing provider or within the authorizing provider's specialty.  See \"Patient Info\" tab in inbasket, or \"Choose Columns\" in Meds & Orders section of the refill encounter.            Passed - Patient is age 10 or older        Passed - Patient's CR is NOT>1.4 OR Patient's EGFR is NOT<45 within past 12 mos.       Recent Labs   Lab Test 10/07/21  1106 03/01/21  0854   GFRESTIMATED 85 77   GFRESTBLACK  --  90       Recent Labs   Lab Test 10/07/21  1106   CR 0.74             Passed - Patient does NOT have a diagnosis of CHF.        Passed - Medication is active on med list        Passed - Patient is not pregnant        Passed - Patient has not had a positive pregnancy test within the past 12 mos.            dulaglutide (TRULICITY) 0.75 MG/0.5ML pen 2 mL 0     Sig: Inject 0.75 mg Subcutaneous every 7 days VISIT DUE        GLP-1 Agonists Protocol Failed - 8/10/2022 10:55 AM        Failed - HgbA1C in past 3 or 6 months     If HgbA1C is 8 or greater, it needs to be on file within the past 3 months.  If less than 8, must be on file within the past 6 months.     Recent Labs   Lab Test 10/07/21  1106   A1C 7.0*             Failed - Recent (6 mo) or future (30 days) visit within the authorizing provider's specialty " "    Patient had office visit in the last 6 months or has a visit in the next 30 days with authorizing provider.  See \"Patient Info\" tab in inbasket, or \"Choose Columns\" in Meds & Orders section of the refill encounter.            Passed - Medication is active on med list        Passed - Patient is age 18 or older        Passed - No active pregnancy on record        Passed - Normal serum creatinine on file in past 12 months     Recent Labs   Lab Test 10/07/21  1106   CR 0.74       Ok to refill medication if creatinine is low          Passed - No positive pregnancy test in past 12 months           metoprolol succinate ER (TOPROL XL) 25 MG 24 hr tablet 90 tablet 0     Sig: Take 1 tablet (25 mg) by mouth daily        Beta-Blockers Protocol Passed - 8/10/2022 10:55 AM        Passed - Blood pressure under 140/90 in past 12 months       BP Readings from Last 3 Encounters:   11/15/21 135/72   11/01/21 113/55   10/25/21 134/72                 Passed - Patient is age 6 or older        Passed - Recent (12 mo) or future (30 days) visit within the authorizing provider's specialty     Patient has had an office visit with the authorizing provider or a provider within the authorizing providers department within the previous 12 mos or has a future within next 30 days. See \"Patient Info\" tab in inbasket, or \"Choose Columns\" in Meds & Orders section of the refill encounter.              Passed - Medication is active on med list            Lolita Ribera RN     "

## 2022-10-12 ENCOUNTER — OFFICE VISIT (OUTPATIENT)
Dept: FAMILY MEDICINE | Facility: CLINIC | Age: 67
End: 2022-10-12
Payer: COMMERCIAL

## 2022-10-12 VITALS
WEIGHT: 126 LBS | HEART RATE: 88 BPM | HEIGHT: 63 IN | OXYGEN SATURATION: 98 % | SYSTOLIC BLOOD PRESSURE: 154 MMHG | TEMPERATURE: 97.6 F | DIASTOLIC BLOOD PRESSURE: 80 MMHG | RESPIRATION RATE: 18 BRPM | BODY MASS INDEX: 22.32 KG/M2

## 2022-10-12 DIAGNOSIS — Z00.00 ENCOUNTER FOR MEDICARE ANNUAL WELLNESS EXAM: Primary | ICD-10-CM

## 2022-10-12 DIAGNOSIS — Z72.0 TOBACCO USE: ICD-10-CM

## 2022-10-12 DIAGNOSIS — I10 ESSENTIAL HYPERTENSION WITH GOAL BLOOD PRESSURE LESS THAN 140/90: ICD-10-CM

## 2022-10-12 DIAGNOSIS — R87.810 CERVICAL HIGH RISK HPV (HUMAN PAPILLOMAVIRUS) TEST POSITIVE: ICD-10-CM

## 2022-10-12 DIAGNOSIS — E11.9 TYPE 2 DIABETES MELLITUS WITHOUT COMPLICATION, WITHOUT LONG-TERM CURRENT USE OF INSULIN (H): ICD-10-CM

## 2022-10-12 DIAGNOSIS — Z12.4 SCREENING FOR CERVICAL CANCER: ICD-10-CM

## 2022-10-12 DIAGNOSIS — Z78.0 POSTMENOPAUSAL STATUS: ICD-10-CM

## 2022-10-12 DIAGNOSIS — F41.1 GENERALIZED ANXIETY DISORDER: ICD-10-CM

## 2022-10-12 LAB
ANION GAP SERPL CALCULATED.3IONS-SCNC: 14 MMOL/L (ref 7–15)
BUN SERPL-MCNC: 14.7 MG/DL (ref 8–23)
CALCIUM SERPL-MCNC: 10.3 MG/DL (ref 8.8–10.2)
CHLORIDE SERPL-SCNC: 103 MMOL/L (ref 98–107)
CHOLEST SERPL-MCNC: 110 MG/DL
CREAT SERPL-MCNC: 0.82 MG/DL (ref 0.51–0.95)
CREAT UR-MCNC: 31.1 MG/DL
DEPRECATED HCO3 PLAS-SCNC: 23 MMOL/L (ref 22–29)
GFR SERPL CREATININE-BSD FRML MDRD: 78 ML/MIN/1.73M2
GLUCOSE SERPL-MCNC: 128 MG/DL (ref 70–99)
HBA1C MFR BLD: 7.5 % (ref 0–5.6)
HDLC SERPL-MCNC: 42 MG/DL
LDLC SERPL CALC-MCNC: 42 MG/DL
MICROALBUMIN UR-MCNC: <12 MG/L
MICROALBUMIN/CREAT UR: NORMAL MG/G{CREAT}
NONHDLC SERPL-MCNC: 68 MG/DL
POTASSIUM SERPL-SCNC: 4.3 MMOL/L (ref 3.4–5.3)
SODIUM SERPL-SCNC: 140 MMOL/L (ref 136–145)
TRIGL SERPL-MCNC: 131 MG/DL
TSH SERPL DL<=0.005 MIU/L-ACNC: 1.64 UIU/ML (ref 0.3–4.2)

## 2022-10-12 PROCEDURE — 80048 BASIC METABOLIC PNL TOTAL CA: CPT | Performed by: FAMILY MEDICINE

## 2022-10-12 PROCEDURE — G0145 SCR C/V CYTO,THINLAYER,RESCR: HCPCS | Performed by: FAMILY MEDICINE

## 2022-10-12 PROCEDURE — 91313 COVID-19,PF,MODERNA BIVALENT: CPT | Performed by: FAMILY MEDICINE

## 2022-10-12 PROCEDURE — 0134A COVID-19,PF,MODERNA BIVALENT: CPT | Performed by: FAMILY MEDICINE

## 2022-10-12 PROCEDURE — 84443 ASSAY THYROID STIM HORMONE: CPT | Performed by: FAMILY MEDICINE

## 2022-10-12 PROCEDURE — 87624 HPV HI-RISK TYP POOLED RSLT: CPT | Performed by: FAMILY MEDICINE

## 2022-10-12 PROCEDURE — 99214 OFFICE O/P EST MOD 30 MIN: CPT | Mod: 25 | Performed by: FAMILY MEDICINE

## 2022-10-12 PROCEDURE — 83036 HEMOGLOBIN GLYCOSYLATED A1C: CPT | Performed by: FAMILY MEDICINE

## 2022-10-12 PROCEDURE — 36415 COLL VENOUS BLD VENIPUNCTURE: CPT | Performed by: FAMILY MEDICINE

## 2022-10-12 PROCEDURE — G0438 PPPS, INITIAL VISIT: HCPCS | Mod: 25 | Performed by: FAMILY MEDICINE

## 2022-10-12 PROCEDURE — 80061 LIPID PANEL: CPT | Performed by: FAMILY MEDICINE

## 2022-10-12 PROCEDURE — 82043 UR ALBUMIN QUANTITATIVE: CPT | Performed by: FAMILY MEDICINE

## 2022-10-12 PROCEDURE — 99207 PR FOOT EXAM NO CHARGE: CPT | Performed by: FAMILY MEDICINE

## 2022-10-12 RX ORDER — ATORVASTATIN CALCIUM 40 MG/1
40 TABLET, FILM COATED ORAL DAILY
Qty: 90 TABLET | Refills: 3 | Status: SHIPPED | OUTPATIENT
Start: 2022-10-12 | End: 2023-10-25

## 2022-10-12 RX ORDER — HYDROXYZINE HYDROCHLORIDE 25 MG/1
25 TABLET, FILM COATED ORAL 3 TIMES DAILY PRN
COMMUNITY
Start: 2022-06-20 | End: 2022-11-09

## 2022-10-12 RX ORDER — LISINOPRIL 40 MG/1
40 TABLET ORAL DAILY
Qty: 90 TABLET | Refills: 3 | Status: SHIPPED | OUTPATIENT
Start: 2022-10-12 | End: 2023-11-01

## 2022-10-12 RX ORDER — METFORMIN HCL 500 MG
1000 TABLET, EXTENDED RELEASE 24 HR ORAL 2 TIMES DAILY WITH MEALS
Qty: 360 TABLET | Refills: 1 | Status: SHIPPED | OUTPATIENT
Start: 2022-10-12 | End: 2023-05-31

## 2022-10-12 RX ORDER — ESCITALOPRAM OXALATE 10 MG/1
10 TABLET ORAL DAILY
Qty: 30 TABLET | Refills: 1 | Status: SHIPPED | OUTPATIENT
Start: 2022-10-12 | End: 2022-11-09

## 2022-10-12 RX ORDER — BLOOD SUGAR DIAGNOSTIC
STRIP MISCELLANEOUS
Qty: 200 STRIP | Refills: 3 | Status: SHIPPED | OUTPATIENT
Start: 2022-10-12 | End: 2024-04-29

## 2022-10-12 RX ORDER — METOPROLOL SUCCINATE 50 MG/1
50 TABLET, EXTENDED RELEASE ORAL DAILY
Qty: 90 TABLET | Refills: 3 | Status: SHIPPED | OUTPATIENT
Start: 2022-10-12 | End: 2022-11-09 | Stop reason: DRUGHIGH

## 2022-10-12 RX ORDER — HYDROXYZINE HYDROCHLORIDE 25 MG/1
25 TABLET, FILM COATED ORAL 3 TIMES DAILY PRN
Qty: 30 TABLET | Refills: 4 | Status: ON HOLD | OUTPATIENT
Start: 2022-10-12 | End: 2023-06-23

## 2022-10-12 ASSESSMENT — ENCOUNTER SYMPTOMS
NERVOUS/ANXIOUS: 1
DIZZINESS: 0
ARTHRALGIAS: 0
EYE PAIN: 0
HEMATURIA: 0
HEARTBURN: 0
BREAST MASS: 0
FEVER: 0
WEAKNESS: 0
SHORTNESS OF BREATH: 0
COUGH: 0
DIARRHEA: 0
CHILLS: 0
NAUSEA: 0
CONSTIPATION: 0
ABDOMINAL PAIN: 0
PALPITATIONS: 0
HEADACHES: 0
MYALGIAS: 0
PARESTHESIAS: 0
JOINT SWELLING: 0
FREQUENCY: 0
SORE THROAT: 0
DYSURIA: 0

## 2022-10-12 ASSESSMENT — PAIN SCALES - GENERAL: PAINLEVEL: NO PAIN (0)

## 2022-10-12 ASSESSMENT — ACTIVITIES OF DAILY LIVING (ADL): CURRENT_FUNCTION: NO ASSISTANCE NEEDED

## 2022-10-12 NOTE — PROGRESS NOTES
"SUBJECTIVE:   Asmita is a 66 year old who presents for Preventive Visit.      Patient has been advised of split billing requirements and indicates understanding: Yes  Are you in the first 12 months of your Medicare coverage?  No    Healthy Habits:     In general, how would you rate your overall health?  Good    Frequency of exercise:  4-5 days/week    Duration of exercise:  Less than 15 minutes    Do you usually eat at least 4 servings of fruit and vegetables a day, include whole grains    & fiber and avoid regularly eating high fat or \"junk\" foods?  Yes    Taking medications regularly:  Yes    Medication side effects:  None    Ability to successfully perform activities of daily living:  No assistance needed    Home Safety:  No safety concerns identified    Hearing Impairment:  Need to ask people to speak up or repeat themselves and difficulty understanding soft or whispered speech    In the past 6 months, have you been bothered by leaking of urine?  No    In general, how would you rate your overall mental or emotional health?  Good      PHQ-2 Total Score: 0    Additional concerns today:  No    Do you feel safe in your environment? Yes    Have you ever done Advance Care Planning? (For example, a Health Directive, POLST, or a discussion with a medical provider or your loved ones about your wishes): No, advance care planning information given to patient to review.  Patient declined advance care planning discussion at this time.       Fall risk  Fallen 2 or more times in the past year?: No  Any fall with injury in the past year?: No    Cognitive Screening   1) Repeat 3 items (Leader, Season, Table)    2) Clock draw: NORMAL  3) 3 item recall: Recalls 2 objects   Results: NORMAL clock, 1-2 items recalled: COGNITIVE IMPAIRMENT LESS LIKELY    Mini-CogTM Copyright DENVER Neil. Licensed by the author for use in [facility]; reprinted with permission (angel@[email or web address]). All rights reserved.      Do you have sleep apnea, " excessive snoring or daytime drowsiness?: no    Reviewed and updated as needed this visit by clinical staff   Tobacco  Allergies  Meds   Med Hx  Surg Hx  Fam Hx  Soc Hx        Reviewed and updated as needed this visit by Provider                 Social History     Tobacco Use     Smoking status: Every Day     Packs/day: 1.00     Years: 40.00     Pack years: 40.00     Types: Cigarettes     Smokeless tobacco: Never     Tobacco comments:     pt not interested in quit plan 4/22/09   Substance Use Topics     Alcohol use: No     If you drink alcohol do you typically have >3 drinks per day or >7 drinks per week? No    Alcohol Use 10/12/2022   Prescreen: >3 drinks/day or >7 drinks/week? Not Applicable   Prescreen: >3 drinks/day or >7 drinks/week? -     - Tobacco abuse. Patient is currently smoking cigarettes, 1ppd.    Diabetes Follow-up  Trulicity 0.75mg Sub Q weekly, metformin 1,000mg bid  How often are you checking your blood sugar? Two times daily  Blood sugar testing frequency justification:  Uncontrolled diabetes  What time of day are you checking your blood sugars (select all that apply)?  Before and after meals  Have you had any blood sugars above 200?  No  Have you had any blood sugars below 70?  Yes     What symptoms do you notice when your blood sugar is low?  None    What concerns do you have today about your diabetes? None     Do you have any of these symptoms? (Select all that apply)  No numbness or tingling in feet.  No redness, sores or blisters on feet.  No complaints of excessive thirst.  No reports of blurry vision.  No significant changes to weight.    Have you had a diabetic eye exam in the last 12 months? Yes- Date of last eye exam: 2/2022,  Location: [facility]          Hyperlipidemia Follow-Up  Atorvastatin 40mg qd    Are you regularly taking any medication or supplement to lower your cholesterol?   Yes- statin    Are you having muscle aches or other side effects that you think could be  caused by your cholesterol lowering medication?  No    Hypertension Follow-up  Lisinopril 40mg every day, metoprolol 25mg qd    Do you check your blood pressure regularly outside of the clinic? Yes     Are you following a low salt diet? No    Are your blood pressures ever more than 140 on the top number (systolic) OR more   than 90 on the bottom number (diastolic), for example 140/90? Yes    BP Readings from Last 2 Encounters:   10/12/22 (!) 144/74   11/15/21 135/72     Hemoglobin A1C (%)   Date Value   10/07/2021 7.0 (H)   03/01/2021 6.9 (H)   08/06/2020 7.1 (H)     LDL Cholesterol Calculated (mg/dL)   Date Value   10/07/2021 32   08/06/2020 57   12/16/2019 31         Current providers sharing in care for this patient include:   Patient Care Team:  Melissa Pitts MD as PCP - General (Family Practice)  Melissa Pitts MD as Assigned PCP    The following health maintenance items are reviewed in Epic and correct as of today:  Health Maintenance   Topic Date Due     DEXA  Never done     EYE EXAM  Never done     Pneumococcal Vaccine: 65+ Years (1 - PCV) Never done     ZOSTER IMMUNIZATION (1 of 2) Never done     COVID-19 Vaccine (4 - Booster for Moderna series) 02/02/2022     A1C  04/07/2022     INFLUENZA VACCINE (1) 09/01/2022     BMP  10/07/2022     LIPID  10/07/2022     MICROALBUMIN  10/07/2022     LUNG CANCER SCREENING  11/02/2022     NICOTINE/TOBACCO CESSATION COUNSELING Q 1 YR  10/12/2023     MEDICARE ANNUAL WELLNESS VISIT  10/12/2023     DIABETIC FOOT EXAM  10/12/2023     ANNUAL REVIEW OF HM ORDERS  10/12/2023     FALL RISK ASSESSMENT  10/12/2023     MAMMO SCREENING  05/10/2024     DTAP/TDAP/TD IMMUNIZATION (4 - Td or Tdap) 11/18/2025     COLORECTAL CANCER SCREENING  12/11/2025     ADVANCE CARE PLANNING  10/12/2027     HEPATITIS C SCREENING  Completed     PHQ-2 (once per calendar year)  Completed     IPV IMMUNIZATION  Aged Out     MENINGITIS IMMUNIZATION  Aged Out     Lab work is in process  Labs reviewed in  "EPIC  Pneumonia Vaccine:declined    FHS-7:   Breast CA Risk Assessment (FHS-7) 5/10/2022   Did any of your first-degree relatives have breast or ovarian cancer? No   Did any of your relatives have bilateral breast cancer? No   Did any man in your family have breast cancer? No   Did any woman in your family have breast and ovarian cancer? No   Did any woman in your family have breast cancer before age 50 y? No   Do you have 2 or more relatives with breast and/or ovarian cancer? No   Do you have 2 or more relatives with breast and/or bowel cancer? No       Mammogram Screening: Recommended mammography every 1-2 years with patient discussion and risk factor consideration  Pertinent mammograms are reviewed under the imaging tab.    Review of Systems  Constitutional, HEENT, cardiovascular, pulmonary, gi and gu systems are negative, except as otherwise noted.    OBJECTIVE:   BP (!) 144/74   Pulse 88   Temp 97.6  F (36.4  C) (Tympanic)   Resp 18   Ht 1.6 m (5' 3\")   Wt 57.2 kg (126 lb)   LMP 12/01/2009   SpO2 98%   BMI 22.32 kg/m   Estimated body mass index is 22.32 kg/m  as calculated from the following:    Height as of this encounter: 1.6 m (5' 3\").    Weight as of this encounter: 57.2 kg (126 lb).  Physical Exam  GENERAL: healthy, alert and no distress  NECK: no adenopathy, no asymmetry, masses, or scars and thyroid normal to palpation  RESP: lungs clear to auscultation - no rales, rhonchi or wheezes  CV: regular rate and rhythm, normal S1 S2, no S3 or S4, no murmur, click or rub, no peripheral edema and peripheral pulses strong  ABDOMEN: soft, nontender, no hepatosplenomegaly, no masses and bowel sounds normal   (female): normal female external genitalia, normal urethral meatus , vaginal mucosa pink, moist, well rugated and normal cervix, adnexae, and uterus without masses.  MS: no gross musculoskeletal defects noted, no edema  PSYCH: mentation appears normal and anxious    Diagnostic Test Results:  Labs " reviewed in Epic    ASSESSMENT / PLAN:   (Z00.00) Encounter for Medicare annual wellness exam  (primary encounter diagnosis)  Comment:    Plan:      (E11.9) Type 2 diabetes mellitus without complication, without long-term current use of insulin (H)  Comment: increase trulicity to 1.5 mg weekly  Plan: HEMOGLOBIN A1C, Lipid panel reflex to direct         LDL Non-fasting, Albumin Random Urine         Quantitative with Creat Ratio, FOOT EXAM, TSH         with free T4 reflex, metFORMIN (GLUCOPHAGE XR)         500 MG 24 hr tablet, atorvastatin (LIPITOR) 40         MG tablet, dulaglutide (TRULICITY) 1.5 MG/0.5ML        pen, OFFICE/OUTPT VISIT,EST,LEVL III, blood         glucose (ACCU-CHEK SIDDHARTHA PLUS) test strip             (I10) Essential hypertension with goal blood pressure less than 140/90  Comment: increase Metoprolol xl to 50 mg daily  Recheck in 4 weeks  Plan: BASIC METABOLIC PANEL, lisinopril (ZESTRIL) 40         MG tablet, OFFICE/OUTPT VISIT,EST,LEVL III,         metoprolol succinate ER (TOPROL XL) 50 MG 24 hr        tablet             (Z72.0) Tobacco use  Comment: recommend cessation  Plan: TOBACCO CESSATION - FOR HEALTH MAINTENANCE             (Z78.0) Postmenopausal status  Comment:    Plan: DEXA HIP/PELVIS/SPINE - Future             (R87.810) Cervical high risk HPV (human papillomavirus) test positive  Comment:    Plan: Pap screen with HPV - recommended age 30 - 65         years             (Z12.4) Screening for cervical cancer  Comment:    Plan: Pap screen with HPV - recommended age 30 - 65         years             (F41.1) RENETTA (generalized anxiety disorder)  Comment: risks, benefits and alternatives discussed    Start lexapro  Plan: hydrOXYzine (ATARAX) 25 MG tablet     Plan: escitalopram (LEXAPRO) 10 MG tablet        Recheck 4 weeks      Patient has been advised of split billing requirements and indicates understanding: Yes    COUNSELING:  Reviewed preventive health counseling, as reflected in patient  "instructions       Regular exercise       Healthy diet/nutrition    Estimated body mass index is 22.32 kg/m  as calculated from the following:    Height as of this encounter: 1.6 m (5' 3\").    Weight as of this encounter: 57.2 kg (126 lb).    Weight management plan noted, stable and monitoring    She reports that she has been smoking cigarettes. She has a 40.00 pack-year smoking history. She has never used smokeless tobacco.  Nicotine/Tobacco Cessation Plan:   Information offered: Patient not interested at this time      Appropriate preventive services were discussed with this patient, including applicable screening as appropriate for cardiovascular disease, diabetes, osteopenia/osteoporosis, and glaucoma.  As appropriate for age/gender, discussed screening for colorectal cancer, prostate cancer, breast cancer, and cervical cancer. Checklist reviewing preventive services available has been given to the patient.    Reviewed patients plan of care and provided an AVS. The Basic Care Plan (routine screening as documented in Health Maintenance) for Asmita meets the Care Plan requirement. This Care Plan has been established and reviewed with the Patient.    Counseling Resources:  ATP IV Guidelines  Pooled Cohorts Equation Calculator  Breast Cancer Risk Calculator  Breast Cancer: Medication to Reduce Risk  FRAX Risk Assessment  ICSI Preventive Guidelines  Dietary Guidelines for Americans, 2010  [employer]'s MyPlate  ASA Prophylaxis  Lung CA Screening    Melissa Pitts MD  [facility]    Identified Health Risks:  "

## 2022-10-12 NOTE — LETTER
October 17, 2022      Asmita Jerez  [address]  [place] [address]        Dear ,    We are writing to inform you of your test results.    Your test results fall within the expected range(s) or remain unchanged from previous results.  Please continue with current treatment plan.    Resulted Orders   HEMOGLOBIN A1C   Result Value Ref Range    Hemoglobin A1C 7.5 (H) 0.0 - 5.6 %      Comment:      Normal <5.7%   Prediabetes 5.7-6.4%    Diabetes 6.5% or higher     Note: Adopted from ADA consensus guidelines.   BASIC METABOLIC PANEL   Result Value Ref Range    Sodium 140 136 - 145 mmol/L    Potassium 4.3 3.4 - 5.3 mmol/L    Chloride 103 98 - 107 mmol/L    Carbon Dioxide (CO2) 23 22 - 29 mmol/L    Anion Gap 14 7 - 15 mmol/L    Urea Nitrogen 14.7 8.0 - 23.0 mg/dL    Creatinine 0.82 0.51 - 0.95 mg/dL    Calcium 10.3 (H) 8.8 - 10.2 mg/dL    Glucose 128 (H) 70 - 99 mg/dL    GFR Estimate 78 >60 mL/min/1.73m2      Comment:      Effective December 21, 2021 eGFRcr in adults is calculated using the 2021 CKD-EPI creatinine equation which includes age and gender (Chris et al., NE, DOI: 10.1056/LFLOmi3844535)   Lipid panel reflex to direct LDL Non-fasting   Result Value Ref Range    Cholesterol 110 <200 mg/dL    Triglycerides 131 <150 mg/dL    Direct Measure HDL 42 (L) >=50 mg/dL    LDL Cholesterol Calculated 42 <=100 mg/dL    Non HDL Cholesterol 68 <130 mg/dL    Narrative    Cholesterol  Desirable:  <200 mg/dL    Triglycerides  Normal:  Less than 150 mg/dL  Borderline High:  150-199 mg/dL  High:  200-499 mg/dL  Very High:  Greater than or equal to 500 mg/dL    Direct Measure HDL  Female:  Greater than or equal to 50 mg/dL   Male:  Greater than or equal to 40 mg/dL    LDL Cholesterol  Desirable:  <100mg/dL  Above Desirable:  100-129 mg/dL   Borderline High:  130-159 mg/dL   High:  160-189 mg/dL   Very High:  >= 190 mg/dL    Non HDL Cholesterol  Desirable:  130 mg/dL  Above Desirable:  130-159  mg/dL  Borderline High:  160-189 mg/dL  High:  190-219 mg/dL  Very High:  Greater than or equal to 220 mg/dL   Albumin Random Urine Quantitative with Creat Ratio   Result Value Ref Range    Albumin Urine mg/L <12.0 mg/L      Comment:      The reference ranges have not been established in urine albumin. The results should be integrated into the clinical context for interpretation.    Albumin Urine mg/g Cr        Comment:      Unable to calculate, urine albumin and/or urine creatinine is outside detectable limits.  Microalbuminuria is defined as an albumin:creatinine ratio of 17 to 299 for males and 25 to 299 for females. A ratio of albumin:creatinine of 300 or higher is indicative of overt proteinuria.  Due to biologic variability, positive results should be confirmed by a second, first-morning random or 24-hour timed urine specimen. If there is discrepancy, a third specimen is recommended. When 2 out of 3 results are in the microalbuminuria range, this is evidence for incipient nephropathy and warrants increased efforts at glucose control, blood pressure control, and institution of therapy with an angiotensin-converting-enzyme (ACE) inhibitor (if the patient can tolerate it).      Creatinine Urine mg/dL 31.1 mg/dL      Comment:      The reference ranges have not been established in urine creatinine. The results should be integrated into the clinical context for interpretation.   TSH with free T4 reflex   Result Value Ref Range    TSH 1.64 0.30 - 4.20 uIU/mL   Please notify pt of normal/acceptable results.       If you have any questions or concerns, please call the clinic at the number listed above.       Sincerely,      Melissa Pitts MD

## 2022-10-12 NOTE — PATIENT INSTRUCTIONS
"Increase Trulicity to 1.5 mg weekly    See me in 6 months    Schedule CT scan of the lungs and DEXA (bone density) in early November: [phone]    Increase Metoprolol xl to 50 mg daily    Lexapro/escitalopram 10 mg daily  Recheck in 1 month    SHINGLES VACCINE/SHINGRIX  If you had chicken pox as a child, you are at risk of shingles (a painful rash with blisters that can sometimes lead to chronic nerve pain or blindness if it affects your eye).    The new Shingrix vaccine is supposed to be more effective at preventing shingles (98%).  Even if you had Zostavax (the original shingles vaccine), this is recommended. I encourage people to check with their pharmacy (or ours) and have them run it through to check the cost.  It's often better covered through your pharmacy benefit.      It is a two part vaccine series - one now and one in 2-6 months.  Many people will feel a bit \"flu like\" with fever and body aches for a few days after the injection.  Taking ibuprofen can help with this.            Patient Education   Personalized Prevention Plan  You are due for the preventive services outlined below.  Your care team is available to assist you in scheduling these services.  If you have already completed any of these items, please share that information with your care team to update in your medical record.  Health Maintenance Due   Topic Date Due    Osteoporosis Screening  Never done    Eye Exam  Never done    Pneumococcal Vaccine (1 - PCV) Never done    Zoster (Shingles) Vaccine (1 of 2) Never done    PHQ-2 (once per calendar year)  01/01/2022    COVID-19 Vaccine (4 - Booster for Moderna series) 02/02/2022    Talk to your care team about options to quit tobacco use.  03/01/2022    FALL RISK ASSESSMENT  03/29/2022    A1C Lab  04/07/2022    Flu Vaccine (1) 09/01/2022    Basic Metabolic Panel  10/07/2022    Cholesterol Lab  10/07/2022    Kidney Microalbumin Urine Test  10/07/2022    Diabetic Foot Exam  10/07/2022    ANNUAL " REVIEW OF  ORDERS  10/07/2022    LUNG CANCER SCREENING  11/02/2022

## 2022-10-14 LAB
BKR LAB AP GYN ADEQUACY: NORMAL
BKR LAB AP GYN INTERPRETATION: NORMAL
BKR LAB AP HPV REFLEX: NORMAL
BKR LAB AP PREVIOUS ABNORMAL: NORMAL
PATH REPORT.COMMENTS IMP SPEC: NORMAL
PATH REPORT.COMMENTS IMP SPEC: NORMAL
PATH REPORT.RELEVANT HX SPEC: NORMAL

## 2022-10-17 ENCOUNTER — TELEPHONE (OUTPATIENT)
Dept: FAMILY MEDICINE | Facility: CLINIC | Age: 67
End: 2022-10-17

## 2022-10-17 DIAGNOSIS — Z87.891 PERSONAL HISTORY OF NICOTINE DEPENDENCE: ICD-10-CM

## 2022-10-17 DIAGNOSIS — Z72.0 TOBACCO USE: Primary | ICD-10-CM

## 2022-10-17 NOTE — TELEPHONE ENCOUNTER
Patient called and says on her after care summary it says to schedule a Dexa scan and a CT of lungs.  Patient went to schedule and they do not have an order for a CT of the lungs.  Please place order.        Carolina Yousif, AMBROSIO Lara

## 2022-10-18 LAB
HUMAN PAPILLOMA VIRUS 16 DNA: NEGATIVE
HUMAN PAPILLOMA VIRUS 18 DNA: NEGATIVE
HUMAN PAPILLOMA VIRUS FINAL DIAGNOSIS: ABNORMAL
HUMAN PAPILLOMA VIRUS OTHER HR: POSITIVE

## 2022-10-19 ENCOUNTER — PATIENT OUTREACH (OUTPATIENT)
Dept: FAMILY MEDICINE | Facility: CLINIC | Age: 67
End: 2022-10-19

## 2022-11-09 ENCOUNTER — OFFICE VISIT (OUTPATIENT)
Dept: FAMILY MEDICINE | Facility: CLINIC | Age: 67
End: 2022-11-09
Payer: COMMERCIAL

## 2022-11-09 VITALS
BODY MASS INDEX: 22.86 KG/M2 | HEIGHT: 63 IN | WEIGHT: 129 LBS | SYSTOLIC BLOOD PRESSURE: 128 MMHG | DIASTOLIC BLOOD PRESSURE: 68 MMHG | HEART RATE: 89 BPM | RESPIRATION RATE: 14 BRPM | OXYGEN SATURATION: 98 % | TEMPERATURE: 96.6 F

## 2022-11-09 DIAGNOSIS — I10 HYPERTENSION, GOAL BELOW 140/90: Primary | ICD-10-CM

## 2022-11-09 DIAGNOSIS — F41.1 GENERALIZED ANXIETY DISORDER: ICD-10-CM

## 2022-11-09 DIAGNOSIS — E83.52 HYPERCALCEMIA: ICD-10-CM

## 2022-11-09 PROCEDURE — 99214 OFFICE O/P EST MOD 30 MIN: CPT | Performed by: FAMILY MEDICINE

## 2022-11-09 RX ORDER — METOPROLOL SUCCINATE 100 MG/1
100 TABLET, EXTENDED RELEASE ORAL DAILY
Qty: 90 TABLET | Refills: 3 | Status: SHIPPED | OUTPATIENT
Start: 2022-11-09 | End: 2023-11-01

## 2022-11-09 ASSESSMENT — PATIENT HEALTH QUESTIONNAIRE - PHQ9: 5. POOR APPETITE OR OVEREATING: SEVERAL DAYS

## 2022-11-09 ASSESSMENT — ANXIETY QUESTIONNAIRES
2. NOT BEING ABLE TO STOP OR CONTROL WORRYING: NEARLY EVERY DAY
3. WORRYING TOO MUCH ABOUT DIFFERENT THINGS: NEARLY EVERY DAY
GAD7 TOTAL SCORE: 11
5. BEING SO RESTLESS THAT IT IS HARD TO SIT STILL: NOT AT ALL
GAD7 TOTAL SCORE: 11
1. FEELING NERVOUS, ANXIOUS, OR ON EDGE: NEARLY EVERY DAY
7. FEELING AFRAID AS IF SOMETHING AWFUL MIGHT HAPPEN: SEVERAL DAYS
6. BECOMING EASILY ANNOYED OR IRRITABLE: NOT AT ALL

## 2022-11-09 NOTE — PROGRESS NOTES
Assessment & Plan     Hypertension, goal below 140/90   increase metoprolol to 100 mg daily  Blood pressure is still running pretty high at home  HR in the 80s -90s still  - metoprolol succinate ER (TOPROL XL) 100 MG 24 hr tablet; Take 1 tablet (100 mg) by mouth daily    Hypercalcemia  Will have her cut back on calcium    Generalized anxiety disorder  Decided not to take the lexapro due to list of side effects  She wants to try Ashwaganda supplement                   No follow-ups on file.    Melissa Pitts MD  [facility]    Yessi Tian is a 66 year old, presenting for the following health issues:  Hypertension      History of Present Illness       Hypertension: She presents for follow up of hypertension.  She does check blood pressure  regularly outside of the clinic. Outside blood pressures have been over 140/90. She follows a low salt diet.         Diabetes Follow-up    How often are you checking your blood sugar? One time daily  What time of day are you checking your blood sugars (select all that apply)?  Before meals  Have you had any blood sugars above 200?  No  Have you had any blood sugars below 70?  No    What symptoms do you notice when your blood sugar is low?  None    What concerns do you have today about your diabetes? None     Do you have any of these symptoms? (Select all that apply)  No numbness or tingling in feet.  No redness, sores or blisters on feet.  No complaints of excessive thirst.  No reports of blurry vision.  No significant changes to weight.    Improved blood sugars on the increased dose of Trulicity.      BP Readings from Last 2 Encounters:   11/09/22 128/68   10/12/22 (!) 154/80     Hemoglobin A1C (%)   Date Value   10/12/2022 7.5 (H)   10/07/2021 7.0 (H)   03/01/2021 6.9 (H)   08/06/2020 7.1 (H)     LDL Cholesterol Calculated (mg/dL)   Date Value   10/12/2022 42   10/07/2021 32   08/06/2020 57   12/16/2019 31     Anxiety follow up  Did not take  "the lexapro after looking at the side effects  Wants to try Ashwaganda as she feels this might help her anxiety and her blood pressure.     Hypertension Follow-up      Do you check your blood pressure regularly outside of the clinic? Yes     Are you following a low salt diet? Yes    Are your blood pressures ever more than 140 on the top number (systolic) OR more   than 90 on the bottom number (diastolic), for example 140/90? Yes      How many servings of fruits and vegetables do you eat daily?  2-3    On average, how many sweetened beverages do you drink each day (Examples: soda, juice, sweet tea, etc.  Do NOT count diet or artificially sweetened beverages)?   0    How many days per week do you exercise enough to make your heart beat faster? 3 or less    How many minutes a day do you exercise enough to make your heart beat faster? 30 - 60    How many days per week do you miss taking your medication? 0    PHQ 2/20/2020 3/1/2021 3/29/2021   PHQ-9 Total Score 14 2 3   Q9: Thoughts of better off dead/self-harm past 2 weeks Not at all Not at all Not at all     RENETTA-7 SCORE 3/1/2021 3/29/2021 11/9/2022   Total Score 9 5 11         Review of Systems   Constitutional, HEENT, cardiovascular, pulmonary, gi and gu systems are negative, except as otherwise noted.      Objective    /68 (BP Location: Right arm, Patient Position: Sitting, Cuff Size: Adult Large)   Pulse 89   Temp (!) 96.6  F (35.9  C) (Tympanic)   Resp 14   Ht 1.6 m (5' 3\")   Wt 58.5 kg (129 lb)   LMP 12/01/2009   SpO2 98%   BMI 22.85 kg/m    Body mass index is 22.85 kg/m .  Physical Exam   GENERAL: healthy, alert and no distress  PSYCH: mentation appears normal and anxious                      "

## 2022-12-14 ENCOUNTER — HOSPITAL ENCOUNTER (OUTPATIENT)
Dept: BONE DENSITY | Facility: CLINIC | Age: 67
Discharge: HOME | End: 2022-12-14
Attending: FAMILY MEDICINE
Payer: COMMERCIAL

## 2022-12-14 ENCOUNTER — HOSPITAL ENCOUNTER (OUTPATIENT)
Dept: CT IMAGING | Facility: CLINIC | Age: 67
Discharge: HOME | End: 2022-12-14
Attending: FAMILY MEDICINE
Payer: COMMERCIAL

## 2022-12-14 DIAGNOSIS — Z87.891 PERSONAL HISTORY OF NICOTINE DEPENDENCE: ICD-10-CM

## 2022-12-14 DIAGNOSIS — Z72.0 TOBACCO USE: ICD-10-CM

## 2022-12-14 DIAGNOSIS — Z78.0 POSTMENOPAUSAL STATUS: ICD-10-CM

## 2022-12-14 PROCEDURE — 71271 CT THORAX LUNG CANCER SCR C-: CPT

## 2022-12-14 PROCEDURE — 77080 DXA BONE DENSITY AXIAL: CPT

## 2022-12-16 ENCOUNTER — HOSPITAL ENCOUNTER (EMERGENCY)
Facility: CLINIC | Age: 67
Discharge: HOME | End: 2022-12-16
Attending: NURSE PRACTITIONER | Admitting: NURSE PRACTITIONER
Payer: COMMERCIAL

## 2022-12-16 VITALS
HEART RATE: 90 BPM | SYSTOLIC BLOOD PRESSURE: 196 MMHG | DIASTOLIC BLOOD PRESSURE: 82 MMHG | OXYGEN SATURATION: 96 % | TEMPERATURE: 99.8 F | BODY MASS INDEX: 22.14 KG/M2 | RESPIRATION RATE: 18 BRPM | WEIGHT: 125 LBS

## 2022-12-16 DIAGNOSIS — S40.021A HEMATOMA OF ARM, RIGHT, INITIAL ENCOUNTER: ICD-10-CM

## 2022-12-16 PROCEDURE — G0463 HOSPITAL OUTPT CLINIC VISIT: HCPCS | Performed by: NURSE PRACTITIONER

## 2022-12-16 PROCEDURE — 99213 OFFICE O/P EST LOW 20 MIN: CPT | Performed by: NURSE PRACTITIONER

## 2022-12-16 ASSESSMENT — ACTIVITIES OF DAILY LIVING (ADL): ADLS_ACUITY_SCORE: 35

## 2022-12-16 ASSESSMENT — ENCOUNTER SYMPTOMS
BACK PAIN: 1
MYALGIAS: 1
ARTHRALGIAS: 1
JOINT SWELLING: 0

## 2022-12-17 NOTE — ED PROVIDER NOTES
History     Chief Complaint   Patient presents with     Fall     Pt fell about two hours ago within 5 minutes she developed a large hematoma on her lower forearm.     HPI  Asmita Jerez is a 67 year old female who presents to the urgent care for evaluation of right forearm pain.  Prior to arrival patient missed the top step and slid down 4 wooden steps.  Denies hitting her head or any loss of consciousness.  Has bruise to her lower back with surrounding tenderness, denies bony tenderness. Denies numbness and tingling, saddle anesthesia, and loss of bowel or bladder control.  Reports a large bump on the right forearm that is nontender.  No medications prior to arrival.      Allergies:  Allergies   Allergen Reactions     Amoxicillin Rash       Problem List:    Patient Active Problem List    Diagnosis Date Noted     Generalized anxiety disorder 03/29/2021     Priority: Medium     Tobacco use 09/07/2018     Priority: Medium     Cervical high risk HPV (human papillomavirus) test positive 03/31/2018     Priority: Medium     2004, 2005, 2008, 2010, 2011, 2012, 2013 NIL paps  2014 NIL pap, Neg HPV  2015 NIL pap, Neg HPV  3/23/18 Pap: NIL, +HR HPV (Neg 16/18). Plan Pap+HPV in 1 year.  4/15/19 NIL, neg HPV. Plan: cotest 3 years  10/12/22 NIL pap, + HR HPV (not 16 or 18). Plan: cotest in 1 year  10/19/22 advised by phone. Letter sent in mail.        Essential hypertension with goal blood pressure less than 140/90 05/25/2016     Priority: Medium     Type 2 diabetes mellitus without complication (H) 10/28/2015     Priority: Medium     Hypertension, goal below 140/90 02/21/2013     Priority: Medium     Advanced directives, counseling/discussion 01/24/2012     Priority: Medium     Patient does not have an Advance/Health Care Directive (HCD), declines information/referral.    Nani Brown  January 24, 2012         CARDIOVASCULAR SCREENING; LDL GOAL LESS THAN 100 10/31/2010     Priority: Medium     Syringoma 07/15/2010      Priority: Medium     Digital mucinous cyst 07/12/2010     Priority: Medium     Eyelid lesion 07/12/2010     Priority: Medium        Past Medical History:    Past Medical History:   Diagnosis Date     Cervical high risk HPV (human papillomavirus) test positive 03/31/2018     Diabetes (H)      Hypertension      Peptic ulcer, unspecified site, unspecified as acute or chronic, without mention of hemorrhage, perforation, or obstruction      Polycystic ovaries      Pure hypercholesterolemia      Tobacco use disorder      Unspecified multiple gestation, unspecified as to episode of care        Past Surgical History:    Past Surgical History:   Procedure Laterality Date     APPENDECTOMY       BIOPSY BREAST       COLONOSCOPY N/A 12/11/2015    Procedure: COLONOSCOPY;  Surgeon: Tino Pryor MD;  Location: [facility]     ENDOSCOPIC RELEASE CARPAL TUNNEL Right 11/01/2021    Procedure: Endoscopic carpal tunnel release;  Surgeon: cA Lemus MD;  Location: [facility]     ENDOSCOPIC RELEASE CARPAL TUNNEL Left 11/15/2021    Procedure: ENDOSCOPIC Carpal Tunnel Release, LEFT, removal suture right wrist;  Surgeon: Ac Lemus MD;  Location: [facility]     LUMPECTOMY BREAST       RELEASE TRIGGER FINGER  05/23/2014    Procedure: RELEASE TRIGGER FINGER;  Surgeon: Ag Hoffman MD;  Location: [facility]     SURGICAL HISTORY OF -   01/01/1970    appendectomy     SURGICAL HISTORY OF -   01/01/1970    pilonidal cyst and sinuses       Family History:    Family History   Problem Relation Age of Onset     Unknown/Adopted Father      Dementia Father        Social History:  Marital Status:  Single [1]  Social History     Tobacco Use     Smoking status: Every Day     Packs/day: 1.00     Years: 40.00     Pack years: 40.00     Types: Cigarettes     Smokeless tobacco: Never     Tobacco comments:     pt not interested in quit plan 10/12/2022   Vaping Use     Vaping Use: Never used   Substance Use Topics     Alcohol use: No     Drug  use: No        Medications:    ASPIRIN 81 MG OR TABS  atorvastatin (LIPITOR) 40 MG tablet  blood glucose (ACCU-CHEK SIDDHARTHA PLUS) test strip  blood glucose monitoring (ACCU-CHEK MULTICLIX) lancets  dulaglutide (TRULICITY) 1.5 MG/0.5ML pen  hydrOXYzine (ATARAX) 25 MG tablet  lisinopril (ZESTRIL) 40 MG tablet  metFORMIN (GLUCOPHAGE XR) 500 MG 24 hr tablet  metoprolol succinate ER (TOPROL XL) 100 MG 24 hr tablet          Review of Systems   Musculoskeletal: Positive for arthralgias, back pain and myalgias. Negative for joint swelling.   All other systems reviewed and are negative.      Physical Exam   BP: (!) 196/82  Pulse: 90  Temp: 99.8  F (37.7  C)  Resp: 18  Weight: 56.7 kg (125 lb)  SpO2: 96 %      Physical Exam  Constitutional:       General: She is not in acute distress.     Appearance: Normal appearance.   Eyes:      Conjunctiva/sclera: Conjunctivae normal.      Pupils: Pupils are equal, round, and reactive to light.   Cardiovascular:      Rate and Rhythm: Normal rate.   Pulmonary:      Effort: Pulmonary effort is normal.   Musculoskeletal:         General: Normal range of motion.      Cervical back: Normal range of motion.        Back:    Skin:     General: Skin is warm.      Capillary Refill: Capillary refill takes less than 2 seconds.      Comments: Large fluctuant hematoma to right fore arm, see images below. Area is nontender. No wrist, forearm, elbow and shoulder pain.    Neurological:      General: No focal deficit present.      Mental Status: She is alert.               ED Course           Procedures            No results found for this or any previous visit (from the past 24 hour(s)).    Medications - No data to display    Assessments & Plan (with Medical Decision Making)   Asmita Jerez is a 67 year old female who presents to the urgent care for evaluation of right forearm pain.  Prior to arrival patient missed the top step and slid down 4 wooden steps.  Denies hitting her head or any loss of  consciousness.  Has bruise to her lower back with surrounding tenderness, denies bony tenderness. Denies numbness and tingling, saddle anesthesia, and loss of bowel or bladder control.  Reports a large bump on the right forearm that is nontender.  Slightly hypertensive, remaining vitals normal. Patient declines all imaging including forearm and lumbar spine. Discussed hematoma and home management. Will follow up with orthopedics if needed. Return precautions reviewed, all questions answered. Patient is agreeable to plan of care and discharged in no acute distress.     I have reviewed the nursing notes.    I have reviewed the findings, diagnosis, plan and need for follow up with the patient.  Discharge Medication List as of 12/16/2022  7:24 PM        Final diagnoses:   Hematoma of arm, right, initial encounter     12/16/2022   [facility] EMERGENCY DEPT     Regina Bhardwaj, APRN CNP  12/16/22 1944

## 2022-12-29 ENCOUNTER — HOSPITAL ENCOUNTER (EMERGENCY)
Facility: CLINIC | Age: 67
Discharge: HOME | End: 2022-12-29
Payer: COMMERCIAL

## 2022-12-29 VITALS
DIASTOLIC BLOOD PRESSURE: 68 MMHG | BODY MASS INDEX: 22.15 KG/M2 | WEIGHT: 125 LBS | OXYGEN SATURATION: 97 % | HEIGHT: 63 IN | HEART RATE: 74 BPM | SYSTOLIC BLOOD PRESSURE: 146 MMHG | RESPIRATION RATE: 16 BRPM | TEMPERATURE: 98 F

## 2022-12-29 NOTE — ED TRIAGE NOTES
Pt here with right forearm pain and swelling. Fell two weeks ago. Was seen and told to keep an ace wrap on for 5 days. Pt here with concerns for infection, nerve damage and blood clots.      Triage Assessment     Row Name 12/29/22 110       Triage Assessment (Adult)    Airway WDL WDL       Respiratory WDL    Respiratory WDL WDL       Skin Circulation/Temperature WDL    Skin Circulation/Temperature WDL WDL       Cardiac WDL    Cardiac WDL WDL       Peripheral/Neurovascular WDL    Peripheral Neurovascular WDL WDL       Cognitive/Neuro/Behavioral WDL    Cognitive/Neuro/Behavioral WDL WDL

## 2023-01-16 ENCOUNTER — TRANSFERRED RECORDS (OUTPATIENT)
Dept: HEALTH INFORMATION MANAGEMENT | Facility: CLINIC | Age: 68
End: 2023-01-16
Payer: COMMERCIAL

## 2023-01-31 ENCOUNTER — TELEPHONE (OUTPATIENT)
Dept: FAMILY MEDICINE | Facility: CLINIC | Age: 68
End: 2023-01-31
Payer: COMMERCIAL

## 2023-01-31 NOTE — TELEPHONE ENCOUNTER
General Call      Reason for Call:  Pt calling stating her Chiropractor wants her to get an MRI. Pt says when she goes back 2/2/23 he will write up a note stating why she needs it. Pt was wondering if that note was actually needed. Chiropracter said she could have a cyst on her lower back. Pt thought it was sciatic nerve pain. Chiroprater thinks it's cyst pressing on her lower back. Pt is not in pain today but she says she can't go 3 days without pain. Pain is in her R hip, her stomach and front leg to knee cap. R side. Pt says PCP felt the lump in her last appointment.       Okay to leave a detailed message?: Yes at Cell number on file:    Telephone Information:   Mobile [phone]     .Fadia Pitts PSC

## 2023-01-31 NOTE — TELEPHONE ENCOUNTER
Appointment needed.  I will not order a MRI based on the chiropractor wanting this.  I need to see/evaluate. I have not seen her since November.    Melissa Pitts M.D.

## 2023-02-17 ENCOUNTER — OFFICE VISIT (OUTPATIENT)
Dept: FAMILY MEDICINE | Facility: CLINIC | Age: 68
End: 2023-02-17
Payer: COMMERCIAL

## 2023-02-17 VITALS
HEART RATE: 77 BPM | HEIGHT: 64 IN | RESPIRATION RATE: 20 BRPM | OXYGEN SATURATION: 98 % | TEMPERATURE: 97 F | BODY MASS INDEX: 21.51 KG/M2 | DIASTOLIC BLOOD PRESSURE: 88 MMHG | SYSTOLIC BLOOD PRESSURE: 128 MMHG | WEIGHT: 126 LBS

## 2023-02-17 DIAGNOSIS — M54.41 CHRONIC RIGHT-SIDED LOW BACK PAIN WITH RIGHT-SIDED SCIATICA: ICD-10-CM

## 2023-02-17 DIAGNOSIS — E11.9 TYPE 2 DIABETES MELLITUS WITHOUT COMPLICATION, WITHOUT LONG-TERM CURRENT USE OF INSULIN (H): ICD-10-CM

## 2023-02-17 DIAGNOSIS — M62.830 BACK MUSCLE SPASM: ICD-10-CM

## 2023-02-17 DIAGNOSIS — M53.3 SI (SACROILIAC) JOINT DYSFUNCTION: Primary | ICD-10-CM

## 2023-02-17 DIAGNOSIS — G89.29 CHRONIC RIGHT-SIDED LOW BACK PAIN WITH RIGHT-SIDED SCIATICA: ICD-10-CM

## 2023-02-17 LAB — HBA1C MFR BLD: 7.6 % (ref 0–5.6)

## 2023-02-17 PROCEDURE — 36415 COLL VENOUS BLD VENIPUNCTURE: CPT | Performed by: NURSE PRACTITIONER

## 2023-02-17 PROCEDURE — 83036 HEMOGLOBIN GLYCOSYLATED A1C: CPT | Performed by: NURSE PRACTITIONER

## 2023-02-17 PROCEDURE — 99214 OFFICE O/P EST MOD 30 MIN: CPT | Performed by: NURSE PRACTITIONER

## 2023-02-17 RX ORDER — METHOCARBAMOL 500 MG/1
500 TABLET, FILM COATED ORAL 4 TIMES DAILY PRN
Qty: 20 TABLET | Refills: 0 | Status: SHIPPED | OUTPATIENT
Start: 2023-02-17 | End: 2023-03-28

## 2023-02-17 ASSESSMENT — PATIENT HEALTH QUESTIONNAIRE - PHQ9
SUM OF ALL RESPONSES TO PHQ QUESTIONS 1-9: 5
10. IF YOU CHECKED OFF ANY PROBLEMS, HOW DIFFICULT HAVE THESE PROBLEMS MADE IT FOR YOU TO DO YOUR WORK, TAKE CARE OF THINGS AT HOME, OR GET ALONG WITH OTHER PEOPLE: VERY DIFFICULT
SUM OF ALL RESPONSES TO PHQ QUESTIONS 1-9: 5

## 2023-02-17 ASSESSMENT — PAIN SCALES - GENERAL: PAINLEVEL: NO PAIN (0)

## 2023-02-17 NOTE — PROGRESS NOTES
Assessment & Plan     SI (sacroiliac) joint dysfunction  Acutely tender SI joint. Recommend imaging and ongoing symptomatic management. Start muscle relaxer.   - XR Lumbar Spine 2/3 Views; Future  - MR Lumbar Spine w/o Contrast; Future  - methocarbamol (ROBAXIN) 500 MG tablet; Take 1 tablet (500 mg) by mouth 4 times daily as needed for muscle spasms    Chronic right-sided low back pain with right-sided sciatica  Given chronicity recommend imaging. Discussed formal PHYSICAL THERAPY. Currently working with her chiropractor and doing PHYSICAL THERAPY through them.   - XR Lumbar Spine 2/3 Views; Future  - MR Lumbar Spine w/o Contrast; Future  - methocarbamol (ROBAXIN) 500 MG tablet; Take 1 tablet (500 mg) by mouth 4 times daily as needed for muscle spasms    Type 2 diabetes mellitus without complication, without long-term current use of insulin (H)  Hemoglobin a1c 7.6, increasing dose of trulicity from 1.5mg to 3mg. Follow up 3 months.   - Hemoglobin A1c; Future  - Hemoglobin A1c    Back muscle spasm  See above.   - methocarbamol (ROBAXIN) 500 MG tablet; Take 1 tablet (500 mg) by mouth 4 times daily as needed for muscle spasms                 Return if symptoms worsen or fail to improve.    GAURANG Crow CNP  [facility]   Asmita is a 67 year old accompanied by her self, presenting for the following health issues:  Referral and Health Maintenance (Advised patient of . Declines shots today.)      History of Present Illness       Back Pain:  She presents for follow up of back pain. Patient's back pain is a chronic problem.  Location of back pain:  Right lower back, right middle of back, right buttock, right hip and right side of waist  Description of back pain: burning, cramping, dull ache, sharp and shooting  Back pain spreads: right buttocks, right thigh and right knee    Since patient first noticed back pain, pain is: always present, but gets better and worse  Does  "back pain interfere with her job:  Yes      She eats 0-1 servings of fruits and vegetables daily.She consumes 0 sweetened beverage(s) daily.She exercises with enough effort to increase her heart rate 9 or less minutes per day.  She exercises with enough effort to increase her heart rate 3 or less days per week.   She is taking medications regularly.    Today's PHQ-9         PHQ-9 Total Score: 5    PHQ-9 Q9 Thoughts of better off dead/self-harm past 2 weeks :   Not at all    How difficult have these problems made it for you to do your work, take care of things at home, or get along with other people: Very difficult     Chronic low back pain for the last 3 years. She has been seeing a chiropractor. Saw a chiropractor without improvement. She has been using ice, aspercream, and ibuprofen. She started seeing a different chiropractor earlier this month.   She is doing PHYSICAL THERAPY 2 x per week, nothing seems to be working. Pain is present from the low back bilaterally, into the hip and radiates to the right knee. She reports that her leg \"hurts so bad that I can't move it\". Pain is waking her up in the middle of the night. Sciatic pain is constant when the pain is present. She will have aggravation of the pain with any activities.       Hypertension Follow-up      Do you check your blood pressure regularly outside of the clinic? Yes     Are you following a low salt diet? Yes    Are your blood pressures ever more than 140 on the top number (systolic) OR more   than 90 on the bottom number (diastolic), for example 140/90? Yes patient brought a copy of her recent numbers  Blood pressures have been elevated at home. In clinic improved.       Review of Systems   Constitutional, HEENT, cardiovascular, pulmonary, gi and gu systems are negative, except as otherwise noted.      Objective    /88 (BP Location: Right arm, Patient Position: Sitting, Cuff Size: Adult Regular)   Pulse 77   Temp 97  F (36.1  C) (Tympanic)   " "Resp 20   Ht 1.626 m (5' 4\")   Wt 57.2 kg (126 lb)   LMP 12/01/2009   SpO2 98%   BMI 21.63 kg/m    Body mass index is 21.63 kg/m .  Physical Exam   GENERAL: healthy, alert and no distress  NECK: no adenopathy, no asymmetry, masses, or scars and thyroid normal to palpation  RESP: lungs clear to auscultation - no rales, rhonchi or wheezes  CV: regular rate and rhythm, normal S1 S2, no S3 or S4, no murmur, click or rub, no peripheral edema and peripheral pulses strong  ABDOMEN: soft, nontender, no hepatosplenomegaly, no masses and bowel sounds normal  MS: no gross musculoskeletal defects noted, no edema  Comprehensive back pain exam:  Tenderness of right SI joint, Pain limits the following motions: flexion/ extension/ rotation, Lower extremity equal muscle tone, right leg slightly weaker than left. , Lower extremity reflexes within normal limits bilaterally, Lower extremity sensation normal and equal on both sides and Straight leg positive on  right, indicating possible ipsilateral radiculopathy                    "

## 2023-02-17 NOTE — LETTER
February 20, 2023      Asmita Jerez  [id number]  [address]  [place] [address]        Dear ,    We are writing to inform you of your test results.    As discussed on the phone, increase trulicity. I think you would benefit from seeing a spine specialist regarding your back.  Imaging results as below.     Resulted Orders   Hemoglobin A1c   Result Value Ref Range    Hemoglobin A1C 7.6 (H) 0.0 - 5.6 %      Comment:      Normal <5.7%   Prediabetes 5.7-6.4%    Diabetes 6.5% or higher     Note: Adopted from ADA consensus guidelines.     XR Lumbar Spine 2/3 Views    Result Date: 2/20/2023  EXAM: XR LUMBAR SPINE 2/3 VIEWS LOCATION: [facility] DATE/TIME: 2/18/2023 9:12 AM INDICATION: Right SI joint with sciatica. COMPARISON: None.     IMPRESSION: Five lumbar segments. Satisfactory height. 1 mm degenerative retrolisthesis L2-L3, 2 mm degenerative retrolisthesis L3-L4. No high-grade subluxations. No compression fractures. Very mild rightward lumbar curve apex L2. Clips right upper quadrant. No displaced lower rib fractures. Sacral neural foramina are intact. Procedural changes overlie the upper pelvis anteriorly. Sacral neural foramina are intact. Satisfactory sacral and coccygeal alignment. Lower lumbar spine facet joint arthropathy. Otherwise negative exam.    MR Lumbar Spine w/o Contrast    Result Date: 2/20/2023  EXAM: MR LUMBAR SPINE W/O CONTRAST LOCATION: [facility] DATE/TIME: 2/18/2023 9:43 AM INDICATION: Low back pain; Low back pain, no complicating feature; Chronic LBP duration >= 3 months; Worsening or not improving; PT chiropractic  in the last 60 days; No known automatically detected potential contraindications to imaging. COMPARISON: Plain film imaging 2/18/2023. TECHNIQUE: Routine Lumbar Spine MRI without IV contrast. FINDINGS: Nomenclature is based on 5 lumbar type vertebral bodies. No compression fractures. Satisfactory height. No marrow  or ligamentous edema. Satisfactory sacral alignment. No presacral mass or fluid collections are evident. Nothing for sacral insufficiency or  stress fracture. Mild lower lumbar spine facet joint hypertrophic changes. Mild degenerative changes both SI joints. Sacral neural foramina are intact. Low-grade 1 to 2 mm degenerative retrolisthesis L2-L3 and L3-L4. Chronic Schmorl's node endplate deformities about the narrowed L3-L4 interspace. Normal distal spinal cord and cauda equina with conus medullaris at L1-L2. No cord edema or expansive change. Nerve roots of cauda equina are satisfactory without nodularity or thickening. No retroperitoneal solid mass or adenopathy. Simple cyst lower pole right kidney is benign and does not require specific imaging follow-up. Symmetric psoas appearance bilaterally. Normal caliber of the abdominal aorta. No pelvic mass or adenopathy. T12-L1: Normal disc height and signal. No herniation. Normal facets. No spinal canal or neural foraminal stenosis. L1-L2: Mild loss of disc height with annular bulge. No disc herniation. Mild spinal stenosis. No foraminal narrowing. Facet joints are normal. L2-L3: Moderate loss of disc height. Generalized disc bulge asymmetric to the right. No disc herniation Moderate spinal stenosis. No foraminal compromise. Mild facet joint arthropathy bilaterally. L3-L4: Moderate loss of disc height. Generalized disc bulge without disc herniation. Moderate spinal stenosis and mild bilateral foraminal narrowing left more than right. Mild encroachment of the L4 lateral recesses with mild facet joint arthropathy bilaterally. L4-L5: Mild loss of disc height with generalized disc bulge asymmetric to the left. No disc herniation. Moderate spinal stenosis. Annular tear/disruption right foraminal zone series 2 image 5 is in proximity to the exited right L4 nerve root, correlate for right L4 radicular symptoms. Moderate facet joint arthropathy bilaterally. Mild to moderate  bilateral foraminal narrowing right more than left. L5-S1: Normal disc height and signal. No herniation. Moderate facet joint arthropathy bilaterally. No spinal canal or neural foraminal stenosis.     IMPRESSION: 1.  Mild to moderate degenerative changes lumbar spine most marked mid lumbar levels. 2.  No severe spinal stenosis or severe foraminal narrowing at any lumbar level. 3.  Possible etiology suggested for right L4 radiculopathy at the L4-L5 level due to proximity of annular tear/disruption to the exited right L4 nerve root at the foraminal level. There is no contact or displacement of this exited nerve root, however. 4.  Please see above for full details and level by level description.    If you have any questions or concerns, please call the clinic at the number listed above.       Sincerely,      GAURANG Schneider CNP

## 2023-02-18 ENCOUNTER — HOSPITAL ENCOUNTER (OUTPATIENT)
Dept: GENERAL RADIOLOGY | Facility: CLINIC | Age: 68
Discharge: HOME | End: 2023-02-18
Attending: NURSE PRACTITIONER
Payer: COMMERCIAL

## 2023-02-18 ENCOUNTER — HOSPITAL ENCOUNTER (OUTPATIENT)
Dept: MRI IMAGING | Facility: CLINIC | Age: 68
Discharge: HOME | End: 2023-02-18
Attending: NURSE PRACTITIONER
Payer: COMMERCIAL

## 2023-02-18 DIAGNOSIS — G89.29 CHRONIC RIGHT-SIDED LOW BACK PAIN WITH RIGHT-SIDED SCIATICA: ICD-10-CM

## 2023-02-18 DIAGNOSIS — M53.3 SI (SACROILIAC) JOINT DYSFUNCTION: ICD-10-CM

## 2023-02-18 DIAGNOSIS — M54.41 CHRONIC RIGHT-SIDED LOW BACK PAIN WITH RIGHT-SIDED SCIATICA: ICD-10-CM

## 2023-02-18 PROCEDURE — 72148 MRI LUMBAR SPINE W/O DYE: CPT

## 2023-02-18 PROCEDURE — 72100 X-RAY EXAM L-S SPINE 2/3 VWS: CPT

## 2023-02-20 RX ORDER — DULAGLUTIDE 3 MG/.5ML
3 INJECTION, SOLUTION SUBCUTANEOUS
Qty: 6 ML | Refills: 0 | Status: SHIPPED | OUTPATIENT
Start: 2023-02-20 | End: 2023-05-05

## 2023-02-23 ENCOUNTER — TELEPHONE (OUTPATIENT)
Dept: FAMILY MEDICINE | Facility: CLINIC | Age: 68
End: 2023-02-23
Payer: COMMERCIAL

## 2023-02-23 DIAGNOSIS — M51.369 ANNULAR TEAR OF LUMBAR DISC: Primary | ICD-10-CM

## 2023-02-23 DIAGNOSIS — G89.29 CHRONIC RIGHT-SIDED LOW BACK PAIN WITH RIGHT-SIDED SCIATICA: ICD-10-CM

## 2023-02-23 DIAGNOSIS — M54.41 CHRONIC RIGHT-SIDED LOW BACK PAIN WITH RIGHT-SIDED SCIATICA: ICD-10-CM

## 2023-02-23 RX ORDER — GABAPENTIN 100 MG/1
100 CAPSULE ORAL 3 TIMES DAILY
Qty: 90 CAPSULE | Refills: 1 | Status: SHIPPED | OUTPATIENT
Start: 2023-02-23 | End: 2023-02-28

## 2023-02-23 NOTE — TELEPHONE ENCOUNTER
Discussed results as below. Will start gabapentin. Spine care referral placed for management.       XR Lumbar Spine 2/3 Views    Result Date: 2/20/2023  EXAM: XR LUMBAR SPINE 2/3 VIEWS LOCATION: [facility] DATE/TIME: 2/18/2023 9:12 AM INDICATION: Right SI joint with sciatica. COMPARISON: None.     IMPRESSION: Five lumbar segments. Satisfactory height. 1 mm degenerative retrolisthesis L2-L3, 2 mm degenerative retrolisthesis L3-L4. No high-grade subluxations. No compression fractures. Very mild rightward lumbar curve apex L2. Clips right upper quadrant. No displaced lower rib fractures. Sacral neural foramina are intact. Procedural changes overlie the upper pelvis anteriorly. Sacral neural foramina are intact. Satisfactory sacral and coccygeal alignment. Lower lumbar spine facet joint arthropathy. Otherwise negative exam.    MR Lumbar Spine w/o Contrast    Result Date: 2/20/2023  EXAM: MR LUMBAR SPINE W/O CONTRAST LOCATION: [facility] DATE/TIME: 2/18/2023 9:43 AM INDICATION: Low back pain; Low back pain, no complicating feature; Chronic LBP duration >= 3 months; Worsening or not improving; PT chiropractic  in the last 60 days; No known automatically detected potential contraindications to imaging. COMPARISON: Plain film imaging 2/18/2023. TECHNIQUE: Routine Lumbar Spine MRI without IV contrast. FINDINGS: Nomenclature is based on 5 lumbar type vertebral bodies. No compression fractures. Satisfactory height. No marrow or ligamentous edema. Satisfactory sacral alignment. No presacral mass or fluid collections are evident. Nothing for sacral insufficiency or  stress fracture. Mild lower lumbar spine facet joint hypertrophic changes. Mild degenerative changes both SI joints. Sacral neural foramina are intact. Low-grade 1 to 2 mm degenerative retrolisthesis L2-L3 and L3-L4. Chronic Schmorl's node endplate deformities about the narrowed L3-L4 interspace. Normal distal  spinal cord and cauda equina with conus medullaris at L1-L2. No cord edema or expansive change. Nerve roots of cauda equina are satisfactory without nodularity or thickening. No retroperitoneal solid mass or adenopathy. Simple cyst lower pole right kidney is benign and does not require specific imaging follow-up. Symmetric psoas appearance bilaterally. Normal caliber of the abdominal aorta. No pelvic mass or adenopathy. T12-L1: Normal disc height and signal. No herniation. Normal facets. No spinal canal or neural foraminal stenosis. L1-L2: Mild loss of disc height with annular bulge. No disc herniation. Mild spinal stenosis. No foraminal narrowing. Facet joints are normal. L2-L3: Moderate loss of disc height. Generalized disc bulge asymmetric to the right. No disc herniation Moderate spinal stenosis. No foraminal compromise. Mild facet joint arthropathy bilaterally. L3-L4: Moderate loss of disc height. Generalized disc bulge without disc herniation. Moderate spinal stenosis and mild bilateral foraminal narrowing left more than right. Mild encroachment of the L4 lateral recesses with mild facet joint arthropathy bilaterally. L4-L5: Mild loss of disc height with generalized disc bulge asymmetric to the left. No disc herniation. Moderate spinal stenosis. Annular tear/disruption right foraminal zone series 2 image 5 is in proximity to the exited right L4 nerve root, correlate for right L4 radicular symptoms. Moderate facet joint arthropathy bilaterally. Mild to moderate bilateral foraminal narrowing right more than left. L5-S1: Normal disc height and signal. No herniation. Moderate facet joint arthropathy bilaterally. No spinal canal or neural foraminal stenosis.     IMPRESSION: 1.  Mild to moderate degenerative changes lumbar spine most marked mid lumbar levels. 2.  No severe spinal stenosis or severe foraminal narrowing at any lumbar level. 3.  Possible etiology suggested for right L4 radiculopathy at the L4-L5 level  due to proximity of annular tear/disruption to the exited right L4 nerve root at the foraminal level. There is no contact or displacement of this exited nerve root, however. 4.  Please see above for full details and level by level description.

## 2023-02-27 ENCOUNTER — TELEPHONE (OUTPATIENT)
Dept: FAMILY MEDICINE | Facility: CLINIC | Age: 68
End: 2023-02-27
Payer: COMMERCIAL

## 2023-02-27 NOTE — TELEPHONE ENCOUNTER
Please check and see if she has scheduled an appointment with orthopedics/spine care at this time.  Is there a specific concern that she has with Pepto-Bismol and gabapentin if taken at the same time?    It is okay for her to not take the gabapentin and continue with Tylenol and ibuprofen.  GAURANG Crow CNP

## 2023-02-27 NOTE — TELEPHONE ENCOUNTER
Patient calling regarding Gabapentin rx.     States she should not take this with Pepto Bismol antidiarrheal.   No interaction per micromedex.   States she has taken a total of 7 pills so far with no relief.     Will stop Gabapentin and having relief with tylenol and ibuprofen alternating.      No other medication tried for Annular tear of lumbar disc     Given phone number for spinal referral.     Maryanne Corrigan RN on 2/27/2023 at 10:37 AM

## 2023-02-27 NOTE — TELEPHONE ENCOUNTER
Reason for call:  Patient reporting a symptom    Symptom or request: Pt has questions about her new Gabapentin med.  Please call patient and advise.      Duration (how long have symptoms been present): ongoing    Have you been treated for this before? Yes    Additional comments:     Phone Number patient can be reached at:  Home number on file [phone] (home)    Best Time:  any    Can we leave a detailed message on this number:  YES    Call taken on 2/27/2023 at 9:59 AM by Janet Johnson

## 2023-02-28 NOTE — TELEPHONE ENCOUNTER
Patient is scheduled with Spine/ortho clinic for 3/28/23.   She has stopped gabapentin and prefers to just continue with Tylenol and ibuprofen until her appt.     Peace Cast RN  [facility]

## 2023-02-28 NOTE — TELEPHONE ENCOUNTER
Patient was instructed to return call to [facility] main line at [phone] to speak with an RN.    Peace Cast RN  [facility]

## 2023-03-27 NOTE — PROGRESS NOTES
ASSESSMENT: Asmita Jerez is a 67 year old female with past medical history significant for type 2 diabetes mellitus, hypertension, tobacco use, generalized anxiety disorder who presents today for new patient evaluation of chronic and progressive pain involving primarily the right groin radiating down the anterior thigh to the knee.  She also has some right greater than left upper buttock pain.  Patient has not had any imaging of the right hip.  My review of an MRI lumbar spine shows multilevel lumbar spondylosis.  At L4-5 there is a disc bulge with an annular tear/disruption in the right foraminal zone in close proximity to the exited right L4 nerve root.  There is also moderate facet arthropathy L4-5 and L5-S1.  There is moderate spinal canal stenosis L3-4 and L4-5.  There is mild degenerative change of both sacroiliac joints.  On exam today her pain seems most likely related to hip osteoarthritis or other pathology.  She has only mild right L3 nerve root compromise is pain in this distribution and patient describes more severe pain than I would expect related to these changes.  Suspect the upper buttock pain is related to some mild sacroiliac joint dysfunction/degeneration although SI joint provocative tests were mostly negative today.    PLAN:  A shared decision making model was used.  The patient's values and choices were respected.  The following represents what was discussed and decided upon by the physician assistant and the patient.      1.  DIAGNOSTIC TESTS:  - I reviewed the MRI lumbar spine.  No further diagnostic tests were ordered.  - I ordered x-rays right hip.    2.  PHYSICAL THERAPY: Patient reports that her chiropractor prescribed home exercise program 6 weeks ago.  She did the home exercises but then her pain got worse so she stopped doing them.  I offered to refer her for dedicated physical therapy targeting the hip and/or lumbar spine.  She declined.    3.  MEDICATIONS: No changes are made to  the patient's medications.  - Patient takes Tylenol 1000 mg up to 3 times daily as needed which is helpful  - Patient takes ibuprofen as needed  - Methocarbamol was not helpful so she stopped taking it  - Gabapentin was not helpful and she did not want to risk having side effects including suicidal ideation so she stopped taking it    4.  INTERVENTIONS: The patient I discussed a hip joint injection if the hip x-rays show significant osteoarthritis or an epidural steroid injection if it does not.  Patient is reluctant to consider corticosteroid injections because of possible side effects and she is worried about masking the pain and then doing more damage.    5.  PATIENT EDUCATION: Patient is in agreement the above plan.  All questions were answered.    6.  Patient is most interested in finding out if there is a surgical solution for her pain.  I told the patient if her right hip x-ray shows significant osteoarthritis I will refer her to a hip surgeon to get their opinion.  I told the patient that in my opinion there is not a clear surgical solution for her pain from a spine perspective.    7.  FOLLOW-UP:   My nurse will call the patient with the results of her x-ray.  If this shows significant pathology I will recommend a referral to orthopedics.  If not, I would recommend a trial of physical therapy for the low back and possibly a right L3-4 transforaminal epidural steroid injection.      SUBJECTIVE:  Asmita Jerez  Is a 67 year old female who presents today in consultation at the request of Addie Lerner CNP for new patient evaluation of chronic and progressive pain involving the right lower extremity.  Patient reports that she began to have pain 3 years ago.  She denies any injury or event to cause the pain.  For the first year after the pain began she saw a chiropractor every other day for 1 month which was not helpful.  The second year she did not do any treatments.  Beginning in January 2023 her pain became  more severe so she went back to a different chiropractor.  She went every other day for 2 weeks and they prescribed a home exercise program.  She did not have any improvement in her pain and they recommended an MRI.  Her primary care provider ordered an MRI and referred her to our clinic for further evaluation.    Patient complains of pain which begins in the right groin.  Pain radiates down the right anterior thigh to the knee.  She denies any pain distal to the knee.  She also has some pain in the right upper buttock region.  When pain is severe it spreads over to the left upper buttock.  Patient feels that the pain starts in the groin.  She describes the pain as an explosion or firework type pain.  She gets cramping pain in the pelvis.  She gets spasms/twitching in the right hip muscles.  Pain is aggravated with standing.  She cannot stand more than 10 minutes to prepare a meal or do dishes.  Any increase in activity also makes her pain worse.  She cannot vacuum or sweep because of the pain.  She has not been able to clear snow because of the pain.  Walking makes her pain worse.  At times the pain wakes her from sleep.  Pain is alleviated with applying ice.  She denies recent fevers, chills, sweats.  She has had diarrhea recently possibly due to medication changes but otherwise denies loss of bowel or bladder control.  She denies any numbness or tingling down the leg.  She has had a sensation of weakness in the right hip when she bends over to care for her cat.  She feels as if her right hip could give way.  She is also limping because of the pain.    Treatment to date:  - Chiropractic treatment for 1 month in 2021 with no help  - Chiropractic treatment for 2 weeks in February 2023 with no help  - Home exercise program prescribed by chiropractor since February 2023 with no help  - No spine injections  - No hip injections  - No spine surgeries  - No hip surgeries  - Tylenol 1000 mg up to 3 times daily as needed is  helpful  - Ibuprofen as needed is helpful  - Biofreeze is helpful  - Methocarbamol was not helpful so she stopped taking it  - Gabapentin was not helpful and she is concerned about side effects so she stopped taking it    Current Outpatient Medications   Medication     ASPIRIN 81 MG OR TABS     atorvastatin (LIPITOR) 40 MG tablet     blood glucose (ACCU-CHEK SIDDHARTHA PLUS) test strip     blood glucose monitoring (ACCU-CHEK MULTICLIX) lancets     Dulaglutide (TRULICITY) 3 MG/0.5ML SOPN     hydrOXYzine (ATARAX) 25 MG tablet     lisinopril (ZESTRIL) 40 MG tablet     metFORMIN (GLUCOPHAGE XR) 500 MG 24 hr tablet     methocarbamol (ROBAXIN) 500 MG tablet     metoprolol succinate ER (TOPROL XL) 100 MG 24 hr tablet     No current facility-administered medications for this visit.       Allergies   Allergen Reactions     Amoxicillin Rash       Past Medical History:   Diagnosis Date     Cervical high risk HPV (human papillomavirus) test positive 03/31/2018     Diabetes (H)      Hypertension      Peptic ulcer, unspecified site, unspecified as acute or chronic, without mention of hemorrhage, perforation, or obstruction      Polycystic ovaries      Pure hypercholesterolemia      Tobacco use disorder      Unspecified multiple gestation, unspecified as to episode of care     Multiple gestation        Patient Active Problem List   Diagnosis     Digital mucinous cyst     Eyelid lesion     Syringoma     CARDIOVASCULAR SCREENING; LDL GOAL LESS THAN 100     Advanced directives, counseling/discussion     Hypertension, goal below 140/90     Type 2 diabetes mellitus without complication (H)     Essential hypertension with goal blood pressure less than 140/90     Cervical high risk HPV (human papillomavirus) test positive     Tobacco use     Generalized anxiety disorder       Past Surgical History:   Procedure Laterality Date     APPENDECTOMY       BIOPSY BREAST       COLONOSCOPY N/A 12/11/2015    Procedure: COLONOSCOPY;  Surgeon: Konstantin  Tino Skaggs MD;  Location: [facility]     ENDOSCOPIC RELEASE CARPAL TUNNEL Right 11/01/2021    Procedure: Endoscopic carpal tunnel release;  Surgeon: Ac Lemus MD;  Location: [facility]     ENDOSCOPIC RELEASE CARPAL TUNNEL Left 11/15/2021    Procedure: ENDOSCOPIC Carpal Tunnel Release, LEFT, removal suture right wrist;  Surgeon: Ac Lemus MD;  Location: [facility]     LUMPECTOMY BREAST       RELEASE TRIGGER FINGER  05/23/2014    Procedure: RELEASE TRIGGER FINGER;  Surgeon: Ag Hoffman MD;  Location: [facility]     SURGICAL HISTORY OF -   01/01/1970    appendectomy     SURGICAL HISTORY OF -   01/01/1970    pilonidal cyst and sinuses       Family History   Problem Relation Age of Onset     Unknown/Adopted Father      Dementia Father        Social history: Patient is retired.  She is .  She smokes 1 pack of cigarettes per day and is not motivated to quit.  Denies alcohol use.  Denies illicit drug use.      ROS: Positive for abdominal pain, joint pain, sciatica, insomnia, anxiety.  Specifically negative for bowel/bladder dysfunction, fevers,chills, appetite changes, unexplained weight loss.   Otherwise 13 systems reviewed are negative.  Please see the patient's intake questionnaire from today for details.      OBJECTIVE:  PHYSICAL EXAMINATION:    CONSTITUTIONAL:  Vital signs as above.  No acute distress.  The patient is well nourished and well groomed.  PSYCHIATRIC:  The patient is awake, alert, oriented to person, place, time and answering questions appropriately with clear speech.    HEENT: Normocephalic, atraumatic.  Sclera clear.  Neck is supple.  SKIN:  Skin over the face, bilateral lower extremities, and posterior torso is clean, dry, intact without rashes.    GAIT:  Gait is non-antalgic.  The patient is able to heel and toe walk without significant difficulty.    STANDING EXAMINATION: No significant tenderness palpation bilateral lumbar paraspinous muscles or bilateral sacroiliac  joints.  No tenderness palpation bilateral greater trochanters.  Lumbar range of motion is full.  MUSCLE STRENGTH:  The patient has 4/5 strength right hip flexors, otherwise 5/5 strength for the left hip flexors, bilateral knee flexors/extensors, ankle dorsiflexors/plantar flexors, great toe extensors, ankle evertors/invertors.  NEUROLOGICAL: 2+ patellar, and achilles reflexes bilaterally.  Negative Babinski's bilaterally.  No ankle clonus bilaterally. Sensation to light touch is intact in the bilateral L3, L4, L5, and S1 dermatomes.  VASCULAR:  2/4 dorsalis pedis pulses bilaterally.  Bilateral lower extremities are warm.  There is no pitting edema of the bilateral lower extremities.  ABDOMINAL:  Soft, non-distended, non-tender throughout all quadrants.  No pulsatile mass palpated in the left lower quadrant.  LYMPH NODES:  No palpable or tender inguinal lymph nodes.  MUSCULOSKELETAL: Tenderness to palpation left groin.  Straight leg raise negative bilaterally.  Negative pelvic distraction test.  Negative thigh thrust bilaterally.  Negative Juanjose's test bilaterally.  Negative Gaenslen's test bilaterally.  Positive Yeoman's test bilaterally.  Range of motion of the right hip is moderately restricted with internal greater than external rotation with reproduction of right groin/lateral hip pain.    RESULTS: I reviewed the MRI lumbar spine from [facility] dated February 18, 2023.  This shows mild degenerative change sacroiliac joints.  No evidence for sacral insufficiency or stress fracture.  At L3-4 there is a disc bulge and mild facet arthropathy with moderate spinal canal stenosis and mild bilateral foraminal stenosis.  At L4-5 there is a disc bulge asymmetric to the left.  There is moderate spinal canal stenosis.  There is an annular tear in the right foraminal zone in proximity to the exited right L4 nerve root.  There is moderate facet joint arthropathy.  There is mild to moderate lateral  foraminal stenosis, right more than left.  There is also moderate facet arthropathy L5-S1 but no neural compromise.

## 2023-03-28 ENCOUNTER — HOSPITAL ENCOUNTER (OUTPATIENT)
Dept: RADIOLOGY | Facility: HOSPITAL | Age: 68
Discharge: HOME | End: 2023-03-28
Attending: PHYSICIAN ASSISTANT | Admitting: PHYSICIAN ASSISTANT
Payer: COMMERCIAL

## 2023-03-28 ENCOUNTER — OFFICE VISIT (OUTPATIENT)
Dept: PHYSICAL MEDICINE AND REHAB | Facility: CLINIC | Age: 68
End: 2023-03-28
Attending: NURSE PRACTITIONER
Payer: COMMERCIAL

## 2023-03-28 VITALS
WEIGHT: 123.8 LBS | HEART RATE: 83 BPM | HEIGHT: 64 IN | DIASTOLIC BLOOD PRESSURE: 77 MMHG | SYSTOLIC BLOOD PRESSURE: 178 MMHG | BODY MASS INDEX: 21.13 KG/M2

## 2023-03-28 DIAGNOSIS — M54.50 LUMBAR SPINE PAIN: ICD-10-CM

## 2023-03-28 DIAGNOSIS — M25.551 HIP PAIN, RIGHT: ICD-10-CM

## 2023-03-28 DIAGNOSIS — M25.551 HIP PAIN, RIGHT: Primary | ICD-10-CM

## 2023-03-28 PROCEDURE — 99204 OFFICE O/P NEW MOD 45 MIN: CPT | Performed by: PHYSICIAN ASSISTANT

## 2023-03-28 PROCEDURE — 73502 X-RAY EXAM HIP UNI 2-3 VIEWS: CPT

## 2023-03-28 RX ORDER — ACETAMINOPHEN 500 MG
1000 TABLET ORAL PRN
Status: ON HOLD | COMMUNITY
End: 2023-06-23

## 2023-03-28 ASSESSMENT — PAIN SCALES - GENERAL: PAINLEVEL: MILD PAIN (2)

## 2023-03-28 NOTE — LETTER
3/28/2023         RE: Asmita Jerez  [address] [facility]  [address]  [place] [address]        Dear Colleague,    Thank you for referring your patient, Asmita Jerez, to the [facility]. Please see a copy of my visit note below.    ASSESSMENT: Asmita Jerez is a 67 year old female with past medical history significant for type 2 diabetes mellitus, hypertension, tobacco use, generalized anxiety disorder who presents today for new patient evaluation of chronic and progressive pain involving primarily the right groin radiating down the anterior thigh to the knee.  She also has some right greater than left upper buttock pain.  Patient has not had any imaging of the right hip.  My review of an MRI lumbar spine shows multilevel lumbar spondylosis.  At L4-5 there is a disc bulge with an annular tear/disruption in the right foraminal zone in close proximity to the exited right L4 nerve root.  There is also moderate facet arthropathy L4-5 and L5-S1.  There is moderate spinal canal stenosis L3-4 and L4-5.  There is mild degenerative change of both sacroiliac joints.  On exam today her pain seems most likely related to hip osteoarthritis or other pathology.  She has only mild right L3 nerve root compromise is pain in this distribution and patient describes more severe pain than I would expect related to these changes.  Suspect the upper buttock pain is related to some mild sacroiliac joint dysfunction/degeneration although SI joint provocative tests were mostly negative today.    PLAN:  A shared decision making model was used.  The patient's values and choices were respected.  The following represents what was discussed and decided upon by the physician assistant and the patient.      1.  DIAGNOSTIC TESTS:  - I reviewed the MRI lumbar spine.  No further diagnostic tests were ordered.  - I ordered x-rays right hip.    2.  PHYSICAL THERAPY: Patient reports that her chiropractor prescribed home  exercise program 6 weeks ago.  She did the home exercises but then her pain got worse so she stopped doing them.  I offered to refer her for dedicated physical therapy targeting the hip and/or lumbar spine.  She declined.    3.  MEDICATIONS: No changes are made to the patient's medications.  - Patient takes Tylenol 1000 mg up to 3 times daily as needed which is helpful  - Patient takes ibuprofen as needed  - Methocarbamol was not helpful so she stopped taking it  - Gabapentin was not helpful and she did not want to risk having side effects including suicidal ideation so she stopped taking it    4.  INTERVENTIONS: The patient I discussed a hip joint injection if the hip x-rays show significant osteoarthritis or an epidural steroid injection if it does not.  Patient is reluctant to consider corticosteroid injections because of possible side effects and she is worried about masking the pain and then doing more damage.    5.  PATIENT EDUCATION: Patient is in agreement the above plan.  All questions were answered.    6.  Patient is most interested in finding out if there is a surgical solution for her pain.  I told the patient if her right hip x-ray shows significant osteoarthritis I will refer her to a hip surgeon to get their opinion.  I told the patient that in my opinion there is not a clear surgical solution for her pain from a spine perspective.    7.  FOLLOW-UP:   My nurse will call the patient with the results of her x-ray.  If this shows significant pathology I will recommend a referral to orthopedics.  If not, I would recommend a trial of physical therapy for the low back and possibly a right L3-4 transforaminal epidural steroid injection.      SUBJECTIVE:  Asmita Jerez  Is a 67 year old female who presents today in consultation at the request of Addie Lerner CNP for new patient evaluation of chronic and progressive pain involving the right lower extremity.  Patient reports that she began to have pain 3  years ago.  She denies any injury or event to cause the pain.  For the first year after the pain began she saw a chiropractor every other day for 1 month which was not helpful.  The second year she did not do any treatments.  Beginning in January 2023 her pain became more severe so she went back to a different chiropractor.  She went every other day for 2 weeks and they prescribed a home exercise program.  She did not have any improvement in her pain and they recommended an MRI.  Her primary care provider ordered an MRI and referred her to our clinic for further evaluation.    Patient complains of pain which begins in the right groin.  Pain radiates down the right anterior thigh to the knee.  She denies any pain distal to the knee.  She also has some pain in the right upper buttock region.  When pain is severe it spreads over to the left upper buttock.  Patient feels that the pain starts in the groin.  She describes the pain as an explosion or firework type pain.  She gets cramping pain in the pelvis.  She gets spasms/twitching in the right hip muscles.  Pain is aggravated with standing.  She cannot stand more than 10 minutes to prepare a meal or do dishes.  Any increase in activity also makes her pain worse.  She cannot vacuum or sweep because of the pain.  She has not been able to clear snow because of the pain.  Walking makes her pain worse.  At times the pain wakes her from sleep.  Pain is alleviated with applying ice.  She denies recent fevers, chills, sweats.  She has had diarrhea recently possibly due to medication changes but otherwise denies loss of bowel or bladder control.  She denies any numbness or tingling down the leg.  She has had a sensation of weakness in the right hip when she bends over to care for her cat.  She feels as if her right hip could give way.  She is also limping because of the pain.    Treatment to date:  - Chiropractic treatment for 1 month in 2021 with no help  - Chiropractic  treatment for 2 weeks in February 2023 with no help  - Home exercise program prescribed by chiropractor since February 2023 with no help  - No spine injections  - No hip injections  - No spine surgeries  - No hip surgeries  - Tylenol 1000 mg up to 3 times daily as needed is helpful  - Ibuprofen as needed is helpful  - Biofreeze is helpful  - Methocarbamol was not helpful so she stopped taking it  - Gabapentin was not helpful and she is concerned about side effects so she stopped taking it    Current Outpatient Medications   Medication     ASPIRIN 81 MG OR TABS     atorvastatin (LIPITOR) 40 MG tablet     blood glucose (ACCU-CHEK SIDDHARTHA PLUS) test strip     blood glucose monitoring (ACCU-CHEK MULTICLIX) lancets     Dulaglutide (TRULICITY) 3 MG/0.5ML SOPN     hydrOXYzine (ATARAX) 25 MG tablet     lisinopril (ZESTRIL) 40 MG tablet     metFORMIN (GLUCOPHAGE XR) 500 MG 24 hr tablet     methocarbamol (ROBAXIN) 500 MG tablet     metoprolol succinate ER (TOPROL XL) 100 MG 24 hr tablet     No current facility-administered medications for this visit.       Allergies   Allergen Reactions     Amoxicillin Rash       Past Medical History:   Diagnosis Date     Cervical high risk HPV (human papillomavirus) test positive 03/31/2018     Diabetes (H)      Hypertension      Peptic ulcer, unspecified site, unspecified as acute or chronic, without mention of hemorrhage, perforation, or obstruction      Polycystic ovaries      Pure hypercholesterolemia      Tobacco use disorder      Unspecified multiple gestation, unspecified as to episode of care     Multiple gestation        Patient Active Problem List   Diagnosis     Digital mucinous cyst     Eyelid lesion     Syringoma     CARDIOVASCULAR SCREENING; LDL GOAL LESS THAN 100     Advanced directives, counseling/discussion     Hypertension, goal below 140/90     Type 2 diabetes mellitus without complication (H)     Essential hypertension with goal blood pressure less than 140/90     Cervical  high risk HPV (human papillomavirus) test positive     Tobacco use     Generalized anxiety disorder       Past Surgical History:   Procedure Laterality Date     APPENDECTOMY       BIOPSY BREAST       COLONOSCOPY N/A 12/11/2015    Procedure: COLONOSCOPY;  Surgeon: Tino Pryor MD;  Location: [facility]     ENDOSCOPIC RELEASE CARPAL TUNNEL Right 11/01/2021    Procedure: Endoscopic carpal tunnel release;  Surgeon: Ac Lemus MD;  Location: [facility]     ENDOSCOPIC RELEASE CARPAL TUNNEL Left 11/15/2021    Procedure: ENDOSCOPIC Carpal Tunnel Release, LEFT, removal suture right wrist;  Surgeon: Ac Lemus MD;  Location: [facility]     LUMPECTOMY BREAST       RELEASE TRIGGER FINGER  05/23/2014    Procedure: RELEASE TRIGGER FINGER;  Surgeon: Ag Hoffman MD;  Location: [facility]     SURGICAL HISTORY OF -   01/01/1970    appendectomy     SURGICAL HISTORY OF -   01/01/1970    pilonidal cyst and sinuses       Family History   Problem Relation Age of Onset     Unknown/Adopted Father      Dementia Father        Social history: Patient is retired.  She is .  She smokes 1 pack of cigarettes per day and is not motivated to quit.  Denies alcohol use.  Denies illicit drug use.      ROS: Positive for abdominal pain, joint pain, sciatica, insomnia, anxiety.  Specifically negative for bowel/bladder dysfunction, fevers,chills, appetite changes, unexplained weight loss.   Otherwise 13 systems reviewed are negative.  Please see the patient's intake questionnaire from today for details.      OBJECTIVE:  PHYSICAL EXAMINATION:    CONSTITUTIONAL:  Vital signs as above.  No acute distress.  The patient is well nourished and well groomed.  PSYCHIATRIC:  The patient is awake, alert, oriented to person, place, time and answering questions appropriately with clear speech.    HEENT: Normocephalic, atraumatic.  Sclera clear.  Neck is supple.  SKIN:  Skin over the face, bilateral lower extremities, and posterior torso  is clean, dry, intact without rashes.    GAIT:  Gait is non-antalgic.  The patient is able to heel and toe walk without significant difficulty.    STANDING EXAMINATION: No significant tenderness palpation bilateral lumbar paraspinous muscles or bilateral sacroiliac joints.  No tenderness palpation bilateral greater trochanters.  Lumbar range of motion is full.  MUSCLE STRENGTH:  The patient has 4/5 strength right hip flexors, otherwise 5/5 strength for the left hip flexors, bilateral knee flexors/extensors, ankle dorsiflexors/plantar flexors, great toe extensors, ankle evertors/invertors.  NEUROLOGICAL: 2+ patellar, and achilles reflexes bilaterally.  Negative Babinski's bilaterally.  No ankle clonus bilaterally. Sensation to light touch is intact in the bilateral L3, L4, L5, and S1 dermatomes.  VASCULAR:  2/4 dorsalis pedis pulses bilaterally.  Bilateral lower extremities are warm.  There is no pitting edema of the bilateral lower extremities.  ABDOMINAL:  Soft, non-distended, non-tender throughout all quadrants.  No pulsatile mass palpated in the left lower quadrant.  LYMPH NODES:  No palpable or tender inguinal lymph nodes.  MUSCULOSKELETAL: Tenderness to palpation left groin.  Straight leg raise negative bilaterally.  Negative pelvic distraction test.  Negative thigh thrust bilaterally.  Negative Juanjose's test bilaterally.  Negative Gaenslen's test bilaterally.  Positive Yeoman's test bilaterally.  Range of motion of the right hip is moderately restricted with internal greater than external rotation with reproduction of right groin/lateral hip pain.    RESULTS: I reviewed the MRI lumbar spine from [facility] dated February 18, 2023.  This shows mild degenerative change sacroiliac joints.  No evidence for sacral insufficiency or stress fracture.  At L3-4 there is a disc bulge and mild facet arthropathy with moderate spinal canal stenosis and mild bilateral foraminal stenosis.  At L4-5 there is  a disc bulge asymmetric to the left.  There is moderate spinal canal stenosis.  There is an annular tear in the right foraminal zone in proximity to the exited right L4 nerve root.  There is moderate facet joint arthropathy.  There is mild to moderate lateral foraminal stenosis, right more than left.  There is also moderate facet arthropathy L5-S1 but no neural compromise.        Again, thank you for allowing me to participate in the care of your patient.        Sincerely,        Yisel Smith PA-C

## 2023-03-28 NOTE — PATIENT INSTRUCTIONS
[facility] Scheduling    Please call [phone] to schedule your image(s) (select option#1). There are 2 different locations, see below. You can do walk-in visits for xray only images if you want.     [facility]  [address] [place] [id number]    [address] [address]

## 2023-03-29 ENCOUNTER — TELEPHONE (OUTPATIENT)
Dept: PHYSICAL MEDICINE AND REHAB | Facility: CLINIC | Age: 68
End: 2023-03-29
Payer: COMMERCIAL

## 2023-03-29 DIAGNOSIS — M16.11 PRIMARY OSTEOARTHRITIS OF RIGHT HIP: Primary | ICD-10-CM

## 2023-03-29 NOTE — TELEPHONE ENCOUNTER
"Call placed to patient with provider's results and recommendations.  Pt stated understanding. Pt inquiring if Yisel believes the degenerative changes in her hip are what is causing her pain, \"and if she does think the hip is causing the pain, then how come when I put ice on my back that relieves the pain? I just don't understand that correlation\".     Pt reports is orthopedics is recommended she would want to be in the [place] area.     Pt aware that provider would be updated with her questions and she will be called back with a response.       "

## 2023-03-29 NOTE — TELEPHONE ENCOUNTER
My opinion that the hip is the primary pain generator is based on her clinical history and physical exam.  There may be more than one pain generator however.  Referral to orthopedics  is entered.  I am happy to see the patient back if pain persists.

## 2023-03-29 NOTE — TELEPHONE ENCOUNTER
----- Message from Yisel Smith PA-C sent at 3/29/2023 10:31 AM CDT -----  Please call the patient and let her know that her xray shows some degenerative changes in the hip.  Recommend a referral to orthopedics.

## 2023-03-29 NOTE — TELEPHONE ENCOUNTER
Call placed to pt with provider's response. Pt stated understanding and will get scheduled with orthopedics.

## 2023-03-31 ENCOUNTER — TELEPHONE (OUTPATIENT)
Dept: ORTHOPEDICS | Facility: CLINIC | Age: 68
End: 2023-03-31
Payer: COMMERCIAL

## 2023-03-31 NOTE — TELEPHONE ENCOUNTER
Reviewed patient's chart prior to appointment on 4/4/23 with Sports Med - Dr Bui for her right hip issues.  Per OV note from appointment with Dr Landis on 3/29/23 patient was NOT interested in non-surgical/injection options and wanted to discuss potential surgery.    Dr Bui is not an Ortho Surgeon and cannot advise on surgical procedures.  If patient is wanting to have surgery consultation would recommend scheduling with below providers.      [facility]: Connor David MD  [facility] - [place]: Albin Williamson MD vs Jermaine Foote MD ([phone])    If wanting to review conservative treatment options then ok to leave scheduled with Dr Bui.    [facility] [place] for return call to discuss expectations of appointment on 4/4/23.     Ac Kennedy ATC

## 2023-03-31 NOTE — TELEPHONE ENCOUNTER
Patient LVM at 9:25am on 3/31/23. She was returning the phone call she received earlier. Call back at [phone].

## 2023-03-31 NOTE — TELEPHONE ENCOUNTER
Spoke to patient discussed expectations of appointment.  Mild hip osteoarthritis, no clear indication for total hip arthroplasty referral at this point.  She would like to keep appointment with Dr Bui to review conservative treatment options.     Ac Kennedy ATC

## 2023-04-04 ENCOUNTER — OFFICE VISIT (OUTPATIENT)
Dept: ORTHOPEDICS | Facility: CLINIC | Age: 68
End: 2023-04-04
Attending: PHYSICIAN ASSISTANT
Payer: COMMERCIAL

## 2023-04-04 VITALS
BODY MASS INDEX: 21 KG/M2 | DIASTOLIC BLOOD PRESSURE: 83 MMHG | WEIGHT: 123 LBS | SYSTOLIC BLOOD PRESSURE: 183 MMHG | HEIGHT: 64 IN

## 2023-04-04 DIAGNOSIS — M16.11 PRIMARY OSTEOARTHRITIS OF RIGHT HIP: ICD-10-CM

## 2023-04-04 DIAGNOSIS — G89.29 CHRONIC RIGHT HIP PAIN: Primary | ICD-10-CM

## 2023-04-04 DIAGNOSIS — M25.551 CHRONIC RIGHT HIP PAIN: Primary | ICD-10-CM

## 2023-04-04 PROCEDURE — 20611 DRAIN/INJ JOINT/BURSA W/US: CPT | Mod: RT | Performed by: FAMILY MEDICINE

## 2023-04-04 PROCEDURE — 99214 OFFICE O/P EST MOD 30 MIN: CPT | Mod: 25 | Performed by: FAMILY MEDICINE

## 2023-04-04 RX ORDER — ROPIVACAINE HYDROCHLORIDE 5 MG/ML
3 INJECTION, SOLUTION EPIDURAL; INFILTRATION; PERINEURAL
Status: DISCONTINUED | OUTPATIENT
Start: 2023-04-04 | End: 2023-06-23

## 2023-04-04 RX ORDER — TRIAMCINOLONE ACETONIDE 40 MG/ML
40 INJECTION, SUSPENSION INTRA-ARTICULAR; INTRAMUSCULAR
Status: SHIPPED | OUTPATIENT
Start: 2023-04-04

## 2023-04-04 RX ADMIN — ROPIVACAINE HYDROCHLORIDE 3 ML: 5 INJECTION, SOLUTION EPIDURAL; INFILTRATION; PERINEURAL at 12:15

## 2023-04-04 RX ADMIN — TRIAMCINOLONE ACETONIDE 40 MG: 40 INJECTION, SUSPENSION INTRA-ARTICULAR; INTRAMUSCULAR at 12:15

## 2023-04-04 NOTE — PROGRESS NOTES
Asmita Jerez  :  1955  DOS: 2023  MRN: [id number]    Sports Medicine Clinic Visit    PCP: Melissa Pitts    Asmita Jerez is a 67 year old female who is seen in consultation at the request of  Yisel Preston with chronic right hip pain.    Injury: Gradual onset of worsening pain over the past 3+ years.  Pain located over right deep lateral anterior hip, radiating to right anterior thigh, occasionally to left hip over past 3 months .  Reports progressing radiating to sharp gnawing pain with weakness to right anterior thigh.  Additional Features:  Positive: weakness and burning pain.  Symptoms are better with Ice, Tylenol and Ibuprofen.  Symptoms are worse with: going from sit to stand, bending at waist, lifting, crossing legs.  Other evaluation and/or treatments so far consists of: Ice, Tylenol, Ibuprofen, Rest and chiropractic care, Spine Consult (Yisel Smith PA-C).  Recent imaging completed: MRI of lumbar spine completed 23, XR lumbar spine 23 & right hip 3/28/23.  Prior History of related problems: Chronic deep hip joint pain - trial of chiropractic care in  provided minimal relief.  She notes that pain now severely limits her daily activity.    Social History: retired    Review of Systems  Musculoskeletal: as above  Remainder of review of systems is negative including constitutional, CV, pulmonary, GI, Skin and Neurologic except as noted in HPI or medical history.    Past Medical History:   Diagnosis Date     Cervical high risk HPV (human papillomavirus) test positive 2018     Diabetes (H)      Hypertension      Peptic ulcer, unspecified site, unspecified as acute or chronic, without mention of hemorrhage, perforation, or obstruction      Polycystic ovaries      Pure hypercholesterolemia      Tobacco use disorder      Unspecified multiple gestation, unspecified as to episode of care     Multiple gestation     Past Surgical History:   Procedure Laterality Date      "APPENDECTOMY       BIOPSY BREAST       COLONOSCOPY N/A 12/11/2015    Procedure: COLONOSCOPY;  Surgeon: Tino Pryor MD;  Location: [facility]     ENDOSCOPIC RELEASE CARPAL TUNNEL Right 11/01/2021    Procedure: Endoscopic carpal tunnel release;  Surgeon: Ac Lemus MD;  Location: [facility]     ENDOSCOPIC RELEASE CARPAL TUNNEL Left 11/15/2021    Procedure: ENDOSCOPIC Carpal Tunnel Release, LEFT, removal suture right wrist;  Surgeon: Ac Lemus MD;  Location: [facility]     LUMPECTOMY BREAST       RELEASE TRIGGER FINGER  05/23/2014    Procedure: RELEASE TRIGGER FINGER;  Surgeon: Ag Hoffman MD;  Location: [facility]     SURGICAL HISTORY OF -   01/01/1970    appendectomy     SURGICAL HISTORY OF -   01/01/1970    pilonidal cyst and sinuses     Family History   Problem Relation Age of Onset     Unknown/Adopted Father      Dementia Father        Objective  BP (!) 183/83   Ht 1.626 m (5' 4\")   Wt 55.8 kg (123 lb)   LMP 12/01/2009   BMI 21.11 kg/m        General: healthy, alert and in no distress      HEENT: no scleral icterus or conjunctival erythema     Skin: no suspicious lesions or rash. No jaundice.     CV: regular rhythm by palpation, 2+ distal pulses, no pedal edema      Resp: normal respiratory effort without conversational dyspnea     Psych: normal mood and affect      Gait: mildly antalgic, appropriate coordination and balance     Neuro: normal light touch sensory exam of the extremities. Motor strength as noted below     Right hip exam    Inspection:        no edema or ecchymosis in hip area    ROM:       Flexion 100       internal rotation 15      external rotation 30      Range of motion limited by pain    Strength:        flexion 4/5       extension 4/5       abduction 4/5       adduction 4/5    Tender:        greater trochanter       Anterior hip joint       Mild ipsilateral SI joint TTP    Non Tender:        remainder of hip area       illiac crest       ASIS       " pubis    Sensation:        grossly intact in hip and thigh    Skin:       well perfused       capillary refill brisk    Special Tests:        neg (-) KARTIK       positive (+) FADIR       positive (+) scour       neg (-) Martinez    Radiology  Results for orders placed or performed during the hospital encounter of 03/28/23   XR Hip Right 2-3 Views    Narrative    EXAM: XR HIP RIGHT 2-3 VIEWS  LOCATION: [facility]  DATE/TIME: 3/28/2023 2:48 PM    INDICATION: right hip pain  COMPARISON: None.      Impression    IMPRESSION: No acute fracture or malalignment. Mild degenerative changes throughout the pelvis and lower lumbar spine.     Narrative & Impression   EXAM: MR LUMBAR SPINE W/O CONTRAST  LOCATION: [facility]  DATE/TIME: 2/18/2023 9:43 AM     INDICATION: Low back pain; Low back pain, no complicating feature; Chronic LBP duration >= 3 months; Worsening or not improving; PT chiropractic  in the last 60 days; No known automatically detected potential contraindications to imaging.  COMPARISON: Plain film imaging 2/18/2023.  TECHNIQUE: Routine Lumbar Spine MRI without IV contrast.     FINDINGS:   Nomenclature is based on 5 lumbar type vertebral bodies. No compression fractures. Satisfactory height. No marrow or ligamentous edema. Satisfactory sacral alignment. No presacral mass or fluid collections are evident. Nothing for sacral insufficiency or   stress fracture. Mild lower lumbar spine facet joint hypertrophic changes. Mild degenerative changes both SI joints. Sacral neural foramina are intact. Low-grade 1 to 2 mm degenerative retrolisthesis L2-L3 and L3-L4. Chronic Schmorl's node endplate   deformities about the narrowed L3-L4 interspace. Normal distal spinal cord and cauda equina with conus medullaris at L1-L2. No cord edema or expansive change. Nerve roots of cauda equina are satisfactory without nodularity or thickening. No   retroperitoneal solid mass or  adenopathy. Simple cyst lower pole right kidney is benign and does not require specific imaging follow-up. Symmetric psoas appearance bilaterally. Normal caliber of the abdominal aorta. No pelvic mass or adenopathy.     T12-L1: Normal disc height and signal. No herniation. Normal facets. No spinal canal or neural foraminal stenosis.      L1-L2: Mild loss of disc height with annular bulge. No disc herniation. Mild spinal stenosis. No foraminal narrowing. Facet joints are normal.     L2-L3: Moderate loss of disc height. Generalized disc bulge asymmetric to the right. No disc herniation Moderate spinal stenosis. No foraminal compromise. Mild facet joint arthropathy bilaterally.      L3-L4: Moderate loss of disc height. Generalized disc bulge without disc herniation. Moderate spinal stenosis and mild bilateral foraminal narrowing left more than right. Mild encroachment of the L4 lateral recesses with mild facet joint arthropathy   bilaterally.     L4-L5: Mild loss of disc height with generalized disc bulge asymmetric to the left. No disc herniation. Moderate spinal stenosis. Annular tear/disruption right foraminal zone series 2 image 5 is in proximity to the exited right L4 nerve root, correlate   for right L4 radicular symptoms. Moderate facet joint arthropathy bilaterally. Mild to moderate bilateral foraminal narrowing right more than left.     L5-S1: Normal disc height and signal. No herniation. Moderate facet joint arthropathy bilaterally. No spinal canal or neural foraminal stenosis.                                                                      IMPRESSION:  1.  Mild to moderate degenerative changes lumbar spine most marked mid lumbar levels.     2.  No severe spinal stenosis or severe foraminal narrowing at any lumbar level.     3.  Possible etiology suggested for right L4 radiculopathy at the L4-L5 level due to proximity of annular tear/disruption to the exited right L4 nerve root at the foraminal level.  There is no contact or displacement of this exited nerve root, however.     4.  Please see above for full details and level by level description.     Large Joint Injection/Arthocentesis: R hip joint    Date/Time: 4/4/2023 12:15 PM    Performed by: Mich Bui DO  Authorized by: Mich Bui DO    Indications:  Pain, diagnostic evaluation and osteoarthritis  Needle Size:  22 G  Guidance: ultrasound    Approach:  Anterior  Location:  Hip      Site:  R hip joint  Medications:  3 mL ropivacaine 5 MG/ML; 40 mg triamcinolone 40 MG/ML  Outcome:  Tolerated well, no immediate complications  Procedure discussed: discussed risks, benefits, and alternatives    Consent Given by:  Patient  Timeout: timeout called immediately prior to procedure    Prep: patient was prepped and draped in usual sterile fashion     4 ml's of 1% lidocaine was used as local anesthetic prior to injection    Ultrasound images of procedure were permanently stored.           Assessment:  1. Chronic right hip pain    2. Primary osteoarthritis of right hip        Plan:  Discussed the assessment with the patient.  Follow up: As needed based on clinical progress  Combination of low back, hip, pelvis pain over the last 3 years, R>>L  Progressing anterior thigh pain which is consistent with referred pain from the hip joint  Trial of ultrasound-guided corticosteroid injection of the right hip joint today  Consider advanced imaging for labile or worsening symptoms, defer for now  Low impact activity strategies and physical therapy options reviewed  XR images independently visualized and reviewed with patient today in clinic  Mild degenerative changes noted in bilateral hip joints on x-ray  If injection is helpful and has ongoing left hip pain could consider injection to that location as well, although we will try to limit corticosteroid injections is much as possible  Lower suspicion of low back mediated pain today, injection should  provide more clarity  Expectations and goals of CSI reviewed  Often 2-3 days for steroid effect, and can take up to two weeks for maximum effect  We discussed modified progressive pain-free activity as tolerated  Do not overuse in first two weeks if feeling better due to concern for vulnerability while steroid is working  Supportive care reviewed  All questions were answered today  Contact us with additional questions or concerns  Signs and sx of concern reviewed    Thanks very much for sending this nice lady to us, I will keep you updated with her progress      Mich Bui DO, CAQ  Sports Medicine Physician  [facility]            Disclaimer: This note consists of symbols derived from keyboarding, dictation and/or voice recognition software. As a result, there may be errors in the script that have gone undetected. Please consider this when interpreting information found in this chart.

## 2023-04-04 NOTE — LETTER
2023         RE: Asmita Jerez  [address]  [address]  [place] [address]        Dear Colleague,    Thank you for referring your patient, Asmita Jerez, to the [facility] [place]. Please see a copy of my visit note below.    Asmita Jerez  :  1955  DOS: 2023  MRN: [id number]    Sports Medicine Clinic Visit    PCP: Melissa Pitts    Asmita Jerez is a 67 year old female who is seen in consultation at the request of  Yisel Preston with chronic right hip pain.    Injury: Gradual onset of worsening pain over the past 3+ years.  Pain located over right deep lateral anterior hip, radiating to right anterior thigh, occasionally to left hip over past 3 months .  Reports progressing radiating to sharp gnawing pain with weakness to right anterior thigh.  Additional Features:  Positive: weakness and burning pain.  Symptoms are better with Ice, Tylenol and Ibuprofen.  Symptoms are worse with: going from sit to stand, bending at waist, lifting, crossing legs.  Other evaluation and/or treatments so far consists of: Ice, Tylenol, Ibuprofen, Rest and chiropractic care, Spine Consult (Yisel Smith PA-C).  Recent imaging completed: MRI of lumbar spine completed 23, XR lumbar spine 23 & right hip 3/28/23.  Prior History of related problems: Chronic deep hip joint pain - trial of chiropractic care in  provided minimal relief.  She notes that pain now severely limits her daily activity.    Social History: retired    Review of Systems  Musculoskeletal: as above  Remainder of review of systems is negative including constitutional, CV, pulmonary, GI, Skin and Neurologic except as noted in HPI or medical history.    Past Medical History:   Diagnosis Date     Cervical high risk HPV (human papillomavirus) test positive 2018     Diabetes (H)      Hypertension      Peptic ulcer, unspecified site, unspecified as acute or chronic, without mention of  "hemorrhage, perforation, or obstruction      Polycystic ovaries      Pure hypercholesterolemia      Tobacco use disorder      Unspecified multiple gestation, unspecified as to episode of care     Multiple gestation     Past Surgical History:   Procedure Laterality Date     APPENDECTOMY       BIOPSY BREAST       COLONOSCOPY N/A 12/11/2015    Procedure: COLONOSCOPY;  Surgeon: Tino Pryor MD;  Location: [facility]     ENDOSCOPIC RELEASE CARPAL TUNNEL Right 11/01/2021    Procedure: Endoscopic carpal tunnel release;  Surgeon: Ac Lemus MD;  Location: [facility]     ENDOSCOPIC RELEASE CARPAL TUNNEL Left 11/15/2021    Procedure: ENDOSCOPIC Carpal Tunnel Release, LEFT, removal suture right wrist;  Surgeon: Ac Lemus MD;  Location: [facility]     LUMPECTOMY BREAST       RELEASE TRIGGER FINGER  05/23/2014    Procedure: RELEASE TRIGGER FINGER;  Surgeon: Ag Hoffman MD;  Location: [facility]     SURGICAL HISTORY OF -   01/01/1970    appendectomy     SURGICAL HISTORY OF -   01/01/1970    pilonidal cyst and sinuses     Family History   Problem Relation Age of Onset     Unknown/Adopted Father      Dementia Father        Objective  BP (!) 183/83   Ht 1.626 m (5' 4\")   Wt 55.8 kg (123 lb)   LMP 12/01/2009   BMI 21.11 kg/m        General: healthy, alert and in no distress      HEENT: no scleral icterus or conjunctival erythema     Skin: no suspicious lesions or rash. No jaundice.     CV: regular rhythm by palpation, 2+ distal pulses, no pedal edema      Resp: normal respiratory effort without conversational dyspnea     Psych: normal mood and affect      Gait: mildly antalgic, appropriate coordination and balance     Neuro: normal light touch sensory exam of the extremities. Motor strength as noted below     Right hip exam    Inspection:        no edema or ecchymosis in hip area    ROM:       Flexion 100       internal rotation 15      external rotation 30      Range of motion limited by " pain    Strength:        flexion 4/5       extension 4/5       abduction 4/5       adduction 4/5    Tender:        greater trochanter       Anterior hip joint       Mild ipsilateral SI joint TTP    Non Tender:        remainder of hip area       illiac crest       ASIS       pubis    Sensation:        grossly intact in hip and thigh    Skin:       well perfused       capillary refill brisk    Special Tests:        neg (-) KARTIK       positive (+) FADIR       positive (+) scour       neg (-) Martinez    Radiology  Results for orders placed or performed during the hospital encounter of 03/28/23   XR Hip Right 2-3 Views    Narrative    EXAM: XR HIP RIGHT 2-3 VIEWS  LOCATION: [facility]  DATE/TIME: 3/28/2023 2:48 PM    INDICATION: right hip pain  COMPARISON: None.      Impression    IMPRESSION: No acute fracture or malalignment. Mild degenerative changes throughout the pelvis and lower lumbar spine.     Narrative & Impression   EXAM: MR LUMBAR SPINE W/O CONTRAST  LOCATION: [facility]  DATE/TIME: 2/18/2023 9:43 AM     INDICATION: Low back pain; Low back pain, no complicating feature; Chronic LBP duration >= 3 months; Worsening or not improving; PT chiropractic  in the last 60 days; No known automatically detected potential contraindications to imaging.  COMPARISON: Plain film imaging 2/18/2023.  TECHNIQUE: Routine Lumbar Spine MRI without IV contrast.     FINDINGS:   Nomenclature is based on 5 lumbar type vertebral bodies. No compression fractures. Satisfactory height. No marrow or ligamentous edema. Satisfactory sacral alignment. No presacral mass or fluid collections are evident. Nothing for sacral insufficiency or   stress fracture. Mild lower lumbar spine facet joint hypertrophic changes. Mild degenerative changes both SI joints. Sacral neural foramina are intact. Low-grade 1 to 2 mm degenerative retrolisthesis L2-L3 and L3-L4. Chronic Schmorl's node endplate    deformities about the narrowed L3-L4 interspace. Normal distal spinal cord and cauda equina with conus medullaris at L1-L2. No cord edema or expansive change. Nerve roots of cauda equina are satisfactory without nodularity or thickening. No   retroperitoneal solid mass or adenopathy. Simple cyst lower pole right kidney is benign and does not require specific imaging follow-up. Symmetric psoas appearance bilaterally. Normal caliber of the abdominal aorta. No pelvic mass or adenopathy.     T12-L1: Normal disc height and signal. No herniation. Normal facets. No spinal canal or neural foraminal stenosis.      L1-L2: Mild loss of disc height with annular bulge. No disc herniation. Mild spinal stenosis. No foraminal narrowing. Facet joints are normal.     L2-L3: Moderate loss of disc height. Generalized disc bulge asymmetric to the right. No disc herniation Moderate spinal stenosis. No foraminal compromise. Mild facet joint arthropathy bilaterally.      L3-L4: Moderate loss of disc height. Generalized disc bulge without disc herniation. Moderate spinal stenosis and mild bilateral foraminal narrowing left more than right. Mild encroachment of the L4 lateral recesses with mild facet joint arthropathy   bilaterally.     L4-L5: Mild loss of disc height with generalized disc bulge asymmetric to the left. No disc herniation. Moderate spinal stenosis. Annular tear/disruption right foraminal zone series 2 image 5 is in proximity to the exited right L4 nerve root, correlate   for right L4 radicular symptoms. Moderate facet joint arthropathy bilaterally. Mild to moderate bilateral foraminal narrowing right more than left.     L5-S1: Normal disc height and signal. No herniation. Moderate facet joint arthropathy bilaterally. No spinal canal or neural foraminal stenosis.                                                                      IMPRESSION:  1.  Mild to moderate degenerative changes lumbar spine most marked mid lumbar  levels.     2.  No severe spinal stenosis or severe foraminal narrowing at any lumbar level.     3.  Possible etiology suggested for right L4 radiculopathy at the L4-L5 level due to proximity of annular tear/disruption to the exited right L4 nerve root at the foraminal level. There is no contact or displacement of this exited nerve root, however.     4.  Please see above for full details and level by level description.     Large Joint Injection/Arthocentesis: R hip joint    Date/Time: 4/4/2023 12:15 PM    Performed by: Mich Bui DO  Authorized by: Mich Bui DO    Indications:  Pain, diagnostic evaluation and osteoarthritis  Needle Size:  22 G  Guidance: ultrasound    Approach:  Anterior  Location:  Hip      Site:  R hip joint  Medications:  3 mL ropivacaine 5 MG/ML; 40 mg triamcinolone 40 MG/ML  Outcome:  Tolerated well, no immediate complications  Procedure discussed: discussed risks, benefits, and alternatives    Consent Given by:  Patient  Timeout: timeout called immediately prior to procedure    Prep: patient was prepped and draped in usual sterile fashion     4 ml's of 1% lidocaine was used as local anesthetic prior to injection    Ultrasound images of procedure were permanently stored.           Assessment:  1. Chronic right hip pain    2. Primary osteoarthritis of right hip        Plan:  Discussed the assessment with the patient.  Follow up: As needed based on clinical progress  Combination of low back, hip, pelvis pain over the last 3 years, R>>L  Progressing anterior thigh pain which is consistent with referred pain from the hip joint  Trial of ultrasound-guided corticosteroid injection of the right hip joint today  Consider advanced imaging for labile or worsening symptoms, defer for now  Low impact activity strategies and physical therapy options reviewed  XR images independently visualized and reviewed with patient today in clinic  Mild degenerative changes noted in  bilateral hip joints on x-ray  If injection is helpful and has ongoing left hip pain could consider injection to that location as well, although we will try to limit corticosteroid injections is much as possible  Lower suspicion of low back mediated pain today, injection should provide more clarity  Expectations and goals of CSI reviewed  Often 2-3 days for steroid effect, and can take up to two weeks for maximum effect  We discussed modified progressive pain-free activity as tolerated  Do not overuse in first two weeks if feeling better due to concern for vulnerability while steroid is working  Supportive care reviewed  All questions were answered today  Contact us with additional questions or concerns  Signs and sx of concern reviewed    Thanks very much for sending this nice lady to us, I will keep you updated with her progress      Mich Bui DO, RAHEL  Sports Medicine Physician  [facility]            Disclaimer: This note consists of symbols derived from keyboarding, dictation and/or voice recognition software. As a result, there may be errors in the script that have gone undetected. Please consider this when interpreting information found in this chart.      Again, thank you for allowing me to participate in the care of your patient.        Sincerely,        Mich Bui DO

## 2023-04-10 ENCOUNTER — TELEPHONE (OUTPATIENT)
Dept: ORTHOPEDICS | Facility: CLINIC | Age: 68
End: 2023-04-10
Payer: COMMERCIAL

## 2023-04-10 NOTE — TELEPHONE ENCOUNTER
Called and spoke with patient.  She states that she received relief initially from the injection for 2 days.  Now the hip pain has become more constant.  I explained that this can be expected and that it can take 2 full weeks for the steroid to have full affect.  Patient expressed understanding.  She will contact our office next week or sooner if symptoms are worsening.    Deisy Osman, ATC

## 2023-04-10 NOTE — TELEPHONE ENCOUNTER
[facility] Call Center    Phone Message    May a detailed message be left on voicemail: yes     Reason for Call: Other: sHE HAD INJECTION IN HIP ON THE 4TH OF THIS MONTH AND IS STILL NOT HAVING ANY RELIEVE FROM THE INJECTION. PLEASE CALL HER     Action Taken: Other: [facility]    Travel Screening: Not Applicable

## 2023-04-18 ENCOUNTER — TELEPHONE (OUTPATIENT)
Dept: ORTHOPEDICS | Facility: CLINIC | Age: 68
End: 2023-04-18
Payer: COMMERCIAL

## 2023-04-18 NOTE — TELEPHONE ENCOUNTER
Spoke to patient discussed her right hip pain has completed resolved following her intra-articular steroid injection on 4/4/23.  We reviewed overall expectations of hip injection and her known hip osteoarthritis.  Discussed further options for treatment would physical therapy to work on hip/core strength.  Can consider repeating every 3 - 4 months as needed, referral to Ortho Surgeon for discussion of total hip arthroplasty available at any time, if desired.      Patient will follow up with clinic as needed moving forward.    Ac Kennedy, ATC

## 2023-04-18 NOTE — TELEPHONE ENCOUNTER
[facility] Call Center    Phone Message    May a detailed message be left on voicemail: yes     Reason for Call 2 weeks Check up. Patient was Told to Call Doctor in 2 weeks. Please call   Action Taken: Message routed to:  Other: [facility]    Travel Screening: Not Applicable

## 2023-05-05 ENCOUNTER — TELEPHONE (OUTPATIENT)
Dept: FAMILY MEDICINE | Facility: CLINIC | Age: 68
End: 2023-05-05

## 2023-05-05 ENCOUNTER — LAB (OUTPATIENT)
Dept: LAB | Facility: CLINIC | Age: 68
End: 2023-05-05
Payer: COMMERCIAL

## 2023-05-05 ENCOUNTER — VIRTUAL VISIT (OUTPATIENT)
Dept: FAMILY MEDICINE | Facility: CLINIC | Age: 68
End: 2023-05-05
Payer: COMMERCIAL

## 2023-05-05 DIAGNOSIS — R30.0 DYSURIA: Primary | ICD-10-CM

## 2023-05-05 DIAGNOSIS — R10.32 LLQ ABDOMINAL PAIN: ICD-10-CM

## 2023-05-05 DIAGNOSIS — E11.9 TYPE 2 DIABETES MELLITUS WITHOUT COMPLICATION, WITHOUT LONG-TERM CURRENT USE OF INSULIN (H): ICD-10-CM

## 2023-05-05 LAB
ALBUMIN UR-MCNC: NEGATIVE MG/DL
APPEARANCE UR: CLEAR
BILIRUB UR QL STRIP: NEGATIVE
COLOR UR AUTO: YELLOW
GLUCOSE UR STRIP-MCNC: NEGATIVE MG/DL
HGB UR QL STRIP: ABNORMAL
KETONES UR STRIP-MCNC: NEGATIVE MG/DL
LEUKOCYTE ESTERASE UR QL STRIP: ABNORMAL
NITRATE UR QL: NEGATIVE
PH UR STRIP: 5.5 [PH] (ref 5–7)
RBC #/AREA URNS AUTO: ABNORMAL /HPF
SP GR UR STRIP: 1.01 (ref 1–1.03)
SQUAMOUS #/AREA URNS AUTO: ABNORMAL /LPF
UROBILINOGEN UR STRIP-ACNC: 0.2 E.U./DL
WBC #/AREA URNS AUTO: ABNORMAL /HPF

## 2023-05-05 PROCEDURE — 81001 URINALYSIS AUTO W/SCOPE: CPT

## 2023-05-05 PROCEDURE — 99214 OFFICE O/P EST MOD 30 MIN: CPT | Mod: 93 | Performed by: NURSE PRACTITIONER

## 2023-05-05 RX ORDER — DULAGLUTIDE 3 MG/.5ML
3 INJECTION, SOLUTION SUBCUTANEOUS
Qty: 6 ML | Refills: 1 | Status: SHIPPED | OUTPATIENT
Start: 2023-05-05 | End: 2023-11-01

## 2023-05-05 NOTE — TELEPHONE ENCOUNTER
Reason for Call:  Appointment Request    Patient requesting this type of appt:  UTI    Requested provider: Melissa Pitts    Reason patient unable to be scheduled: Not within requested timeframe    When does patient want to be seen/preferred time: Same day    Comments: Please see if someone at [facility] can see her today.  Declined going anywhere else due to lack of transportation.     Okay to leave a detailed message?: Yes at Cell number on file:    Telephone Information:   Mobile [phone]       Call taken on 5/5/2023 at 7:41 AM by Mary Ramos

## 2023-05-05 NOTE — TELEPHONE ENCOUNTER
Patient called and triaged:    S-(situation): possible UTI symptoms    B-(background): started 2 days ago    A-(assessment): patient reports lower abdominal (below belly button/pubic pain), cloudy urine, no pain with urination, no blood in urine, report she has low back pain, no frequency, denies burning with urination, concerned she may have a UTI.    R-(recommendations): advised patient be seen, she declines to come to Urgent Care, would like virtual appointment today, this is scheduled at 4 pm, patient is going to drop off urine sample. Huddled with Addie Lerner who will place order for patient once she arrives. Patient is agreeable to this.      DANIELLE Barcenas

## 2023-05-05 NOTE — PROGRESS NOTES
Asmita is a 67 year old who is being evaluated via a billable telephone visit.      What phone number would you like to be contacted at? [phone]   How would you like to obtain your AVS? Mail a copy    Distant Location (provider location):  On-site    Assessment & Plan       ICD-10-CM    1. Dysuria  R30.0 CANCELED: UA Macroscopic with reflex to Microscopic and Culture      2. Type 2 diabetes mellitus without complication, without long-term current use of insulin (H)  E11.9 Dulaglutide (TRULICITY) 3 MG/0.5ML SOPN     Comprehensive metabolic panel (BMP + Alb, Alk Phos, ALT, AST, Total. Bili, TP)     Hemoglobin A1c     CBC with platelets      3. LLQ abdominal pain  R10.32 Comprehensive metabolic panel (BMP + Alb, Alk Phos, ALT, AST, Total. Bili, TP)        Urinalysis is clear no infection.    Doing okay with her diabetes management return to clinic for labs after May 17.  Doing well with Trulicity overall.  Does have diarrhea secondary to Trulicity.  Advised patient to monitor symptoms of the abdominal pain and association with her bowel movements.  I suspect her abdominal pain is due to BMs and different dietary intake soft.  She is in agreement this plan.                 Addie Lerner, GAURANG CNP  [facility]   Asmita is a 67 year old, presenting for the following health issues:  UTI and Health Maintenance (Advised patient of .)        5/5/2023     3:10 PM   Additional Questions   Roomed by Deisy MORRIS CMA   Accompanied by self     HPI          Genitourinary - Female  Onset/Duration: on and off for 2 months, randomly  Description:   Painful urination (Dysuria): No           Frequency: No  Blood in urine (Hematuria): No  Delay in urine (Hesitency): YES- little bit  Intensity: severe- last night it woke her up  Progression of Symptoms:  same and intermittent  Accompanying Signs & Symptoms:  Fever/chills: No  Flank pain: No  Nausea and vomiting: No  Vaginal symptoms: white  "discharge  Abdominal/Pelvic Pain: YES- woke her up last night, very bad pain  History:   History of frequent UTI s: No  History of kidney stones: No  Sexually Active: No  Possibility of pregnancy: No  Precipitating or alleviating factors: None  Therapies tried and outcome: acetaminophen and heating pad helps a little bit      0230 pain was present. Drank a bottle of water, heating pad. 1000mg tylenol taken. About 1 hour later she was able to fall back asleep. Pain was \"worse than dull\" Stomach felt like a big cramp. Chronic diarrhea. Not different than previous.     Steroid shot on 4/4 for her SI joint pain. Pain has resumed after 1 month. Following up with a hip surgeon.     No additional pain once she woke up. No nausea is present.     Review of Systems   Constitutional, HEENT, cardiovascular, pulmonary, gi and gu systems are negative, except as otherwise noted.      Objective           Vitals:  No vitals were obtained today due to virtual visit.    Physical Exam   healthy, alert and no distress  PSYCH: Alert and oriented times 3; coherent speech, normal   rate and volume, able to articulate logical thoughts, able   to abstract reason, no tangential thoughts, no hallucinations   or delusions  Her affect is normal  RESP: No cough, no audible wheezing, able to talk in full sentences  Remainder of exam unable to be completed due to telephone visits                Phone call duration: 12 minutes    "

## 2023-05-09 ENCOUNTER — TRANSFERRED RECORDS (OUTPATIENT)
Dept: HEALTH INFORMATION MANAGEMENT | Facility: CLINIC | Age: 68
End: 2023-05-09
Payer: COMMERCIAL

## 2023-05-11 ENCOUNTER — HOSPITAL ENCOUNTER (OUTPATIENT)
Dept: MRI IMAGING | Facility: CLINIC | Age: 68
Discharge: HOME | End: 2023-05-11
Attending: ORTHOPAEDIC SURGERY | Admitting: ORTHOPAEDIC SURGERY
Payer: COMMERCIAL

## 2023-05-11 DIAGNOSIS — M25.559 HIP PAIN: ICD-10-CM

## 2023-05-11 PROCEDURE — 73721 MRI JNT OF LWR EXTRE W/O DYE: CPT | Mod: RT

## 2023-05-11 PROCEDURE — 73721 MRI JNT OF LWR EXTRE W/O DYE: CPT | Mod: 26 | Performed by: RADIOLOGY

## 2023-05-22 ENCOUNTER — LAB (OUTPATIENT)
Dept: LAB | Facility: CLINIC | Age: 68
End: 2023-05-22
Payer: COMMERCIAL

## 2023-05-22 ENCOUNTER — ALLIED HEALTH/NURSE VISIT (OUTPATIENT)
Dept: FAMILY MEDICINE | Facility: CLINIC | Age: 68
End: 2023-05-22
Payer: COMMERCIAL

## 2023-05-22 ENCOUNTER — TELEPHONE (OUTPATIENT)
Dept: FAMILY MEDICINE | Facility: CLINIC | Age: 68
End: 2023-05-22

## 2023-05-22 ENCOUNTER — ANCILLARY PROCEDURE (OUTPATIENT)
Dept: MAMMOGRAPHY | Facility: CLINIC | Age: 68
End: 2023-05-22
Attending: FAMILY MEDICINE
Payer: COMMERCIAL

## 2023-05-22 VITALS — SYSTOLIC BLOOD PRESSURE: 138 MMHG | HEART RATE: 79 BPM | DIASTOLIC BLOOD PRESSURE: 68 MMHG | OXYGEN SATURATION: 96 %

## 2023-05-22 DIAGNOSIS — E11.9 TYPE 2 DIABETES MELLITUS WITHOUT COMPLICATION, WITHOUT LONG-TERM CURRENT USE OF INSULIN (H): ICD-10-CM

## 2023-05-22 DIAGNOSIS — Z12.31 VISIT FOR SCREENING MAMMOGRAM: ICD-10-CM

## 2023-05-22 DIAGNOSIS — R10.32 LLQ ABDOMINAL PAIN: ICD-10-CM

## 2023-05-22 DIAGNOSIS — Z01.30 BLOOD PRESSURE CHECK: Primary | ICD-10-CM

## 2023-05-22 LAB
ALBUMIN SERPL BCG-MCNC: 4.5 G/DL (ref 3.5–5.2)
ALP SERPL-CCNC: 72 U/L (ref 35–104)
ALT SERPL W P-5'-P-CCNC: 19 U/L (ref 10–35)
ANION GAP SERPL CALCULATED.3IONS-SCNC: 14 MMOL/L (ref 7–15)
AST SERPL W P-5'-P-CCNC: 27 U/L (ref 10–35)
BILIRUB SERPL-MCNC: 0.4 MG/DL
BUN SERPL-MCNC: 17.9 MG/DL (ref 8–23)
CALCIUM SERPL-MCNC: 10.4 MG/DL (ref 8.8–10.2)
CHLORIDE SERPL-SCNC: 103 MMOL/L (ref 98–107)
CREAT SERPL-MCNC: 0.82 MG/DL (ref 0.51–0.95)
DEPRECATED HCO3 PLAS-SCNC: 23 MMOL/L (ref 22–29)
ERYTHROCYTE [DISTWIDTH] IN BLOOD BY AUTOMATED COUNT: 13.5 % (ref 10–15)
GFR SERPL CREATININE-BSD FRML MDRD: 77 ML/MIN/1.73M2
GLUCOSE SERPL-MCNC: 181 MG/DL (ref 70–99)
HBA1C MFR BLD: 8.5 % (ref 0–5.6)
HCT VFR BLD AUTO: 40.7 % (ref 35–47)
HGB BLD-MCNC: 13.2 G/DL (ref 11.7–15.7)
MCH RBC QN AUTO: 29.7 PG (ref 26.5–33)
MCHC RBC AUTO-ENTMCNC: 32.4 G/DL (ref 31.5–36.5)
MCV RBC AUTO: 92 FL (ref 78–100)
PLATELET # BLD AUTO: 290 10E3/UL (ref 150–450)
POTASSIUM SERPL-SCNC: 4.3 MMOL/L (ref 3.4–5.3)
PROT SERPL-MCNC: 7 G/DL (ref 6.4–8.3)
RBC # BLD AUTO: 4.44 10E6/UL (ref 3.8–5.2)
SODIUM SERPL-SCNC: 140 MMOL/L (ref 136–145)
WBC # BLD AUTO: 9.8 10E3/UL (ref 4–11)

## 2023-05-22 PROCEDURE — 36415 COLL VENOUS BLD VENIPUNCTURE: CPT

## 2023-05-22 PROCEDURE — 77067 SCR MAMMO BI INCL CAD: CPT | Mod: TC | Performed by: RADIOLOGY

## 2023-05-22 PROCEDURE — 80053 COMPREHEN METABOLIC PANEL: CPT

## 2023-05-22 PROCEDURE — 99207 PR NO CHARGE NURSE ONLY: CPT

## 2023-05-22 PROCEDURE — 77063 BREAST TOMOSYNTHESIS BI: CPT | Mod: TC | Performed by: RADIOLOGY

## 2023-05-22 PROCEDURE — 83036 HEMOGLOBIN GLYCOSYLATED A1C: CPT

## 2023-05-22 PROCEDURE — 85027 COMPLETE CBC AUTOMATED: CPT

## 2023-05-22 NOTE — TELEPHONE ENCOUNTER
Asmita Jerez is a 67 year old year old patient who comes in today for a Blood Pressure check because of ongoing blood pressure monitoring.  Vital Signs as repeated by /68 left 138/68 right, after 5 minute rest HR 79 SPO2 96%  Patient is taking medication as prescribed  Patient is tolerating medications well.  Patient is not monitoring Blood Pressure at home.  Average readings if yes are NA  Current complaints: none  Disposition:  patient to continue with the same medication or as advised     CL  pharmacy and my chart message if changes     Maryanne Corrigan RN on 5/22/2023 at 2:14 PM

## 2023-05-22 NOTE — TELEPHONE ENCOUNTER
BP Readings from Last 6 Encounters:   05/22/23 138/68   04/04/23 (!) 183/83   03/28/23 (!) 178/77   02/17/23 128/88   12/29/22 (!) 146/68   12/16/22 (!) 196/82       Blood pressure seems reasonable today.  Continue current medication.    Melissa Pitts M.D.

## 2023-05-22 NOTE — TELEPHONE ENCOUNTER
Called and notified patient of Dr. Pitts's message.    Patient expressed understanding.     Alexsandra Dutta RN on 5/22/2023 at 4:45 PM

## 2023-05-23 ENCOUNTER — TELEPHONE (OUTPATIENT)
Dept: FAMILY MEDICINE | Facility: CLINIC | Age: 68
End: 2023-05-23
Payer: COMMERCIAL

## 2023-05-23 ENCOUNTER — TRANSFERRED RECORDS (OUTPATIENT)
Dept: HEALTH INFORMATION MANAGEMENT | Facility: CLINIC | Age: 68
End: 2023-05-23
Payer: COMMERCIAL

## 2023-05-23 DIAGNOSIS — E11.9 TYPE 2 DIABETES MELLITUS WITHOUT COMPLICATION, WITHOUT LONG-TERM CURRENT USE OF INSULIN (H): Primary | ICD-10-CM

## 2023-05-23 NOTE — TELEPHONE ENCOUNTER
Pt returned call, was notified of provider's message below and also reviewed provider's result note; she verbalized understanding. Pt says that ever since she got the steroid injection on 4/4, her BGs have been running consistently >200. Pt checked BG while on phone with writer; says that it's currently 220, does report that she just ate an hour ago. Pt says that she has an appt with the provider that did her injection this morning, and after she discusses this further with them, will return call to schedule f/u diabetes appointment. Writer also notes that pt doesn't have a current diabetic educator referral; order pended, and routed to provider for review.    Shruthi Swann RN  [facility]

## 2023-05-23 NOTE — TELEPHONE ENCOUNTER
LVMEREDITH TCB.  I did not leave a detailed message.    Hemoglobin A1c 8.5% would like patient to see diabetic education and schedule a diabetes follow-up in clinic for medication adjustments.  Letter composed for patient.    GAURANG Crow CNP

## 2023-05-23 NOTE — TELEPHONE ENCOUNTER
Worsening Hemoglobin a1c over the last 1 year. Diabetes education referral placed today.   GAURANG Crow CNP

## 2023-05-25 ENCOUNTER — TRANSCRIBE ORDERS (OUTPATIENT)
Dept: OTHER | Age: 68
End: 2023-05-25

## 2023-05-25 DIAGNOSIS — Z96.641 S/P TOTAL RIGHT HIP ARTHROPLASTY: Primary | ICD-10-CM

## 2023-05-31 DIAGNOSIS — E11.9 TYPE 2 DIABETES MELLITUS WITHOUT COMPLICATION, WITHOUT LONG-TERM CURRENT USE OF INSULIN (H): ICD-10-CM

## 2023-05-31 RX ORDER — METFORMIN HCL 500 MG
TABLET, EXTENDED RELEASE 24 HR ORAL
Qty: 360 TABLET | Refills: 1 | Status: SHIPPED | OUTPATIENT
Start: 2023-05-31 | End: 2023-11-01 | Stop reason: SINTOL

## 2023-05-31 NOTE — TELEPHONE ENCOUNTER
"Prescription approved per [facility] Refill Protocol.    Pending Prescriptions:                       Disp   Refills    metFORMIN (GLUCOPHAGE XR) 500 MG 24 hr ta*360 ta*1            Sig: TAKE 2 TABLETS BY MOUTH 2 TIMES DAILY WITH MEALS      Requested Prescriptions   Pending Prescriptions Disp Refills     metFORMIN (GLUCOPHAGE XR) 500 MG 24 hr tablet [Pharmacy Med Name: METFORMIN HCL ER 500MG TB24] 360 tablet 1     Sig: TAKE 2 TABLETS BY MOUTH 2 TIMES DAILY WITH MEALS       Biguanide Agents Passed - 5/31/2023  9:39 AM        Passed - Patient is age 10 or older        Passed - Patient has documented A1c within the specified period of time.     If HgbA1C is 8 or greater, it needs to be on file within the past 3 months.  If less than 8, must be on file within the past 6 months.     Recent Labs   Lab Test 05/22/23  1400   A1C 8.5*             Passed - Patient's CR is NOT>1.4 OR Patient's EGFR is NOT<45 within past 12 mos.     Recent Labs   Lab Test 05/22/23  1400 10/07/21  1106 03/01/21  0854   GFRESTIMATED 77   < > 77   GFRESTBLACK  --   --  90    < > = values in this interval not displayed.       Recent Labs   Lab Test 05/22/23  1400   CR 0.82             Passed - Patient does NOT have a diagnosis of CHF.        Passed - Medication is active on med list        Passed - Patient is not pregnant        Passed - Patient has not had a positive pregnancy test within the past 12 mos.         Passed - Recent (6 mo) or future (30 days) visit within the authorizing provider's specialty     Patient had office visit in the last 6 months or has a visit in the next 30 days with authorizing provider or within the authorizing provider's specialty.  See \"Patient Info\" tab in inbasket, or \"Choose Columns\" in Meds & Orders section of the refill encounter.               Maryanne Corrigan RN on 5/31/2023 at 2:22 PM      "

## 2023-06-20 ENCOUNTER — OFFICE VISIT (OUTPATIENT)
Dept: FAMILY MEDICINE | Facility: CLINIC | Age: 68
End: 2023-06-20
Payer: COMMERCIAL

## 2023-06-20 ENCOUNTER — LAB (OUTPATIENT)
Dept: LAB | Facility: CLINIC | Age: 68
End: 2023-06-20
Payer: COMMERCIAL

## 2023-06-20 VITALS
TEMPERATURE: 97.1 F | RESPIRATION RATE: 20 BRPM | HEART RATE: 93 BPM | SYSTOLIC BLOOD PRESSURE: 138 MMHG | HEIGHT: 64 IN | OXYGEN SATURATION: 97 % | BODY MASS INDEX: 19.55 KG/M2 | DIASTOLIC BLOOD PRESSURE: 80 MMHG | WEIGHT: 114.5 LBS

## 2023-06-20 DIAGNOSIS — Z01.818 PREOP GENERAL PHYSICAL EXAM: ICD-10-CM

## 2023-06-20 DIAGNOSIS — E11.9 TYPE 2 DIABETES MELLITUS WITHOUT COMPLICATION, WITHOUT LONG-TERM CURRENT USE OF INSULIN (H): ICD-10-CM

## 2023-06-20 DIAGNOSIS — Z01.818 PREOP GENERAL PHYSICAL EXAM: Primary | ICD-10-CM

## 2023-06-20 DIAGNOSIS — Z72.0 TOBACCO USE: ICD-10-CM

## 2023-06-20 DIAGNOSIS — I10 HYPERTENSION, GOAL BELOW 140/90: ICD-10-CM

## 2023-06-20 DIAGNOSIS — M16.11 PRIMARY OSTEOARTHRITIS OF RIGHT HIP: ICD-10-CM

## 2023-06-20 LAB
ANION GAP SERPL CALCULATED.3IONS-SCNC: 10 MMOL/L (ref 7–15)
BUN SERPL-MCNC: 15.3 MG/DL (ref 8–23)
CALCIUM SERPL-MCNC: 9.9 MG/DL (ref 8.8–10.2)
CHLORIDE SERPL-SCNC: 104 MMOL/L (ref 98–107)
CREAT SERPL-MCNC: 0.86 MG/DL (ref 0.51–0.95)
DEPRECATED HCO3 PLAS-SCNC: 25 MMOL/L (ref 22–29)
GFR SERPL CREATININE-BSD FRML MDRD: 73 ML/MIN/1.73M2
GLUCOSE SERPL-MCNC: 169 MG/DL (ref 70–99)
HBA1C MFR BLD: 7.3 % (ref 0–5.6)
POTASSIUM SERPL-SCNC: 4.6 MMOL/L (ref 3.4–5.3)
SODIUM SERPL-SCNC: 139 MMOL/L (ref 136–145)

## 2023-06-20 PROCEDURE — 99214 OFFICE O/P EST MOD 30 MIN: CPT | Performed by: FAMILY MEDICINE

## 2023-06-20 PROCEDURE — 83036 HEMOGLOBIN GLYCOSYLATED A1C: CPT

## 2023-06-20 PROCEDURE — 80048 BASIC METABOLIC PNL TOTAL CA: CPT

## 2023-06-20 PROCEDURE — 36415 COLL VENOUS BLD VENIPUNCTURE: CPT

## 2023-06-20 PROCEDURE — 93000 ELECTROCARDIOGRAM COMPLETE: CPT | Performed by: FAMILY MEDICINE

## 2023-06-20 ASSESSMENT — PAIN SCALES - GENERAL: PAINLEVEL: NO PAIN (0)

## 2023-06-20 NOTE — LETTER
June 21, 2023      Asmita Jerez  [address] [address]  [place] [address]        Dear ,    We are writing to inform you of your test results.    Your test results fall within the expected range(s) or remain unchanged from previous results.  Please continue with current treatment plan.    Resulted Orders   Hemoglobin A1c   Result Value Ref Range    Hemoglobin A1C 7.3 (H) 0.0 - 5.6 %      Comment:      Normal <5.7%   Prediabetes 5.7-6.4%    Diabetes 6.5% or higher     Note: Adopted from ADA consensus guidelines.   Basic metabolic panel  (Ca, Cl, CO2, Creat, Gluc, K, Na, BUN)   Result Value Ref Range    Sodium 139 136 - 145 mmol/L    Potassium 4.6 3.4 - 5.3 mmol/L    Chloride 104 98 - 107 mmol/L    Carbon Dioxide (CO2) 25 22 - 29 mmol/L    Anion Gap 10 7 - 15 mmol/L    Urea Nitrogen 15.3 8.0 - 23.0 mg/dL    Creatinine 0.86 0.51 - 0.95 mg/dL    Calcium 9.9 8.8 - 10.2 mg/dL    Glucose 169 (H) 70 - 99 mg/dL    GFR Estimate 73 >60 mL/min/1.73m2       If you have any questions or concerns, please call the clinic at the number listed above.       Sincerely,      Melissa Pitts MD

## 2023-06-20 NOTE — PATIENT INSTRUCTIONS
For informational purposes only. Not to replace the advice of your health care provider. Copyright   2003,  [employer] [facility]. All rights reserved. Clinically reviewed by Caren Gotti MD. [employer] [id number] - REV .  Preparing for Your Surgery  Getting started  A nurse will call you to review your health history and instructions. They will give you an arrival time based on your scheduled surgery time. Please be ready to share:  Your doctor's clinic name and phone number  Your medical, surgical, and anesthesia history  A list of allergies and sensitivities  A list of medicines, including herbal treatments and over-the-counter drugs  Whether the patient has a legal guardian (ask how to send us the papers in advance)  Please tell us if you're pregnant--or if there's any chance you might be pregnant. Some surgeries may injure a fetus (unborn baby), so they require a pregnancy test. Surgeries that are safe for a fetus don't always need a test, and you can choose whether to have one.   If you have a child who's having surgery, please ask for a copy of Preparing for Your Child's Surgery.    Preparing for surgery  Within 10 to 30 days of surgery: Have a pre-op exam (sometimes called an H&P, or History and Physical). This can be done at a clinic or pre-operative center.  If you're having a , you may not need this exam. Talk to your care team.  At your pre-op exam, talk to your care team about all medicines you take. If you need to stop any medicines before surgery, ask when to start taking them again.  We do this for your safety. Many medicines can make you bleed too much during surgery. Some change how well surgery (anesthesia) drugs work.  Call your insurance company to let them know you're having surgery. (If you don't have insurance, call [phone].)  Call your clinic if there's any change in your health. This includes signs of a cold or flu (sore throat, runny nose, cough, rash, fever). It  also includes a scrape or scratch near the surgery site.  If you have questions on the day of surgery, call your hospital or surgery center.  Eating and drinking guidelines  For your safety: Unless your surgeon tells you otherwise, follow the guidelines below.  Eat and drink as usual until 8 hours before you arrive for surgery. After that, no food or milk.  Drink clear liquids until 2 hours before you arrive. These are liquids you can see through, like water, Gatorade, and Propel Water. They also include plain black coffee and tea (no cream or milk), candy, and breath mints. You can spit out gum when you arrive.  If you drink alcohol: Stop drinking it the night before surgery.  If your care team tells you to take medicine on the morning of surgery, it's okay to take it with a sip of water.  Preventing infection  Shower or bathe the night before and morning of your surgery. Follow the instructions your clinic gave you. (If no instructions, use regular soap.)  Don't shave or clip hair near your surgery site. We'll remove the hair if needed.  Don't smoke or vape the morning of surgery. You may chew nicotine gum up to 2 hours before surgery. A nicotine patch is okay.  Note: Some surgeries require you to completely quit smoking and nicotine. Check with your surgeon.  Your care team will make every effort to keep you safe from infection. We will:  Clean our hands often with soap and water (or an alcohol-based hand rub).  Clean the skin at your surgery site with a special soap that kills germs.  Give you a special gown to keep you warm. (Cold raises the risk of infection.)  Wear special hair covers, masks, gowns and gloves during surgery.  Give antibiotic medicine, if prescribed. Not all surgeries need antibiotics.  What to bring on the day of surgery  Photo ID and insurance card  Copy of your health care directive, if you have one  Glasses and hearing aids (bring cases)  You can't wear contacts during surgery  Inhaler and  eye drops, if you use them (tell us about these when you arrive)  CPAP machine or breathing device, if you use them  A few personal items, if spending the night  If you have . . .  A pacemaker, ICD (cardiac defibrillator) or other implant: Bring the ID card.  An implanted stimulator: Bring the remote control.  A legal guardian: Bring a copy of the certified (court-stamped) guardianship papers.  Please remove any jewelry, including body piercings. Leave jewelry and other valuables at home.  If you're going home the day of surgery  You must have a responsible adult drive you home. They should stay with you overnight as well.  If you don't have someone to stay with you, and you aren't safe to go home alone, we may keep you overnight. Insurance often won't pay for this.  After surgery  If it's hard to control your pain or you need more pain medicine, please call your surgeon's office.  Questions?   If you have any questions for your care team, list them here: _________________________________________________________________________________________________________________________________________________________________________ ____________________________________ ____________________________________ ____________________________________    How to Take Your Medication Before Surgery  - HOLD (do not take) your METFORMIN on the morning of surgery.  - HOLD (do not take) Aspirin

## 2023-06-20 NOTE — PROVIDER NOTIFICATION
06/20/23 1307   Discharge Planning   Patient/Family Anticipates Transition to home with family   Concerns to be Addressed all concerns addressed in this encounter   Living Arrangements   People in Home sibling(s)   Is your private residence a single family home or apartment? Single family home   Number of Stairs, Within Home, Primary five   Stair Railings, Within Home, Primary railings safe and in good condition   Once home, are you able to live on one level? Yes   Which level? Main Level   Bathroom Shower/Tub Walk-in shower   Equipment Currently Used at Home none   Support System   Support Systems None   Blood   Known Bleeding Disorder or Coagulopathy No   Does the patient have any Rastafarian/cultural preferences related to blood products? No   Education   Has the patient scheduled or completed pre-op total joint education, either in class or online, in the last 12 months? No

## 2023-06-20 NOTE — PROGRESS NOTES
[facility]  [address]  [facility] [address]  Phone: [phone]  Primary Provider: Melissa James  Pre-op Performing Provider: MELISSA JAMES      PREOPERATIVE EVALUATION:  Today's date: 6/20/2023    Asmita Jerez is a 67 year old female who presents for a preoperative evaluation.    Surgical Information:  Surgery/Procedure: Total Right Hip Arthroplasty  Surgery Location: [facility]  Surgeon: Clay  Surgery Date: 6/22/2023  Time of Surgery: 3:00PM  Where patient plans to recover: At home with family  Fax number for surgical facility: Note does not need to be faxed, will be available electronically in Epic.    Assessment & Plan     The proposed surgical procedure is considered INTERMEDIATE risk.    Preop general physical exam     - Basic metabolic panel  (Ca, Cl, CO2, Creat, Gluc, K, Na, BUN); Future  - EKG 12-lead complete w/read - Clinics      Primary osteoarthritis of right hip         Type 2 diabetes mellitus without complication, without long-term current use of insulin (H)  HgbA1c is 7.3%, meter reviewed, overall seems to be well controlled  ?some diarrhea from metformin   - Hemoglobin A1c; Future  - EKG 12-lead complete w/read - Clinics    Hypertension, goal below 140/90  Well controlled    Tobacco use  Cessation encouraged     chronic diarrhea   X 6 months  Likely needs colonoscopy with some biopsies for further evaluation    Risks and Recommendations:  The patient has the following additional risks and recommendations for perioperative complications:   - Consult Hospitalist / IM to assist with post-op medical management  Diabetes:  - Patient is not on insulin therapy: regular NPO guidelines can be followed.     Antiplatelet or Anticoagulation Medication Instructions:   - aspirin: last dose of ASA was 6/19/2023    Additional Medication Instructions:  Patient is to take all scheduled medications on the day of surgery EXCEPT for modifications listed  below:   - ACE/ARB: HOLD on day of surgery (minimum 11 hours for general anesthesia).   - Beta Blockers: Continue taking on the day of surgery.   - Statins: Continue taking on the day of surgery.    - metformin: HOLD day of surgery.   - GLP-1 Injectable (exenitide, liraglutide, semaglutide, dulaglutide, etc.): HOLD day of surgery     RECOMMENDATION:  APPROVAL GIVEN to proceed with proposed procedure, without further diagnostic evaluation.            Subjective       HPI related to upcoming procedure: 66 yo female with hypertension, type 2 diabetes, hyperlipidemia and now right OA.            6/20/2023     9:47 AM   Preop Questions   1. Have you ever had a heart attack or stroke? No   2. Have you ever had surgery on your heart or blood vessels, such as a stent placement, a coronary artery bypass, or surgery on an artery in your head, neck, heart, or legs? No   3. Do you have chest pain with activity? No   4. Do you have a history of  heart failure? No   5. Do you currently have a cold, bronchitis or symptoms of other infection? No   6. Do you have a cough, shortness of breath, or wheezing? No   7. Do you or anyone in your family have previous history of blood clots? No   8. Do you or does anyone in your family have a serious bleeding problem such as prolonged bleeding following surgeries or cuts? No   9. Have you ever had problems with anemia or been told to take iron pills? No   10. Have you had any abnormal blood loss such as black, tarry or bloody stools, or abnormal vaginal bleeding? No   11. Have you ever had a blood transfusion? No   12. Are you willing to have a blood transfusion if it is medically needed before, during, or after your surgery? Yes   13. Have you or any of your relatives ever had problems with anesthesia? No   14. Do you have sleep apnea, excessive snoring or daytime drowsiness? No   15. Do you have any artifical heart valves or other implanted medical devices like a pacemaker, defibrillator,  or continuous glucose monitor? No   16. Do you have artificial joints? No   17. Are you allergic to latex? No     Health Care Directive:  Patient does not have a Health Care Directive or Living Will: Discussed advance care planning with patient; however, patient declined at this time.    Preoperative Review of :   reviewed - no record of controlled substances prescribed.      Status of Chronic Conditions:  See problem list for active medical problems.  Problems all longstanding and stable, except as noted/documented.  See ROS for pertinent symptoms related to these conditions.      Review of Systems  CONSTITUTIONAL: NEGATIVE for fever, chills, change in weight  ENT/MOUTH: NEGATIVE for ear, mouth and throat problems  RESP: NEGATIVE for significant cough or SOB  CV: NEGATIVE for chest pain, palpitations or peripheral edema  GI: POSITIVE for diarrhea    Patient Active Problem List    Diagnosis Date Noted     Generalized anxiety disorder 03/29/2021     Priority: Medium     Tobacco use 09/07/2018     Priority: Medium     Cervical high risk HPV (human papillomavirus) test positive 03/31/2018     Priority: Medium     2004, 2005, 2008, 2010, 2011, 2012, 2013 NIL paps  2014 NIL pap, Neg HPV  2015 NIL pap, Neg HPV  3/23/18 Pap: NIL, +HR HPV (Neg 16/18). Plan Pap+HPV in 1 year.  4/15/19 NIL, neg HPV. Plan: cotest 3 years  10/12/22 NIL pap, + HR HPV (not 16 or 18). Plan: cotest in 1 year  10/19/22 advised by phone. Letter sent in mail.        Essential hypertension with goal blood pressure less than 140/90 05/25/2016     Priority: Medium     Type 2 diabetes mellitus without complication (H) 10/28/2015     Priority: Medium     Hypertension, goal below 140/90 02/21/2013     Priority: Medium     Advanced directives, counseling/discussion 01/24/2012     Priority: Medium     Patient does not have an Advance/Health Care Directive (HCD), declines information/referral.    Nani Brown  January 24, 2012         CARDIOVASCULAR  SCREENING; LDL GOAL LESS THAN 100 10/31/2010     Priority: Medium     Syringoma 07/15/2010     Priority: Medium     Digital mucinous cyst 07/12/2010     Priority: Medium     Eyelid lesion 07/12/2010     Priority: Medium      Past Medical History:   Diagnosis Date     Cervical high risk HPV (human papillomavirus) test positive 03/31/2018     Diabetes (H)      Hypertension      Peptic ulcer, unspecified site, unspecified as acute or chronic, without mention of hemorrhage, perforation, or obstruction      Polycystic ovaries      Pure hypercholesterolemia      Tobacco use disorder      Unspecified multiple gestation, unspecified as to episode of care     Multiple gestation     Past Surgical History:   Procedure Laterality Date     APPENDECTOMY       BIOPSY BREAST       COLONOSCOPY N/A 12/11/2015    Procedure: COLONOSCOPY;  Surgeon: Tino Pryor MD;  Location: [facility]     ENDOSCOPIC RELEASE CARPAL TUNNEL Right 11/01/2021    Procedure: Endoscopic carpal tunnel release;  Surgeon: Ac Lemus MD;  Location: [facility]     ENDOSCOPIC RELEASE CARPAL TUNNEL Left 11/15/2021    Procedure: ENDOSCOPIC Carpal Tunnel Release, LEFT, removal suture right wrist;  Surgeon: Ac Lemus MD;  Location: [facility]     LUMPECTOMY BREAST       RELEASE TRIGGER FINGER  05/23/2014    Procedure: RELEASE TRIGGER FINGER;  Surgeon: Ag Hoffman MD;  Location: [facility]     SURGICAL HISTORY OF -   01/01/1970    appendectomy     SURGICAL HISTORY OF -   01/01/1970    pilonidal cyst and sinuses     Current Outpatient Medications   Medication Sig Dispense Refill     atorvastatin (LIPITOR) 40 MG tablet Take 1 tablet (40 mg) by mouth daily 90 tablet 3     Dulaglutide (TRULICITY) 3 MG/0.5ML SOPN Inject 3 mg Subcutaneous every 7 days 6 mL 1     lisinopril (ZESTRIL) 40 MG tablet Take 1 tablet (40 mg) by mouth daily 90 tablet 3     metFORMIN (GLUCOPHAGE XR) 500 MG 24 hr tablet TAKE 2 TABLETS BY MOUTH 2 TIMES DAILY WITH MEALS 360  "tablet 1     metoprolol succinate ER (TOPROL XL) 100 MG 24 hr tablet Take 1 tablet (100 mg) by mouth daily 90 tablet 3     acetaminophen (TYLENOL) 500 MG tablet Take 1,000 mg by mouth as needed for mild pain       ASPIRIN 81 MG OR TABS 1 TABLET DAILY       blood glucose (ACCU-CHEK SIDDHARTHA PLUS) test strip Use to test blood sugar 3 times daily or as directed. 200 strip 3     blood glucose monitoring (ACCU-CHEK MULTICLIX) lancets Use to test blood sugar 1 time daily or as directed. Accu chek, Ins will cover, match machine. 100 each 2     hydrOXYzine (ATARAX) 25 MG tablet Take 1 tablet (25 mg) by mouth 3 times daily as needed for anxiety 30 tablet 4       Allergies   Allergen Reactions     Amoxicillin Rash        Social History     Tobacco Use     Smoking status: Every Day     Packs/day: 1.00     Years: 40.00     Pack years: 40.00     Types: Cigarettes     Smokeless tobacco: Never     Tobacco comments:     pt not interested in quit plan 10/12/2022   Vaping Use     Vaping status: Never Used   Substance Use Topics     Alcohol use: No     Family History   Problem Relation Age of Onset     Unknown/Adopted Father      Dementia Father      History   Drug Use No         Objective     /80   Pulse 93   Temp 97.1  F (36.2  C) (Tympanic)   Resp 20   Ht 1.626 m (5' 4\")   Wt 51.9 kg (114 lb 8 oz)   LMP 12/01/2009   SpO2 97%   BMI 19.65 kg/m      Physical Exam  GENERAL APPEARANCE: healthy, alert and no distress  HENT: ear canals and TM's normal and nose and mouth without ulcers or lesions  RESP: lungs clear to auscultation - no rales, rhonchi or wheezes  CV: regular rate and rhythm, normal S1 S2, no S3 or S4 and no murmur, click or rub   ABDOMEN: soft, nontender, no HSM or masses and bowel sounds normal  NEURO: Normal strength and tone, sensory exam grossly normal, mentation intact and speech normal    Recent Labs   Lab Test 05/22/23  1400 02/17/23  1446 10/12/22  1526   HGB 13.2  --   --      --   --      " --  140   POTASSIUM 4.3  --  4.3   CR 0.82  --  0.82   A1C 8.5* 7.6* 7.5*        Diagnostics:  Results for orders placed or performed in visit on 06/20/23   Hemoglobin A1c     Status: Abnormal   Result Value Ref Range    Hemoglobin A1C 7.3 (H) 0.0 - 5.6 %        EKG: appears normal, NSR, normal axis, normal intervals, no acute ST/T changes c/w ischemia, no LVH by voltage criteria, unchanged from previous tracings    Revised Cardiac Risk Index (RCRI):  The patient has the following serious cardiovascular risks for perioperative complications:   - No serious cardiac risks = 0 points     RCRI Interpretation: 0 points: Class I (very low risk - 0.4% complication rate)           Signed Electronically by: Melissa Pitts MD  Copy of this evaluation report is provided to requesting physician.

## 2023-06-20 NOTE — PROVIDER NOTIFICATION
"   06/20/23 1304   General Information   Contact made? Yes   Which attempt is this? 1   Call date:  06/20/23   Call time: 1305   Communication Assessment   Patient / Family communication style spoken language (English or Bilingual)   Pre-op Phone Call   How to be Addressed Stephania   Primary Caregiver Self   Notified of Need to Arrange Ride and Caregiver Yes   Stated Reason for Admission hip replacement on right side   Surgery Time Verified Yes   Arrival Time Verified 1300   Facility Location Verified Yes   NPO Status Reinforced Yes   Solids 0500   Clear Liquids 1100   Physician's Name Dr Pitts   Date of Physical 06/20/23   Patient Knows to Bring List of Pre-op Medications Yes   Patient Reminded to Bring Eye Drops for Day of Surgery N/A   Do you have any of the following Medical Devices? Not Applicable   Patient/Family states an understanding of: Pre-op shower with antiseptic soap x2 instructions;Removing jewelry/ body piercings before surgery;Need to bring insurance card;No cosmetics including fragrances;Plan/Resources/Equipment needed for discharge;Instructions not to shave surgical area;Need to leave valuables at home;Reviewed visitor policy   Pre-op Implants (USE \"DEVICES\" GROUP TO DOCUMENT ASSESSMENT OF IMPLANT ON DOS)   Electronic Implant No   Advance Directives   Scanned docmt in ACP Activity? No scanned doc   Pt confirms current doc scanned Pt states no documents   Diabetes Symptoms/Conditions Management   Diabetes Management Strategies diet modification;medication therapy   Malnutrition Screening Tool (MST)   Have you recently lost weight without trying? 0   Have you been eating poorly because of a decreased appetite? 0   Spiritual Beliefs   May our Spiritual Health Services staff meet with you or contact someone on your behalf? not at this time   Disability/Function   Hearing Difficulty or Deaf no   Wear Glasses or Blind yes   Vision Management glasses   Concentrating, Remembering or Making Decisions " Difficulty no   Difficulty Communicating no   Difficulty Eating/Swallowing no   Walking or Climbing Stairs Difficulty no   Dressing/Bathing Difficulty no   Doing Errands Independently Difficulty (such as shopping) no   Equipment Currently Used at Home none   Fall history within last six months no   Change in Functional Status Since Onset of Current Illness/Injury no   Discharge Planning   People in Home sibling(s)   Support Systems None   Type of Residence Private Residence   Patient/Family Anticipates Transition to home with family   Coping/Stress/Tolerance   Major Change/Loss/Stressor/Fears denies

## 2023-06-21 ENCOUNTER — ANESTHESIA EVENT (OUTPATIENT)
Dept: SURGERY | Facility: CLINIC | Age: 68
End: 2023-06-21
Payer: COMMERCIAL

## 2023-06-21 ASSESSMENT — LIFESTYLE VARIABLES: TOBACCO_USE: 1

## 2023-06-21 NOTE — ANESTHESIA PREPROCEDURE EVALUATION
Anesthesia Pre-Procedure Evaluation    Patient: Asmita Jerez   MRN: [id number] : 1955        Procedure : Procedure(s):  Total Hip Arthroplasty          Past Medical History:   Diagnosis Date     Cervical high risk HPV (human papillomavirus) test positive 2018     Diabetes (H)      Hypertension      Peptic ulcer, unspecified site, unspecified as acute or chronic, without mention of hemorrhage, perforation, or obstruction      Polycystic ovaries      Pure hypercholesterolemia      Tobacco use disorder      Unspecified multiple gestation, unspecified as to episode of care     Multiple gestation      Past Surgical History:   Procedure Laterality Date     APPENDECTOMY       BIOPSY BREAST       COLONOSCOPY N/A 2015    Procedure: COLONOSCOPY;  Surgeon: Tino Pryor MD;  Location: [facility]     ENDOSCOPIC RELEASE CARPAL TUNNEL Right 2021    Procedure: Endoscopic carpal tunnel release;  Surgeon: Ac Lemus MD;  Location: [facility]     ENDOSCOPIC RELEASE CARPAL TUNNEL Left 11/15/2021    Procedure: ENDOSCOPIC Carpal Tunnel Release, LEFT, removal suture right wrist;  Surgeon: Ac Lemus MD;  Location: [facility]     LUMPECTOMY BREAST       RELEASE TRIGGER FINGER  2014    Procedure: RELEASE TRIGGER FINGER;  Surgeon: Ag Hoffman MD;  Location: [facility]     SURGICAL HISTORY OF -   1970    appendectomy     SURGICAL HISTORY OF -   1970    pilonidal cyst and sinuses      Allergies   Allergen Reactions     Amoxicillin Rash      Social History     Tobacco Use     Smoking status: Every Day     Packs/day: 1.00     Years: 40.00     Pack years: 40.00     Types: Cigarettes     Smokeless tobacco: Never     Tobacco comments:     pt not interested in quit plan 10/12/2022   Substance Use Topics     Alcohol use: No      Wt Readings from Last 1 Encounters:   23 51.9 kg (114 lb 8 oz)        Anesthesia Evaluation   Pt has had prior anesthetic. Type: MAC and General.     No history of anesthetic complications       ROS/MED HX  ENT/Pulmonary:     (+) tobacco use, Current use, 40  Pack-Year Hx,      Neurologic:  - neg neurologic ROS     Cardiovascular:     (+) Dyslipidemia hypertension-----    METS/Exercise Tolerance:     Hematologic:  - neg hematologic  ROS     Musculoskeletal:  - neg musculoskeletal ROS     GI/Hepatic:     (+) GERD,     Renal/Genitourinary:  - neg Renal ROS     Endo: Comment: Polycystic ovaries    (+) type II DM, Not using insulin, - not using insulin pump.     Psychiatric/Substance Use:     (+) psychiatric history anxiety     Infectious Disease:  - neg infectious disease ROS     Malignancy:  - neg malignancy ROS     Other:  - neg other ROS          Physical Exam    Airway  airway exam normal           Respiratory Devices and Support         Dental       (+) Minor Abnormalities - some fillings, tiny chips      Cardiovascular   cardiovascular exam normal          Pulmonary   pulmonary exam normal                OUTSIDE LABS:  CBC:   Lab Results   Component Value Date    WBC 9.8 05/22/2023    WBC 11.1 (H) 04/29/2015    HGB 13.2 05/22/2023    HGB 13.5 04/29/2015    HCT 40.7 05/22/2023    HCT 40.8 04/29/2015     05/22/2023     04/29/2015     BMP:   Lab Results   Component Value Date     06/20/2023     05/22/2023    POTASSIUM 4.6 06/20/2023    POTASSIUM 4.3 05/22/2023    CHLORIDE 104 06/20/2023    CHLORIDE 103 05/22/2023    CO2 25 06/20/2023    CO2 23 05/22/2023    BUN 15.3 06/20/2023    BUN 17.9 05/22/2023    CR 0.86 06/20/2023    CR 0.82 05/22/2023     (H) 06/20/2023     (H) 05/22/2023     COAGS: No results found for: PTT, INR, FIBR  POC:   Lab Results   Component Value Date     (H) 05/23/2014    HCGS Negative 07/30/2013     HEPATIC:   Lab Results   Component Value Date    ALBUMIN 4.5 05/22/2023    PROTTOTAL 7.0 05/22/2023    ALT 19 05/22/2023    AST 27 05/22/2023    ALKPHOS 72 05/22/2023    BILITOTAL 0.4 05/22/2023      OTHER:   Lab Results   Component Value Date    A1C 7.3 (H) 06/20/2023    JORY 9.9 06/20/2023    LIPASE 62 07/30/2013    TSH 1.64 10/12/2022       Anesthesia Plan    ASA Status:  3   NPO Status:  NPO Appropriate    Anesthesia Type: Spinal.      Maintenance: Balanced.        Consents    Anesthesia Plan(s) and associated risks, benefits, and realistic alternatives discussed. Questions answered and patient/representative(s) expressed understanding.     - Discussed: Risks, Benefits and Alternatives for BOTH SEDATION and the PROCEDURE were discussed     - Discussed with:  Patient         Postoperative Care    Pain management: IV analgesics, Oral pain medications.   PONV prophylaxis: Ondansetron (or other 5HT-3), Dexamethasone or Solumedrol     Comments:                GAURANG Malik CRNA

## 2023-06-22 ENCOUNTER — APPOINTMENT (OUTPATIENT)
Dept: GENERAL RADIOLOGY | Facility: CLINIC | Age: 68
End: 2023-06-22
Attending: ORTHOPAEDIC SURGERY
Payer: COMMERCIAL

## 2023-06-22 ENCOUNTER — ANESTHESIA (OUTPATIENT)
Dept: SURGERY | Facility: CLINIC | Age: 68
End: 2023-06-22
Payer: COMMERCIAL

## 2023-06-22 ENCOUNTER — HOSPITAL ENCOUNTER (OUTPATIENT)
Facility: CLINIC | Age: 68
Discharge: HOME | End: 2023-06-23
Attending: ORTHOPAEDIC SURGERY | Admitting: ORTHOPAEDIC SURGERY
Payer: COMMERCIAL

## 2023-06-22 DIAGNOSIS — Z96.641 STATUS POST RIGHT HIP REPLACEMENT: Primary | ICD-10-CM

## 2023-06-22 PROBLEM — Z96.649 S/P TOTAL HIP ARTHROPLASTY: Status: ACTIVE | Noted: 2023-06-22

## 2023-06-22 LAB
GLUCOSE BLDC GLUCOMTR-MCNC: 74 MG/DL (ref 70–99)
GLUCOSE BLDC GLUCOMTR-MCNC: 74 MG/DL (ref 70–99)
GLUCOSE BLDC GLUCOMTR-MCNC: 95 MG/DL (ref 70–99)
GLUCOSE BLDC GLUCOMTR-MCNC: 99 MG/DL (ref 70–99)

## 2023-06-22 PROCEDURE — 250N000009 HC RX 250: Performed by: NURSE ANESTHETIST, CERTIFIED REGISTERED

## 2023-06-22 PROCEDURE — 360N000077 HC SURGERY LEVEL 4, PER MIN: Performed by: ORTHOPAEDIC SURGERY

## 2023-06-22 PROCEDURE — 370N000017 HC ANESTHESIA TECHNICAL FEE, PER MIN: Performed by: ORTHOPAEDIC SURGERY

## 2023-06-22 PROCEDURE — 250N000013 HC RX MED GY IP 250 OP 250 PS 637: Performed by: PHYSICIAN ASSISTANT

## 2023-06-22 PROCEDURE — 258N000003 HC RX IP 258 OP 636: Performed by: NURSE ANESTHETIST, CERTIFIED REGISTERED

## 2023-06-22 PROCEDURE — 272N000001 HC OR GENERAL SUPPLY STERILE: Performed by: ORTHOPAEDIC SURGERY

## 2023-06-22 PROCEDURE — C1776 JOINT DEVICE (IMPLANTABLE): HCPCS | Performed by: ORTHOPAEDIC SURGERY

## 2023-06-22 PROCEDURE — 258N000003 HC RX IP 258 OP 636: Performed by: ORTHOPAEDIC SURGERY

## 2023-06-22 PROCEDURE — 250N000013 HC RX MED GY IP 250 OP 250 PS 637

## 2023-06-22 PROCEDURE — 258N000003 HC RX IP 258 OP 636

## 2023-06-22 PROCEDURE — 250N000009 HC RX 250: Performed by: ORTHOPAEDIC SURGERY

## 2023-06-22 PROCEDURE — 250N000013 HC RX MED GY IP 250 OP 250 PS 637: Performed by: ORTHOPAEDIC SURGERY

## 2023-06-22 PROCEDURE — 250N000011 HC RX IP 250 OP 636: Performed by: ORTHOPAEDIC SURGERY

## 2023-06-22 PROCEDURE — 710N000009 HC RECOVERY PHASE 1, LEVEL 1, PER MIN: Performed by: ORTHOPAEDIC SURGERY

## 2023-06-22 PROCEDURE — 250N000011 HC RX IP 250 OP 636: Mod: JZ | Performed by: NURSE ANESTHETIST, CERTIFIED REGISTERED

## 2023-06-22 PROCEDURE — 999N000065 XR PELVIS PORT 1/2 VIEWS

## 2023-06-22 PROCEDURE — 250N000011 HC RX IP 250 OP 636

## 2023-06-22 PROCEDURE — 258N000001 HC RX 258: Performed by: ORTHOPAEDIC SURGERY

## 2023-06-22 PROCEDURE — 82962 GLUCOSE BLOOD TEST: CPT

## 2023-06-22 PROCEDURE — 999N000141 HC STATISTIC PRE-PROCEDURE NURSING ASSESSMENT: Performed by: ORTHOPAEDIC SURGERY

## 2023-06-22 DEVICE — IMP CUP ACE PINNACLE 52MM 1217-22-052: Type: IMPLANTABLE DEVICE | Site: HIP | Status: FUNCTIONAL

## 2023-06-22 DEVICE — IMP HEAD FEMORAL DEPUY CERAMIC 36MM +1.5MM 1365-36-310: Type: IMPLANTABLE DEVICE | Site: HIP | Status: FUNCTIONAL

## 2023-06-22 DEVICE — IMP INSERT HIP DEPUY PINNACLE ALTRX 36X52MM 1221-36-052: Type: IMPLANTABLE DEVICE | Site: HIP | Status: FUNCTIONAL

## 2023-06-22 DEVICE — IMP STEM FEM DEPUY ACTIS STD COLLAR TPR SZ 3MM 1010-11-030: Type: IMPLANTABLE DEVICE | Site: HIP | Status: FUNCTIONAL

## 2023-06-22 RX ORDER — ACETAMINOPHEN 325 MG/1
975 TABLET ORAL EVERY 8 HOURS
Status: DISCONTINUED | OUTPATIENT
Start: 2023-06-22 | End: 2023-06-23 | Stop reason: HOSPADM

## 2023-06-22 RX ORDER — DIMENHYDRINATE 50 MG/ML
25 INJECTION, SOLUTION INTRAMUSCULAR; INTRAVENOUS
Status: DISCONTINUED | OUTPATIENT
Start: 2023-06-22 | End: 2023-06-22

## 2023-06-22 RX ORDER — SODIUM CHLORIDE, SODIUM LACTATE, POTASSIUM CHLORIDE, CALCIUM CHLORIDE 600; 310; 30; 20 MG/100ML; MG/100ML; MG/100ML; MG/100ML
INJECTION, SOLUTION INTRAVENOUS CONTINUOUS
Status: DISCONTINUED | OUTPATIENT
Start: 2023-06-22 | End: 2023-06-22 | Stop reason: HOSPADM

## 2023-06-22 RX ORDER — OXYCODONE HYDROCHLORIDE 5 MG/1
10 TABLET ORAL EVERY 4 HOURS PRN
Status: DISCONTINUED | OUTPATIENT
Start: 2023-06-22 | End: 2023-06-23 | Stop reason: HOSPADM

## 2023-06-22 RX ORDER — GLYCOPYRROLATE 0.2 MG/ML
INJECTION, SOLUTION INTRAMUSCULAR; INTRAVENOUS PRN
Status: DISCONTINUED | OUTPATIENT
Start: 2023-06-22 | End: 2023-06-22

## 2023-06-22 RX ORDER — ONDANSETRON 2 MG/ML
4 INJECTION INTRAMUSCULAR; INTRAVENOUS EVERY 30 MIN PRN
Status: DISCONTINUED | OUTPATIENT
Start: 2023-06-22 | End: 2023-06-22

## 2023-06-22 RX ORDER — HYDROMORPHONE HCL IN WATER/PF 6 MG/30 ML
0.2 PATIENT CONTROLLED ANALGESIA SYRINGE INTRAVENOUS
Status: DISCONTINUED | OUTPATIENT
Start: 2023-06-22 | End: 2023-06-23 | Stop reason: HOSPADM

## 2023-06-22 RX ORDER — TRANEXAMIC ACID 650 MG/1
1950 TABLET ORAL ONCE
Status: COMPLETED | OUTPATIENT
Start: 2023-06-22 | End: 2023-06-22

## 2023-06-22 RX ORDER — ASPIRIN 325 MG
325 TABLET, DELAYED RELEASE (ENTERIC COATED) ORAL DAILY
Qty: 30 TABLET | Refills: 0 | Status: SHIPPED | OUTPATIENT
Start: 2023-06-22 | End: 2023-08-17

## 2023-06-22 RX ORDER — LIDOCAINE 40 MG/G
CREAM TOPICAL
Status: DISCONTINUED | OUTPATIENT
Start: 2023-06-22 | End: 2023-06-22 | Stop reason: HOSPADM

## 2023-06-22 RX ORDER — DIPHENHYDRAMINE HYDROCHLORIDE 50 MG/ML
INJECTION INTRAMUSCULAR; INTRAVENOUS PRN
Status: DISCONTINUED | OUTPATIENT
Start: 2023-06-22 | End: 2023-06-22

## 2023-06-22 RX ORDER — FENTANYL CITRATE 50 UG/ML
50 INJECTION, SOLUTION INTRAMUSCULAR; INTRAVENOUS EVERY 5 MIN PRN
Status: DISCONTINUED | OUTPATIENT
Start: 2023-06-22 | End: 2023-06-22

## 2023-06-22 RX ORDER — ACETAMINOPHEN 325 MG/1
650 TABLET ORAL EVERY 4 HOURS PRN
Qty: 100 TABLET | Refills: 0
Start: 2023-06-22 | End: 2023-07-25

## 2023-06-22 RX ORDER — PROCHLORPERAZINE MALEATE 5 MG
5 TABLET ORAL EVERY 6 HOURS PRN
Status: DISCONTINUED | OUTPATIENT
Start: 2023-06-22 | End: 2023-06-23 | Stop reason: HOSPADM

## 2023-06-22 RX ORDER — METOPROLOL TARTRATE 25 MG/1
25 TABLET, FILM COATED ORAL ONCE
Status: COMPLETED | OUTPATIENT
Start: 2023-06-22 | End: 2023-06-22

## 2023-06-22 RX ORDER — FENTANYL CITRATE 50 UG/ML
25 INJECTION, SOLUTION INTRAMUSCULAR; INTRAVENOUS EVERY 5 MIN PRN
Status: DISCONTINUED | OUTPATIENT
Start: 2023-06-22 | End: 2023-06-22

## 2023-06-22 RX ORDER — NALOXONE HYDROCHLORIDE 0.4 MG/ML
0.4 INJECTION, SOLUTION INTRAMUSCULAR; INTRAVENOUS; SUBCUTANEOUS
Status: DISCONTINUED | OUTPATIENT
Start: 2023-06-22 | End: 2023-06-23 | Stop reason: HOSPADM

## 2023-06-22 RX ORDER — MEPERIDINE HYDROCHLORIDE 25 MG/ML
12.5 INJECTION INTRAMUSCULAR; INTRAVENOUS; SUBCUTANEOUS EVERY 5 MIN PRN
Status: DISCONTINUED | OUTPATIENT
Start: 2023-06-22 | End: 2023-06-22

## 2023-06-22 RX ORDER — LIDOCAINE HYDROCHLORIDE 10 MG/ML
INJECTION, SOLUTION INFILTRATION; PERINEURAL PRN
Status: DISCONTINUED | OUTPATIENT
Start: 2023-06-22 | End: 2023-06-22

## 2023-06-22 RX ORDER — NICOTINE POLACRILEX 4 MG
15-30 LOZENGE BUCCAL
Status: DISCONTINUED | OUTPATIENT
Start: 2023-06-22 | End: 2023-06-22

## 2023-06-22 RX ORDER — MAGNESIUM HYDROXIDE 1200 MG/15ML
LIQUID ORAL PRN
Status: DISCONTINUED | OUTPATIENT
Start: 2023-06-22 | End: 2023-06-22 | Stop reason: HOSPADM

## 2023-06-22 RX ORDER — BISACODYL 10 MG
10 SUPPOSITORY, RECTAL RECTAL DAILY PRN
Status: DISCONTINUED | OUTPATIENT
Start: 2023-06-22 | End: 2023-06-23 | Stop reason: HOSPADM

## 2023-06-22 RX ORDER — METFORMIN HCL 500 MG
1000 TABLET, EXTENDED RELEASE 24 HR ORAL 2 TIMES DAILY WITH MEALS
Status: DISCONTINUED | OUTPATIENT
Start: 2023-06-22 | End: 2023-06-23 | Stop reason: HOSPADM

## 2023-06-22 RX ORDER — CEFAZOLIN SODIUM/WATER 2 G/20 ML
2 SYRINGE (ML) INTRAVENOUS SEE ADMIN INSTRUCTIONS
Status: DISCONTINUED | OUTPATIENT
Start: 2023-06-22 | End: 2023-06-22 | Stop reason: HOSPADM

## 2023-06-22 RX ORDER — AMOXICILLIN 250 MG
1 CAPSULE ORAL 2 TIMES DAILY
Status: DISCONTINUED | OUTPATIENT
Start: 2023-06-22 | End: 2023-06-23 | Stop reason: HOSPADM

## 2023-06-22 RX ORDER — ACETAMINOPHEN 325 MG/1
975 TABLET ORAL ONCE
Status: COMPLETED | OUTPATIENT
Start: 2023-06-22 | End: 2023-06-22

## 2023-06-22 RX ORDER — HYDROXYZINE HYDROCHLORIDE 25 MG/1
25 TABLET, FILM COATED ORAL EVERY 6 HOURS PRN
Status: DISCONTINUED | OUTPATIENT
Start: 2023-06-22 | End: 2023-06-23 | Stop reason: HOSPADM

## 2023-06-22 RX ORDER — DEXAMETHASONE SODIUM PHOSPHATE 4 MG/ML
INJECTION, SOLUTION INTRA-ARTICULAR; INTRALESIONAL; INTRAMUSCULAR; INTRAVENOUS; SOFT TISSUE PRN
Status: DISCONTINUED | OUTPATIENT
Start: 2023-06-22 | End: 2023-06-22

## 2023-06-22 RX ORDER — ONDANSETRON 4 MG/1
4 TABLET, ORALLY DISINTEGRATING ORAL EVERY 30 MIN PRN
Status: DISCONTINUED | OUTPATIENT
Start: 2023-06-22 | End: 2023-06-22

## 2023-06-22 RX ORDER — BUPIVACAINE HYDROCHLORIDE 7.5 MG/ML
INJECTION, SOLUTION INTRASPINAL PRN
Status: DISCONTINUED | OUTPATIENT
Start: 2023-06-22 | End: 2023-06-22

## 2023-06-22 RX ORDER — ACETAMINOPHEN 325 MG/1
650 TABLET ORAL EVERY 4 HOURS PRN
Status: DISCONTINUED | OUTPATIENT
Start: 2023-06-25 | End: 2023-06-23 | Stop reason: HOSPADM

## 2023-06-22 RX ORDER — ONDANSETRON 2 MG/ML
INJECTION INTRAMUSCULAR; INTRAVENOUS PRN
Status: DISCONTINUED | OUTPATIENT
Start: 2023-06-22 | End: 2023-06-22

## 2023-06-22 RX ORDER — FAMOTIDINE 20 MG/1
20 TABLET, FILM COATED ORAL 2 TIMES DAILY
Status: DISCONTINUED | OUTPATIENT
Start: 2023-06-22 | End: 2023-06-23 | Stop reason: HOSPADM

## 2023-06-22 RX ORDER — CEFAZOLIN SODIUM/WATER 2 G/20 ML
2 SYRINGE (ML) INTRAVENOUS
Status: COMPLETED | OUTPATIENT
Start: 2023-06-22 | End: 2023-06-22

## 2023-06-22 RX ORDER — METOPROLOL SUCCINATE 100 MG/1
100 TABLET, EXTENDED RELEASE ORAL DAILY
Status: DISCONTINUED | OUTPATIENT
Start: 2023-06-23 | End: 2023-06-23 | Stop reason: HOSPADM

## 2023-06-22 RX ORDER — HYDROMORPHONE HCL IN WATER/PF 6 MG/30 ML
0.4 PATIENT CONTROLLED ANALGESIA SYRINGE INTRAVENOUS
Status: DISCONTINUED | OUTPATIENT
Start: 2023-06-22 | End: 2023-06-23 | Stop reason: HOSPADM

## 2023-06-22 RX ORDER — OXYCODONE HYDROCHLORIDE 5 MG/1
5 TABLET ORAL EVERY 4 HOURS PRN
Status: DISCONTINUED | OUTPATIENT
Start: 2023-06-22 | End: 2023-06-23 | Stop reason: HOSPADM

## 2023-06-22 RX ORDER — BUPIVACAINE HYDROCHLORIDE 5 MG/ML
INJECTION, SOLUTION PERINEURAL PRN
Status: DISCONTINUED | OUTPATIENT
Start: 2023-06-22 | End: 2023-06-22 | Stop reason: HOSPADM

## 2023-06-22 RX ORDER — POLYETHYLENE GLYCOL 3350 17 G/17G
17 POWDER, FOR SOLUTION ORAL DAILY
Status: DISCONTINUED | OUTPATIENT
Start: 2023-06-23 | End: 2023-06-23 | Stop reason: HOSPADM

## 2023-06-22 RX ORDER — LISINOPRIL 40 MG/1
40 TABLET ORAL DAILY
Status: DISCONTINUED | OUTPATIENT
Start: 2023-06-23 | End: 2023-06-23 | Stop reason: HOSPADM

## 2023-06-22 RX ORDER — DEXTROSE MONOHYDRATE 25 G/50ML
25-50 INJECTION, SOLUTION INTRAVENOUS
Status: DISCONTINUED | OUTPATIENT
Start: 2023-06-22 | End: 2023-06-22

## 2023-06-22 RX ORDER — NALOXONE HYDROCHLORIDE 0.4 MG/ML
0.2 INJECTION, SOLUTION INTRAMUSCULAR; INTRAVENOUS; SUBCUTANEOUS
Status: DISCONTINUED | OUTPATIENT
Start: 2023-06-22 | End: 2023-06-23 | Stop reason: HOSPADM

## 2023-06-22 RX ORDER — HYDROXYZINE HYDROCHLORIDE 25 MG/1
25 TABLET, FILM COATED ORAL EVERY 6 HOURS PRN
Qty: 30 TABLET | Refills: 0 | Status: SHIPPED | OUTPATIENT
Start: 2023-06-22 | End: 2023-11-07

## 2023-06-22 RX ORDER — KETOROLAC TROMETHAMINE 15 MG/ML
15 INJECTION, SOLUTION INTRAMUSCULAR; INTRAVENOUS EVERY 6 HOURS
Status: DISCONTINUED | OUTPATIENT
Start: 2023-06-22 | End: 2023-06-23 | Stop reason: HOSPADM

## 2023-06-22 RX ORDER — GABAPENTIN 100 MG/1
100 CAPSULE ORAL
Status: COMPLETED | OUTPATIENT
Start: 2023-06-22 | End: 2023-06-22

## 2023-06-22 RX ORDER — ONDANSETRON 2 MG/ML
4 INJECTION INTRAMUSCULAR; INTRAVENOUS EVERY 6 HOURS PRN
Status: DISCONTINUED | OUTPATIENT
Start: 2023-06-22 | End: 2023-06-23 | Stop reason: HOSPADM

## 2023-06-22 RX ORDER — ONDANSETRON 4 MG/1
4 TABLET, ORALLY DISINTEGRATING ORAL EVERY 6 HOURS PRN
Status: DISCONTINUED | OUTPATIENT
Start: 2023-06-22 | End: 2023-06-23 | Stop reason: HOSPADM

## 2023-06-22 RX ORDER — CALCIUM CARBONATE 500 MG/1
500 TABLET, CHEWABLE ORAL 4 TIMES DAILY PRN
Status: DISCONTINUED | OUTPATIENT
Start: 2023-06-22 | End: 2023-06-23 | Stop reason: HOSPADM

## 2023-06-22 RX ORDER — FENTANYL CITRATE 50 UG/ML
INJECTION, SOLUTION INTRAMUSCULAR; INTRAVENOUS PRN
Status: DISCONTINUED | OUTPATIENT
Start: 2023-06-22 | End: 2023-06-22

## 2023-06-22 RX ORDER — ASPIRIN 325 MG
325 TABLET, DELAYED RELEASE (ENTERIC COATED) ORAL DAILY
Status: DISCONTINUED | OUTPATIENT
Start: 2023-06-22 | End: 2023-06-23 | Stop reason: HOSPADM

## 2023-06-22 RX ORDER — ATORVASTATIN CALCIUM 20 MG/1
40 TABLET, FILM COATED ORAL DAILY
Status: DISCONTINUED | OUTPATIENT
Start: 2023-06-22 | End: 2023-06-23 | Stop reason: HOSPADM

## 2023-06-22 RX ORDER — SODIUM CHLORIDE, SODIUM LACTATE, POTASSIUM CHLORIDE, CALCIUM CHLORIDE 600; 310; 30; 20 MG/100ML; MG/100ML; MG/100ML; MG/100ML
INJECTION, SOLUTION INTRAVENOUS CONTINUOUS
Status: DISCONTINUED | OUTPATIENT
Start: 2023-06-22 | End: 2023-06-23 | Stop reason: HOSPADM

## 2023-06-22 RX ORDER — AMOXICILLIN 250 MG
1-2 CAPSULE ORAL 2 TIMES DAILY PRN
Qty: 30 TABLET | Refills: 0 | Status: SHIPPED | OUTPATIENT
Start: 2023-06-22 | End: 2023-06-23

## 2023-06-22 RX ORDER — HYDROMORPHONE HCL IN WATER/PF 6 MG/30 ML
0.4 PATIENT CONTROLLED ANALGESIA SYRINGE INTRAVENOUS EVERY 5 MIN PRN
Status: DISCONTINUED | OUTPATIENT
Start: 2023-06-22 | End: 2023-06-22

## 2023-06-22 RX ORDER — CEFAZOLIN SODIUM 1 G/3ML
1 INJECTION, POWDER, FOR SOLUTION INTRAMUSCULAR; INTRAVENOUS EVERY 8 HOURS
Status: COMPLETED | OUTPATIENT
Start: 2023-06-22 | End: 2023-06-23

## 2023-06-22 RX ORDER — HYDROMORPHONE HCL IN WATER/PF 6 MG/30 ML
0.2 PATIENT CONTROLLED ANALGESIA SYRINGE INTRAVENOUS EVERY 5 MIN PRN
Status: DISCONTINUED | OUTPATIENT
Start: 2023-06-22 | End: 2023-06-22

## 2023-06-22 RX ORDER — SODIUM CHLORIDE, SODIUM LACTATE, POTASSIUM CHLORIDE, CALCIUM CHLORIDE 600; 310; 30; 20 MG/100ML; MG/100ML; MG/100ML; MG/100ML
INJECTION, SOLUTION INTRAVENOUS CONTINUOUS
Status: DISCONTINUED | OUTPATIENT
Start: 2023-06-22 | End: 2023-06-22

## 2023-06-22 RX ORDER — OXYCODONE HYDROCHLORIDE 5 MG/1
5-10 TABLET ORAL EVERY 4 HOURS PRN
Qty: 26 TABLET | Refills: 0 | Status: SHIPPED | OUTPATIENT
Start: 2023-06-22 | End: 2023-11-01

## 2023-06-22 RX ORDER — PROPOFOL 10 MG/ML
INJECTION, EMULSION INTRAVENOUS CONTINUOUS PRN
Status: DISCONTINUED | OUTPATIENT
Start: 2023-06-22 | End: 2023-06-22

## 2023-06-22 RX ORDER — LIDOCAINE 40 MG/G
CREAM TOPICAL
Status: DISCONTINUED | OUTPATIENT
Start: 2023-06-22 | End: 2023-06-23 | Stop reason: HOSPADM

## 2023-06-22 RX ORDER — HYDROXYZINE HYDROCHLORIDE 10 MG/1
10 TABLET, FILM COATED ORAL EVERY 6 HOURS PRN
Status: DISCONTINUED | OUTPATIENT
Start: 2023-06-22 | End: 2023-06-22

## 2023-06-22 RX ORDER — HYDROXYZINE HYDROCHLORIDE 25 MG/1
25 TABLET, FILM COATED ORAL 3 TIMES DAILY PRN
Status: DISCONTINUED | OUTPATIENT
Start: 2023-06-22 | End: 2023-06-22

## 2023-06-22 RX ADMIN — GABAPENTIN 100 MG: 100 CAPSULE ORAL at 13:33

## 2023-06-22 RX ADMIN — DEXAMETHASONE SODIUM PHOSPHATE 4 MG: 4 INJECTION, SOLUTION INTRA-ARTICULAR; INTRALESIONAL; INTRAMUSCULAR; INTRAVENOUS; SOFT TISSUE at 15:36

## 2023-06-22 RX ADMIN — FENTANYL CITRATE 50 MCG: 50 INJECTION, SOLUTION INTRAMUSCULAR; INTRAVENOUS at 15:27

## 2023-06-22 RX ADMIN — TRANEXAMIC ACID 1950 MG: 650 TABLET ORAL at 13:33

## 2023-06-22 RX ADMIN — LIDOCAINE HYDROCHLORIDE 2 ML: 10 INJECTION, SOLUTION INFILTRATION; PERINEURAL at 15:29

## 2023-06-22 RX ADMIN — FAMOTIDINE 20 MG: 20 TABLET, FILM COATED ORAL at 19:55

## 2023-06-22 RX ADMIN — ONDANSETRON 4 MG: 2 INJECTION INTRAMUSCULAR; INTRAVENOUS at 15:36

## 2023-06-22 RX ADMIN — MIDAZOLAM 2 MG: 1 INJECTION INTRAMUSCULAR; INTRAVENOUS at 15:24

## 2023-06-22 RX ADMIN — SODIUM CHLORIDE, POTASSIUM CHLORIDE, SODIUM LACTATE AND CALCIUM CHLORIDE: 600; 310; 30; 20 INJECTION, SOLUTION INTRAVENOUS at 19:58

## 2023-06-22 RX ADMIN — KETOROLAC TROMETHAMINE 15 MG: 15 INJECTION, SOLUTION INTRAMUSCULAR; INTRAVENOUS at 19:56

## 2023-06-22 RX ADMIN — ATORVASTATIN CALCIUM 40 MG: 20 TABLET, FILM COATED ORAL at 19:56

## 2023-06-22 RX ADMIN — GLYCOPYRROLATE 0.1 MG: 0.2 INJECTION, SOLUTION INTRAMUSCULAR; INTRAVENOUS at 15:26

## 2023-06-22 RX ADMIN — PHENYLEPHRINE HYDROCHLORIDE 100 MCG: 10 INJECTION INTRAVENOUS at 16:08

## 2023-06-22 RX ADMIN — FENTANYL CITRATE 50 MCG: 50 INJECTION, SOLUTION INTRAMUSCULAR; INTRAVENOUS at 15:26

## 2023-06-22 RX ADMIN — ACETAMINOPHEN 975 MG: 325 TABLET, FILM COATED ORAL at 22:11

## 2023-06-22 RX ADMIN — MIDAZOLAM 2 MG: 1 INJECTION INTRAMUSCULAR; INTRAVENOUS at 15:36

## 2023-06-22 RX ADMIN — ASPIRIN 325 MG: 325 TABLET ORAL at 19:54

## 2023-06-22 RX ADMIN — SODIUM CHLORIDE, POTASSIUM CHLORIDE, SODIUM LACTATE AND CALCIUM CHLORIDE: 600; 310; 30; 20 INJECTION, SOLUTION INTRAVENOUS at 13:54

## 2023-06-22 RX ADMIN — Medication 2 G: at 15:24

## 2023-06-22 RX ADMIN — DIPHENHYDRAMINE HYDROCHLORIDE 25 MG: 50 INJECTION, SOLUTION INTRAMUSCULAR; INTRAVENOUS at 15:36

## 2023-06-22 RX ADMIN — LIDOCAINE HYDROCHLORIDE 10 ML: 10 INJECTION, SOLUTION INFILTRATION; PERINEURAL at 15:39

## 2023-06-22 RX ADMIN — GLYCOPYRROLATE 0.1 MG: 0.2 INJECTION, SOLUTION INTRAMUSCULAR; INTRAVENOUS at 15:24

## 2023-06-22 RX ADMIN — ACETAMINOPHEN 975 MG: 325 TABLET, FILM COATED ORAL at 13:33

## 2023-06-22 RX ADMIN — PROPOFOL 50 MCG/KG/MIN: 10 INJECTION, EMULSION INTRAVENOUS at 15:39

## 2023-06-22 RX ADMIN — METFORMIN HYDROCHLORIDE 1000 MG: 500 TABLET, EXTENDED RELEASE ORAL at 19:52

## 2023-06-22 RX ADMIN — CEFAZOLIN 1 G: 1 INJECTION, POWDER, FOR SOLUTION INTRAMUSCULAR; INTRAVENOUS at 23:37

## 2023-06-22 RX ADMIN — BUPIVACAINE HYDROCHLORIDE IN DEXTROSE 1.6 ML: 7.5 INJECTION, SOLUTION SUBARACHNOID at 15:31

## 2023-06-22 RX ADMIN — METOPROLOL TARTRATE 25 MG: 25 TABLET, FILM COATED ORAL at 19:54

## 2023-06-22 ASSESSMENT — ACTIVITIES OF DAILY LIVING (ADL)
ADLS_ACUITY_SCORE: 24
ADLS_ACUITY_SCORE: 20

## 2023-06-22 NOTE — PROVIDER NOTIFICATION
Jes Santos CRNA notified of BG of 74. Pt POing juice at this time. Will recheck BG in 15 minutes. Patient alert, oriented and well appearing. Other vitals stable. Will cont to closely monitor and notify CRNA with any further concerns.

## 2023-06-22 NOTE — ANESTHESIA POSTPROCEDURE EVALUATION
Patient: Asmita Jerez    Procedure: Procedure(s):  Total Hip Arthroplasty Right       Anesthesia Type:  Spinal    Note:  Disposition: Admission   Postop Pain Control: Uneventful            Sign Out: Well controlled pain   PONV: No   Neuro/Psych: Uneventful            Sign Out: Acceptable/Baseline neuro status   Airway/Respiratory: Uneventful            Sign Out: Acceptable/Baseline resp. status   CV/Hemodynamics: Uneventful            Sign Out: Acceptable CV status; No obvious hypovolemia; No obvious fluid overload   Other NRE: NONE   DID A NON-ROUTINE EVENT OCCUR? No           Last vitals:  Vitals Value Taken Time   /64 06/22/23 1730   Temp 36.3  C (97.4  F) 06/22/23 1730   Pulse 68 06/22/23 1736   Resp 16 06/22/23 1736   SpO2 99 % 06/22/23 1743   Vitals shown include unvalidated device data.    Electronically Signed By: GAURANG Holland CRNA  June 22, 2023  5:52 PM

## 2023-06-22 NOTE — PROVIDER NOTIFICATION
06/22/23 1345   Discharge Planning   Patient/Family Anticipates Transition to home with family   Concerns to be Addressed all concerns addressed in this encounter   Living Arrangements   People in Home sibling(s)   Type of Residence Private Residence   Is your private residence a single family home or apartment? Single family home   Number of Stairs, Within Home, Primary five   Stair Railings, Within Home, Primary railings safe and in good condition   Once home, are you able to live on one level? Yes   Which level? Main Level   Which rooms are not on the main level? Bedroom;Bathroom;Living Room;Kitchen   Bathroom Shower/Tub Walk-in shower   Equipment Currently Used at Home walker, standard;grab bar, tub/shower;grab bar, toilet;shower chair;raised toilet seat   Support System   Support Systems Friends/Neighbors;None   Do you have someone available to stay with you one or two nights once you are home? Yes   Medical Clearance   Date of Physical 06/20/23   It is recommended that you call and check with any specialty providers before surgery to see if you need surgical clearance.  Do you see any specialty providers outside of your primary care provider? No   Blood   Known Bleeding Disorder or Coagulopathy No   Does the patient have any Rastafari/cultural preferences related to blood products? No   Education   Has the patient scheduled or completed pre-op total joint education, either in class or online, in the last 12 months? No   Relationship/Living Environment   Name(s) of People in Home Sharron

## 2023-06-22 NOTE — INTERVAL H&P NOTE
I have reviewed the surgical (or preoperative) H&P that is linked to this encounter, and examined the patient. There are no significant changes    Clinical Conditions Present on Arrival:  Clinically Significant Risk Factors Present on Admission                 # Drug Induced Platelet Defect: home medication list includes an antiplatelet medication  # DMII: A1C = 7.3 % (Ref range: 0.0 - 5.6 %) within past 6 months

## 2023-06-22 NOTE — PROCEDURES
06/22/23    PREOP DIAGNOSIS: Right hip arthritis  POSTOP DIAGNOSIS: Right hip arthritis  PROCEDURE: Right total hip arthroplasty  SURGEON: Albin Williamson   ASSISTANT: Sadie Herrera.  The presence of a PA was necessary for positioning, retraction, and safe progression of the case  ANESTHESIA: Spinal with sedation   EBL: 100cc  COMPLICATIONS: None apparent   DISPO: Stable to PACU     IMPLANTS: DePuy Actis size 3 standard offset collared femur, 36mm x +1.5mm ceramic femoral head, 36mm x 52mm neutral polyethylene liner, and 52mm [place] Cup    INDICATIONS: Cheryl is a 67 year old female with a history of progressive right hip pain due to end stage arthritis.  After failing nonoperative management, she elected to proceed with a right BOO, understanding the risks of infection, damage to vessels or nerves, blood clots, instability, leg length discrepancy, ongoing pain and need for revision surgery.     PROCEDURE: Cheryl was met in the preoperative holding area where consent was reviewed and the right hip was marked.  She was then transferred to the operating theater. After a spinal anesthetic was placed, she was placed in a left lateral decubitus position with her right side up. All bony prominences were padded, she was secured with a Stulberg hip positioner, and an axillary roll was placed.  A timeout was performed verifying the correct patient, surgery, and location. She received preoperative antibiotics as well as transexamic acid. The right lower extremity was then prepped and draped in a standard fashion.     We then made an incision in line with a posterior approach to the hip. Dissection was sharply carried down through skin and subcutaneous tissue, and the gluteus fascia incised in line with the incision. After palpating and protecting the sciatic nerve, an east west retractor was placed. We then released the piriformis and short external rotators and then performed a T Capsulotomy. The hip was dislocated and a  neck cut made, approximately 7 mm proximal to the lesser trochanter. The femoral head measured 45mm and showed full thickness chondral wear.    We turned our attention to the acetabulum. After placing anterior and posterior retractors, foveal and labral tissue were removed.  We started with a 45mm reamer and sequentially reamed to a 52, in approximately 40 deg of abduction and 25 deg of anteversion. At that point, we had good bleeding bone circumferentially.  A trial cup was placed with good pressfit followed by our final 52mm cup, again with good press fit, and a neutral liner. We then turned our attention to the femur. After using a cookie cutter and canal finder, we broached to a size 3, keeping the stem in approximatley 10-15 deg of anteversion. At that point, we had excellent rotational stability.  We then placed a standard offset neck with and a variety of different length femoral heads.  We felt the +1.5mm head most appropriate which showed leg lengths felt appropriate, the hip was stable in full extension and maximal external rotation, in the sleeping position, and with flexion to 90 degrees and internal rotation to 80 degrees.     We then removed all trial components and thoroughly irrigated the wound. We placed our final size 3 stem.  We impacted our final head. The wound was instilled with a dilute betadine solution. Capsule and short external rotators were repaired with 0-ethibond, gluteus fascia with #1 stratafix, subdermal sutures with 2-0 vicryl, and a running 3-0 subcuticular monocryl. A sterile dressing followed by an abductor pillow was placed and the patient was transferred to the PACU in stable condition.       POSTOPERATIVE PLAN:   1. WBAT right LE with PT for mobilization  2. DVT prophylaxis: ASA 325mg daily x 30 days  3. Perioperative antibiotics   4. Follow up in two weeks for a wound check, sooner with questions or concerns    Albin Williamson MD

## 2023-06-22 NOTE — ANESTHESIA CARE TRANSFER NOTE
Patient: Asmita Jerez    Procedure: Procedure(s):  Total Hip Arthroplasty Right       Diagnosis: Arthritis of right hip [M16.11]  Diagnosis Additional Information: No value filed.    Anesthesia Type:   Spinal     Note:    Oropharynx: oropharynx clear of all foreign objects and spontaneously breathing  Level of Consciousness: drowsy  Oxygen Supplementation: nasal cannula  Level of Supplemental Oxygen (L/min / FiO2): 2  Independent Airway: airway patency satisfactory and stable  Dentition: dentition unchanged  Vital Signs Stable: post-procedure vital signs reviewed and stable  Report to RN Given: handoff report given  Patient transferred to: PACU    Handoff Report: Identifed the Patient, Identified the Reponsible Provider, Reviewed the pertinent medical history, Discussed the surgical course, Reviewed Intra-OP anesthesia mangement and issues during anesthesia, Set expectations for post-procedure period and Allowed opportunity for questions and acknowledgement of understanding      Vitals:  Vitals Value Taken Time   /67 06/22/23 1654   Temp     Pulse 76 06/22/23 1658   Resp 14 06/22/23 1658   SpO2 99 % 06/22/23 1658   Vitals shown include unvalidated device data.    Electronically Signed By: GAURANG Holland CRNA  June 22, 2023  4:58 PM

## 2023-06-22 NOTE — PROGRESS NOTES
Vitals stable. Denies pain and nausea. BG recheck 99. Tolerated juice and crackers. Sister oCncha updated via telephone regarding transfer to med surg unit. Stable on room air. Report given to Idania Shabazz med surg RN.

## 2023-06-22 NOTE — ANESTHESIA PROCEDURE NOTES
"Intrathecal injection Procedure Note    Pre-Procedure   Staff -        CRNA: Jes Santos APRN CRNA       Performed By: CRNA       Location: OR       Procedure Start/Stop Times: 6/22/2023 3:27 PM and 6/22/2023 3:32 PM       Pre-Anesthestic Checklist: patient identified, IV checked, risks and benefits discussed, informed consent, monitors and equipment checked, pre-op evaluation and at physician/surgeon's request  Timeout:       Correct Patient: Yes        Correct Procedure: Yes        Correct Site: Yes        Correct Position: Yes   Procedure Documentation  Procedure: intrathecal injection       Diagnosis: right hip DJD       Patient Position: sitting       Patient Prep/Sterile Barriers: sterile gloves, mask, patient draped       Skin prep: Betadine       Insertion Site: L4-5. (midline approach).       Needle Gauge: 25.        Needle Length (Inches): 3.5        Spinal Needle Type: Pencan       Introducer used       Introducer: 20 G       # of attempts: 1 and  # of redirects:  0    Assessment/Narrative         Paresthesias: No.       CSF fluid: clear.       Opening pressure was cmH2O while  Sitting.      Medication(s) Administered   Medication Administration Time: 6/22/2023 3:27 PM      FOR [facility] ONLY:   Pain Team Contact information: please page the Pain Team Via [address]ore[address]. Search \"Pain\". During daytime hours, please page the attending first. At night please page the resident first.      "

## 2023-06-23 ENCOUNTER — APPOINTMENT (OUTPATIENT)
Dept: PHYSICAL THERAPY | Facility: CLINIC | Age: 68
End: 2023-06-23
Attending: ORTHOPAEDIC SURGERY
Payer: COMMERCIAL

## 2023-06-23 VITALS
DIASTOLIC BLOOD PRESSURE: 44 MMHG | BODY MASS INDEX: 19.55 KG/M2 | RESPIRATION RATE: 16 BRPM | HEIGHT: 64 IN | SYSTOLIC BLOOD PRESSURE: 130 MMHG | OXYGEN SATURATION: 100 % | HEART RATE: 67 BPM | TEMPERATURE: 98.1 F | WEIGHT: 114.5 LBS

## 2023-06-23 LAB
HGB BLD-MCNC: 10.8 G/DL (ref 11.7–15.7)
HOLD SPECIMEN: NORMAL

## 2023-06-23 PROCEDURE — 36415 COLL VENOUS BLD VENIPUNCTURE: CPT | Performed by: ORTHOPAEDIC SURGERY

## 2023-06-23 PROCEDURE — 250N000011 HC RX IP 250 OP 636: Mod: JZ | Performed by: ORTHOPAEDIC SURGERY

## 2023-06-23 PROCEDURE — 85018 HEMOGLOBIN: CPT | Performed by: ORTHOPAEDIC SURGERY

## 2023-06-23 PROCEDURE — 99204 OFFICE O/P NEW MOD 45 MIN: CPT

## 2023-06-23 PROCEDURE — 250N000013 HC RX MED GY IP 250 OP 250 PS 637: Performed by: PHYSICIAN ASSISTANT

## 2023-06-23 PROCEDURE — 97161 PT EVAL LOW COMPLEX 20 MIN: CPT | Mod: GP | Performed by: PHYSICAL MEDICINE & REHABILITATION

## 2023-06-23 PROCEDURE — 250N000013 HC RX MED GY IP 250 OP 250 PS 637: Performed by: ORTHOPAEDIC SURGERY

## 2023-06-23 PROCEDURE — 97116 GAIT TRAINING THERAPY: CPT | Mod: GP | Performed by: PHYSICAL MEDICINE & REHABILITATION

## 2023-06-23 PROCEDURE — 97110 THERAPEUTIC EXERCISES: CPT | Mod: GP | Performed by: PHYSICAL MEDICINE & REHABILITATION

## 2023-06-23 RX ADMIN — METFORMIN HYDROCHLORIDE 1000 MG: 500 TABLET, EXTENDED RELEASE ORAL at 08:09

## 2023-06-23 RX ADMIN — METOPROLOL SUCCINATE 100 MG: 100 TABLET, EXTENDED RELEASE ORAL at 08:08

## 2023-06-23 RX ADMIN — KETOROLAC TROMETHAMINE 15 MG: 15 INJECTION, SOLUTION INTRAMUSCULAR; INTRAVENOUS at 02:07

## 2023-06-23 RX ADMIN — KETOROLAC TROMETHAMINE 15 MG: 15 INJECTION, SOLUTION INTRAMUSCULAR; INTRAVENOUS at 08:08

## 2023-06-23 RX ADMIN — SENNOSIDES AND DOCUSATE SODIUM 1 TABLET: 50; 8.6 TABLET ORAL at 08:08

## 2023-06-23 RX ADMIN — ATORVASTATIN CALCIUM 40 MG: 20 TABLET, FILM COATED ORAL at 08:08

## 2023-06-23 RX ADMIN — ASPIRIN 325 MG: 325 TABLET ORAL at 08:08

## 2023-06-23 RX ADMIN — ACETAMINOPHEN 975 MG: 325 TABLET, FILM COATED ORAL at 05:59

## 2023-06-23 RX ADMIN — HYDROXYZINE HYDROCHLORIDE 25 MG: 25 TABLET, FILM COATED ORAL at 02:07

## 2023-06-23 RX ADMIN — CEFAZOLIN 1 G: 1 INJECTION, POWDER, FOR SOLUTION INTRAMUSCULAR; INTRAVENOUS at 08:08

## 2023-06-23 RX ADMIN — LISINOPRIL 40 MG: 40 TABLET ORAL at 08:09

## 2023-06-23 RX ADMIN — FAMOTIDINE 20 MG: 10 INJECTION INTRAVENOUS at 08:08

## 2023-06-23 ASSESSMENT — ACTIVITIES OF DAILY LIVING (ADL)
ADLS_ACUITY_SCORE: 24
ADLS_ACUITY_SCORE: 21

## 2023-06-23 NOTE — DISCHARGE SUMMARY
[facility] Orthopedics Discharge Summary                                  [facility]     DEANNE VELEZ [id number]   Age: 67 year old  PCP: Melissa Pitts, [phone] 1955     Date of Admission:  6/22/2023  Date of Discharge::  6/23/2023  9:59 AM  Discharge Physician:  Collin Khoury PA-C    Code status:  Full Code    Admission Information:  Admission Diagnosis:  Arthritis of right hip [M16.11]  S/P total hip arthroplasty [Z96.649]    Post-Operative Day: 1 Day Post-Op     Reason for admission:  The patient was admitted for the following:Procedure(s) (LRB):  Total Hip Arthroplasty Right (Right)    Principal Problem:    S/P total hip arthroplasty      Allergies:  Amoxicillin    Following the procedure noted above the patient was transferred to the post-op floor and started on:    Therapy:  physical therapy  Anticoagulation Plan:  mg daily  for 30 days  Pain Management: oxycodone, toradol, tylenol and vistaril  Weight bearing status: Weight bearing as tolerated     The patient was followed and co-managed by the hospitalist service during the inpatient treatment course  Complications:  None  Consultations:  None     Pertinent Labs   Lab Results: personally reviewed.     Recent Labs   Lab Test 06/23/23  0448 06/20/23  1010 05/22/23  1400 10/12/22  1526 05/25/16  1138 04/29/15  0958   HGB 10.8*  --  13.2  --   --  13.5   HCT  --   --  40.7  --   --  40.8   MCV  --   --  92  --   --  91   PLT  --   --  290  --   --  277   NA  --  139 140 140   < > 140    < > = values in this interval not displayed.          Discharge Information:  Condition at discharge: Stable  Discharge destination:  Discharged to home     Medications at discharge:  Current Discharge Medication List      START taking these medications    Details   aspirin (ASA) 325 MG EC tablet Take 1 tablet (325 mg) by mouth daily  Qty: 30 tablet, Refills: 0    Associated Diagnoses: Status post right hip replacement      oxyCODONE  (ROXICODONE) 5 MG tablet Take 1-2 tablets (5-10 mg) by mouth every 4 hours as needed for moderate to severe pain  Qty: 26 tablet, Refills: 0    Associated Diagnoses: Status post right hip replacement         CONTINUE these medications which have CHANGED    Details   acetaminophen (TYLENOL) 325 MG tablet Take 2 tablets (650 mg) by mouth every 4 hours as needed for other (mild pain)  Qty: 100 tablet, Refills: 0    Associated Diagnoses: Status post right hip replacement      hydrOXYzine (ATARAX) 25 MG tablet Take 1 tablet (25 mg) by mouth every 6 hours as needed for itching or anxiety (with pain, moderate pain)  Qty: 30 tablet, Refills: 0    Associated Diagnoses: Status post right hip replacement         CONTINUE these medications which have NOT CHANGED    Details   ASPIRIN 81 MG OR TABS Take 81 mg by mouth daily      atorvastatin (LIPITOR) 40 MG tablet Take 1 tablet (40 mg) by mouth daily  Qty: 90 tablet, Refills: 3    Associated Diagnoses: Type 2 diabetes mellitus without complication, without long-term current use of insulin (H)      Dulaglutide (TRULICITY) 3 MG/0.5ML SOPN Inject 3 mg Subcutaneous every 7 days  Qty: 6 mL, Refills: 1    Associated Diagnoses: Type 2 diabetes mellitus without complication, without long-term current use of insulin (H)      lisinopril (ZESTRIL) 40 MG tablet Take 1 tablet (40 mg) by mouth daily  Qty: 90 tablet, Refills: 3    Associated Diagnoses: Essential hypertension with goal blood pressure less than 140/90      metFORMIN (GLUCOPHAGE XR) 500 MG 24 hr tablet TAKE 2 TABLETS BY MOUTH 2 TIMES DAILY WITH MEALS  Qty: 360 tablet, Refills: 1    Associated Diagnoses: Type 2 diabetes mellitus without complication, without long-term current use of insulin (H)      metoprolol succinate ER (TOPROL XL) 100 MG 24 hr tablet Take 1 tablet (100 mg) by mouth daily  Qty: 90 tablet, Refills: 3    Comments: Note dose change.  Discontinue order for metoprolol xl 50 mg  Associated Diagnoses: Hypertension,  goal below 140/90      blood glucose (ACCU-CHEK SIDDHARTHA PLUS) test strip Use to test blood sugar 3 times daily or as directed.  Qty: 200 strip, Refills: 3    Associated Diagnoses: Type 2 diabetes mellitus without complication, without long-term current use of insulin (H)      blood glucose monitoring (ACCU-CHEK MULTICLIX) lancets Use to test blood sugar 1 time daily or as directed. Accu chek, Ins will cover, match machine.  Qty: 100 each, Refills: 2    Associated Diagnoses: Type 2 diabetes mellitus without complication, without long-term current use of insulin (H)                        Follow-Up Care:  Patient should be seen in the office in 14 days by the Orthopedic Surgeon/Physician Assistant.  Call [phone] for appointment or questions.    Collin Khoury PA-C

## 2023-06-23 NOTE — PROGRESS NOTES
"[facility] ADMISSION NOTE    Patient admitted to room 2302 at approximately 1800 via cart from surgery. Patient was accompanied by nurse.     Verbal SBAR report received from DANIELLE Figueredo prior to patient arrival.     Patient trasferred to bed via air grace. Patient alert and oriented X 3. The patient is not having any pain.  . Admission vital signs: Blood pressure 129/47, pulse 68, temperature 98.3  F (36.8  C), temperature source Oral, resp. rate 16, height 1.626 m (5' 4.02\"), weight 51.9 kg (114 lb 8 oz), last menstrual period 12/01/2009, SpO2 96 %, not currently breastfeeding. Patient was oriented to plan of care, call light, bed controls, tv, telephone, bathroom and visiting hours.     Risk Assessment    The following safety risks were identified during admission: fall. Yellow risk band applied: YES.     Skin Initial Assessment    This writer admitted this patient and completed a full skin assessment and Billy score in the Adult PCS flowsheet. Appropriate interventions initiated as needed.     Secondary skin check completed by DANIELLE Dunaway.    Education    Patient has a [place] to Observation order: No  Observation education completed and documented: N/A      Idania Shabazz RN    "

## 2023-06-23 NOTE — PROGRESS NOTES
May 13, 2022    Patient: Ren Wang  YOB: 2009  Date of Last Encounter: 5/10/2022      To whom it may concern:     Ren Wang has tested positive for COVID-19 on 5/12/22  He may return to  School on  5/18/22, which is 5 days from illness onset (provided symptoms are improving) and 24 hours without fever      Sincerely,         [address] Patient vital signs are at baseline: Yes  Patient able to ambulate as they were prior to admission or with assist devices provided by therapies during their stay:  Yes  Patient MUST void prior to discharge:  Yes  Patient able to tolerate oral intake:  Yes  Pain has adequate pain control using Oral analgesics:  Yes  Does patient have an identified :  Yes  Has goal D/C date and time been discussed with patient:  Yes

## 2023-06-23 NOTE — PROGRESS NOTES
[facility] DISCHARGE NOTE    Patient discharged to home at 9:59 AM via wheel chair. Accompanied by sister and staff. Discharge instructions reviewed with patient and other, opportunity offered to ask questions. Prescriptions filled and sent with patient upon discharge. All belongings sent with patient.    Dagoberto Justin RN

## 2023-06-23 NOTE — PROGRESS NOTES
06/23/23 0900   Appointment Info   Signing Clinician's Name / Credentials (PT) Adair Ballard, PT   Quick Adds   Quick Adds Certification   Living Environment   People in Home alone   Current Living Arrangements house   Number of Stairs, Within Home, Primary five   Stair Railings, Within Home, Primary railings safe and in good condition;railing on right side (ascending)   Transportation Anticipated family or friend will provide   Living Environment Comments will have neighbor staying with her to help with needs   Self-Care   Equipment Currently Used at Home walker, standard;grab bar, tub/shower;grab bar, toilet;shower chair;raised toilet seat   Fall history within last six months no   General Information   Onset of Illness/Injury or Date of Surgery 06/22/23   Referring Physician Dr. Albin Williamson   Patient/Family Therapy Goals Statement (PT) to return home safely   Pertinent History of Current Problem (include personal factors and/or comorbidities that impact the POC) s/p R TKA   Existing Precautions/Restrictions no known precautions/restrictions   Cognition   Affect/Mental Status (Cognition) WFL   Orientation Status (Cognition) oriented x 4   Follows Commands (Cognition) WFL   Pain Assessment   Patient Currently in Pain No   Integumentary/Edema   Integumentary/Edema no deficits were identifed   Posture    Posture Not impaired   Range of Motion (ROM)   Range of Motion ROM is WFL;ROM deficits secondary to surgical procedure   Strength (Manual Muscle Testing)   Strength (Manual Muscle Testing) No deficits observed during functional mobility   Bed Mobility   Bed Mobility no deficits identified   Transfers   Transfers no deficits identified   Gait/Stairs (Locomotion)   [place] Level (Gait) supervision   Assistive Device (Gait) other (see comments)  (used rollator, as well as no device)   Distance in Feet 300   Distance in Feet (Gait) 300+   Pattern (Gait) step-through   Deviations/Abnormal Patterns (Gait) gait  speed decreased   Negotiation (Stairs) stairs independence;handrail location;number of steps;ascending technique;descending technique   [place] Level (Stairs) supervision   Handrail Location (Stairs) right side (descending)   Number of Steps (Stairs) 8   Ascending Technique (Stairs) step-to-step   Descending Technique (Stairs) step-to-step   Balance   Balance no deficits were identified   Clinical Impression   Criteria for Skilled Therapeutic Intervention Yes, treatment indicated   PT Diagnosis (PT) s/p R BOO   Influenced by the following impairments weakness   Functional limitations due to impairments gait, stairs   Clinical Presentation (PT Evaluation Complexity) Stable/Uncomplicated   Clinical Presentation Rationale clinical judgement   Clinical Decision Making (Complexity) low complexity   Planned Therapy Interventions (PT) gait training;home exercise program;ROM (range of motion);stair training;strengthening;stretching;progressive activity/exercise   Risk & Benefits of therapy have been explained evaluation/treatment results reviewed;care plan/treatment goals reviewed;risks/benefits reviewed;current/potential barriers reviewed;participants voiced agreement with care plan;participants included;patient;friend   PT Total Evaluation Time   PT Eval, Low Complexity Minutes (89056) 10   Plan of Care Review   Plan of Care Reviewed With patient   Therapy Certification   Start of care date 06/23/23   Certification date from 06/23/23   Certification date to 06/30/23   Medical Diagnosis s/p R BOO   Physical Therapy Goals   PT Frequency One time eval and treatment only   PT Predicted Duration/Target Date for Goal Attainment 06/30/23   PT Goals Gait;Stairs   PT: Gait Supervision/stand-by assist;Rolling walker;Greater than 200 feet;Goal Met;Completed   PT: Stairs Supervision/stand-by assist;8 stairs;Rail on right;Goal Met;Completed   Interventions   Interventions Quick Adds Gait Training;Therapeutic Procedure    Therapeutic Procedure/Exercise   Ther. Procedure: strength, endurance, ROM, flexibillity Minutes (08248) 11   Symptoms Noted During/After Treatment none   Treatment Detail/Skilled Intervention Patient provided with and instructed in completion of a home program to improve ROM/strength. Exercises included: ankle pumps, SAQ/LAQ, quad/gluteal/HS sets, heel slides seated and supine, SLR, and sit to stands. Verbal/tactile cues given on appropriate form and pacing to maximize benefit.   Gait Training   Gait Training Minutes (54836) 13   Symptoms Noted During/After Treatment (Gait Training) fatigue   Treatment Detail/Skilled Intervention Patient ambulates over 300 feet in hallway with half use of rollator and half without use of AD. Up/down 2 steps x4 attempt with education on ascending/descending techniques for pain modulation and stability. Educated patient on weaning off using AD when at home.   PT Discharge Planning   PT Plan Discharge today.   PT Discharge Recommendation (DC Rec) home with assist;home with outpatient physical therapy   PT Rationale for DC Rec Patient moving very well, no concerns to return home at this time.   PT Brief overview of current status SBA transfers, gait x300+ feet, stairs up/down 8 steps   Total Session Time   Timed Code Treatment Minutes 24   Total Session Time (sum of timed and untimed services) 34     [facility]  OUTPATIENT PHYSICAL THERAPY EVALUATION  PLAN OF TREATMENT FOR OUTPATIENT REHABILITATION  (COMPLETE FOR INITIAL CLAIMS ONLY)  Patient's Last Name, First Name, M.I.  YOB: 1955  Asmita Jerez                        Provider's Name  [facility] Medical Record No.  [id number]                             Onset Date:  06/22/23   Start of Care Date:  06/23/23   Type:     _X_PT   ___OT   ___SLP Medical Diagnosis:  s/p R BOO              PT Diagnosis:  s/p R BOO Visits from SOC:  1     See note for plan of  treatment, functional goals and certification details    I CERTIFY THE NEED FOR THESE SERVICES FURNISHED UNDER        THIS PLAN OF TREATMENT AND WHILE UNDER MY CARE     (Physician co-signature of this document indicates review and certification of the therapy plan).

## 2023-06-23 NOTE — PROGRESS NOTES
Time: Assumed care of this patient at 2300    Reason for Admission: Right total hip arthroplasty    Activity:  SBA , gait belt and walker. Has been walking halls 300 + feet at 0200. Up in chair at 0615. Did not sleep.    Neuro:A/O x 4. Stated she will use call light and not get up without help.    GI/:WNL    Respiratory WNL Capno is on per N/C EtCo2 35. IS 2000    Cardiac WNL    Skin: Surgical dressing on right hip CDI. Ice pack applied.     Diet: Regular    IV Access: LR 75 ml/hr    Vitals:Temp: 97.4  F (36.3  C) Temp src: Oral BP: 138/52 Pulse: 68   Resp: 16 SpO2: 97 % O2 Device: None (Room air)      Pain: Mild burning pain in hip 3/10. Is on scheduled Toradol and Tylenol. Atarax x 1.    Plan:  PT/OT

## 2023-06-23 NOTE — PROGRESS NOTES
"[facility] Progress Note      Post-operative Day: 1 Day Post-Op    Procedure(s):  Total Hip Arthroplasty Right  Subjective:    Pt reports mild pain in the right hip that increases with activity. She has outpatient physical therapy already scheduled and hasn't yet seen PT here. She hopes to go home today.     Chest pain, SOB:  No  Nausea, vomiting:  No  Lightheadedness, dizziness:  No  Neuro:  Patient denies new onset numbness or paresthesias      Objective:  Blood pressure 130/44, pulse 67, temperature 98.1  F (36.7  C), temperature source Oral, resp. rate 16, height 1.626 m (5' 4.02\"), weight 51.9 kg (114 lb 8 oz), last menstrual period 12/01/2009, SpO2 100 %, not currently breastfeeding.    Patient Vitals for the past 24 hrs:   BP Temp Temp src Pulse Resp SpO2 Height Weight   06/23/23 0618 130/44 98.1  F (36.7  C) Oral 67 16 100 % -- --   06/23/23 0216 138/52 97.4  F (36.3  C) Oral 68 16 97 % -- --   06/22/23 2255 129/47 98.3  F (36.8  C) Oral 68 16 96 % -- --   06/1955 132/50 -- -- 77 -- -- -- --   06/22/23 1829 (!) 146/51 -- -- 69 -- 96 % -- --   06/22/23 1814 (!) 155/51 97.5  F (36.4  C) Oral 69 18 96 % -- --   06/22/23 1743 -- -- -- -- -- 99 % -- --   06/22/23 1730 (!) 143/64 97.4  F (36.3  C) Oral 84 25 98 % -- --   06/22/23 1715 132/71 -- -- 82 18 98 % -- --   06/22/23 1700 128/72 -- -- 73 14 95 % -- --   06/22/23 1654 129/67 97.8  F (36.6  C) Axillary 71 19 98 % -- --   06/22/23 1311 133/65 97.7  F (36.5  C) Oral 85 19 98 % 1.626 m (5' 4.02\") 51.9 kg (114 lb 8 oz)       Wt Readings from Last 4 Encounters:   06/22/23 51.9 kg (114 lb 8 oz)   06/20/23 51.9 kg (114 lb 8 oz)   04/04/23 55.8 kg (123 lb)   03/28/23 56.2 kg (123 lb 12.8 oz)       Gen: A&O x 3. NAD. Up to the recliner.   Wound status: Covered, Aquacel dressing is c/d/i.  Circulation, motion and sensation: Dorsiflexion/plantarflexion intact and equal bilaterally; distal lower extremity sensation is intact and equal bilaterally. " Foot and toes are warm and well perfused.    Swelling: Mild  Calf tenderness: calves are soft and non-tender bilaterally     Pertinent Labs   Lab Results: personally reviewed.     Recent Labs   Lab Test 06/23/23  0448 06/20/23  1010 05/22/23  1400 10/12/22  1526 05/25/16  1138 04/29/15  0958   HGB 10.8*  --  13.2  --   --  13.5   HCT  --   --  40.7  --   --  40.8   MCV  --   --  92  --   --  91   PLT  --   --  290  --   --  277   NA  --  139 140 140   < > 140    < > = values in this interval not displayed.       Plan:   Continue current cares and rehabilitation.  Anticoagulation protocol:  mg daily  x 30  days  Pain medications:  oxycodone, toradol, tylenol and vistaril  Weight bearing status:  WBAT  Disposition:  Plan for discharge to home with outpatient therapy when medically stable and pain is controlled, cleared by therapy. Later today.              Report completed by:  Collin Khoury PA-C  Date: 6/23/2023  Time: 9:08 AM

## 2023-06-23 NOTE — PROGRESS NOTES
[facility] Medicine Progress Note  Date of Service: 06/23/2023    Assessment & Plan   Asmita Jerez is a 67 year old female who presented on 6/22/2023 for scheduled Procedure(s):  Total Hip Arthroplasty Right by Albin Williamson MD and is being followed by the hospital medicine service for co-management of acute and/or chronic perioperative medical problems.      S/p Procedure(s):  Total Hip Arthroplasty Right   1 Day Post-Op    - pain control, wound cares, physical therapy, occupational therapy and DVT prophylaxis per orthopedic surgery service    Diabetes mellitus type 2  Blood glucose well controlled here. Managed PTA with metformin 1000 mg BID and dulaglutide 3 mg QW.  -Continue PTA metformin  -Hold dulaglutide    Hypertension  Well controlled, normotensive here. Managed PTA with lisinopril 40 mg daily and metoprolol succinae  mg daily.   -Continue PTA metformin and lisinopril with hold parameters.    Generalized anxiety disorder  Managed PTA with hydroxyzine 25 mg TID as needed.  -Continue PTA hydroxyzine as needed.    Chronic diarrhea  Chronic diarrhea x 6 months per review of PCP note and recommend colonoscopy and biopsy for further evaluation.  -Follow outpatient recommendations.    Tobacco Use  Current tobacco use. Cessation encouraged.    DVT Prophylaxis: as per orthopedic surgery service -  mg daily x 30 days.  Code Status: Full Code    Lines: PIV   Esquivel catheter: None    Discussion: Medically, the patient appears to be recovering well and is optimized for discharge. There are no barrier at this time.     Disposition: Anticipate discharge 06/23/23    Attestation:  I have reviewed today's vital signs, notes, medications, labs and imaging.    I have discussed, or will be discussing, the patient with hospitalist physician, Dr. Zaragoza.    BERNARD ZAVALETA PA-C     Interval History   POD#1: Routine post operative morning rounds. No overnight events.  "Patient states feeling well this morning despite getting \"1 hour\" off sleep. Pain is well controlled, only notices some slight burning to her right hip. She states ambulating a few times. Feeling steady on her feet without any dizziness or lightheadedness. Passing some flatus with no bowel movement at this time. Urinating without difficulty. Has been tolerating PO without nausea or vomiting. Denies chest pain, shortness of breath, dizziness, lightheadedness, abdominal     Physical Exam   Temp:  [97.4  F (36.3  C)-98.3  F (36.8  C)] 98.1  F (36.7  C)  Pulse:  [67-85] 67  Resp:  [14-25] 16  BP: (128-155)/(44-72) 130/44  SpO2:  [95 %-100 %] 100 %    Weights:   Vitals:    06/22/23 1311   Weight: 51.9 kg (114 lb 8 oz)    Body mass index is 19.64 kg/m .    General: Alert, oriented, sitting upright in chair, daughter visiting by her side, pleasant, no acute distress, non toxic appearing.   CV: Regular rate and rhythm. Normal S1 and S2 heart sounds. No S3, S4, or murmurs. No lower extremity edema. Strong radial pulse bilaterally.   Respiratory: Bilateral wheezing mid lungs. No crackles or rhonchi. Normal respiratory effort on room air. Speaking in full sentences.    GI: Soft, flat, nontender to palpation throughout.   Skin: Warm and dry  Musculoskeletal: Surgical site exam deferred to orthopedic provider.    Data   Recent Labs   Lab 06/23/23  0448 06/22/23  1733 06/22/23  1719 06/22/23  1703 06/22/23  1340 06/20/23  1010   HGB 10.8*  --   --   --   --   --    NA  --   --   --   --   --  139   POTASSIUM  --   --   --   --   --  4.6   CHLORIDE  --   --   --   --   --  104   CO2  --   --   --   --   --  25   BUN  --   --   --   --   --  15.3   CR  --   --   --   --   --  0.86   ANIONGAP  --   --   --   --   --  10   JORY  --   --   --   --   --  9.9   GLC  --  99 74 74   < > 169*    < > = values in this interval not displayed.     Recent Labs   Lab 06/22/23  1733 06/22/23  1719 06/22/23  1703 06/22/23  1340 06/20/23  1010   GLC " 99 74 74 95 169*      Unresulted Labs Ordered in the Past 30 Days of this Admission     No orders found for last 31 day(s).         Imaging  Recent Results (from the past 24 hour(s))   XR Pelvis Port 1/2 Views    Narrative    EXAM: XR PELVIS PORT 1/2 VIEWS  LOCATION: [facility]  DATE: 6/22/2023    INDICATION: Status post Hip surgery  COMPARISON: None.      Impression    IMPRESSION: Recent right total hip arthroplasty with adjacent gas. Excellent position and alignment of components on this view. There is no evidence of complication or periprosthetic fracture.      I reviewed all new labs and imaging results over the last 24 hours. I personally reviewed no images or EKG's today.  Medications     lactated ringers 75 mL/hr at 06/22/23 1958       acetaminophen  975 mg Oral Q8H     aspirin  325 mg Oral Daily     atorvastatin  40 mg Oral Daily     ceFAZolin  1 g Intravenous Q8H     famotidine  20 mg Oral BID    Or     famotidine  20 mg Intravenous BID     ketorolac  15 mg Intravenous Q6H     lisinopril  40 mg Oral Daily     metFORMIN  1,000 mg Oral BID w/meals     metoprolol succinate ER  100 mg Oral Daily     polyethylene glycol  17 g Oral Daily     senna-docusate  1 tablet Oral BID     sodium chloride (PF)  3 mL Intracatheter Q8H     BERNARD ZAVALETA PA-C

## 2023-06-27 ENCOUNTER — THERAPY VISIT (OUTPATIENT)
Dept: PHYSICAL THERAPY | Facility: CLINIC | Age: 68
End: 2023-06-27
Attending: ORTHOPAEDIC SURGERY
Payer: COMMERCIAL

## 2023-06-27 DIAGNOSIS — Z96.641 STATUS POST TOTAL REPLACEMENT OF RIGHT HIP: Primary | ICD-10-CM

## 2023-06-27 PROCEDURE — 97161 PT EVAL LOW COMPLEX 20 MIN: CPT | Mod: GP

## 2023-06-27 PROCEDURE — 97110 THERAPEUTIC EXERCISES: CPT | Mod: GP

## 2023-06-27 NOTE — PROGRESS NOTES
PHYSICAL THERAPY EVALUATION  Type of Visit: Evaluation    See electronic medical record for Abuse and Falls Screening details.    Subjective      Presenting condition or subjective complaint: Pt underwent a R BOO 6/22/23.  Date of onset: 06/22/23    Relevant medical history: Diabetes; High blood pressure; Menopause; Sleep disorder like apnea; Smoking   Dates & types of surgery: appendix removal, gall bladder removal, carpal tunnel    Prior diagnostic imaging/testing results: MRI; X-ray     Prior therapy history for the same diagnosis, illness or injury: Yes chiropractic    Prior Level of Function   Independent    Living Environment  Social support: Alone   Type of home: House; 1 level   Stairs to enter the home: Yes 5     Ramp: No   Stairs inside the home: No       Help at home: Self Cares (home health aide/personal care attendant, family, etc)  Equipment owned: Straight Cane; Walker with wheels; Grab bars; Raised toilet seat; Bath bench     Employment: No    Hobbies/Interests:      Patient goals for therapy: return to normal movement    Pain assessment: Pain present  Location: R buttock and groin pain/Rating: 3/10     Objective   HIP EVALUATION  PAIN: Pain Level at Rest: 2/10  Pain Level with Use: 3/10  Pain Location: hip  Pain Quality: Aching and Dull  Pain Frequency: constant  Pain is Worst: same no matter the time of day  Pain is Exacerbated By: nothing  Pain is Relieved By: jpain meds, shower  Pain Progression: Improved  GAIT:   Weightbearing Status: WBAT  Assistive Device(s): Walker (four wheeled)  Gait Deviations: Antalgic  Stance time decreased  Stride length decreased  Nohemy decreased  ROM: R hip moderately limited in all directions due to pain  STRENGTH: R hip less than antigravity  LE FLEXIBILITY: tight R hip flexors and adductors    Assessment & Plan   CLINICAL IMPRESSIONS   Medical Diagnosis: S/P total right hip arthroplasty    Treatment Diagnosis: S/P total right hip arthroplasty    Impression/Assessment: Patient is a 67 year old female with R hip complaints.  The following significant findings have been identified: Pain, Decreased ROM/flexibility, Decreased joint mobility, Decreased strength, Impaired balance and Impaired gait. These impairments interfere with their ability to perform self care tasks, household chores, household mobility and community mobility as compared to previous level of function.     Clinical Decision Making (Complexity):   Clinical Presentation: Stable/Uncomplicated  Clinical Presentation Rationale: based on medical and personal factors listed in PT evaluation  Clinical Decision Making (Complexity): Low complexity    PLAN OF CARE  Treatment Interventions:  Interventions: Gait Training, Therapeutic Exercise    Long Term Goals     PT Goal 1  Goal Identifier: 1  Goal Description: Pt will be able to walk without an AD with minimal to no limp.  Target Date: 07/18/23  PT Goal 2  Goal Identifier: 2  Goal Description: Pt will be able to navigate stairs reciprocally with one rail.  Target Date: 08/01/23  PT Goal 3  Goal Identifier: 3  Goal Description: Pt will be able to squat with minimal difficulty  Rationale: to maximize safety and independence with performance of ADLs and functional tasks  Target Date: 08/08/23  PT Goal 4  Goal Identifier: 4  Goal Description: Pt will be independent with HEP for optimal functional recovery.  Target Date: 08/08/23      Frequency of Treatment: 2x/week  Duration of Treatment: 6 weeks    Education Assessment:   Learner/Method: Patient;Listening;Demonstration;Pictures/Video;No Barriers to Learning    Risks and benefits of evaluation/treatment have been explained.   Patient/Family/caregiver agrees with Plan of Care.     Evaluation Time:     PT Eval, Low Complexity Minutes (92675): 20  Signing Clinician: Carolina Corrigan PT      [facility]                                                                                    OUTPATIENT PHYSICAL THERAPY      PLAN OF TREATMENT FOR OUTPATIENT REHABILITATION   Patient's Last Name, First Name, Asmita Dorado YOB: 1955   Provider's Name   [facility]   Medical Record No.  [id number]     Onset Date: 06/22/23  Start of Care Date: 06/27/23     Medical Diagnosis:  S/P total right hip arthroplasty      PT Treatment Diagnosis:  S/P total right hip arthroplasty Plan of Treatment  Frequency/Duration: 2x/week/ 6 weeks    Certification date from 06/27/23 to 08/08/23         See note for plan of treatment details and functional goals     Carolina Corrigan, PT                         I CERTIFY THE NEED FOR THESE SERVICES FURNISHED UNDER        THIS PLAN OF TREATMENT AND WHILE UNDER MY CARE .             Physician Signature               Date    X_____________________________________________________                        Referring Provider:  Albin Williamson      Initial Assessment  See Epic Evaluation- Start of Care Date: 06/27/23

## 2023-06-29 ENCOUNTER — THERAPY VISIT (OUTPATIENT)
Dept: PHYSICAL THERAPY | Facility: CLINIC | Age: 68
End: 2023-06-29
Attending: ORTHOPAEDIC SURGERY
Payer: COMMERCIAL

## 2023-06-29 DIAGNOSIS — Z96.641 STATUS POST TOTAL REPLACEMENT OF RIGHT HIP: Primary | ICD-10-CM

## 2023-06-29 PROCEDURE — 97110 THERAPEUTIC EXERCISES: CPT | Mod: GP

## 2023-07-10 ENCOUNTER — TRANSFERRED RECORDS (OUTPATIENT)
Dept: HEALTH INFORMATION MANAGEMENT | Facility: CLINIC | Age: 68
End: 2023-07-10
Payer: COMMERCIAL

## 2023-07-25 ENCOUNTER — OFFICE VISIT (OUTPATIENT)
Dept: FAMILY MEDICINE | Facility: CLINIC | Age: 68
End: 2023-07-25
Payer: COMMERCIAL

## 2023-07-25 VITALS
BODY MASS INDEX: 19.19 KG/M2 | HEART RATE: 88 BPM | SYSTOLIC BLOOD PRESSURE: 128 MMHG | WEIGHT: 112.4 LBS | HEIGHT: 64 IN | OXYGEN SATURATION: 98 % | TEMPERATURE: 97.4 F | DIASTOLIC BLOOD PRESSURE: 68 MMHG | RESPIRATION RATE: 12 BRPM

## 2023-07-25 DIAGNOSIS — K52.9 CHRONIC DIARRHEA: Primary | ICD-10-CM

## 2023-07-25 DIAGNOSIS — R63.4 UNINTENTIONAL WEIGHT LOSS OF 10% BODY WEIGHT WITHIN 6 MONTHS: ICD-10-CM

## 2023-07-25 PROCEDURE — 99214 OFFICE O/P EST MOD 30 MIN: CPT | Performed by: NURSE PRACTITIONER

## 2023-07-25 ASSESSMENT — ENCOUNTER SYMPTOMS: DIARRHEA: 1

## 2023-07-25 ASSESSMENT — PAIN SCALES - GENERAL: PAINLEVEL: NO PAIN (0)

## 2023-07-25 NOTE — PROGRESS NOTES
Assessment & Plan     Chronic diarrhea  Etiology of symptoms.  Further evaluation recommended.  Reviewed labs no labs needed today however would like patient to have a chest abdomen and pelvis CT referral placed for colonoscopy.  - CT Chest Abdomen Pelvis w/o & w Contrast; Future  - Adult GI  Referral - Procedure Only; Future    Unintentional weight loss of 10% body weight within 6 months  Has lost approximately 10% of her body weight since February further evaluation warranted.  - CT Chest Abdomen Pelvis w/o & w Contrast; Future  - Adult GI  Referral - Procedure Only; Future                 GAURANG Crow CNP  [facility]    Yessi Tian is a 67 year old, presenting for the following health issues:  Diarrhea, Gastric Problem (indigestion), and Health Maintenance ( Declines shots today. )        7/25/2023     7:39 AM   Additional Questions   Roomed by Deisy MORRIS CMA   Accompanied by self     History of Present Illness       Reason for visit:  Upset stomach and diarea  Symptom onset:  More than a month  Symptoms include:  Upset stomach and diaria  Symptom intensity:  Moderate  Symptom progression:  Staying the same  Had these symptoms before:  No  What makes it worse:  No  What makes it better:  Tums and pepto bismol    She eats 0-1 servings of fruits and vegetables daily.She consumes 0 sweetened beverage(s) daily.She exercises with enough effort to increase her heart rate 9 or less minutes per day.  She exercises with enough effort to increase her heart rate 3 or less days per week.   She is taking medications regularly.       Diarrhea  Onset/Duration: since November  Description: weekly cycle:When the diarrhea starts, can be 4 times/hour. Takes tums., pepto bislmal works, then no BM for 3 days       Consistency of stool: dark sometimes, pumpkin puree, mucousy       Blood in stool: No       Number of loose stools past 24 hours: 1  Progression of Symptoms:  "same  Accompanying signs and symptoms:       Fever: No       Nausea/Vomiting: YES- both, infrequently       Abdominal pain: YES- occasionally, not for a couple of weeks       Weight loss: YES, down about 9 lbs or more since November( - 2 lbs- within the last month- had surgery 6/22/23, but was in the 120's in November)       Episodes of constipation: No  History   Ill contacts: No  Recent use of antibiotics: not unless she was given it for her surgery in June 2023  Recent travels: No  Recent medication-new or changes(Rx or OTC): No  Precipitating or alleviating factors: None  Therapies tried and outcome: PeptoBismol and Tums      Will have 1 solid stool then have diarrhea 4-5x then will take tums and peptobisamol if needed. It will stop the symptoms then she will not have a BM for 3 days. She feels full when she eats. But forces herself to eat. She denies any blood in her stool. She has lost 14lbs since februrary. Down from 126 to 114.     Insomnia. Sleeps about 2 hours a night.   Denies any chest pain, sob, or difficulty breathing. Has had nausea. Vomited a couple months ago.     She is a 1 ppd smoker.     Review of Systems   Gastrointestinal:  Positive for diarrhea.      Constitutional, HEENT, cardiovascular, pulmonary, gi and gu systems are negative, except as otherwise noted.      Objective    /68 (BP Location: Right arm, Patient Position: Sitting, Cuff Size: Adult Regular)   Pulse 88   Temp 97.4  F (36.3  C) (Tympanic)   Resp 12   Ht 1.626 m (5' 4\")   Wt 51 kg (112 lb 6.4 oz)   LMP 12/01/2009   SpO2 98%   BMI 19.29 kg/m    Body mass index is 19.29 kg/m .  Physical Exam   GENERAL: healthy, alert and no distress  NECK: no adenopathy, no asymmetry, masses, or scars and thyroid normal to palpation  RESP: lungs clear to auscultation - no rales, rhonchi or wheezes  CV: regular rate and rhythm, normal S1 S2, no S3 or S4, no murmur, click or rub, no peripheral edema and peripheral pulses strong  ABDOMEN: " soft, nontender, no hepatosplenomegaly, no masses and bowel sounds normal  MS: no gross musculoskeletal defects noted, no edema  PSYCH: mentation appears normal, affect normal/bright    Admission on 06/22/2023, Discharged on 06/23/2023   Component Date Value Ref Range Status    GLUCOSE BY METER POCT 06/22/2023 95  70 - 99 mg/dL Final    GLUCOSE BY METER POCT 06/22/2023 74  70 - 99 mg/dL Final    GLUCOSE BY METER POCT 06/22/2023 74  70 - 99 mg/dL Final    GLUCOSE BY METER POCT 06/22/2023 99  70 - 99 mg/dL Final    Hemoglobin 06/23/2023 10.8 (L)  11.7 - 15.7 g/dL Final

## 2023-07-26 ENCOUNTER — TELEPHONE (OUTPATIENT)
Dept: SURGERY | Facility: CLINIC | Age: 68
End: 2023-07-26
Payer: COMMERCIAL

## 2023-07-26 NOTE — TELEPHONE ENCOUNTER
Screening Questions  BLUE  KIND OF PREP RED  LOCATION [review exclusion criteria] GREEN  SEDATION TYPE        N Are you active on mychart?       [place] Ordering/Referring Provider?        [facility] /Medicare What type of coverage do you have?      N Have you had a positive covid test in the last 14 days?     19.29 1. BMI  [BMI 40+ - review exclusion criteria& smart-phrase document]    Y  2. Are you able to give consent for your medical care? [IF NO,RN REVIEW]          N  3. Are you taking any prescription pain medications on a routine schedule   (ex narcotics: oxycodone, roxicodone, oxycontin,  and percocet)? [RN Review]        PRN/no problems with constipation  3a. EXTENDED PREP What kind of prescription?     N 4. Do you have any chemical dependencies such as alcohol, street drugs, or methadone?        **If yes 3- 5 , please schedule with MAC sedation.**          IF YES TO ANY 6 - 10 - HOSPITAL SETTING ONLY.     N 6.   Do you need assistance transferring?     N 7.   Have you had a heart or lung transplant?    N 8.   Are you currently on dialysis?   N 9.   Do you use daily home oxygen?   N 10. Do you take nitroglycerin?   10a. N If yes, how often?     N 11. Are you currently pregnant?    11a. N If yes, how many weeks? [ Greater than 12 weeks, OR NEEDED]    N 12. Do you have Pulmonary Hypertension? *NEED PAC APPT AT [facility] w/ [facility]*     N 13. [review exclusion criteria]  Do you have any implantable devices in your body (pacemaker, defib, LVAD)?    N 14. In the past 6 months, have you had any heart related issues including cardiomyopathy or heart attack?     14a. N If yes, did it require cardiac stenting if so when?     N 15. Have you had a stroke or Transient ischemic attack (TIA - aka  mini stroke ) within 6 months?      N 16. Do you have mod to severe Obstructive Sleep Apnea?  [Hospital only]    N 17. Do you have SEVERE AND UNCONTROLLED asthma? *NEED PAC APPT AT [facility] w/[facility]*     18.Do you take blood  "thinners?  No    N 19. Do you take any of the following medications?    NPhentermine    NOzempic    NWegovy (Semaglutide)  On Trulicity will stop 7 days before or contact provider      19a. If yes, \"Hold for 7 days before procedure.  Please consult your prescribing provider if you have questions about holding this medication.\"     N  20. Do you have chronic kidney disease?      Y  21. Do you have a diagnosis of diabetes?     N  22. On a regular basis do you go 3-5 days between bowel movements?     See below 23. Preferred [facility] for Pre Prescription      [place], [place] - [address]        - CLOSING REMINDERS -  You will receive a call from a Nurse to review instructions and health history.  This assessment must be completed prior to your procedure.  Failure to complete the Nurse assessment may result in the procedure being cancelled.    On the day of your procedure, please designatean adult(s) who can drive you home stay with you for the next 24 hours. The medicines used in the exam will make you sleepy. You will not be able to drive.    You cannot take public transportation, ride share services, or non-medical taxi service without a responsible caregiver.  Medical transport services are allowed with the requirement that a responsible caregiver will receive you at your destination.  We require that drivers and caregivers are confirmed prior to your procedure.      - SCHEDULING DETAILS -  N & N Hospital Setting Required & If yes, what is the exclusion?   Kendy  Surgeon    8-23-23  Date of Procedure  Lower Endoscopy [Colonoscopy]  Type of Procedure Scheduled  [facility]- If you answer yes to questions #8, #20, #21 [  pts ]Which Colonoscopy Prep was Sent?     GEn Sedation Type     n PAC / Pre-op Required              "

## 2023-07-27 ENCOUNTER — HOSPITAL ENCOUNTER (OUTPATIENT)
Dept: CT IMAGING | Facility: CLINIC | Age: 68
Discharge: HOME | End: 2023-07-27
Attending: NURSE PRACTITIONER | Admitting: NURSE PRACTITIONER
Payer: COMMERCIAL

## 2023-07-27 DIAGNOSIS — R63.4 UNINTENTIONAL WEIGHT LOSS OF 10% BODY WEIGHT WITHIN 6 MONTHS: ICD-10-CM

## 2023-07-27 DIAGNOSIS — K52.9 CHRONIC DIARRHEA: ICD-10-CM

## 2023-07-27 LAB
CREAT BLD-MCNC: 0.8 MG/DL (ref 0.5–1)
GFR SERPL CREATININE-BSD FRML MDRD: >60 ML/MIN/1.73M2

## 2023-07-27 PROCEDURE — 250N000011 HC RX IP 250 OP 636: Performed by: RADIOLOGY

## 2023-07-27 PROCEDURE — 250N000009 HC RX 250: Performed by: RADIOLOGY

## 2023-07-27 PROCEDURE — 82565 ASSAY OF CREATININE: CPT

## 2023-07-27 PROCEDURE — 74177 CT ABD & PELVIS W/CONTRAST: CPT

## 2023-07-27 RX ORDER — IOPAMIDOL 755 MG/ML
50 INJECTION, SOLUTION INTRAVASCULAR ONCE
Status: COMPLETED | OUTPATIENT
Start: 2023-07-27 | End: 2023-07-27

## 2023-07-27 RX ADMIN — SODIUM CHLORIDE 54 ML: 9 INJECTION, SOLUTION INTRAVENOUS at 14:11

## 2023-07-27 RX ADMIN — IOPAMIDOL 50 ML: 755 INJECTION, SOLUTION INTRAVENOUS at 14:11

## 2023-07-28 ENCOUNTER — TELEPHONE (OUTPATIENT)
Dept: FAMILY MEDICINE | Facility: CLINIC | Age: 68
End: 2023-07-28
Payer: COMMERCIAL

## 2023-07-28 DIAGNOSIS — N28.89 RENAL MASS: Primary | ICD-10-CM

## 2023-07-28 NOTE — TELEPHONE ENCOUNTER
Discussed results with patient over the phone additional CT scan ordered. Referral to oncology placed.   GAURANG Crow CNP, APRN CNP      CT Chest/Abdomen/Pelvis w Contrast    Result Date: 7/28/2023  CT CHEST/ABDOMEN/PELVIS W CONTRAST 7/27/2023 2:28 PM CLINICAL HISTORY: Chronic diarrhea; Unintentional weight loss of 10% body weight within 6 months TECHNIQUE: CT scan of the chest, abdomen, and pelvis was performed following injection of IV contrast. Multiplanar reformats were obtained. Dose reduction techniques were used. CONTRAST: 50mL Isovue-370 COMPARISON: CT 12/14/2022 11/2/2021 9/17/2018 FINDINGS: LUNGS AND PLEURA: Central airways are patent. Stable ground glass nodule in the basilar left lower lobe measuring 9 mm series 3-196. Stable clustered micronodules in the lateral right upper lobe largest measuring 3 mm series 3-99. Stable micronodule in the posterior right upper lobe measuring 3 mm series 3-110. Stable micronodule in the right lower lobe measuring 3 mm series 3-156. Additional stable micronodules in both lungs have not significantly changed since 5/3/2021. No new or enlarging noncalcified pulmonary nodules. No pleural effusion, pneumothorax or focal consolidation. Mild centrilobular emphysema and scattered areas of mucous plugging. MEDIASTINUM/AXILLAE: Heart is normal with a stable small pericardial effusion. The thoracic aorta is normal in caliber for age with mild calcified atheromatous plaque. Anterior mediastinal heterogeneous fat and soft tissue density structure with more central nodular soft tissue component measuring 24 x 14 mm series 7-40 with a nodular soft tissue component measuring 7 mm series 7-40. Otherwise, no axillary, mediastinal or hilar lymphadenopathy. Subcentimeter hypodense nodule in the anterior right lobe/isthmus of the thyroid and does not require additional follow-up. HEPATOBILIARY: Smooth contour of the surface of the liver. No suspicious focal  hepatic lesion. Stable central intrahepatic biliary ductal dilatation but likely due to reservoir effect from cholecystectomy. PANCREAS: Solitary subcentimeter calcification in the pancreatic tail likely due to remote insult. SPLEEN: Normal. ADRENAL GLANDS: Right adrenal gland normal. There are hypodense nodules in medial lateral limbs of the left adrenal gland measuring up to 18 mm series 7-146. KIDNEYS/BLADDER: Not obstructive intrarenal calculus in the inferior pole the right kidney measuring 3 mm. Indeterminate exophytic hypodensity from the inferior pole the right kidney measuring 12 x 9 mm series 7-186. No right hydronephrosis. Heterogeneous intrarenal lesion in the anterior superior pole of the kidney with extension to the renal sinus measuring 27 x 28 mm series 7-161. Nonobstructive intrarenal calculus in the superior pole the left kidney on the posterior margin of the mass measuring 3 mm. No left hydronephrosis. Partially distended urinary bladder is significantly limited in evaluation due to artifact from total right hip arthroplasty. BOWEL: Fluid-filled gastric distention of the stomach. Small and large bowel loops are normal in caliber without obstruction. Appendix is not discretely identified, however no secondary signs of acute appendicitis of the base of the cecum. Mild retained colonic stool. There is subsegmental cervical vertebral mural thickening without adjacent stranding of the descending and sigmoid colon. A few scattered distal colonic diverticula without acute diverticulitis. PELVIC ORGANS: Significantly limited evaluation due to artifact from total right hip arthroplasty. ADDITIONAL FINDINGS: Abdominal aorta normal in caliber with moderate mixed atheromatous plaque. No evidence for abdominopelvic lymphadenopathy, though somewhat limited evaluation of the iliac regions to total right hip arthroplasty. MUSCULOSKELETAL: Total right hip arthroplasty. Moderate left hip osteoporosis. No focal  destructive osseous lesion.     IMPRESSION: 1.  Left renal heterogeneous lesion high suspicious for primary renal neoplasm such as renal cell carcinoma. Recommend CT or MR renal protocol with and without contrast for further evaluation. 2.  Indeterminate right renal lesion. Further evaluation is recommended with the above findings by CT or MR. 3.   Several stable solid and groundglass pulmonary nodules with the largest a groundglass nodule measuring up to 9 mm and not significant changed since at least 5/3/2021. 4.  Mild emphysema. 5.  Stable heterogeneous fat and soft tissue density in the anterior mediastinum without locally aggressive features. Differential considerations include lipoma, focal thymic hyperplasia, though thymoma is not definitely excluded. 6.  Stable left adrenal nodules since at least 9/17/2018 examination and are most consistent with benign adenomas. 7.  Segmental left hemicolonic mural thickening without adjacent stranding can be seen with infectious, inflammatory or ischemic colitis. 8.  Distal colonic diverticula without acute diverticulitis. 9.  Bilateral nonobstructive intrarenal calculi. SHONNA MULLEN MD   SYSTEM ID:  [id number]

## 2023-07-29 ENCOUNTER — HOSPITAL ENCOUNTER (OUTPATIENT)
Dept: CT IMAGING | Facility: CLINIC | Age: 68
Discharge: HOME | End: 2023-07-29
Attending: NURSE PRACTITIONER | Admitting: NURSE PRACTITIONER
Payer: COMMERCIAL

## 2023-07-29 DIAGNOSIS — N28.89 RENAL MASS: ICD-10-CM

## 2023-07-29 PROCEDURE — 74178 CT ABD&PLV WO CNTR FLWD CNTR: CPT

## 2023-07-29 PROCEDURE — 250N000009 HC RX 250: Performed by: RADIOLOGY

## 2023-07-29 PROCEDURE — 250N000011 HC RX IP 250 OP 636: Performed by: RADIOLOGY

## 2023-07-29 RX ORDER — IOPAMIDOL 755 MG/ML
55 INJECTION, SOLUTION INTRAVASCULAR ONCE
Status: COMPLETED | OUTPATIENT
Start: 2023-07-29 | End: 2023-07-29

## 2023-07-29 RX ADMIN — SODIUM CHLORIDE 58 ML: 9 INJECTION, SOLUTION INTRAVENOUS at 13:08

## 2023-07-29 RX ADMIN — IOPAMIDOL 55 ML: 755 INJECTION, SOLUTION INTRAVENOUS at 13:08

## 2023-07-31 ENCOUNTER — TELEPHONE (OUTPATIENT)
Dept: FAMILY MEDICINE | Facility: CLINIC | Age: 68
End: 2023-07-31
Payer: COMMERCIAL

## 2023-07-31 ENCOUNTER — PATIENT OUTREACH (OUTPATIENT)
Dept: ONCOLOGY | Facility: CLINIC | Age: 68
End: 2023-07-31
Payer: COMMERCIAL

## 2023-07-31 DIAGNOSIS — N28.89 RENAL MASS, LEFT: Primary | ICD-10-CM

## 2023-07-31 NOTE — TELEPHONE ENCOUNTER
Called patient regarding imaging. Oncology referral placed on Friday. If she has not heard from oncology recommended letting me know in 2-3 days.   GAURANG Crow CNP      CT Renal Mass wo & w Contrast    Result Date: 7/30/2023  EXAM: CT RENAL MASS WITHOUT AND WITH CONTRAST LOCATION: [facility] DATE: 07/29/2023 INDICATION: Renal mass. COMPARISON: CT chest, abdomen and pelvis on 07/27/2023. TECHNIQUE: CT scan of the abdomen using renal mass protocol with pre contrast, arterial, portal venous, and delayed images. The pelvis was imaged during the portal venous phase. Multiplanar reformats were obtained. Dose reduction techniques were used. CONTRAST: 55 mL Isovue 370. FINDINGS: LOWER CHEST: Lung bases are clear. Small pericardial effusion. HEPATOBILIARY: No suspicious focal hepatic lesion. The gallbladder is surgically absent. PANCREAS: No main pancreatic ductal dilatation or definite solid pancreatic mass. SPLEEN: No splenomegaly. ADRENAL GLANDS: There are two left adrenal nodules measuring 1.7 and 1.2 cm, both with low Hounsfield units of 1 and 5, respectively and likely represent adrenal adenomas. 1.4 cm right adrenal nodule is also noted with low Hounsfield units of 9, also likely represents adrenal adenoma. RIGHT KIDNEY/URETER: 2 mm stone at the interpolar region. No ureteral stone, filling defect within the renal collecting system or hydronephrosis. There is approximately 1.4 cm exophytic focus arising from the lower pole of the right kidney (series 5 image 87) with mildly elevated Hounsfield units of 19, however, with no convincing evidence of enhancement, likely represents cyst with hemorrhagic/proteinaceous component. LEFT KIDNEY/URETER: Punctate stone at the mid/upper pole of the left kidney (series 5 image 51). There is approximately 2.4 cm heterogenously enhancing mass at the anterior aspect of the interpolar region of the kidney (series 8 image 53), highly worrisome for  renal cell carcinoma. BLADDER: The urinary bladder is grossly unremarkable, although not opacified with contrast which limits detailed assessment. BOWEL: No abnormally dilated bowel loops. Colonic diverticulosis predominantly of the sigmoid colon without CT evidence of acute diverticulitis. PERITONEUM: No significant free fluid in the abdomen or pelvis. No free peritoneal or portal venous gas. LYMPH NODES: No significant abdominal lymphadenopathy. VASCULATURE: Moderate atherosclerotic vascular calcification of the abdominal aorta and iliac vessels. PELVIC ORGANS: Limited evaluation of the pelvic organs due to significant streak artifact from adjacent hip prosthesis. MUSCULOSKELETAL: Postsurgical changes of right hip arthroplasty. Degenerative changes of the spine. No suspicious osseous lesion.     IMPRESSION: 1.  2.4 cm enhancing mass at the anterior aspect of the interpolar region of the left kidney, highly worrisome for renal cell carcinoma. 2.  1.4 cm exophytic focus arising from the lower pole of the right kidney with mildly elevated Hounsfield units, likely represents cyst with hemorrhagic/proteinaceous component. 3.  A few bilateral small kidney stones. No ureteral stone or hydronephrosis in either kidney. 4.  Colonic diverticulosis predominantly of the sigmoid colon without CT evidence of acute diverticulitis.     CT Chest/Abdomen/Pelvis w Contrast    Result Date: 7/28/2023  CT CHEST/ABDOMEN/PELVIS W CONTRAST 7/27/2023 2:28 PM CLINICAL HISTORY: Chronic diarrhea; Unintentional weight loss of 10% body weight within 6 months TECHNIQUE: CT scan of the chest, abdomen, and pelvis was performed following injection of IV contrast. Multiplanar reformats were obtained. Dose reduction techniques were used. CONTRAST: 50mL Isovue-370 COMPARISON: CT 12/14/2022 11/2/2021 9/17/2018 FINDINGS: LUNGS AND PLEURA: Central airways are patent. Stable ground glass nodule in the basilar left lower lobe measuring 9 mm series 3-196.  Stable clustered micronodules in the lateral right upper lobe largest measuring 3 mm series 3-99. Stable micronodule in the posterior right upper lobe measuring 3 mm series 3-110. Stable micronodule in the right lower lobe measuring 3 mm series 3-156. Additional stable micronodules in both lungs have not significantly changed since 5/3/2021. No new or enlarging noncalcified pulmonary nodules. No pleural effusion, pneumothorax or focal consolidation. Mild centrilobular emphysema and scattered areas of mucous plugging. MEDIASTINUM/AXILLAE: Heart is normal with a stable small pericardial effusion. The thoracic aorta is normal in caliber for age with mild calcified atheromatous plaque. Anterior mediastinal heterogeneous fat and soft tissue density structure with more central nodular soft tissue component measuring 24 x 14 mm series 7-40 with a nodular soft tissue component measuring 7 mm series 7-40. Otherwise, no axillary, mediastinal or hilar lymphadenopathy. Subcentimeter hypodense nodule in the anterior right lobe/isthmus of the thyroid and does not require additional follow-up. HEPATOBILIARY: Smooth contour of the surface of the liver. No suspicious focal hepatic lesion. Stable central intrahepatic biliary ductal dilatation but likely due to reservoir effect from cholecystectomy. PANCREAS: Solitary subcentimeter calcification in the pancreatic tail likely due to remote insult. SPLEEN: Normal. ADRENAL GLANDS: Right adrenal gland normal. There are hypodense nodules in medial lateral limbs of the left adrenal gland measuring up to 18 mm series 7-146. KIDNEYS/BLADDER: Not obstructive intrarenal calculus in the inferior pole the right kidney measuring 3 mm. Indeterminate exophytic hypodensity from the inferior pole the right kidney measuring 12 x 9 mm series 7-186. No right hydronephrosis. Heterogeneous intrarenal lesion in the anterior superior pole of the kidney with extension to the renal sinus measuring 27 x 28 mm  series 7-161. Nonobstructive intrarenal calculus in the superior pole the left kidney on the posterior margin of the mass measuring 3 mm. No left hydronephrosis. Partially distended urinary bladder is significantly limited in evaluation due to artifact from total right hip arthroplasty. BOWEL: Fluid-filled gastric distention of the stomach. Small and large bowel loops are normal in caliber without obstruction. Appendix is not discretely identified, however no secondary signs of acute appendicitis of the base of the cecum. Mild retained colonic stool. There is subsegmental cervical vertebral mural thickening without adjacent stranding of the descending and sigmoid colon. A few scattered distal colonic diverticula without acute diverticulitis. PELVIC ORGANS: Significantly limited evaluation due to artifact from total right hip arthroplasty. ADDITIONAL FINDINGS: Abdominal aorta normal in caliber with moderate mixed atheromatous plaque. No evidence for abdominopelvic lymphadenopathy, though somewhat limited evaluation of the iliac regions to total right hip arthroplasty. MUSCULOSKELETAL: Total right hip arthroplasty. Moderate left hip osteoporosis. No focal destructive osseous lesion.     IMPRESSION: 1.  Left renal heterogeneous lesion high suspicious for primary renal neoplasm such as renal cell carcinoma. Recommend CT or MR renal protocol with and without contrast for further evaluation. 2.  Indeterminate right renal lesion. Further evaluation is recommended with the above findings by CT or MR. 3.   Several stable solid and groundglass pulmonary nodules with the largest a groundglass nodule measuring up to 9 mm and not significant changed since at least 5/3/2021. 4.  Mild emphysema. 5.  Stable heterogeneous fat and soft tissue density in the anterior mediastinum without locally aggressive features. Differential considerations include lipoma, focal thymic hyperplasia, though thymoma is not definitely excluded. 6.   Stable left adrenal nodules since at least 9/17/2018 examination and are most consistent with benign adenomas. 7.  Segmental left hemicolonic mural thickening without adjacent stranding can be seen with infectious, inflammatory or ischemic colitis. 8.  Distal colonic diverticula without acute diverticulitis. 9.  Bilateral nonobstructive intrarenal calculi. SHONNA MULLEN MD   SYSTEM ID:  [id number]

## 2023-07-31 NOTE — PROGRESS NOTES
Referral to medical oncology received for new findings of renal mass, initially found on CT imaging.  A CT renal mass study done on 7/29.  I have a message out to referring provider to let her know patient should start with a urologist to discuss possible surgery to remove left renal mass.  I also called Asmita to discuss that urology would be the place for her to start.  I reviewed that typically a solitary renal mass that has not spread, is either observed or removed.  I also gave her the phone number for the urology department.    Narrative & Impression   EXAM: CT RENAL MASS WITHOUT AND WITH CONTRAST  LOCATION: [facility]  DATE: 07/29/2023     IMPRESSION:  1.  2.4 cm enhancing mass at the anterior aspect of the interpolar region of the left kidney, highly worrisome for renal cell carcinoma.  2.  1.4 cm exophytic focus arising from the lower pole of the right kidney with mildly elevated Hounsfield units, likely represents cyst with hemorrhagic/proteinaceous component.  3.  A few bilateral small kidney stones. No ureteral stone or hydronephrosis in either kidney.  4.  Colonic diverticulosis predominantly of the sigmoid colon without CT evidence of acute diverticulitis.

## 2023-08-01 ENCOUNTER — TELEPHONE (OUTPATIENT)
Dept: UROLOGY | Facility: CLINIC | Age: 68
End: 2023-08-01
Payer: COMMERCIAL

## 2023-08-01 NOTE — TELEPHONE ENCOUNTER
Health Call Center    Phone Message    May a detailed message be left on voicemail: yes     Reason for Call: Other: Patient being referred for Urgent Appointment (3-5 days) by referring provider for Kidney Cancer. Writer unable to pull up schedule for [facility] for Dr. Toro for scheduling. Sending for review and follow-up with patient for scheduling. Thank you!     Action Taken: Other: [facility]    Travel Screening: Not Applicable

## 2023-08-01 NOTE — TELEPHONE ENCOUNTER
IMPRESSION:  1.  2.4 cm enhancing mass at the anterior aspect of the interpolar region of the left kidney, highly worrisome for renal cell carcinoma.  2.  1.4 cm exophytic focus arising from the lower pole of the right kidney with mildly elevated Hounsfield units, likely represents cyst with hemorrhagic/proteinaceous component.  3.  A few bilateral small kidney stones. No ureteral stone or hydronephrosis in either kidney.  4.  Colonic diverticulosis predominantly of the sigmoid colon without CT evidence of acute diverticulitis.    Discussed with Urologist on-site at [facility].   Patient should be seen with provider at [facility] or Dr. Toro at [facility], who can discuss observation versus partial nephrectomy.  Message sent to Dr. Toro     Thank you,   Farheen WEST RN  [facility]

## 2023-08-02 NOTE — TELEPHONE ENCOUNTER
Per Dr. Toro.   Patient to be added to his schedule.     Suzanne, please call or update staff with time.     Thank you,   Farheen WEST RN

## 2023-08-03 ENCOUNTER — OFFICE VISIT (OUTPATIENT)
Dept: UROLOGY | Facility: CLINIC | Age: 68
End: 2023-08-03
Payer: COMMERCIAL

## 2023-08-03 VITALS
SYSTOLIC BLOOD PRESSURE: 146 MMHG | OXYGEN SATURATION: 99 % | RESPIRATION RATE: 18 BRPM | BODY MASS INDEX: 19.22 KG/M2 | DIASTOLIC BLOOD PRESSURE: 70 MMHG | WEIGHT: 112 LBS | HEART RATE: 75 BPM

## 2023-08-03 DIAGNOSIS — E27.9 ADRENAL NODULE (H): Primary | ICD-10-CM

## 2023-08-03 PROCEDURE — 99205 OFFICE O/P NEW HI 60 MIN: CPT | Performed by: UROLOGY

## 2023-08-03 ASSESSMENT — PAIN SCALES - GENERAL: PAINLEVEL: NO PAIN (0)

## 2023-08-03 NOTE — NURSING NOTE
"Chief Complaint   Patient presents with    Consult     Kidney Mass        Initial BP (!) 146/70 (BP Location: Left arm, Patient Position: Chair, Cuff Size: Adult Regular)   Pulse 75   Resp 18   Wt 50.8 kg (112 lb)   LMP 12/01/2009   SpO2 99%   BMI 19.22 kg/m   Estimated body mass index is 19.22 kg/m  as calculated from the following:    Height as of 7/25/23: 1.626 m (5' 4\").    Weight as of this encounter: 50.8 kg (112 lb).  BP completed using cuff size: regular  Medications and allergies reviewed.      Lindsay HARPER CMA     "

## 2023-08-03 NOTE — PATIENT INSTRUCTIONS
Per physician instructions.    If you have questions or concerns on any instructions given to you by your provider today or if you need to schedule an appointment, you can reach us at [phone].  Listen to the menu for the Specialty Clinic option.      Thank you!

## 2023-08-04 ENCOUNTER — TELEPHONE (OUTPATIENT)
Dept: ENDOCRINOLOGY | Facility: CLINIC | Age: 68
End: 2023-08-04
Payer: COMMERCIAL

## 2023-08-04 ENCOUNTER — TELEPHONE (OUTPATIENT)
Dept: FAMILY MEDICINE | Facility: CLINIC | Age: 68
End: 2023-08-04
Payer: COMMERCIAL

## 2023-08-04 DIAGNOSIS — K52.9 CHRONIC DIARRHEA: Primary | ICD-10-CM

## 2023-08-04 NOTE — TELEPHONE ENCOUNTER
Symptoms    Describe your symptoms: diarrhea for the last year. This has been ongoing since medication changes, total hip replacement 6/22. Put this on the back burner until the hip got fixed. Saw Urologist yesterday and he suggested doing stool testing. I have not have had any of this done. Please advise.    Any pain: No    How long have you been having symptoms months and months    Have you been seen for this:  Yes:     Appointment offered?: No already have had visits concerning this.     Triage offered?: No    Preferred Pharmacy:   [place], [place] - [address]  [address]  [address] [facility] [address]  Phone: [phone] Fax: [phone] Alternate Fax: [phone]      Okay to leave a detailed message?: Yes at Home number on file [phone] (home)

## 2023-08-04 NOTE — PROGRESS NOTES
CC : left renal mass    HPI: 66 yo female with incidentally diagnosed approximately 2.7 cm left renal mass found on a workup for abdominal pain.  Patient has also had 6-8 months of diarrhea, weight loss and emesis of unclear etiology.  No gross hematuria, no flank pain.    PMHx: DM, HTN  PSHx: appy, jacey, exlap, carpel tunnel, R BOO 5 weeks ago  MEDS:asa, atorvastatin,trulicity, hydroxyzine,lisinopril,metformin, metoprolol, oxycodone  Allergy: amox  SocHx: current smoker since 18 yo; no ETOH  ROS: neg for f/c/s    OBJECTIVE: ct 7/29/23 2.4 cm right renal mass centrally located. 1.7 cm an 1.2 cm left adrenal nodules, 1.4 cmm right adrenal nodule  CT chest/abd/pel 7/27/23: stable small pulmonary nodules    Creat 0.86 mg/dL 6/20/23    ASSESSMENT AND PLAN: Over half of today's 62-minute visit was spent reviewing the chart, results and counseling the patient regarding her renal mass.  I counseled the patient that the renal mass has about an 80% chance of being cancer.  We discussed options including observation vs partial vs radical nephrectomy vs cryo vs biopsy.  The risks and benefits of the various approaches were discussed.  We will also refer the patient to endocrinology to work up the adrenal nodules to rule out functional adenomas, especially given the patient's symptoms.  Depending on the result of that we will further discuss options for the renal mass.

## 2023-08-04 NOTE — TELEPHONE ENCOUNTER
[facility] Call Center    Phone Message    May a detailed message be left on voicemail: yes     Reason for Call: Appointment Intake    Referring Provider Name: PALAK HILARIOSHENG TEMI  Diagnosis and/or Symptoms: Adrenal nodule (H) Per protocol please call patient and try to get her in sooner than what is scheduled.  We have her set in her preferred location and she cannot do video appointments. Appointment at [place] 3/7/2024 at 8AM.    Action Taken: Other: Endo    Travel Screening: Not Applicable

## 2023-08-04 NOTE — TELEPHONE ENCOUNTER
Endocrine triage  The scheduled first available endocrine appointment timeframe is acceptable.  E-consult may be an option for answer to specific question by the referring provider.  E-consults generally have a response within 3 days.  Tanna Hahn MD

## 2023-08-07 ENCOUNTER — TRANSFERRED RECORDS (OUTPATIENT)
Dept: HEALTH INFORMATION MANAGEMENT | Facility: CLINIC | Age: 68
End: 2023-08-07
Payer: COMMERCIAL

## 2023-08-09 NOTE — TELEPHONE ENCOUNTER
Stephania called trying to get a hold of Dr. Toro and Suzanne.    States she is currently scheduled to see Dr. Moore on  3/7/24 (the soonest available)    Patient states she would be willing to see Manny Aguilera if they have sooner availability or is an E-consult an option for her? (As mentioned by Dr. Hahn)    Anamaria Preciado RN on 8/9/2023 at 1:34 PM

## 2023-08-10 ENCOUNTER — LAB (OUTPATIENT)
Dept: LAB | Facility: CLINIC | Age: 68
End: 2023-08-10
Payer: COMMERCIAL

## 2023-08-10 DIAGNOSIS — K52.9 CHRONIC DIARRHEA: ICD-10-CM

## 2023-08-10 LAB — C DIFF TOX B STL QL: NEGATIVE

## 2023-08-10 PROCEDURE — 87209 SMEAR COMPLEX STAIN: CPT

## 2023-08-10 PROCEDURE — 87493 C DIFF AMPLIFIED PROBE: CPT

## 2023-08-10 PROCEDURE — 87177 OVA AND PARASITES SMEARS: CPT

## 2023-08-10 NOTE — LETTER
August 10, 2023      Asmita Jerez  [address]  [place] [address]        Dear ,    We are writing to inform you of your test results.    {results letter list:[id number]}    Resulted Orders   C. difficile Toxin B PCR with reflex to C. difficile Antigen and Toxins A/B EIA   Result Value Ref Range    C Difficile Toxin B by PCR Negative Negative      Comment:      A negative result does not exclude actual disease due to C. difficile and may be due to improper collection, handling and storage of the specimen or the number of organisms in the specimen is below the detection limit of the assay.    Narrative    The [employer] Xpert C. difficile Assay, performed on the [employer]  Instrument Systems, is a qualitative in vitro diagnostic test for rapid detection of toxin B gene sequences from unformed (liquid or soft) stool specimens collected from patients suspected of having Clostridioides difficile infection (CDI). The test utilizes automated real-time polymerase chain reaction (PCR) to detect toxin gene sequences associated with toxin producing C. difficile. The Xpert C. difficile Assay is intended as an aid in the diagnosis of CDI.       If you have any questions or concerns, please call the clinic at the number listed above.       Sincerely,      GAURANG Schneider CNP

## 2023-08-10 NOTE — LETTER
August 11, 2023      Asmita Jerez  [address]  [place] [address]        Dear ,    We are writing to inform you of your test results.    Cologuard test is normal and negative. Colonoscopy screening is in 3 years.     Resulted Orders   C. difficile Toxin B PCR with reflex to C. difficile Antigen and Toxins A/B EIA   Result Value Ref Range    C Difficile Toxin B by PCR Negative Negative      Comment:      A negative result does not exclude actual disease due to C. difficile and may be due to improper collection, handling and storage of the specimen or the number of organisms in the specimen is below the detection limit of the assay.    Narrative    The [employer] Xpert C. difficile Assay, performed on the [employer]  Instrument Systems, is a qualitative in vitro diagnostic test for rapid detection of toxin B gene sequences from unformed (liquid or soft) stool specimens collected from patients suspected of having Clostridioides difficile infection (CDI). The test utilizes automated real-time polymerase chain reaction (PCR) to detect toxin gene sequences associated with toxin producing C. difficile. The Xpert C. difficile Assay is intended as an aid in the diagnosis of CDI.       If you have any questions or concerns, please call the clinic at the number listed above.       Sincerely,      GAURANG Schneider CNP

## 2023-08-11 LAB — O+P STL MICRO: NEGATIVE

## 2023-08-14 NOTE — RESULT ENCOUNTER NOTE
Ordered GI referral.  May be worthwhile to add in pro-biotic. These are available over the counter.   GAURANG Crow CNP

## 2023-08-16 RX ORDER — BISACODYL 5 MG/1
TABLET, DELAYED RELEASE ORAL
Qty: 4 TABLET | Refills: 0 | Status: SHIPPED | OUTPATIENT
Start: 2023-08-16 | End: 2023-08-23

## 2023-08-22 ENCOUNTER — ANESTHESIA EVENT (OUTPATIENT)
Dept: GASTROENTEROLOGY | Facility: CLINIC | Age: 68
End: 2023-08-22
Payer: COMMERCIAL

## 2023-08-22 ASSESSMENT — LIFESTYLE VARIABLES: TOBACCO_USE: 1

## 2023-08-22 NOTE — ANESTHESIA PREPROCEDURE EVALUATION
Anesthesia Pre-Procedure Evaluation    Patient: Asmita Jerez   MRN: [id number] : 1955        Procedure : Procedure(s):  Colonoscopy          Past Medical History:   Diagnosis Date     Cervical high risk HPV (human papillomavirus) test positive 2018     Diabetes (H)      Hypertension      Peptic ulcer, unspecified site, unspecified as acute or chronic, without mention of hemorrhage, perforation, or obstruction      Polycystic ovaries      Pure hypercholesterolemia      Tobacco use disorder      Unspecified multiple gestation, unspecified as to episode of care     Multiple gestation      Past Surgical History:   Procedure Laterality Date     APPENDECTOMY       ARTHROPLASTY HIP Right 2023    Procedure: Total Hip Arthroplasty Right;  Surgeon: Albin Williamson MD;  Location: [facility]     BIOPSY BREAST       COLONOSCOPY N/A 2015    Procedure: COLONOSCOPY;  Surgeon: Tino Pryor MD;  Location: [facility]     ENDOSCOPIC RELEASE CARPAL TUNNEL Right 2021    Procedure: Endoscopic carpal tunnel release;  Surgeon: Ac Lemus MD;  Location: [facility]     ENDOSCOPIC RELEASE CARPAL TUNNEL Left 11/15/2021    Procedure: ENDOSCOPIC Carpal Tunnel Release, LEFT, removal suture right wrist;  Surgeon: Ac Lemus MD;  Location: [facility]     LUMPECTOMY BREAST       RELEASE TRIGGER FINGER  2014    Procedure: RELEASE TRIGGER FINGER;  Surgeon: Ag Hoffman MD;  Location: [facility]     SURGICAL HISTORY OF -   1970    appendectomy     SURGICAL HISTORY OF -   1970    pilonidal cyst and sinuses      Allergies   Allergen Reactions     Amoxicillin Rash      Social History     Tobacco Use     Smoking status: Every Day     Packs/day: 1.00     Years: 40.00     Pack years: 40.00     Types: Cigarettes     Smokeless tobacco: Never     Tobacco comments:     pt not interested in quit plan 10/12/2022   Substance Use Topics     Alcohol use: No      Wt Readings from Last 1  Encounters:   08/03/23 50.8 kg (112 lb)        Anesthesia Evaluation   Pt has had prior anesthetic. Type: General and MAC.        ROS/MED HX  ENT/Pulmonary:     (+)                tobacco use, Current use,  40  Pack-Year Hx,                     Neurologic:       Cardiovascular:     (+)  hypertension- -   -  - -                                 Previous cardiac testing   Echo: Date: Results:    Stress Test:  Date: Results:    ECG Reviewed:  Date: 6/20/23 Results:  Sinus  Rhythm   WITHIN NORMAL LIMITS  Cath:  Date: Results:      METS/Exercise Tolerance:     Hematologic:       Musculoskeletal:       GI/Hepatic:       Renal/Genitourinary:       Endo:     (+)  type II DM,                    Psychiatric/Substance Use:     (+) psychiatric history anxiety       Infectious Disease:       Malignancy:       Other:            Physical Exam    Airway  airway exam normal      Mallampati: II   TM distance: > 3 FB   Neck ROM: full   Mouth opening: > 3 cm    Respiratory Devices and Support         Dental       (+) Edentulous      Cardiovascular   cardiovascular exam normal          Pulmonary   pulmonary exam normal        breath sounds clear to auscultation       OUTSIDE LABS:  CBC:   Lab Results   Component Value Date    WBC 9.8 05/22/2023    WBC 11.1 (H) 04/29/2015    HGB 10.8 (L) 06/23/2023    HGB 13.2 05/22/2023    HCT 40.7 05/22/2023    HCT 40.8 04/29/2015     05/22/2023     04/29/2015     BMP:   Lab Results   Component Value Date     06/20/2023     05/22/2023    POTASSIUM 4.6 06/20/2023    POTASSIUM 4.3 05/22/2023    CHLORIDE 104 06/20/2023    CHLORIDE 103 05/22/2023    CO2 25 06/20/2023    CO2 23 05/22/2023    BUN 15.3 06/20/2023    BUN 17.9 05/22/2023    CR 0.8 07/27/2023    CR 0.86 06/20/2023    GLC 99 06/22/2023    GLC 74 06/22/2023     COAGS: No results found for: PTT, INR, FIBR  POC:   Lab Results   Component Value Date     (H) 05/23/2014    HCGS Negative 07/30/2013     HEPATIC:   Lab  Results   Component Value Date    ALBUMIN 4.5 05/22/2023    PROTTOTAL 7.0 05/22/2023    ALT 19 05/22/2023    AST 27 05/22/2023    ALKPHOS 72 05/22/2023    BILITOTAL 0.4 05/22/2023     OTHER:   Lab Results   Component Value Date    A1C 7.3 (H) 06/20/2023    JORY 9.9 06/20/2023    LIPASE 62 07/30/2013    TSH 1.64 10/12/2022       Anesthesia Plan    ASA Status:  3    NPO Status:  NPO Appropriate    Anesthesia Type: General.     - Airway: Native airway   Induction: Intravenous.   Maintenance: TIVA.        Consents    Anesthesia Plan(s) and associated risks, benefits, and realistic alternatives discussed. Questions answered and patient/representative(s) expressed understanding.     - Discussed: Risks, Benefits and Alternatives for BOTH SEDATION and the PROCEDURE were discussed     - Discussed with:  Patient            Postoperative Care            Comments:                GAURANG Woodard CRNA

## 2023-08-23 ENCOUNTER — HOSPITAL ENCOUNTER (OUTPATIENT)
Facility: CLINIC | Age: 68
Discharge: HOME | End: 2023-08-23
Attending: SURGERY | Admitting: SURGERY
Payer: COMMERCIAL

## 2023-08-23 ENCOUNTER — ANESTHESIA (OUTPATIENT)
Dept: GASTROENTEROLOGY | Facility: CLINIC | Age: 68
End: 2023-08-23
Payer: COMMERCIAL

## 2023-08-23 VITALS
WEIGHT: 110 LBS | SYSTOLIC BLOOD PRESSURE: 155 MMHG | TEMPERATURE: 97 F | BODY MASS INDEX: 18.78 KG/M2 | OXYGEN SATURATION: 98 % | HEART RATE: 90 BPM | HEIGHT: 64 IN | DIASTOLIC BLOOD PRESSURE: 93 MMHG

## 2023-08-23 DIAGNOSIS — Z12.11 SPECIAL SCREENING FOR MALIGNANT NEOPLASMS, COLON: Primary | ICD-10-CM

## 2023-08-23 LAB — COLONOSCOPY: NORMAL

## 2023-08-23 PROCEDURE — 250N000011 HC RX IP 250 OP 636: Performed by: NURSE ANESTHETIST, CERTIFIED REGISTERED

## 2023-08-23 PROCEDURE — 258N000003 HC RX IP 258 OP 636: Performed by: NURSE ANESTHETIST, CERTIFIED REGISTERED

## 2023-08-23 PROCEDURE — 258N000003 HC RX IP 258 OP 636: Performed by: SURGERY

## 2023-08-23 PROCEDURE — 88305 TISSUE EXAM BY PATHOLOGIST: CPT | Mod: TC | Performed by: SURGERY

## 2023-08-23 PROCEDURE — 45380 COLONOSCOPY AND BIOPSY: CPT | Performed by: SURGERY

## 2023-08-23 PROCEDURE — 82962 GLUCOSE BLOOD TEST: CPT | Performed by: SURGERY

## 2023-08-23 PROCEDURE — 250N000009 HC RX 250: Performed by: NURSE ANESTHETIST, CERTIFIED REGISTERED

## 2023-08-23 PROCEDURE — 370N000017 HC ANESTHESIA TECHNICAL FEE, PER MIN: Performed by: SURGERY

## 2023-08-23 PROCEDURE — 88305 TISSUE EXAM BY PATHOLOGIST: CPT | Mod: 26 | Performed by: PATHOLOGY

## 2023-08-23 RX ORDER — NALOXONE HYDROCHLORIDE 0.4 MG/ML
0.2 INJECTION, SOLUTION INTRAMUSCULAR; INTRAVENOUS; SUBCUTANEOUS
Status: DISCONTINUED | OUTPATIENT
Start: 2023-08-23 | End: 2023-08-23 | Stop reason: HOSPADM

## 2023-08-23 RX ORDER — OXYCODONE HYDROCHLORIDE 5 MG/1
10 TABLET ORAL
Status: DISCONTINUED | OUTPATIENT
Start: 2023-08-23 | End: 2023-08-23 | Stop reason: HOSPADM

## 2023-08-23 RX ORDER — LIDOCAINE 40 MG/G
CREAM TOPICAL
Status: DISCONTINUED | OUTPATIENT
Start: 2023-08-23 | End: 2023-08-23 | Stop reason: HOSPADM

## 2023-08-23 RX ORDER — NALOXONE HYDROCHLORIDE 0.4 MG/ML
0.4 INJECTION, SOLUTION INTRAMUSCULAR; INTRAVENOUS; SUBCUTANEOUS
Status: DISCONTINUED | OUTPATIENT
Start: 2023-08-23 | End: 2023-08-23 | Stop reason: HOSPADM

## 2023-08-23 RX ORDER — OXYCODONE HYDROCHLORIDE 5 MG/1
5 TABLET ORAL
Status: DISCONTINUED | OUTPATIENT
Start: 2023-08-23 | End: 2023-08-23 | Stop reason: HOSPADM

## 2023-08-23 RX ORDER — GLYCOPYRROLATE 0.2 MG/ML
INJECTION, SOLUTION INTRAMUSCULAR; INTRAVENOUS PRN
Status: DISCONTINUED | OUTPATIENT
Start: 2023-08-23 | End: 2023-08-23

## 2023-08-23 RX ORDER — LIDOCAINE HYDROCHLORIDE 20 MG/ML
INJECTION, SOLUTION INFILTRATION; PERINEURAL PRN
Status: DISCONTINUED | OUTPATIENT
Start: 2023-08-23 | End: 2023-08-23

## 2023-08-23 RX ORDER — SODIUM CHLORIDE, SODIUM LACTATE, POTASSIUM CHLORIDE, CALCIUM CHLORIDE 600; 310; 30; 20 MG/100ML; MG/100ML; MG/100ML; MG/100ML
INJECTION, SOLUTION INTRAVENOUS CONTINUOUS
Status: DISCONTINUED | OUTPATIENT
Start: 2023-08-23 | End: 2023-08-23 | Stop reason: HOSPADM

## 2023-08-23 RX ORDER — ONDANSETRON 2 MG/ML
4 INJECTION INTRAMUSCULAR; INTRAVENOUS
Status: DISCONTINUED | OUTPATIENT
Start: 2023-08-23 | End: 2023-08-23 | Stop reason: HOSPADM

## 2023-08-23 RX ORDER — FLUMAZENIL 0.1 MG/ML
0.2 INJECTION, SOLUTION INTRAVENOUS
Status: DISCONTINUED | OUTPATIENT
Start: 2023-08-23 | End: 2023-08-23 | Stop reason: HOSPADM

## 2023-08-23 RX ORDER — ONDANSETRON 2 MG/ML
4 INJECTION INTRAMUSCULAR; INTRAVENOUS EVERY 30 MIN PRN
Status: DISCONTINUED | OUTPATIENT
Start: 2023-08-23 | End: 2023-08-23 | Stop reason: HOSPADM

## 2023-08-23 RX ORDER — PROPOFOL 10 MG/ML
INJECTION, EMULSION INTRAVENOUS CONTINUOUS PRN
Status: DISCONTINUED | OUTPATIENT
Start: 2023-08-23 | End: 2023-08-23

## 2023-08-23 RX ORDER — ONDANSETRON 4 MG/1
4 TABLET, ORALLY DISINTEGRATING ORAL EVERY 30 MIN PRN
Status: DISCONTINUED | OUTPATIENT
Start: 2023-08-23 | End: 2023-08-23 | Stop reason: HOSPADM

## 2023-08-23 RX ADMIN — SODIUM CHLORIDE, POTASSIUM CHLORIDE, SODIUM LACTATE AND CALCIUM CHLORIDE: 600; 310; 30; 20 INJECTION, SOLUTION INTRAVENOUS at 10:40

## 2023-08-23 RX ADMIN — LIDOCAINE HYDROCHLORIDE 50 MG: 20 INJECTION, SOLUTION INFILTRATION; PERINEURAL at 10:39

## 2023-08-23 RX ADMIN — PROPOFOL 200 MCG/KG/MIN: 10 INJECTION, EMULSION INTRAVENOUS at 10:39

## 2023-08-23 RX ADMIN — LIDOCAINE HYDROCHLORIDE 0.1 ML: 10 INJECTION, SOLUTION EPIDURAL; INFILTRATION; INTRACAUDAL; PERINEURAL at 09:53

## 2023-08-23 RX ADMIN — SODIUM CHLORIDE, POTASSIUM CHLORIDE, SODIUM LACTATE AND CALCIUM CHLORIDE: 600; 310; 30; 20 INJECTION, SOLUTION INTRAVENOUS at 09:53

## 2023-08-23 RX ADMIN — GLYCOPYRROLATE 0.2 MG: 0.2 INJECTION, SOLUTION INTRAMUSCULAR; INTRAVENOUS at 10:39

## 2023-08-23 RX ADMIN — SODIUM CHLORIDE, POTASSIUM CHLORIDE, SODIUM LACTATE AND CALCIUM CHLORIDE: 600; 310; 30; 20 INJECTION, SOLUTION INTRAVENOUS at 11:02

## 2023-08-23 ASSESSMENT — ACTIVITIES OF DAILY LIVING (ADL): ADLS_ACUITY_SCORE: 35

## 2023-08-23 NOTE — LETTER
Asmita Jerez  [address] [address] [address]      August 30, 2023    Dear Asmita,  This letter is written to inform you of the results of your recent colonoscopy.  Your examination showed no abnormalities.     Final Diagnosis   A. Colon, random locations, biopsies:  -Colonic mucosa consistent with collagenous colitis.  -Patchy prominent subepithelial collagen deposition and surface epithelial damage identified.  -Negative for dysplasia or malignancy       Given these findings, you should see your primary care for treatment which usually consists of steroids and medication assessment.    You should have a repeat colonoscopy in 10 years.  Thank you    Please remember that this recommendation is made with the understanding that you are not experiencing persistent changes in bowel function, bleeding per rectum, and/or significant abdominal pain. If you experience these symptoms, please contact your primary care provider for a further evaluation.     If you have any questions or concerns about the results of your colonoscopy or the appropriate follow-up, please contact my assistant at [phone]    Sincerely,      Celio Larry, DO   [facility]  ___

## 2023-08-23 NOTE — LETTER
August 23, 2023      Asmita Jerez  [address] [address] [address]        Dear ,    We are writing to inform you of your test results.    Your test results fall within the expected range(s) or remain unchanged from previous results.  Please continue with current treatment plan.    Resulted Orders   COLONOSCOPY   Result Value Ref Range    COLONOSCOPY       _______________________________________________________________________________  Patient Name: Asmita Jerez             Procedure Date: 8/23/2023 10:30 AM  MRN: [id number]                       YOB: 1955  Admit Type: Outpatient                Age: 67  Gender: Female                        Attending MD: ALTAGRACIA VOGT MD,     Total Sedation Time:                    _______________________________________________________________________________     Procedure:             Colonoscopy  Indications:           Chronic diarrhea  Providers:             ALTAGRACIA VOGT MD  Referring MD:          MINGO SHULTZ  Medicines:             Monitored Anesthesia Care  Complications:         No immediate complications.  _______________________________________________________________________________  Procedure:             Pre-Anesthesia Assessment:                         - Prior to the procedure, a History and Physical was                          performed, and patien t medications, allergies and                          sensitivities were reviewed. The patient's tolerance                          of previous anesthesia was reviewed.                         - The risks and benefits of the procedure and the                          sedation options and risks were discussed with the                          patient. All questions were answered and informed                          consent was obtained.                         - Patient identification and proposed procedure were                          verified prior to  the procedure by the physician, the                          nurse and the anesthetist. The procedure was verified                          in the pre-procedure area in the endoscopy suite.                         After obtaining informed consent, the colonoscope was                          passed under direct vision. Throughout the procedure,                          the patient's blood pressure, pulse, and oxygen                           saturations were monitored continuously. The Barcode                          0147 was introduced through the anus and advanced to                          the cecum, identified by appendiceal orifice and                          ileocecal valve. The colonoscopy was performed without                          difficulty. The patient tolerated the procedure well.                          The quality of the bowel preparation was adequate to                          identify polyps greater than 5 mm in size. The                          ileocecal valve, appendiceal orifice, and rectum were                          photographed.                                                                                   Findings:       Hemorrhoids were found on perianal exam.       The colon (entire examined portion) appeared normal. Biopsies for        histology were taken with a cold forceps from the right transverse        colon, left transverse colon and rectum for evaluation of microscopic         colitis. Verification of patient identification for the specimen was        done by the nurse and technician using the patient's name and birth        date. Estimated blood loss was minimal.       The exam was otherwise without abnormality on direct and retroflexion        views.       Many medium-mouthed diverticula were found in the sigmoid colon and        descending colon.                                                                                   Impression:            - Hemorrhoids  found on perianal exam.                         - The entire examined colon is normal. Biopsied.                         - The examination was otherwise normal on direct and                          retroflexion views.  Recommendation:        - Discharge patient to home (ambulatory).                         - High fiber diet.                         - Continue present medications.                         - Await pathology results.                                                                                      Signed Electronically by Altagracia Vogt MD  __________________________  ALTAGRACIA VOGT MD  8/23/2023 11:07:16 AM  I was physically present for the entire viewing portion of the exam.  [id number] MD TORIE  Number of Addenda: 0    Note Initiated On: 8/23/2023 10:30 AM  Scope In: 10:45:15 AM  Scope Out: 11:02:57 AM         If you have any questions or concerns, please call the clinic at the number listed above.       Sincerely,      GAURANG Schneider CNP

## 2023-08-23 NOTE — ANESTHESIA CARE TRANSFER NOTE
Patient: Asmita Jerez    Procedure: Procedure(s):  Colonoscopy with biopsies       Diagnosis: Chronic diarrhea [K52.9]  Diagnosis Additional Information: No value filed.    Anesthesia Type:   General     Note:    Oropharynx: spontaneously breathing  Level of Consciousness: drowsy  Oxygen Supplementation: room air    Independent Airway: airway patency satisfactory and stable  Dentition: dentition unchanged  Vital Signs Stable: post-procedure vital signs reviewed and stable  Report to RN Given: handoff report given  Patient transferred to: Phase II    Handoff Report: Identifed the Patient, Identified the Reponsible Provider, Reviewed the pertinent medical history, Discussed the surgical course, Reviewed Intra-OP anesthesia mangement and issues during anesthesia, Set expectations for post-procedure period and Allowed opportunity for questions and acknowledgement of understanding  Vitals:  Vitals Value Taken Time   /66 08/23/23 1105   Temp     Pulse 94 08/23/23 1105   Resp     SpO2 99 % 08/23/23 1108   Vitals shown include unvalidated device data.    Electronically Signed By: GAURANG Rodriguez CRNA  August 23, 2023  11:09 AM

## 2023-08-23 NOTE — ANESTHESIA POSTPROCEDURE EVALUATION
Patient: Asmita Jerez    Procedure: Procedure(s):  Colonoscopy with biopsies       Anesthesia Type:  General    Note:  Disposition: Outpatient   Postop Pain Control:             Sign Out: Well controlled pain   PONV:    Neuro/Psych:             Sign Out: Acceptable/Baseline neuro status   Airway/Respiratory:             Sign Out: Acceptable/Baseline resp. status   CV/Hemodynamics:             Sign Out: Acceptable CV status   Other NRE: NONE   DID A NON-ROUTINE EVENT OCCUR? No       Last vitals:  Vitals Value Taken Time   /66 08/23/23 1105   Temp     Pulse 94 08/23/23 1105   Resp     SpO2 99 % 08/23/23 1109   Vitals shown include unvalidated device data.    Electronically Signed By: GAURANG Rodriguez CRNA  August 23, 2023  11:10 AM

## 2023-08-23 NOTE — H&P
67 year old year old female here for colonoscopy for chronic diarrhea.  Ongoing since 11/2022.  Work-up has been negative.  No blood in stool.  Last colonoscopy was 2015, negative.    Patient Active Problem List   Diagnosis    Digital mucinous cyst    Eyelid lesion    Syringoma    CARDIOVASCULAR SCREENING; LDL GOAL LESS THAN 100    Advanced directives, counseling/discussion    Hypertension, goal below 140/90    Type 2 diabetes mellitus without complication (H)    Essential hypertension with goal blood pressure less than 140/90    Cervical high risk HPV (human papillomavirus) test positive    Tobacco use    Generalized anxiety disorder    S/P total hip arthroplasty       Past Medical History:   Diagnosis Date    Cervical high risk HPV (human papillomavirus) test positive 03/31/2018    Diabetes (H)     Hypertension     Peptic ulcer, unspecified site, unspecified as acute or chronic, without mention of hemorrhage, perforation, or obstruction     Polycystic ovaries     Pure hypercholesterolemia     Tobacco use disorder     Unspecified multiple gestation, unspecified as to episode of care     Multiple gestation       Past Surgical History:   Procedure Laterality Date    APPENDECTOMY      ARTHROPLASTY HIP Right 6/22/2023    Procedure: Total Hip Arthroplasty Right;  Surgeon: Albin Williamson MD;  Location: [facility]    BIOPSY BREAST      COLONOSCOPY N/A 12/11/2015    Procedure: COLONOSCOPY;  Surgeon: Tino Pryor MD;  Location: [facility]    ENDOSCOPIC RELEASE CARPAL TUNNEL Right 11/01/2021    Procedure: Endoscopic carpal tunnel release;  Surgeon: Ac Lemus MD;  Location: [facility]    ENDOSCOPIC RELEASE CARPAL TUNNEL Left 11/15/2021    Procedure: ENDOSCOPIC Carpal Tunnel Release, LEFT, removal suture right wrist;  Surgeon: Ac Lemus MD;  Location: [facility]    LUMPECTOMY BREAST      RELEASE TRIGGER FINGER  05/23/2014    Procedure: RELEASE TRIGGER FINGER;  Surgeon: Ag Hoffman MD;   "Location: [facility]    SURGICAL HISTORY OF -   01/01/1970    appendectomy    SURGICAL HISTORY OF -   01/01/1970    pilonidal cyst and sinuses       Family History   Problem Relation Age of Onset    Unknown/Adopted Father     Dementia Father        Current Outpatient Rx   Medication Sig Dispense Refill    bisacodyl (DULCOLAX) 5 MG EC tablet Take 2 tablets at 3 pm the day before your procedure. If your procedure is before 11 am, take 2 additional tablets at 11 pm. If your procedure is after 11 am, take 2 additional tablets at 6 am. For additional instructions refer to your colonoscopy prep instructions. 4 tablet 0    polyethylene glycol (GOLYTELY) 236 g suspension The night before the exam at 6 pm drink an 8-ounce glass every 15 minutes until the jug is half empty. If you arrive before 11 AM: Drink the other half of the Golytely jug at 11 PM night before procedure. If you arrive after 11 AM: Drink the other half of the Golytely jug at 6 AM day of procedure. For additional instructions refer to your colonoscopy prep instructions. 4000 mL 0       Allergies   Allergen Reactions    Amoxicillin Rash       Pt reports that she has been smoking cigarettes. She has a 40.00 pack-year smoking history. She has never used smokeless tobacco. She reports that she does not drink alcohol and does not use drugs.    Exam:  BP (!) 175/86 (BP Location: Right arm)   Pulse 83   Temp 98.3  F (36.8  C) (Oral)   Ht 1.626 m (5' 4\")   Wt 49.9 kg (110 lb)   LMP 12/01/2009   SpO2 98%   BMI 18.88 kg/m      Awake, Alert OX3  Lungs - CTA bilaterally  CV - RRR, no murmurs, distal pulses intact  Abd - soft, non-distended, non-tender, +BS  Extr - No cyanosis or edema    A/P 67 year old year old female in need of colonoscopy for chronic diarrhea. Risks, benefits, alternatives, and complications were discussed including the possibility of perforation, bleeding, missed lesion and the patient agreed to proceed.    Celio Larry, DO on 8/23/2023 at " 10:13 AM

## 2023-08-24 LAB
PATH REPORT.COMMENTS IMP SPEC: NORMAL
PATH REPORT.FINAL DX SPEC: NORMAL
PATH REPORT.GROSS SPEC: NORMAL
PATH REPORT.MICROSCOPIC SPEC OTHER STN: NORMAL
PATH REPORT.RELEVANT HX SPEC: NORMAL
PHOTO IMAGE: NORMAL

## 2023-09-06 ENCOUNTER — OFFICE VISIT (OUTPATIENT)
Dept: ENDOCRINOLOGY | Facility: CLINIC | Age: 68
End: 2023-09-06
Attending: UROLOGY
Payer: COMMERCIAL

## 2023-09-06 VITALS
OXYGEN SATURATION: 100 % | TEMPERATURE: 97.6 F | SYSTOLIC BLOOD PRESSURE: 188 MMHG | WEIGHT: 115 LBS | HEART RATE: 79 BPM | BODY MASS INDEX: 19.74 KG/M2 | DIASTOLIC BLOOD PRESSURE: 69 MMHG

## 2023-09-06 DIAGNOSIS — E27.9 ADRENAL NODULE (H): ICD-10-CM

## 2023-09-06 LAB
ANION GAP SERPL CALCULATED.3IONS-SCNC: 10 MMOL/L (ref 7–15)
BUN SERPL-MCNC: 20.5 MG/DL (ref 8–23)
CALCIUM SERPL-MCNC: 11 MG/DL (ref 8.8–10.2)
CHLORIDE SERPL-SCNC: 104 MMOL/L (ref 98–107)
CREAT SERPL-MCNC: 0.68 MG/DL (ref 0.51–0.95)
DEPRECATED HCO3 PLAS-SCNC: 26 MMOL/L (ref 22–29)
EGFRCR SERPLBLD CKD-EPI 2021: >90 ML/MIN/1.73M2
GLUCOSE SERPL-MCNC: 114 MG/DL (ref 70–99)
POTASSIUM SERPL-SCNC: 4.3 MMOL/L (ref 3.4–5.3)
SODIUM SERPL-SCNC: 140 MMOL/L (ref 136–145)

## 2023-09-06 PROCEDURE — 99204 OFFICE O/P NEW MOD 45 MIN: CPT | Performed by: INTERNAL MEDICINE

## 2023-09-06 PROCEDURE — 99000 SPECIMEN HANDLING OFFICE-LAB: CPT | Performed by: INTERNAL MEDICINE

## 2023-09-06 PROCEDURE — 82088 ASSAY OF ALDOSTERONE: CPT | Performed by: INTERNAL MEDICINE

## 2023-09-06 PROCEDURE — 80048 BASIC METABOLIC PNL TOTAL CA: CPT | Performed by: INTERNAL MEDICINE

## 2023-09-06 PROCEDURE — 83835 ASSAY OF METANEPHRINES: CPT | Mod: 90 | Performed by: INTERNAL MEDICINE

## 2023-09-06 PROCEDURE — 36415 COLL VENOUS BLD VENIPUNCTURE: CPT | Performed by: INTERNAL MEDICINE

## 2023-09-06 PROCEDURE — 84244 ASSAY OF RENIN: CPT | Mod: 90 | Performed by: INTERNAL MEDICINE

## 2023-09-06 RX ORDER — DEXAMETHASONE 1 MG
1 TABLET ORAL ONCE
Qty: 1 TABLET | Refills: 0 | Status: SHIPPED | OUTPATIENT
Start: 2023-09-06 | End: 2023-11-01

## 2023-09-06 ASSESSMENT — PAIN SCALES - GENERAL: PAINLEVEL: NO PAIN (0)

## 2023-09-06 NOTE — LETTER
9/6/2023         RE: Asmita Jerez  [address] [address]  [address]  [place] [address]        Dear Colleague,    Thank you for referring your patient, Asmita Jerez, to the [facility] ENDOCRINOLOGY. Please see a copy of my visit note below.    Endocrine Consult note    Attending Assessment/Plan :     Adrenal nodule (H)    Assessment:  -adrenal incidentaloma(s) warrant testing for pheo, cushings', hyperaldosteronism  -discussed repeat and/or confirmatory testing if indicated  -also discussed her chronic diarrhea - could be from metformin or at least exacerbated by it    Plan:  -check plasma metanephrines   -discussed high false positive rate on this test.     -if negative, rules out pheochromocytoma   -if positive, will then do confirmatory 24 hour urine catecholamines and metanephrines  -check bmp, renin, aldosterone   -may need to do confirmatory salt loading testing if positive, depending on exact results  -1mg DST (pt will schedule this at [facility])   -if cortisol comes back <1.8, rules out cushings   -if cortisol is above 1.8 and dex level insufficient, will repeat with 2mg   -if cortisol is above 1.8 with adequate dex level, will do confirmatory 24 hour urine cortisol  -for diarrhea, recommend she trial holding metformin for a few days to see if diarrhea resolves or improves.  After that she can restart metformin at 1 pill daily and slowly titrate up by 1 pill every few days until she gets to max tolerable dose that doesn't cause or exacerbate her diarrhea.     I have independently reviewed and interpreted labs, imaging as indicated.    RTC prn    Chief complaint:  Asmita is a 67 year old female seen in consultation for adrenal nodules      HISTORY OF PRESENT ILLNESS  Asmita is a 67F referred to endocrine for evaluation of adrenal incidentaloma's discovered incidentally on renal imaging. She has a hx of t2dm and chronic diarrhea since metformin dose increase in November.     No known  family history of endocrinopathies.     Does endorse anxiety but not necessarily episodic.  Has been her whole life.  Endorses diaphoresis.  No distinct panic attacks.     Has lost about 1lb a month after the diarrhea had started.     Has been trying to put weight on but diarrhea has made this difficult.   No major change in appetite or unintentional weight gain.     Has hypertension, denies known hx of hypokalemia.  Denies lower extremity edema.      Endocrine relevant labs and/or imaging are as follows:  CT from 7/29:  ADRENAL GLANDS: There are two left adrenal nodules measuring 1.7 and 1.2 cm, both with low Hounsfield units of 1 and 5, respectively and likely represent adrenal adenomas. 1.4 cm right adrenal nodule is also noted with low Hounsfield units of 9, also   likely represents adrenal adenoma.      REVIEW OF SYSTEMS    10 system ROS otherwise as per the HPI or negative    Past Medical History  Past Medical History:   Diagnosis Date     Cervical high risk HPV (human papillomavirus) test positive 03/31/2018     Diabetes (H)      Hypertension      Peptic ulcer, unspecified site, unspecified as acute or chronic, without mention of hemorrhage, perforation, or obstruction      Polycystic ovaries      Pure hypercholesterolemia      Tobacco use disorder      Unspecified multiple gestation, unspecified as to episode of care     Multiple gestation       Medications  Current Outpatient Medications   Medication Sig Dispense Refill     atorvastatin (LIPITOR) 40 MG tablet Take 1 tablet (40 mg) by mouth daily 90 tablet 3     blood glucose (ACCU-CHEK SIDDHARTHA PLUS) test strip Use to test blood sugar 3 times daily or as directed. 200 strip 3     dexAMETHasone (DECADRON) 1 MG tablet Take 1 tablet (1 mg) by mouth once for 1 dose Take at 11pm the night before coming in at 8am for labs 1 tablet 0     Dulaglutide (TRULICITY) 3 MG/0.5ML SOPN Inject 3 mg Subcutaneous every 7 days 6 mL 1     hydrOXYzine (ATARAX) 25 MG tablet Take 1  tablet (25 mg) by mouth every 6 hours as needed for itching or anxiety (with pain, moderate pain) 30 tablet 0     lisinopril (ZESTRIL) 40 MG tablet Take 1 tablet (40 mg) by mouth daily 90 tablet 3     metFORMIN (GLUCOPHAGE XR) 500 MG 24 hr tablet TAKE 2 TABLETS BY MOUTH 2 TIMES DAILY WITH MEALS 360 tablet 1     metoprolol succinate ER (TOPROL XL) 100 MG 24 hr tablet Take 1 tablet (100 mg) by mouth daily 90 tablet 3     oxyCODONE (ROXICODONE) 5 MG tablet Take 1-2 tablets (5-10 mg) by mouth every 4 hours as needed for moderate to severe pain 26 tablet 0     blood glucose monitoring (ACCU-CHEK MULTICLIX) lancets Use to test blood sugar 1 time daily or as directed. Accu chek, Ins will cover, match machine. 100 each 2       Allergies  Allergies   Allergen Reactions     Amoxicillin Rash         Family History  family history includes Dementia in her father; Unknown/Adopted in her father.    Social History  Social History     Tobacco Use     Smoking status: Every Day     Packs/day: 1.00     Years: 40.00     Pack years: 40.00     Types: Cigarettes     Smokeless tobacco: Never     Tobacco comments:     pt not interested in quit plan 10/12/2022   Vaping Use     Vaping Use: Never used   Substance Use Topics     Alcohol use: No     Drug use: No       Physical Exam  BP (!) 188/69   Pulse 79   Temp 97.6  F (36.4  C) (Tympanic)   Wt 52.2 kg (115 lb)   LMP 12/01/2009   SpO2 100%   BMI 19.74 kg/m    Body mass index is 19.74 kg/m .    Physical Exam    GENERAL :  In no apparent distress  SKIN: Normal color, normal temperature     EYES: EOMI, No scleral icterus  NECK: No visible masses  RESP: No respiratory distress, normal effort  CARDIO: regular rate  ABDOMEN: flat, nondistended       NEURO: awake, alert, responds appropriately to questions   EXTREMITIES: No clubbing, cyanosis or edema.    I spent a total of 45 minutes on the day of this new patient visit on chart review, lab review, review of imaging, coordinating care, exam,  and counseling of patient      Again, thank you for allowing me to participate in the care of your patient.        Sincerely,        Demetrius Moore MD

## 2023-09-06 NOTE — PATIENT INSTRUCTIONS
Labs today    Complete dexamethasone suppression test:  Take 1mg dexamethasone at 11pm-midnight  Come in the next morning (I.e. ~8 hours later) for lab draw

## 2023-09-06 NOTE — PROGRESS NOTES
Endocrine Consult note    Attending Assessment/Plan :     Adrenal nodule (H)    Assessment:  -adrenal incidentaloma(s) warrant testing for pheo, cushings', hyperaldosteronism  -discussed repeat and/or confirmatory testing if indicated  -also discussed her chronic diarrhea - could be from metformin or at least exacerbated by it    Plan:  -check plasma metanephrines   -discussed high false positive rate on this test.     -if negative, rules out pheochromocytoma   -if positive, will then do confirmatory 24 hour urine catecholamines and metanephrines  -check bmp, renin, aldosterone   -may need to do confirmatory salt loading testing if positive, depending on exact results  -1mg DST (pt will schedule this at [facility])   -if cortisol comes back <1.8, rules out cushings   -if cortisol is above 1.8 and dex level insufficient, will repeat with 2mg   -if cortisol is above 1.8 with adequate dex level, will do confirmatory 24 hour urine cortisol  -for diarrhea, recommend she trial holding metformin for a few days to see if diarrhea resolves or improves.  After that she can restart metformin at 1 pill daily and slowly titrate up by 1 pill every few days until she gets to max tolerable dose that doesn't cause or exacerbate her diarrhea.     I have independently reviewed and interpreted labs, imaging as indicated.    RTC prn    Chief complaint:  Asmita is a 67 year old female seen in consultation for adrenal nodules      HISTORY OF PRESENT ILLNESS  Asmita is a 67F referred to endocrine for evaluation of adrenal incidentaloma's discovered incidentally on renal imaging. She has a hx of t2dm and chronic diarrhea since metformin dose increase in November.     No known family history of endocrinopathies.     Does endorse anxiety but not necessarily episodic.  Has been her whole life.  Endorses diaphoresis.  No distinct panic attacks.     Has lost about 1lb a month after the diarrhea had started.     Has been trying to put weight  on but diarrhea has made this difficult.   No major change in appetite or unintentional weight gain.     Has hypertension, denies known hx of hypokalemia.  Denies lower extremity edema.      Endocrine relevant labs and/or imaging are as follows:  CT from 7/29:  ADRENAL GLANDS: There are two left adrenal nodules measuring 1.7 and 1.2 cm, both with low Hounsfield units of 1 and 5, respectively and likely represent adrenal adenomas. 1.4 cm right adrenal nodule is also noted with low Hounsfield units of 9, also   likely represents adrenal adenoma.      REVIEW OF SYSTEMS    10 system ROS otherwise as per the HPI or negative    Past Medical History  Past Medical History:   Diagnosis Date    Cervical high risk HPV (human papillomavirus) test positive 03/31/2018    Diabetes (H)     Hypertension     Peptic ulcer, unspecified site, unspecified as acute or chronic, without mention of hemorrhage, perforation, or obstruction     Polycystic ovaries     Pure hypercholesterolemia     Tobacco use disorder     Unspecified multiple gestation, unspecified as to episode of care     Multiple gestation       Medications  Current Outpatient Medications   Medication Sig Dispense Refill    atorvastatin (LIPITOR) 40 MG tablet Take 1 tablet (40 mg) by mouth daily 90 tablet 3    blood glucose (ACCU-CHEK SIDDHARTHA PLUS) test strip Use to test blood sugar 3 times daily or as directed. 200 strip 3    dexAMETHasone (DECADRON) 1 MG tablet Take 1 tablet (1 mg) by mouth once for 1 dose Take at 11pm the night before coming in at 8am for labs 1 tablet 0    Dulaglutide (TRULICITY) 3 MG/0.5ML SOPN Inject 3 mg Subcutaneous every 7 days 6 mL 1    hydrOXYzine (ATARAX) 25 MG tablet Take 1 tablet (25 mg) by mouth every 6 hours as needed for itching or anxiety (with pain, moderate pain) 30 tablet 0    lisinopril (ZESTRIL) 40 MG tablet Take 1 tablet (40 mg) by mouth daily 90 tablet 3    metFORMIN (GLUCOPHAGE XR) 500 MG 24 hr tablet TAKE 2 TABLETS BY MOUTH 2 TIMES  DAILY WITH MEALS 360 tablet 1    metoprolol succinate ER (TOPROL XL) 100 MG 24 hr tablet Take 1 tablet (100 mg) by mouth daily 90 tablet 3    oxyCODONE (ROXICODONE) 5 MG tablet Take 1-2 tablets (5-10 mg) by mouth every 4 hours as needed for moderate to severe pain 26 tablet 0    blood glucose monitoring (ACCU-CHEK MULTICLIX) lancets Use to test blood sugar 1 time daily or as directed. Accu chek, Ins will cover, match machine. 100 each 2       Allergies  Allergies   Allergen Reactions    Amoxicillin Rash         Family History  family history includes Dementia in her father; Unknown/Adopted in her father.    Social History  Social History     Tobacco Use    Smoking status: Every Day     Packs/day: 1.00     Years: 40.00     Pack years: 40.00     Types: Cigarettes    Smokeless tobacco: Never    Tobacco comments:     pt not interested in quit plan 10/12/2022   Vaping Use    Vaping Use: Never used   Substance Use Topics    Alcohol use: No    Drug use: No       Physical Exam  BP (!) 188/69   Pulse 79   Temp 97.6  F (36.4  C) (Tympanic)   Wt 52.2 kg (115 lb)   LMP 12/01/2009   SpO2 100%   BMI 19.74 kg/m    Body mass index is 19.74 kg/m .    Physical Exam    GENERAL :  In no apparent distress  SKIN: Normal color, normal temperature     EYES: EOMI, No scleral icterus  NECK: No visible masses  RESP: No respiratory distress, normal effort  CARDIO: regular rate  ABDOMEN: flat, nondistended       NEURO: awake, alert, responds appropriately to questions   EXTREMITIES: No clubbing, cyanosis or edema.    I spent a total of 45 minutes on the day of this new patient visit on chart review, lab review, review of imaging, coordinating care, exam, and counseling of patient

## 2023-09-06 NOTE — NURSING NOTE
Chief Complaint   Patient presents with    New Patient     Adrenal nodule (H) FIONA CID       Vitals:    09/06/23 1233 09/06/23 1235   BP: (!) 166/65 (!) 188/69   BP Location: Left arm    Patient Position: Sitting    Cuff Size: Adult Regular    Pulse: 79    Temp: 97.6  F (36.4  C)    TempSrc: Tympanic    SpO2: 100%    Weight: 52.2 kg (115 lb)      Wt Readings from Last 1 Encounters:   09/06/23 52.2 kg (115 lb)     Deisy MORRIS CMA .................9/6/2023

## 2023-09-08 LAB — ALDOST SERPL-MCNC: 6.3 NG/DL (ref 0–31)

## 2023-09-09 LAB — RENIN PLAS-CCNC: 0.2 NG/ML/HR

## 2023-09-10 LAB
ANNOTATION COMMENT IMP: NORMAL
METANEPHS SERPL-SCNC: 0.17 NMOL/L
NORMETANEPHRINE SERPL-SCNC: 0.62 NMOL/L

## 2023-09-13 ENCOUNTER — TELEPHONE (OUTPATIENT)
Dept: ENDOCRINOLOGY | Facility: CLINIC | Age: 68
End: 2023-09-13

## 2023-09-13 ENCOUNTER — TELEPHONE (OUTPATIENT)
Dept: FAMILY MEDICINE | Facility: CLINIC | Age: 68
End: 2023-09-13

## 2023-09-13 ENCOUNTER — LAB (OUTPATIENT)
Dept: LAB | Facility: CLINIC | Age: 68
End: 2023-09-13
Payer: COMMERCIAL

## 2023-09-13 DIAGNOSIS — E11.9 TYPE 2 DIABETES MELLITUS WITHOUT COMPLICATION (H): Primary | ICD-10-CM

## 2023-09-13 DIAGNOSIS — E27.9 ADRENAL NODULE (H): ICD-10-CM

## 2023-09-13 LAB
CHOLEST SERPL-MCNC: 115 MG/DL
CORTIS 8H P 1 MG DEX SERPL-MCNC: 3.8 UG/DL
HDLC SERPL-MCNC: 50 MG/DL
LDLC SERPL CALC-MCNC: 56 MG/DL
Lab: NORMAL
NONHDLC SERPL-MCNC: 65 MG/DL
PERFORMING LABORATORY: NORMAL
TEST NAME: NORMAL
TRIGL SERPL-MCNC: 47 MG/DL

## 2023-09-13 PROCEDURE — 80299 QUANTITATIVE ASSAY DRUG: CPT

## 2023-09-13 PROCEDURE — 36415 COLL VENOUS BLD VENIPUNCTURE: CPT

## 2023-09-13 PROCEDURE — 82533 TOTAL CORTISOL: CPT

## 2023-09-13 PROCEDURE — 80061 LIPID PANEL: CPT

## 2023-09-13 NOTE — TELEPHONE ENCOUNTER
Medication Question or Refill    Contacts         Type Contact Phone/Fax    09/13/2023 12:10 PM CDT Phone (Incoming) Asmita Jerez (Self)             What medication are you calling about (include dose and sig)? Metformin    Preferred Pharmacy:   [facility] – [place], [place] - [address]  [address]  [facility] [address]  Phone: [phone] Fax: [phone] Alternate Fax: [phone]      Controlled Substance Agreement on file:   CSA -- Patient Level:    CSA: None found at the patient level.       Who prescribed the medication?: Hong    Do you need a refill? Yes, No    When did you use the medication last? Pt used the last time sept 6th.  Pt has a appt tomorrow with Dr Moore, Endocrinologist. nd he is taking her off the Metformin due to medical symptoms that she had.  Pt wants Dr. Pitts to be aware of this    Okay to leave a detailed message?: Yes at Cell number on file:    Telephone Information:   Mobile [phone]   Lolita Goldberg  [facility]

## 2023-09-13 NOTE — TELEPHONE ENCOUNTER
Please let patient know I'm glad to hear the metformin decrease helped her symptoms - sounds like that was at least a huge part of the cause.     We didn't explicitly discuss diabetes treatment during her visit (she was seeing me for adrenal nodule).  I'm happy to discuss that with her if she'd like but there are quite a few options so I would need to see her for a followup to do so.      If she would like to schedule a visit for this, ok to use an available CHETNA slot.  Note that Monday PAOs are an option too but that day would be at [facility] in [place].

## 2023-09-13 NOTE — TELEPHONE ENCOUNTER
Called pt and reviewed message from provider. Pt made appt.  Myra BASHIR RN BSN PHN  [facility]ty Clinics

## 2023-09-13 NOTE — TELEPHONE ENCOUNTER
[facility] Call Center    Phone Message    May a detailed message be left on voicemail: yes     Reason for Call: Medication Question or concern regarding medication   Prescription Clarification  Name of Medication: MetFormin  Prescribing Provider: Dr Moore      What on the order needs clarification? Patient would like a call back to discuss medication changes away from Metformin for her diabetes treatment      Action Taken: Other: endo    Travel Screening: Not Applicable

## 2023-09-13 NOTE — TELEPHONE ENCOUNTER
Spoke with patient and she stated that when she stopped the metformin for a day and a half all of her symptoms subsided. She tried doing 1 tab in the am and side effects were ok, but does not want to take more than one tablet daily. Pt has been stated that she has not increased the tablet amounts. She would like to switch to something else if possible. Weight has dropped from 120 to 108. Please advise.  Myra BASHIR RN BSN PHN  Specialty Clinics

## 2023-09-14 ENCOUNTER — VIRTUAL VISIT (OUTPATIENT)
Dept: ENDOCRINOLOGY | Facility: CLINIC | Age: 68
End: 2023-09-14
Payer: COMMERCIAL

## 2023-09-14 DIAGNOSIS — E27.9 ADRENAL NODULE (H): Primary | ICD-10-CM

## 2023-09-14 DIAGNOSIS — E11.9 TYPE 2 DIABETES MELLITUS WITHOUT COMPLICATION, WITHOUT LONG-TERM CURRENT USE OF INSULIN (H): ICD-10-CM

## 2023-09-14 PROCEDURE — 99212 OFFICE O/P EST SF 10 MIN: CPT | Mod: 93 | Performed by: INTERNAL MEDICINE

## 2023-09-14 NOTE — PROGRESS NOTES
Asmita is a 67 year old who is being evaluated via a billable telephone visit.      What phone number would you like to be contacted at? [phone]   How would you like to obtain your AVS? Mail a copy    Distant Location (provider location):  On-site    Telephone call visit to discuss alternative diabetes medication.      At initial visit we were discussing adrenal incidentaloma and patient mentioned that she has chronic diarrhea.  I recommended she hold metformin to see if that was the issue and she reported that her diarrhea essentially resolved after stopping that.      We discussed alternative DM meds today.  Discussed SGLT-2i and GLP-1ags.  Given her GI issues with metformin, it makes more sense to start with SGLT-2i.      Will start Jardiance daily.  Continue to hold metformin.     Duration of phone call 11 min discussing diabetes, diabetes medication, and follwup plans.

## 2023-09-14 NOTE — LETTER
9/14/2023         RE: Asmita Jerez  [address]  [place] [address]        Dear Colleague,    Thank you for referring your patient, Asmita Jerez, to the [facility] ENDOCRINOLOGY. Please see a copy of my visit note below.    Asmita is a 67 year old who is being evaluated via a billable telephone visit.      What phone number would you like to be contacted at? [phone]   How would you like to obtain your AVS? Mail a copy    Distant Location (provider location):  On-site    Telephone call visit to discuss alternative diabetes medication.      At initial visit we were discussing adrenal incidentaloma and patient mentioned that she has chronic diarrhea.  I recommended she hold metformin to see if that was the issue and she reported that her diarrhea essentially resolved after stopping that.      We discussed alternative DM meds today.  Discussed SGLT-2i and GLP-1ags.  Given her GI issues with metformin, it makes more sense to start with SGLT-2i.      Will start Jardiance daily.  Continue to hold metformin.     Duration of phone call 11 min discussing diabetes, diabetes medication, and follwup plans.       Again, thank you for allowing me to participate in the care of your patient.        Sincerely,        Demetrius Moore MD

## 2023-09-17 LAB — MISCELLANEOUS TEST 1 (ARUP): NORMAL

## 2023-09-20 ENCOUNTER — PATIENT OUTREACH (OUTPATIENT)
Dept: FAMILY MEDICINE | Facility: CLINIC | Age: 68
End: 2023-09-20
Payer: COMMERCIAL

## 2023-09-20 DIAGNOSIS — R87.810 CERVICAL HIGH RISK HPV (HUMAN PAPILLOMAVIRUS) TEST POSITIVE: Primary | ICD-10-CM

## 2023-09-20 NOTE — LETTER
September 20, 2023      Asmita Jerez  [address]  [place] [address]        Dear MsGold,    This letter is to remind you that you are due for your follow-up Pap smear and Human Papillomavirus (HPV) test.    Please call [phone] to schedule your appointment at your earliest convenience.    If you have completed the appointment outside of the [facility] system, please have the records forwarded to our office. We will update your chart for your provider to review before your next annual wellness visit.     Thank you for choosing [facility]!      Sincerely,    Your [facility] Care Team

## 2023-09-26 ENCOUNTER — LAB (OUTPATIENT)
Dept: LAB | Facility: CLINIC | Age: 68
End: 2023-09-26
Payer: COMMERCIAL

## 2023-09-26 DIAGNOSIS — E27.9 ADRENAL NODULE (H): ICD-10-CM

## 2023-09-28 PROCEDURE — 82542 COL CHROMOTOGRAPHY QUAL/QUAN: CPT | Mod: 90

## 2023-09-28 PROCEDURE — 99000 SPECIMEN HANDLING OFFICE-LAB: CPT

## 2023-09-28 PROCEDURE — 82530 CORTISOL FREE: CPT | Mod: 90

## 2023-09-28 PROCEDURE — 81050 URINALYSIS VOLUME MEASURE: CPT

## 2023-09-28 PROCEDURE — 82570 ASSAY OF URINE CREATININE: CPT

## 2023-09-29 LAB
COLLECT DURATION TIME UR: 24 H
CREAT 24H UR-MRATE: 0.89 G/SPEC (ref 0.72–1.51)
CREAT UR-MCNC: 53.9 MG/DL
SPECIMEN VOL UR: 1650 ML

## 2023-10-03 LAB
COLLECT DURATION TIME UR: 24 H
CORTIS 24H UR-MRATE: 45 MCG/24 H (ref 3.5–45)
CORTISONE 24H UR-MRATE: 71 MCG/24 H (ref 17–129)
SPECIMEN VOL 24H UR: 1650 ML

## 2023-10-11 ENCOUNTER — TELEPHONE (OUTPATIENT)
Dept: PHARMACY | Facility: OTHER | Age: 68
End: 2023-10-11
Payer: COMMERCIAL

## 2023-10-11 NOTE — TELEPHONE ENCOUNTER
MTM referral: [facility] insurance    Outreach: telephone, attempt #1    Outcome:  Left message    Donna Worthy, PharmD, [facility]   Medication Management Pharmacist   [facility]  [phone]

## 2023-10-12 ENCOUNTER — TELEPHONE (OUTPATIENT)
Dept: PHARMACY | Facility: CLINIC | Age: 68
End: 2023-10-12
Payer: COMMERCIAL

## 2023-10-13 ENCOUNTER — TELEPHONE (OUTPATIENT)
Dept: ENDOCRINOLOGY | Facility: CLINIC | Age: 68
End: 2023-10-13

## 2023-10-13 DIAGNOSIS — E27.9 ADRENAL NODULE (H): Primary | ICD-10-CM

## 2023-10-13 NOTE — TELEPHONE ENCOUNTER
Last OV 9/14/23 for adrenal nodule.     Patient had labs done 9/28/23. Would like to have provider review results and advise for a plan.    Anamaria Preciado RN on 10/13/2023 at 2:45 PM

## 2023-10-13 NOTE — TELEPHONE ENCOUNTER
[facility] Call Center    Phone Message    May a detailed message be left on voicemail: yes     Reason for Call: Other: Please call Asmita and review lab results per pt request Thank you     Action Taken: Other: endo    Travel Screening: Not Applicable

## 2023-10-17 NOTE — PROGRESS NOTES
06/29/23 0500   Appointment Info   Signing clinician's name / credentials Carolina Corrigan PT   Total/Authorized Visits 12   Visits Used 2   Medical Diagnosis S/P total right hip arthroplasty   PT Tx Diagnosis S/P total right hip arthroplasty   Quick Adds Certification   Progress Note/Certification   Start of Care Date 06/27/23   Onset of illness/injury or Date of Surgery 06/22/23   Therapy Frequency 2x/week   Predicted Duration 6 weeks   Certification date from 06/27/23   Certification date to 08/08/23   Progress Note Due Date 08/01/23   GOALS   PT Goals 2;3;4   PT Goal 1   Goal Identifier 1   Goal Description Pt will be able to walk without an AD with minimal to no limp.   Target Date 07/18/23   PT Goal 2   Goal Identifier 2   Goal Description Pt will be able to navigate stairs reciprocally with one rail.   Target Date 08/01/23   PT Goal 3   Goal Identifier 3   Goal Description Pt will be able to squat with minimal difficulty   Rationale to maximize safety and independence with performance of ADLs and functional tasks   Target Date 08/08/23   PT Goal 4   Goal Identifier 4   Goal Description Pt will be independent with HEP for optimal functional recovery.   Target Date 08/08/23   Subjective Report   Subjective Report Pt reports doing all her exs 2x today. SLR is still difficult to do and needs assist.   Treatment Interventions (PT)   Interventions Therapeutic Procedure/Exercise   Therapeutic Procedure/Exercise   Therapeutic Procedures: strength, endurance, ROM, flexibillity minutes (59546) 28   PTRx Ther Proc 5 Knee Bends   PTRx Ther Proc 5 - Details x10   PTRx Ther Proc 6 Hip AROM Standing Extension   PTRx Ther Proc 6 - Details x10   PTRx Ther Proc 7 Hip AROM Standing Abduction   PTRx Ther Proc 7 - Details x10   PTRx Ther Proc 8 Standing Hip Flexion   PTRx Ther Proc 8 - Details x10   PTRx Ther Proc 9 Eccentric Toe Raise Gastroc   PTRx Ther Proc 9 - Details SL R only x10   PTRx Ther Proc 10 Sit to Stand   PTRx Ther  Proc 10 - Details x10   Education   Learner/Method Patient;Listening;Demonstration;Pictures/Video;No Barriers to Learning   Plan   Plan for next session step up, side step with TB, squats, lunges?   Total Session Time   Timed Code Treatment Minutes 28   Total Treatment Time (sum of timed and untimed services) 28         DISCHARGE  Reason for Discharge: Patient has failed to schedule further appointments.    Discharge Plan: Patient to continue home program.    Referring Provider:  No ref. provider found

## 2023-10-24 DIAGNOSIS — E11.9 TYPE 2 DIABETES MELLITUS WITHOUT COMPLICATION, WITHOUT LONG-TERM CURRENT USE OF INSULIN (H): ICD-10-CM

## 2023-10-25 RX ORDER — ATORVASTATIN CALCIUM 40 MG/1
40 TABLET, FILM COATED ORAL DAILY
Qty: 90 TABLET | Refills: 0 | Status: SHIPPED | OUTPATIENT
Start: 2023-10-25 | End: 2023-11-01

## 2023-11-01 ENCOUNTER — LAB (OUTPATIENT)
Dept: LAB | Facility: CLINIC | Age: 68
End: 2023-11-01
Payer: COMMERCIAL

## 2023-11-01 ENCOUNTER — OFFICE VISIT (OUTPATIENT)
Dept: FAMILY MEDICINE | Facility: CLINIC | Age: 68
End: 2023-11-01
Payer: COMMERCIAL

## 2023-11-01 VITALS
HEART RATE: 74 BPM | OXYGEN SATURATION: 99 % | HEIGHT: 66 IN | DIASTOLIC BLOOD PRESSURE: 72 MMHG | SYSTOLIC BLOOD PRESSURE: 108 MMHG | BODY MASS INDEX: 19.2 KG/M2 | WEIGHT: 119.5 LBS | RESPIRATION RATE: 16 BRPM | TEMPERATURE: 97.2 F

## 2023-11-01 DIAGNOSIS — Z12.4 CERVICAL CANCER SCREENING: ICD-10-CM

## 2023-11-01 DIAGNOSIS — I10 ESSENTIAL HYPERTENSION WITH GOAL BLOOD PRESSURE LESS THAN 140/90: ICD-10-CM

## 2023-11-01 DIAGNOSIS — G47.09 OTHER INSOMNIA: ICD-10-CM

## 2023-11-01 DIAGNOSIS — N28.89 RENAL MASS, LEFT: ICD-10-CM

## 2023-11-01 DIAGNOSIS — I10 HYPERTENSION, GOAL BELOW 140/90: ICD-10-CM

## 2023-11-01 DIAGNOSIS — Z00.00 ENCOUNTER FOR MEDICARE ANNUAL WELLNESS EXAM: Primary | ICD-10-CM

## 2023-11-01 DIAGNOSIS — Z96.641 S/P TOTAL RIGHT HIP ARTHROPLASTY: ICD-10-CM

## 2023-11-01 DIAGNOSIS — E11.9 TYPE 2 DIABETES MELLITUS WITHOUT COMPLICATION, WITHOUT LONG-TERM CURRENT USE OF INSULIN (H): ICD-10-CM

## 2023-11-01 DIAGNOSIS — M25.551 HIP PAIN, RIGHT: ICD-10-CM

## 2023-11-01 DIAGNOSIS — E27.9 ADRENAL NODULE (H): ICD-10-CM

## 2023-11-01 DIAGNOSIS — R87.810 CERVICAL HIGH RISK HPV (HUMAN PAPILLOMAVIRUS) TEST POSITIVE: ICD-10-CM

## 2023-11-01 DIAGNOSIS — Z72.0 TOBACCO USE: ICD-10-CM

## 2023-11-01 LAB
CREAT UR-MCNC: 30.7 MG/DL
HBA1C MFR BLD: 8.4 % (ref 0–5.6)
MICROALBUMIN UR-MCNC: <12 MG/L
MICROALBUMIN/CREAT UR: NORMAL MG/G{CREAT}

## 2023-11-01 PROCEDURE — 90480 ADMN SARSCOV2 VAC 1/ONLY CMP: CPT | Performed by: FAMILY MEDICINE

## 2023-11-01 PROCEDURE — G0439 PPPS, SUBSEQ VISIT: HCPCS | Performed by: FAMILY MEDICINE

## 2023-11-01 PROCEDURE — 82043 UR ALBUMIN QUANTITATIVE: CPT

## 2023-11-01 PROCEDURE — G0145 SCR C/V CYTO,THINLAYER,RESCR: HCPCS | Performed by: FAMILY MEDICINE

## 2023-11-01 PROCEDURE — 82570 ASSAY OF URINE CREATININE: CPT

## 2023-11-01 PROCEDURE — 36415 COLL VENOUS BLD VENIPUNCTURE: CPT

## 2023-11-01 PROCEDURE — 87624 HPV HI-RISK TYP POOLED RSLT: CPT | Mod: GZ | Performed by: FAMILY MEDICINE

## 2023-11-01 PROCEDURE — 99214 OFFICE O/P EST MOD 30 MIN: CPT | Mod: 25 | Performed by: FAMILY MEDICINE

## 2023-11-01 PROCEDURE — 83036 HEMOGLOBIN GLYCOSYLATED A1C: CPT

## 2023-11-01 PROCEDURE — 91320 SARSCV2 VAC 30MCG TRS-SUC IM: CPT | Performed by: FAMILY MEDICINE

## 2023-11-01 RX ORDER — LISINOPRIL 40 MG/1
40 TABLET ORAL DAILY
Qty: 90 TABLET | Refills: 3 | Status: SHIPPED | OUTPATIENT
Start: 2023-11-01

## 2023-11-01 RX ORDER — TRAZODONE HYDROCHLORIDE 50 MG/1
50 TABLET, FILM COATED ORAL AT BEDTIME
Qty: 90 TABLET | Refills: 1 | Status: SHIPPED | OUTPATIENT
Start: 2023-11-01 | End: 2024-06-27

## 2023-11-01 RX ORDER — METOPROLOL SUCCINATE 100 MG/1
100 TABLET, EXTENDED RELEASE ORAL DAILY
Qty: 90 TABLET | Refills: 3 | Status: SHIPPED | OUTPATIENT
Start: 2023-11-01

## 2023-11-01 RX ORDER — RESPIRATORY SYNCYTIAL VIRUS VACCINE 120MCG/0.5
0.5 KIT INTRAMUSCULAR ONCE
Qty: 1 EACH | Refills: 0 | Status: CANCELLED | OUTPATIENT
Start: 2023-11-01 | End: 2023-11-01

## 2023-11-01 RX ORDER — DULAGLUTIDE 3 MG/.5ML
3 INJECTION, SOLUTION SUBCUTANEOUS
Qty: 6 ML | Refills: 1 | Status: SHIPPED | OUTPATIENT
Start: 2023-11-01 | End: 2024-04-08

## 2023-11-01 RX ORDER — ATORVASTATIN CALCIUM 40 MG/1
40 TABLET, FILM COATED ORAL DAILY
Qty: 90 TABLET | Refills: 3 | Status: SHIPPED | OUTPATIENT
Start: 2023-11-01

## 2023-11-01 RX ORDER — GLIPIZIDE 2.5 MG/1
2.5 TABLET, EXTENDED RELEASE ORAL DAILY
Qty: 90 TABLET | Refills: 1 | Status: SHIPPED | OUTPATIENT
Start: 2023-11-01 | End: 2023-11-29 | Stop reason: SINTOL

## 2023-11-01 ASSESSMENT — ENCOUNTER SYMPTOMS
HEADACHES: 0
HEMATOCHEZIA: 0
WEAKNESS: 0
PALPITATIONS: 0
DIZZINESS: 0
HEARTBURN: 0
NERVOUS/ANXIOUS: 1
NAUSEA: 0
CONSTIPATION: 0
DIARRHEA: 0
COUGH: 0
FEVER: 0
CHILLS: 0
ABDOMINAL PAIN: 0
FREQUENCY: 0
HEMATURIA: 0
SHORTNESS OF BREATH: 0
SORE THROAT: 0
JOINT SWELLING: 0
MYALGIAS: 0
ARTHRALGIAS: 0
EYE PAIN: 0
DYSURIA: 0
PARESTHESIAS: 0
BREAST MASS: 0

## 2023-11-01 ASSESSMENT — PAIN SCALES - GENERAL: PAINLEVEL: NO PAIN (0)

## 2023-11-01 ASSESSMENT — ACTIVITIES OF DAILY LIVING (ADL): CURRENT_FUNCTION: NO ASSISTANCE NEEDED

## 2023-11-01 NOTE — PATIENT INSTRUCTIONS
Patient Education       Follow up with Dr. Williamson - regarding hip    I will let you know what I hear from Urology and endocrinology       Trazodone 50 mg at bedtime

## 2023-11-01 NOTE — PROGRESS NOTES
"SUBJECTIVE:   Asmita is a 67 year old who presents for Preventive Visit.      11/1/2023    12:46 PM   Additional Questions   Roomed by Emily chong CMA   Accompanied by Self       Are you in the first 12 months of your Medicare coverage?  No    Healthy Habits:     In general, how would you rate your overall health?  Good    Frequency of exercise:  1 day/week    Duration of exercise:  15-30 minutes    Do you usually eat at least 4 servings of fruit and vegetables a day, include whole grains    & fiber and avoid regularly eating high fat or \"junk\" foods?  No    Taking medications regularly:  Yes    Medication side effects:  None    Ability to successfully perform activities of daily living:  No assistance needed    Home Safety:  No safety concerns identified    Hearing Impairment:  Need to ask people to speak up or repeat themselves    In the past 6 months, have you been bothered by leaking of urine?  No    In general, how would you rate your overall mental or emotional health?  Good    Additional concerns today:  No          Have you ever done Advance Care Planning? (For example, a Health Directive, POLST, or a discussion with a medical provider or your loved ones about your wishes): No, advance care planning information given to patient to review.  Patient declined advance care planning discussion at this time.       Fall risk  Fallen 2 or more times in the past year?: No  Any fall with injury in the past year?: No    Cognitive Screening   1) Repeat 3 items (Leader, Season, Table)    2) Clock draw: NORMAL  3) 3 item recall: Recalls 3 objects  Results: 3 items recalled: COGNITIVE IMPAIRMENT LESS LIKELY    Mini-CogTM Copyright DENVER Neil. Licensed by the author for use in [facility]; reprinted with permission (angel@.[facility]). All rights reserved.      Do you have sleep apnea, excessive snoring or daytime drowsiness? : no    Reviewed and updated as needed this visit by clinical staff   Tobacco  Allergies  Meds    "           Reviewed and updated as needed this visit by Provider                 Social History     Tobacco Use    Smoking status: Every Day     Packs/day: 1.00     Years: 40.00     Additional pack years: 0.00     Total pack years: 40.00     Types: Cigarettes    Smokeless tobacco: Never    Tobacco comments:     pt not interested in quit plan 10/12/2022   Substance Use Topics    Alcohol use: No             11/1/2023    12:34 PM   Alcohol Use   Prescreen: >3 drinks/day or >7 drinks/week? Not Applicable     Do you have a current opioid prescription? No  Do you use any other controlled substances or medications that are not prescribed by a provider? None      - Tobacco  abuse. She is currently smoking cigarettes, 1ppd.        Current providers sharing in care for this patient include:   Patient Care Team:  Melissa Pitts MD as PCP - General (Family Practice)  Melissa Pitts MD as Assigned PCP  Mich Bui DO as Assigned Musculoskeletal Provider  Demetrius Moore MD as Physician (Endocrinology, Diabetes, and Metabolism)  Clayton Toro MD as Assigned Surgical Provider  Demetrius Moore MD as Assigned Endocrinology Provider  No Ref-Primary, Physician  Ned Boucher RICHARDSON as Pharmacist (Pharmacist)    The following health maintenance items are reviewed in Epic and correct as of today:  Health Maintenance   Topic Date Due    Pneumococcal Vaccine: 65+ Years (1 - PCV) Never done    ZOSTER IMMUNIZATION (1 of 2) Never done    HEPATITIS B IMMUNIZATION (1 of 3 - Risk 3-dose series) Never done    RSV VACCINE 60+ (1 - 1-dose 60+ series) Never done    EYE EXAM  02/10/2023    INFLUENZA VACCINE (1) 09/01/2023    COVID-19 Vaccine (5 - 2023-24 season) 09/01/2023    MICROALBUMIN  10/12/2023    DIABETIC FOOT EXAM  10/12/2023    ANNUAL REVIEW OF HM ORDERS  10/12/2023    PAP FOLLOW-UP  10/12/2023    HPV FOLLOW-UP  10/12/2023    A1C  12/20/2023    LUNG CANCER SCREENING  07/27/2024    BMP  09/06/2024    LIPID   09/13/2024    NICOTINE/TOBACCO CESSATION COUNSELING Q 1 YR  11/01/2024    MEDICARE ANNUAL WELLNESS VISIT  11/01/2024    FALL RISK ASSESSMENT  11/01/2024    MAMMO SCREENING  05/22/2025    DTAP/TDAP/TD IMMUNIZATION (2 - Td or Tdap) 11/18/2025    ADVANCE CARE PLANNING  10/12/2027    COLORECTAL CANCER SCREENING  08/23/2033    DEXA  12/14/2037    HEPATITIS C SCREENING  Completed    PHQ-2 (once per calendar year)  Completed    IPV IMMUNIZATION  Aged Out    HPV IMMUNIZATION  Aged Out    MENINGITIS IMMUNIZATION  Aged Out    RSV MONOCLONAL ANTIBODY  Aged Out     Lab work is in process  [facility]       5/10/2022     4:50 PM 5/22/2023     1:24 PM   Breast CA Risk Assessment (FHS-7)   Did any of your first-degree relatives have breast or ovarian cancer? No No   Did any of your relatives have bilateral breast cancer? No No   Did any man in your family have breast cancer? No No   Did any woman in your family have breast and ovarian cancer? No No   Did any woman in your family have breast cancer before age 50 y? No No   Do you have 2 or more relatives with breast and/or ovarian cancer? No No   Do you have 2 or more relatives with breast and/or bowel cancer? No No       Mammogram Screening: Recommended annual mammography  Pertinent mammograms are reviewed under the imaging tab.    Review of Systems   Constitutional:  Negative for chills and fever.   HENT:  Negative for congestion, ear pain, hearing loss and sore throat.    Eyes:  Negative for pain and visual disturbance.   Respiratory:  Negative for cough and shortness of breath.    Cardiovascular:  Negative for chest pain, palpitations and peripheral edema.   Gastrointestinal:  Negative for abdominal pain, constipation, diarrhea, heartburn, hematochezia and nausea.   Breasts:  Negative for tenderness, breast mass and discharge.   Genitourinary:  Negative for dysuria, frequency, genital sores, hematuria, pelvic pain, urgency, vaginal bleeding and vaginal  "discharge.   Musculoskeletal:  Negative for arthralgias, joint swelling and myalgias.   Skin:  Negative for rash.   Neurological:  Negative for dizziness, weakness, headaches and paresthesias.   Psychiatric/Behavioral:  Negative for mood changes. The patient is nervous/anxious.          OBJECTIVE:   /72   Pulse 74   Temp 97.2  F (36.2  C) (Tympanic)   Resp 16   Ht 1.676 m (5' 6\")   Wt 54.2 kg (119 lb 8 oz)   LMP 12/01/2009   SpO2 99%   BMI 19.29 kg/m   Estimated body mass index is 19.29 kg/m  as calculated from the following:    Height as of this encounter: 1.676 m (5' 6\").    Weight as of this encounter: 54.2 kg (119 lb 8 oz).  Physical Exam  GENERAL: healthy, alert and no distress  EYES: Eyes grossly normal to inspection, PERRL and conjunctivae and sclerae normal  HENT: ear canals and TM's normal, nose and mouth without ulcers or lesions  NECK: no adenopathy, no asymmetry, masses, or scars and thyroid normal to palpation  RESP: lungs clear to auscultation - no rales, rhonchi or wheezes  BREAST: normal without masses, tenderness or nipple discharge and no palpable axillary masses or adenopathy  CV: regular rate and rhythm, normal S1 S2, no S3 or S4, no murmur, click or rub, no peripheral edema and peripheral pulses strong  ABDOMEN: soft, nontender, no hepatosplenomegaly, no masses and bowel sounds normal   (female): normal female external genitalia, normal urethral meatus , vaginal mucosal atrophy, and pap obtained  MS: no gross musculoskeletal defects noted, no edema  SKIN: no suspicious lesions or rashes  NEURO: Normal strength and tone, mentation intact and speech normal  PSYCH: mentation appears normal, affect normal/bright    Results for orders placed or performed in visit on 11/01/23   Hemoglobin A1c     Status: Abnormal   Result Value Ref Range    Hemoglobin A1C 8.4 (H) 0.0 - 5.6 %         ASSESSMENT / PLAN:   (Z00.00) Encounter for Medicare annual wellness exam  (primary encounter " diagnosis)  Comment:    Plan:      (Z12.4) Cervical cancer screening  Comment:  due to high risk HPV  Plan: Pap Screen with HPV - recommended age 30 - 65         years             (Z72.0) Tobacco use  Comment:    Plan: TOBACCO CESSATION - FOR HEALTH MAINTENANCE             (N28.89) Renal mass, left  Comment: patient did NOT understand that there is a high probability that this is cancerous.  She was waiting on plans for next steps and didn't have a clear understanding of that.  It appears from Dr. Toro's note that next steps would depend on the evaluation of the adrenal mass.    Plan:      (E27.8) Adrenal nodule (H24)  Comment: awaiting results of the evaluation. I did message Dr. Moore in endocrinology today regarding this as well as messaged Dr. Toro regarding the renal mass  Plan:      (E11.9) Type 2 diabetes mellitus without complication, without long-term current use of insulin (H)  Comment: uncontrolled  Stopped metformin 3 months ago due to chronic diarrhea.  Diarrhea has resolved  Will start Glipizide 2.5 mg and recheck HgbA1c in three months  Plan: Dulaglutide (TRULICITY) 3 MG/0.5ML SOPN,         atorvastatin (LIPITOR) 40 MG tablet, Albumin         Random Urine Quantitative with Creat Ratio,         Hemoglobin A1c, glipiZIDE (GLUCOTROL XL) 2.5 MG        24 hr tablet, Hemoglobin A1c             (I10) Essential hypertension with goal blood pressure less than 140/90  Comment: well controlled  Plan: lisinopril (ZESTRIL) 40 MG tablet             (I10) Hypertension, goal below 140/90  Comment:    Plan: metoprolol succinate ER (TOPROL XL) 100 MG 24         hr tablet             (R87.810) Cervical high risk HPV (human papillomavirus) test positive  Comment:    Plan: pap done    (M25.551) Hip pain, right  Comment: s/p hip replacement, still having a lot of pain.   Recommend she see her surgeon again. She would also like to do PT  Plan: Physical Therapy Referral             (Z96.641) S/P total right hip  arthroplasty  Comment: as above.    Plan: Physical Therapy Referral             (G47.09) Other insomnia  Comment: waking every 2 hours overnight.  Sometimes able to go back to sleep, sometimes not.   Plan: traZODone (DESYREL) 50 MG tablet        Risks, benefits and alternatives discussed      Patient has been advised of split billing requirements and indicates understanding: Yes      COUNSELING:  Reviewed preventive health counseling, as reflected in patient instructions       Regular exercise       Healthy diet/nutrition        She reports that she has been smoking cigarettes. She has a 40.00 pack-year smoking history. She has never used smokeless tobacco.  Nicotine/Tobacco Cessation Plan:   Information offered: Patient not interested at this time      Appropriate preventive services were discussed with this patient, including applicable screening as appropriate for fall prevention, nutrition, physical activity, Tobacco-use cessation, weight loss and cognition.  Checklist reviewing preventive services available has been given to the patient.    Reviewed patients plan of care and provided an AVS. The Basic Care Plan (routine screening as documented in Health Maintenance) for Asmita meets the Care Plan requirement. This Care Plan has been established and reviewed with the Patient.        Melissa Pitts MD  [facility]    Identified Health Risks:

## 2023-11-07 DIAGNOSIS — Z96.641 STATUS POST RIGHT HIP REPLACEMENT: ICD-10-CM

## 2023-11-07 RX ORDER — HYDROXYZINE HYDROCHLORIDE 25 MG/1
25 TABLET, FILM COATED ORAL EVERY 6 HOURS PRN
Qty: 30 TABLET | Refills: 0 | Status: SHIPPED | OUTPATIENT
Start: 2023-11-07 | End: 2024-01-03

## 2023-11-07 NOTE — TELEPHONE ENCOUNTER
HydrOXYzine (ATARAX) 25 MG tablet - Pt states last refill was from surgeon, but usually it's from Dr. Pitts?  No need to call patient back, unless there are questions or problems.

## 2023-11-08 ENCOUNTER — PATIENT OUTREACH (OUTPATIENT)
Dept: FAMILY MEDICINE | Facility: CLINIC | Age: 68
End: 2023-11-08
Payer: COMMERCIAL

## 2023-11-08 ENCOUNTER — TRANSFERRED RECORDS (OUTPATIENT)
Dept: HEALTH INFORMATION MANAGEMENT | Facility: CLINIC | Age: 68
End: 2023-11-08

## 2023-11-08 ENCOUNTER — LAB (OUTPATIENT)
Dept: LAB | Facility: CLINIC | Age: 68
End: 2023-11-08
Payer: COMMERCIAL

## 2023-11-08 DIAGNOSIS — M25.559 HIP PAIN, UNSPECIFIED LATERALITY: Primary | ICD-10-CM

## 2023-11-08 LAB
CRP SERPL-MCNC: 3.06 MG/L
ERYTHROCYTE [SEDIMENTATION RATE] IN BLOOD BY WESTERGREN METHOD: 14 MM/HR (ref 0–30)
HUMAN PAPILLOMA VIRUS 16 DNA: NEGATIVE
HUMAN PAPILLOMA VIRUS 18 DNA: NEGATIVE
HUMAN PAPILLOMA VIRUS FINAL DIAGNOSIS: NORMAL
HUMAN PAPILLOMA VIRUS OTHER HR: NEGATIVE

## 2023-11-08 PROCEDURE — 86140 C-REACTIVE PROTEIN: CPT

## 2023-11-08 PROCEDURE — 85652 RBC SED RATE AUTOMATED: CPT

## 2023-11-08 PROCEDURE — 36415 COLL VENOUS BLD VENIPUNCTURE: CPT

## 2023-11-08 NOTE — LETTER
November 8, 2023      Asmita Jerez  [address] [address]        Dear ,    We are happy to inform you that your recent Pap smear and Human Papillomavirus (HPV) test results are normal and negative.    It is recommended that you have your next Pap smear and Human Papillomavirus (HPV) test in 1 year. You will also need to return to the clinic every year for an annual wellness visit.    If you have additional questions regarding this result, please contact our office and we will be happy to assist you.      Sincerely,    Your [facility] Care Team

## 2023-11-10 ENCOUNTER — DOCUMENTATION ONLY (OUTPATIENT)
Dept: OTHER | Facility: CLINIC | Age: 68
End: 2023-11-10
Payer: COMMERCIAL

## 2023-11-10 ENCOUNTER — TELEPHONE (OUTPATIENT)
Dept: PHARMACY | Facility: OTHER | Age: 68
End: 2023-11-10
Payer: COMMERCIAL

## 2023-11-10 NOTE — TELEPHONE ENCOUNTER
Called patient to schedule MTM appointment - they were referred by their insurance plan for a medication review.     Referral outreach attempt #2       Outcome: spoke with patient and she stated that she just went through her medications with her primary care provider earlier this week and does not feel it's necessary to go through the medications with a pharmacist. She would like to opt out of this service.     Gaviota Perez, Pharm.D.  Medication Therapy Management Pharmacist  [facility]

## 2023-11-11 PROCEDURE — 99000 SPECIMEN HANDLING OFFICE-LAB: CPT

## 2023-11-11 PROCEDURE — 82533 TOTAL CORTISOL: CPT | Mod: 90

## 2023-11-13 LAB — GLUCOSE BLDC GLUCOMTR-MCNC: 104 MG/DL (ref 70–99)

## 2023-11-13 PROCEDURE — 82533 TOTAL CORTISOL: CPT | Mod: 90

## 2023-11-13 PROCEDURE — 99000 SPECIMEN HANDLING OFFICE-LAB: CPT

## 2023-11-15 ENCOUNTER — LAB (OUTPATIENT)
Dept: LAB | Facility: CLINIC | Age: 68
End: 2023-11-15
Payer: COMMERCIAL

## 2023-11-15 DIAGNOSIS — E27.9 ADRENAL NODULE (H): Primary | ICD-10-CM

## 2023-11-15 PROCEDURE — 82533 TOTAL CORTISOL: CPT | Mod: 90

## 2023-11-15 PROCEDURE — 99000 SPECIMEN HANDLING OFFICE-LAB: CPT

## 2023-11-21 LAB
CORTIS SAL-MCNC: 0.04 UG/DL
CORTIS SAL-MCNC: 0.11 UG/DL

## 2023-11-24 ENCOUNTER — THERAPY VISIT (OUTPATIENT)
Dept: PHYSICAL THERAPY | Facility: CLINIC | Age: 68
End: 2023-11-24
Attending: FAMILY MEDICINE
Payer: COMMERCIAL

## 2023-11-24 DIAGNOSIS — M25.551 HIP PAIN, RIGHT: ICD-10-CM

## 2023-11-24 DIAGNOSIS — Z96.641 S/P TOTAL RIGHT HIP ARTHROPLASTY: ICD-10-CM

## 2023-11-24 PROCEDURE — 97140 MANUAL THERAPY 1/> REGIONS: CPT | Mod: GP | Performed by: PHYSICAL THERAPIST

## 2023-11-24 PROCEDURE — 97161 PT EVAL LOW COMPLEX 20 MIN: CPT | Mod: GP | Performed by: PHYSICAL THERAPIST

## 2023-11-24 PROCEDURE — 97110 THERAPEUTIC EXERCISES: CPT | Mod: GP | Performed by: PHYSICAL THERAPIST

## 2023-11-24 NOTE — PROGRESS NOTES
PHYSICAL THERAPY EVALUATION  Type of Visit: Evaluation    See electronic medical record for Abuse and Falls Screening details.    Subjective       Presenting condition or subjective complaint:   Patient continues to have R hip pain with lifting leg up and out to the side with anterior hip pain. No pain with standing and lifting leg back or out to the side.   Date of onset: 23    Relevant medical history:   DM II  Dates & types of surgery:  R BOO on 23,     Prior diagnostic imaging/testing results:     x-ray & MRI - unremarkable  Prior therapy history for the same diagnosis, illness or injury:        Living Environment  Social support:   lives alone  Type of home:   1 level  Stairs to enter the home:       4 with railing  Ramp:     Stairs inside the home:       no  Help at home:  none  Equipment owned:       Employment:      Hobbies/Interests:      Patient goals for therapy:  move right leg without the pain    Pain assessment: Pain present  Location: front of the hip pain/Ratin/10     Objective   HIP EVALUATION  PAIN: Pain Level at Rest: 1/10  Pain Level with Use: 9/10  Pain Location: hip, pain can go down towards knee on anterior thigh  Pain Quality: Aching, Sharp, and Throbbing  Pain Frequency: intermittent  Pain is Worst: daytime  Pain is Exacerbated By: lifting leg, abduction  Pain is Relieved By: NSAIDs and rest  INTEGUMENTARY (edema, incisions): WNL  POSTURE: WNL  GAIT:   Weightbearing Status: WBAT  Assistive Device(s): None  Gait Deviations:  walking is okay  BALANCE/PROPRIOCEPTION: WNL  WEIGHTBEARING ALIGNMENT: WNL  NON-WEIGHTBEARING ALIGNMENT: WNL   ROM: PROM WNL  Pain with seated AROM into hip flexion/abduction combination  PELVIC/SI SCREEN: WNL  STRENGTH: WNL  LE FLEXIBILITY: WNL  SPECIAL TESTS:    Left Right   KARTIK Negative  Negative    FADIR/Labrum/CLARENCE Negative  Negative    Femoral Nerve Negative  Negative    Martinez's Negative  Negative    Piriformis Negative  Negative    Quadrant Testing  Negative  Negative    SLR Negative  Negative    Slump Negative  Negative    Stork with Extension Negative  Negative    Jermaine Negative  Negative           FUNCTIONAL TESTS: Double Leg Squat: Anterior knee translation, Knee valgus, Hip internal rotation, and Improper use of glutes/hips  PALPATION:  TTP TFL, g;shaye med  JOINT MOBILITY: WNL, some limitations secondary to recent R BOO    Assessment & Plan   CLINICAL IMPRESSIONS  Medical Diagnosis: Hip pain, right    Treatment Diagnosis: right hip pain   Impression/Assessment: Patient is a 67 year old female with R hip pain complaints.  The following significant findings have been identified: Pain, Decreased ROM/flexibility, Decreased joint mobility, Decreased strength, Impaired balance, Impaired gait, Impaired muscle performance, and Decreased activity tolerance. These impairments interfere with their ability to perform self care tasks, work tasks, recreational activities, driving , household mobility, and community mobility as compared to previous level of function.     Clinical Decision Making (Complexity):  Clinical Presentation: Stable/Uncomplicated  Clinical Presentation Rationale: based on medical and personal factors listed in PT evaluation  Clinical Decision Making (Complexity): Low complexity    PLAN OF CARE  Treatment Interventions:  Interventions: Gait Training, Manual Therapy, Neuromuscular Re-education, Therapeutic Activity, Therapeutic Exercise, Self-Care/Home Management    Long Term Goals     PT Goal 1  Goal Identifier: 1.  Goal Description: Patient will tolerate sitting motion without compensation or pain.  Rationale: to maximize safety and independence with performance of ADLs and functional tasks  Target Date: 01/02/24  PT Goal 2  Goal Identifier: 2.  Goal Description: Patient will report ability to get in and out of car without pain in order to tolerate ind tasks.  Target Date: 01/23/24  PT Goal 3  Goal Identifier: 3.  Goal Description: Patient will be  ind with HEP in order to reduce risk for re-injury.  Target Date: 02/06/24      Frequency of Treatment: 1x/week  Duration of Treatment: 10 weeks    Education Assessment:        Risks and benefits of evaluation/treatment have been explained.   Patient/Family/caregiver agrees with Plan of Care.     Evaluation Time:     PT Eval, Low Complexity Minutes (68550): 20     Signing Clinician: JUAN LUIS [facility]                                                                                   OUTPATIENT PHYSICAL THERAPY      PLAN OF TREATMENT FOR OUTPATIENT REHABILITATION   Patient's Last Name, First Name, Asmita Dorado YOB: 1955   Provider's Name   [facility]   Medical Record No.  [id number]     Onset Date: 11/01/23  Start of Care Date: 11/24/23     Medical Diagnosis:  Hip pain, right      PT Treatment Diagnosis:  right hip pain Plan of Treatment  Frequency/Duration: 1x/week/ 10 weeks    Certification date from 11/24/23 to 02/02/24         See note for plan of treatment details and functional goals     Talia Ballard PT                         I CERTIFY THE NEED FOR THESE SERVICES FURNISHED UNDER        THIS PLAN OF TREATMENT AND WHILE UNDER MY CARE     (Physician attestation of this document indicates review and certification of the therapy plan).              Referring Provider:  Melissa Pitts    Initial Assessment  See Epic Evaluation- Start of Care Date: 11/24/23

## 2023-11-25 LAB — CORTIS SAL-MCNC: 0.06 UG/DL

## 2023-11-28 ENCOUNTER — NURSE TRIAGE (OUTPATIENT)
Dept: FAMILY MEDICINE | Facility: CLINIC | Age: 68
End: 2023-11-28
Payer: COMMERCIAL

## 2023-11-28 DIAGNOSIS — E11.9 TYPE 2 DIABETES MELLITUS WITHOUT COMPLICATION, WITHOUT LONG-TERM CURRENT USE OF INSULIN (H): Primary | ICD-10-CM

## 2023-11-28 NOTE — TELEPHONE ENCOUNTER
Symptoms    Describe your symptoms: Pt states that her 2 new meds Trazodone (Took once) and the Glipizide (Took 2 weeks) are not working for pt.  Pt is constipated and wants to know what she can do for that and what meds she could now take for diabetes and for trouble sleeping?    [facility]  Please call patient and advise.      Any pain: No    How long have you been having symptoms: Ongoing      Have you been seen for this:  No    Appointment offered?: No    Triage offered?: Yes:     Home remedies tried:     Preferred Pharmacy:   [place], [place] - [address] [facility] [address]  Phone: [phone] Fax: [phone] Alternate Fax: [phone]    Okay to leave a detailed message?: Yes at Cell number on file:    Telephone Information:   Mobile [phone]

## 2023-11-28 NOTE — TELEPHONE ENCOUNTER
Also See message below.  pt stopped taking glipizide due to constipation she is still having mild abd discomfort and reports 7 days without bowel movement. Poor appetite, no fever. Pt does not want to make an appt she wants md to advise. She was irritable. Has never had issues with constipation in the past and has not tried any OTC remedies.  Kateryna Sandoval RN on 11/28/2023 at 1:23 PM         Reason for Disposition   Last bowel movement (BM) > 4 days ago   Abdomen is more swollen than usual    Additional Information   Negative: Abdomen pain is main symptom and male   Negative: Abdomen pain is main symptom and female   Negative: Rectal bleeding or blood in stool is main symptom   Negative: Vomiting bile (green color)   Negative: Patient sounds very sick or weak to the triager   Negative: Constant abdominal pain lasting > 2 hours   Negative: Vomiting and abdomen looks much more swollen than usual   Negative: Rectal pain or fullness from fecal impaction (rectum full of stool) and NOT better after SITZ bath, suppository or enema    Protocols used: Constipation-A-OH

## 2023-11-29 NOTE — TELEPHONE ENCOUNTER
It's likely the trazodone causing some constipation.      Miralax/Polyethylene glycol is what I would recommend:    1) Disimpaction phase is necessary before initiation of maintenance therapy: use 3 measuring cups of Mirlax daily for 3 days (better during weekend) to achieve clean out of the colon from hard stool matter. Please assure adequate hydration during this phase. The expectation is to have significant amount of large hard stool mixed with loose stool during this phase with only loose stool toward the third day of treatment.   2) Maintenance Therapy: after disimpaction phase, start with 1 measuring cup (17 gm) of Mirlax daily. If Asmita starts having very watery/loose stools, decrease the dose slowly by 1/2 measuring cup (8.5gm) of Mirlax.      Melissa Pitts M.D.

## 2023-11-29 NOTE — TELEPHONE ENCOUNTER
Called patient and informed her of Dr. Pitts's recommendations.    Patient expressed understanding and confirmed she is going to pick Jardiance up from the pharmacy tomorrow.    Provided patient education about Jardiance about how it pulls sugar from the Kidneys to control blood glucose levels and patient may notice increased urination because of this medication. Advised patient to drink plenty of water and to report any issues or side effects. Patient advised to call if she has urinary issues or concern for UTI or yeast infection.    Patient instructed to continue to monitor daily glucose levels.    Patient instructed to schedule a lab appointment in 3 months for hemoglobin A1C; future order is in chart.     Patient expressed understanding.     Patient reports she feels comfortable with this plan and is not needing a visit from Dr. Pitts at this time.     Alexsandra Dutta RN on 11/29/2023 at 4:07 PM

## 2023-11-29 NOTE — TELEPHONE ENCOUNTER
Called patient and informed her of Dr. Pitts's instructions and recommendations.    Patient report she stopped the glipizide weeks ago.    Patient had not started the Jardiance that Dr. Moore prescribed because she wanted Dr. Pitts to review the medication before she started anything.    Patient demanding to talk Dr. Pitts about all of these issues.   RN informed patient that Dr. Pitts can address her questions, but Dr. Pitts usually has the nurses call the patient and provide Dr. Pitts's response.     Routing to Dr. Pitts to please evaluate if the Jardiance needs to be started at a different dose.    Alexsandra Dutta RN on 11/29/2023 at 1:06 PM

## 2023-11-29 NOTE — TELEPHONE ENCOUNTER
I'd be happy to talk to her tomorrow for a telephone or video visit.     I didn't realize she had not been taking the Jardiance 10 mg - she should start that medication at the 10 mg dose and recheck HgbA1c in 3 months.      Melissa Pitts M.D.

## 2023-11-29 NOTE — TELEPHONE ENCOUNTER
Contacted patient to discuss disimpaction as below  States yesterday she started Senakot with relief, had bowel movements, no longer having abdominal discomfort  Questions what she can now take for sleep, only took Trazodone 1 night but did not help her sleep  Was on Glipizide for two weeks and feels that is when the constipation started from that  Is not taking Glipizide  Am BGL's are running 145-160 sometimes up to 170's  Questions what she should do now for this?    [facility]      [phone] okay to leave detailed msg    Maryanne Corrigan RN on 11/29/2023 at 8:29 AM

## 2023-11-29 NOTE — TELEPHONE ENCOUNTER
Two things:     She should get back into see Dr. Toro from Urology or talk to his care team to follow up on the kidney mass.  Sounds like Dr. Moore (endocrinologist) feels the adrenal mass was benign and found incidentally.      2.  For diabetes: stop the Glipizide.    Increase Jardiance (empagliflozin) to 25 mg daily.  New prescription sent to pharmacy.    Melissa Pitts M.D.

## 2023-11-30 RX ORDER — DEXAMETHASONE 4 MG/1
TABLET ORAL
Qty: 2 TABLET | Refills: 0 | Status: SHIPPED | OUTPATIENT
Start: 2023-11-30 | End: 2024-01-24

## 2023-11-30 NOTE — TELEPHONE ENCOUNTER
Called pt to discuss salivary cortisol labs.  Results inconclusive with 1 above normal, 2 normal. Will do high dose 8mg DST with ACTH.  Will need labs the morning before and after dexamethasone to compare levels.

## 2023-12-01 ENCOUNTER — THERAPY VISIT (OUTPATIENT)
Dept: PHYSICAL THERAPY | Facility: CLINIC | Age: 68
End: 2023-12-01
Attending: FAMILY MEDICINE
Payer: COMMERCIAL

## 2023-12-01 DIAGNOSIS — M25.551 HIP PAIN, RIGHT: Primary | ICD-10-CM

## 2023-12-01 PROCEDURE — 97110 THERAPEUTIC EXERCISES: CPT | Mod: GP | Performed by: PHYSICAL THERAPIST

## 2023-12-06 ENCOUNTER — LAB (OUTPATIENT)
Dept: LAB | Facility: CLINIC | Age: 68
End: 2023-12-06
Payer: COMMERCIAL

## 2023-12-06 ENCOUNTER — TRANSFERRED RECORDS (OUTPATIENT)
Dept: HEALTH INFORMATION MANAGEMENT | Facility: CLINIC | Age: 68
End: 2023-12-06

## 2023-12-06 DIAGNOSIS — E27.9 ADRENAL NODULE (H): ICD-10-CM

## 2023-12-06 LAB — CORTIS SERPL-MCNC: 37.9 UG/DL

## 2023-12-06 PROCEDURE — 36415 COLL VENOUS BLD VENIPUNCTURE: CPT

## 2023-12-06 PROCEDURE — 82024 ASSAY OF ACTH: CPT

## 2023-12-06 PROCEDURE — 82627 DEHYDROEPIANDROSTERONE: CPT

## 2023-12-06 PROCEDURE — 82533 TOTAL CORTISOL: CPT

## 2023-12-07 ENCOUNTER — LAB (OUTPATIENT)
Dept: LAB | Facility: CLINIC | Age: 68
End: 2023-12-07
Payer: COMMERCIAL

## 2023-12-07 DIAGNOSIS — E27.9 ADRENAL NODULE (H): ICD-10-CM

## 2023-12-07 LAB
ACTH PLAS-MCNC: 46 PG/ML
CORTIS SERPL-MCNC: 4.9 UG/DL
DHEA-S SERPL-MCNC: 38 UG/DL (ref 35–430)
Lab: NORMAL
PERFORMING LABORATORY: NORMAL
TEST NAME: NORMAL

## 2023-12-07 PROCEDURE — 36415 COLL VENOUS BLD VENIPUNCTURE: CPT

## 2023-12-07 PROCEDURE — 80299 QUANTITATIVE ASSAY DRUG: CPT

## 2023-12-07 PROCEDURE — 82024 ASSAY OF ACTH: CPT

## 2023-12-07 PROCEDURE — 82533 TOTAL CORTISOL: CPT

## 2023-12-07 PROCEDURE — 82627 DEHYDROEPIANDROSTERONE: CPT

## 2023-12-08 LAB
ACTH PLAS-MCNC: <10 PG/ML
DHEA-S SERPL-MCNC: 28 UG/DL (ref 35–430)

## 2023-12-11 LAB — MISCELLANEOUS TEST 1 (ARUP): NORMAL

## 2024-01-02 DIAGNOSIS — Z96.641 STATUS POST RIGHT HIP REPLACEMENT: ICD-10-CM

## 2024-01-03 RX ORDER — HYDROXYZINE HYDROCHLORIDE 25 MG/1
TABLET, FILM COATED ORAL
Qty: 30 TABLET | Refills: 0 | Status: SHIPPED | OUTPATIENT
Start: 2024-01-03 | End: 2024-02-23

## 2024-01-11 ENCOUNTER — OFFICE VISIT (OUTPATIENT)
Dept: UROLOGY | Facility: CLINIC | Age: 69
End: 2024-01-11
Payer: COMMERCIAL

## 2024-01-11 DIAGNOSIS — N28.89 RENAL MASS: Primary | ICD-10-CM

## 2024-01-11 PROCEDURE — 99214 OFFICE O/P EST MOD 30 MIN: CPT | Performed by: UROLOGY

## 2024-01-11 NOTE — PROGRESS NOTES
CC : left renal mass     HPI: 69 yo female with incidentally diagnosed approximately 2.7 cm left renal mass found on a workup for abdominal pain.  Patient has also had 6-8 months of diarrhea, weight loss and emesis of unclear etiology.  Also noted to have several adrenal nodules.  Patient was referred to endocrinology for work-up of the nodules.  Metformin was also stopped by endocrine with resolution of the diarrhea.  Patient is awaiting the results of the endocrinology testing.          OBJECTIVE: CT 7/29/23 2.4 cm right renal mass centrally located. 1.7 cm and 1.2 cm left adrenal nodules, 1.4 cmm right adrenal nodule  CT chest/abd/pel 7/27/23: stable small pulmonary nodules        ASSESSMENT AND PLAN: Over half of today's 35-minute visit was spent reviewing the chart, results and counseling the patient regarding her renal mass.  We will repeat a CT of the abd/pel to check for interval change of the renal mass.  If it has grown, we will discuss removal of the kidney.  If stable, may continue with observation.  We will follow up with endocrine about the patient's adrenal nodule work-up and see the patient back in a few weeks to go over the CT results.

## 2024-01-17 ENCOUNTER — TELEPHONE (OUTPATIENT)
Dept: ENDOCRINOLOGY | Facility: CLINIC | Age: 69
End: 2024-01-17
Payer: COMMERCIAL

## 2024-01-17 NOTE — TELEPHONE ENCOUNTER
Called patient to discuss high dose DST, unable to reach pt so left VM.     8mg dex suppression failed to suppress as well with appropriately suppressed ACTH and DHEA-s.  Will message  and see if she can get patient on her schedule as it appears she may have autonomous nodule function.

## 2024-01-18 ENCOUNTER — TELEPHONE (OUTPATIENT)
Dept: ENDOCRINOLOGY | Facility: CLINIC | Age: 69
End: 2024-01-18

## 2024-01-18 NOTE — TELEPHONE ENCOUNTER
Appointment at 11 am on 1/24/24 put on hold.    Called and left a message for the patient to call back.    Nadege Zuniga CMA  Adult Endocrinology  [facility] [place]

## 2024-01-18 NOTE — TELEPHONE ENCOUNTER
----- Message from Karley Burch RN sent at 1/18/2024  9:09 AM CST -----  Regarding: FW: adrenal nodule/cushings patient  Virtual is ok per Dr. Avalos.    ----- Message -----  From: Nani Avalos MD  Sent: 1/17/2024   5:29 PM CST  To: Karley Burch RN; #  Subject: FW: adrenal nodule/cushings patient              Please schedule this patient in the return apt from 1/24.   ----- Message -----  From: Demetirus Moore MD  Sent: 1/17/2024   5:11 PM CST  To: Nani Avalos MD  Subject: adrenal nodule/cushings patient                  Rigoberto Cardoza,    This is the patient of mine I've discussed a few times with you with the bilateral adrenal nodules and a renal mass concerning for RCC. Latest CT reported 1.7 and 1.2 cm left nodules with 1 and 5 HU; and a 1.4 cm r nodule with low HU at 9.     As a reminder she failed 1mg DST with cortisol  24 hour urine cortisol was at the very upper limit of normal at 45mcg/day.  Salivary cortisol was indeterminate.     I did an 8mg DST as you suggested and she had a pre dose cortisol of 37.9 and it did drop to 4.9 but wasn't suppressed (dex level was high).  ACTH went from 46 to undetectable with 8mg dexamethasone.     I'm not sure what to make of these labs.  Is there a way you can get her in your schedule to evaluate her and determine what next steps would be?    Let me know what you think.   -Demetrius

## 2024-01-18 NOTE — TELEPHONE ENCOUNTER
Patient called back today and would not drive to any of the locations. Writer offered potential virtual appointment patient stated her phone cannot do any type of video call. Patient is requesting a call back.

## 2024-01-19 NOTE — TELEPHONE ENCOUNTER
Patient called back to schedule 01/24 at 11am but it's not available anymore, is there another day that would work? Patient states any day and time would work for her. Please call and advise.

## 2024-01-19 NOTE — TELEPHONE ENCOUNTER
AR  Would you please try to schedule her in any of the CHETNA I have available. I do not think it's an urgency, so please find a spot which works with her.     Writer called and spoke with the patient. Patient states she does not drive much and will not go to [place] or [place]. She states she does not have any family to bring her to either of these locations and that [place] is the furthest she will go.    Writer asked the patient if she could do a video visit. Patient states she does not have a computer and has a very old phone and does not think she can do a video. Patient feels that this is just a second opinion and states why does this doctor need to see me. Writer explained that is policy that all new patients need to be seen on video or in person.     Patient states she will see what she can do to figure out a video and will call the clinic back to schedule. She did not schedule at this time as she does not think she can do a video.     Writer explained that I would forward this message to Dr. Avalos and  Dr. Moore to let them both know.    Nadege Zuniga, [facility]  Adult Endocrinology  [facility] [place]

## 2024-01-20 ENCOUNTER — HOSPITAL ENCOUNTER (OUTPATIENT)
Dept: CT IMAGING | Facility: CLINIC | Age: 69
Discharge: HOME | End: 2024-01-20
Attending: UROLOGY | Admitting: UROLOGY
Payer: COMMERCIAL

## 2024-01-20 DIAGNOSIS — N28.89 RENAL MASS: ICD-10-CM

## 2024-01-20 LAB
CREAT BLD-MCNC: 0.9 MG/DL (ref 0.5–1)
EGFRCR SERPLBLD CKD-EPI 2021: >60 ML/MIN/1.73M2

## 2024-01-20 PROCEDURE — 250N000011 HC RX IP 250 OP 636: Performed by: UROLOGY

## 2024-01-20 PROCEDURE — 82565 ASSAY OF CREATININE: CPT

## 2024-01-20 PROCEDURE — 74177 CT ABD & PELVIS W/CONTRAST: CPT

## 2024-01-20 PROCEDURE — 250N000009 HC RX 250: Performed by: UROLOGY

## 2024-01-20 RX ORDER — IOPAMIDOL 755 MG/ML
58 INJECTION, SOLUTION INTRAVASCULAR ONCE
Status: COMPLETED | OUTPATIENT
Start: 2024-01-20 | End: 2024-01-20

## 2024-01-20 RX ADMIN — SODIUM CHLORIDE 55 ML: 9 INJECTION, SOLUTION INTRAVENOUS at 12:40

## 2024-01-20 RX ADMIN — IOPAMIDOL 58 ML: 755 INJECTION, SOLUTION INTRAVENOUS at 12:40

## 2024-01-22 ENCOUNTER — TELEPHONE (OUTPATIENT)
Dept: ENDOCRINOLOGY | Facility: CLINIC | Age: 69
End: 2024-01-22
Payer: COMMERCIAL

## 2024-01-22 NOTE — TELEPHONE ENCOUNTER
Writer is unable to schedule in the time slots offered.  This is a provider and location (currently [place]) change, which per protocols, the patient needs to be scheduled as NEW, not return.  Please re-evaluate and provide updated availability    Complex Repair And Flap Additional Text (Will Appearing After The Standard Complex Repair Text): The complex repair was not sufficient to completely close the primary defect. The remaining additional defect was repaired with the flap mentioned below.

## 2024-01-22 NOTE — TELEPHONE ENCOUNTER
1/22 Called and left voicemail, provided phone number [phone] to schedule an appointment with Dr. Avalos on 1/24 at 730 in person or video or  2/20 at 3 pm video visit.     [facility]   Orthopedics, Podiatry, Sports Medicine, Ent ,Eye , Audiology, Adult Endocrine & Diabetes, Nutrition & Medication Therapy Management Specialties   [facility]

## 2024-01-22 NOTE — TELEPHONE ENCOUNTER
Outcome for 01/22/24 1:07 PM: Reached patient but requests call back at 10am on 1/23/24 to give readings over  the phone.  Manda Dhillon MA

## 2024-01-22 NOTE — TELEPHONE ENCOUNTER
Spoke with pt and scheduled per TE with Dr. Avalos was ok to schedule a 30 min new appt   Please schedule her in my next available CHETNA. No urgency. 1 hr preferred but it can be 30 minutes, too.       Time was on hold for 1/24 at 7:30 am Lizbet Renee on 1/22/2024 at 11:36 AM

## 2024-01-22 NOTE — TELEPHONE ENCOUNTER
Please offer 1/24/24 at 7:30am in person or by video. This appointment has been put on hold.    Or we can do 2/20/24 @ 3pm by video.    Nadege Zuniga CMA  Adult Endocrinology  [facility] [place]

## 2024-01-22 NOTE — PROGRESS NOTES
Outcome for 01/22/24 1:06 PM: Reached patient but requests call back at 10am on 1/23/24 to give meter readings over the phone.  Manda Dhillon MA  Outcome for 01/23/24 10:07 AM: Data obtained via phone and located below  Airam Norton LPN       Patient is showing 3/5 [place] met. A1c not in range and Current tobacco use   Manda Dhillon MA    ALL READINGS ARE FASTING     1/23  9:24 AM-144    1/19  9:09 AM- 182    1/16  8:33 AM- 130    1/09  4:04 AM- 124    1/08  8:15 AM- 141    1/07  8:54 AM- 131    1/06  7:30 AM- 110    1/05  7:51 AM- 135    1/04  9:22 AM- 146    1/03  9:18 AM- 145    1/02  7:16 AM- 113    1/01  8:27 AM- 130    12/30  7:28 AM- 125    12/29  6:40 AM- 93

## 2024-01-23 PROBLEM — D35.02 BILATERAL ADRENAL ADENOMAS: Status: ACTIVE | Noted: 2024-01-23

## 2024-01-23 PROBLEM — D35.01 BILATERAL ADRENAL ADENOMAS: Status: ACTIVE | Noted: 2024-01-23

## 2024-01-23 NOTE — TELEPHONE ENCOUNTER
Spoke with patient in regards to obtaining blood sugar readings for appointment 1/24/24. Data obtained via phone and located below.    ALL READINGS ARE FASTING     1/23  9:24 AM-144    1/19  9:09 AM- 182    1/16  8:33 AM- 130    1/09  4:04 AM- 124    1/08  8:15 AM- 141    1/07  8:54 AM- 131    1/06  7:30 AM- 110    1/05  7:51 AM- 135    1/04  9:22 AM- 146    1/03  9:18 AM- 145    1/02  7:16 AM- 113    1/01  8:27 AM- 130    12/30  7:28 AM- 125    12/29  6:40 AM- 93

## 2024-01-24 ENCOUNTER — VIRTUAL VISIT (OUTPATIENT)
Dept: ENDOCRINOLOGY | Facility: CLINIC | Age: 69
End: 2024-01-24
Payer: COMMERCIAL

## 2024-01-24 VITALS — WEIGHT: 115 LBS | BODY MASS INDEX: 18.56 KG/M2

## 2024-01-24 DIAGNOSIS — E83.52 HYPERCALCEMIA: ICD-10-CM

## 2024-01-24 DIAGNOSIS — R79.89 HIGH SERUM CORTISOL: ICD-10-CM

## 2024-01-24 DIAGNOSIS — D35.02 BILATERAL ADRENAL ADENOMAS: Primary | ICD-10-CM

## 2024-01-24 DIAGNOSIS — E11.9 TYPE 2 DIABETES MELLITUS WITHOUT COMPLICATION, WITHOUT LONG-TERM CURRENT USE OF INSULIN (H): ICD-10-CM

## 2024-01-24 DIAGNOSIS — D35.01 BILATERAL ADRENAL ADENOMAS: Primary | ICD-10-CM

## 2024-01-24 PROCEDURE — 99215 OFFICE O/P EST HI 40 MIN: CPT | Mod: 95 | Performed by: INTERNAL MEDICINE

## 2024-01-24 RX ORDER — DEXAMETHASONE 4 MG/1
TABLET ORAL
Qty: 2 TABLET | Refills: 0 | Status: SHIPPED | OUTPATIENT
Start: 2024-01-24 | End: 2024-04-30

## 2024-01-24 NOTE — NURSING NOTE
Is the patient currently in the state of [place]? YES    Visit mode:VIDEO    If the visit is dropped, the patient can be reconnected by: VIDEO VISIT: Text to cell phone:   Telephone Information:   Mobile [phone]       Will anyone else be joining the visit? NO  (If patient encounters technical issues they should call [phone])    How would you like to obtain your AVS? MyChart    Are changes needed to the allergy or medication list? No    Reason for visit: Consult and Video Visit    Alicia DIETRICH

## 2024-01-24 NOTE — PATIENT INSTRUCTIONS
Please perform a 24-hour urine collection at your convenience.  When you bring the urine collection to the lab, also have blood drawn.  You do not need to fast.  Once this is completed, schedule the 8 mg dexamethasone suppression test with the cortisol level checked at 8 in the morning.    Instructions for Collection of a 24 hour Urine Specimen    1. You should collect every drop of urine during each 24-hour period. It does not matter how much or little urine is passed each time, as long as every drop is collected.    2. Begin the urine collection in the morning after you wake up, after you have emptied your bladder for the first time.    3. Urinate (empty the bladder) for the first time and flush it down the toilet. Note the exact time (eg, 6:15 AM). You will begin the urine collection at this time.    4. Collect every drop of urine during the day and night. Store the urine container in the refrigerator in between collections.    5. If you need to have a bowel movement, any urine passed with the bowel movement should be collected. Do not include feces with the urine collection.     6. Finish by collecting the first urine passed the next morning, adding it to the collection bottle. This should be within ten minutes before or after the time of the first morning void on the first day (which was flushed). In this example, you would try to void between 6:05 and 6:25 AM on the second day.    7. The urine container should be kept cool or refrigerated until it is returned to the lab.  A lab with your name, medical record number and date of birth must be attached to the urine container.  A lab request form completed by your clinic/doctor with your name medical number number, date of birth and test requested must accompany the urine specimen.  Record on the lab request form the date and time (am/pm) of the start of the urine collection and date and time of the end of the urine collection.    5. If you have any questions,  feel free to call the laboratory at [phone] or the clinic at .

## 2024-01-24 NOTE — LETTER
1/24/2024         RE: Asmita Jerez  [address]  [place] [id number]        Dear Colleague,    Thank you for referring your patient, Asmita Jerez, to the [facility]. Please see a copy of my visit note below.    Outcome for 01/22/24 1:06 PM: Reached patient but requests call back at 10am on 1/23/24 to give meter readings over the phone.  Manda Dhillon MA  Outcome for 01/23/24 10:07 AM: Data obtained via phone and located below  Airam Norton LPN       Patient is showing 3/5 MNCM met. A1c not in range and Current tobacco use   Manda Dhillon MA    ALL READINGS ARE FASTING     1/23  9:24 AM-144    1/19  9:09 AM- 182    1/16  8:33 AM- 130    1/09  4:04 AM- 124    1/08  8:15 AM- 141    1/07  8:54 AM- 131    1/06  7:30 AM- 110    1/05  7:51 AM- 135    1/04  9:22 AM- 146    1/03  9:18 AM- 145    1/02  7:16 AM- 113    1/01  8:27 AM- 130    12/30  7:28 AM- 125    12/29  6:40 AM- 93      Video-Visit Details    Type of service:  Video Visit    Joined the call at 1/24/2024, 7:32:56 am.  Left the call at 1/24/2024, 8:09:07 am  Originating Location (pt. Location): Home  Distant Location (provider location): Off-site    Mode of Communication:  Video Conference via the[employer]    =======================================================  Assessment     Asmita Jerez is a 68 year old female seen for evaluation of bilateral adrenal macroadenomas, associated with possible mild autonomous cortisol secretion.    1.  Bilateral adrenal macroadenomas  Prior evaluations have revealed an incompletely suppressed cortisol with 1 mg and 8 mg dexamethasone, normal salivary and urinary cortisol, a non-suppressed ACTH of 46 and a lowish ACTH of 38.   Clinically, the patient has a history of PCOS, type 2 diabetes.  She is not obese or overweight and does not endorse signs or symptoms suggestive of hypercortisolism.  In the absence of definite clinical features or comorbidities suggestive of  hypercortisolism, I think clinical monitoring is reasonable.  I have counseled the patient on signs and symptoms of Cushing syndrome, the interpretation of the tests done.   Recommendations:  Perform a 8 mg dexamethasone suppression test and have the cortisol level checked by liquid chromatography  Recheck 24-hour urinary cortisol, since the patient is going to collect the urine for evaluation of hypercalcemia    2.  Hypercalcemia  Not of new onset and associated with kidney stones, incidentally discovered on imaging last year.  The patient has no history of bone fractures or osteoporosis.  There is a high suspicion she might have renal cancer.  Currently, she is not medicated with any calcium or vitamin D supplements.  Recommendations:  Check 24-hour urinary calcium  Check calcium, phosphorus, albumin, PTH RP  Follow-up to be determined based on lab results    3. Type 2 diabetes   BG numbers OK. We'll review in detail at a f/up visit.      Orders Placed This Encounter   Procedures     [facility]; [facility]; Cortisol, Mass Spectrometry, Serum (Laboratory Miscellaneous Order)     [facility]; [id number]; Dexamethasone, Serum or Plasma by LC-MS/MS (Laboratory Miscellaneous Order)     Basic metabolic panel     Phosphorus     Albumin level     Parathyroid Hormone Intact     25 Hydroxyvitamin D2 and D3     Cortisol free urine     Creatinine timed urine     Calcium timed urine     PTH Related Peptide Test     =======================================================  The patient is seen in consultation at the request of Dr. Moore.     History of Present Illness:  Asmita Jerez is a 68 year old female with a past medical history significant for type 2 diabetes, hypertension, polycystic ovaries and hypercholesterolemia, who was initially seen in our clinic by Dr. Moore, in September 2023.    1.  Adrenal incidentaloma  It was incidentally discovered on the abdominal CT from July 2023, which revealed 2 left  adrenal nodules, measuring 1.7 and 1.2 cm and a 1.4 cm right adrenal nodule.  All nodules had low Hounsfield unit density on the noncontrast CT <10 so they were considered to represent adrenal adenomas.  Also, the nodules were stable compared with the prior imaging from 2018. I reviewed the CT images. The CT was also positive for kidney stones and a 2.6 cm left renal heterogeneous lesion for which the patient follows up with urology. The lesion is suspicious for renal cell carcinoma and it remained stable on the follow-up abdominal CT from January 2024.    She was diagnosed with hypertension in her 60s. Her blood pressure is currently medicated with lisinopril and metoprolol.  There is no prior history of hypokalemia.    Pertinent labs reviewed:   9/6/2023 aldosterone 6.3, renin 0.2, normal plasma metanephrines  9/13/2023 1 mg dexamethasone suppression test: cortisol level of 3.8; dexamethasone 352.6 (140-295)    9/28/2020 24-hour urinary cortisol 45 (3.5-45) 24-hour urinary cortisone normal.  The urinary volume was 1.65 L, with urine creatinine 0.9 g  11/11, 11/13, 11/15/23 salivary cortisol normal twice and borderline normal on 11/13/2023 at 0.112 (less than 0.1)  12/6/2023 ACTH 46, cortisol 37.9, DHEA-S 38 () at 7:52 AM  12/7/2023 8 mg dexamethasone suppression test: ACTH undetectable, cortisol 4.9, DHEA-S 28, dexamethasone >3000 at 7:42 AM    2.  Type 2 diabetes, diagnosed around 2000, around age 45.  Average A1c over the years has been around 8%.   Most recent A1c was 8.4, on 11/1/2023.  She was only treated with metformin until staring Trulicity in 2023. Metformin caused diarrhea and it was replaced with Jardiance, in September 2023.    Currently antidiabetic medications:  Trulicity, 3 mg weekly (started in February 2023)  Jardiance, 10 mg daily (started in November 2023)    Diabetes complications:  Last eye exam: 2 years ago  Most recent urine microalbumin negative in November 2023. On 40 mg lisinopril  daily.   No numbness or tingling sensation in her feet   Last lipid panel from 9/13/2023 LDL 56, HDL 50, triglycerides 47.  On 40 mg atorvastatin daily.     3.  Hypercalcemia  Most recent calcium level was 11, on 9/6/2023.  The patient has had intermittent episodes of mild hypercalcemia or high normal calcium levels dating back to 2008.  The highest calcium level was in September 2023.  On prior lab work, the albumin was normal, as well as alkaline phosphatase.     Calcium supplements: none   Vitamin D supplements: no   OVC - none besides ASA. No MVI   Dairies: 2 glasses of milk daily and one serving of cheese - a couple of times a week. Doesn't like yogurt.   Kidneys stones: pt not aware of this diagnosis   Menopause: in the 50s. She still has hot flashes and occasional night sweats. Overall, the hot flashes have been stable.   The DEXA scan from December 2022 revealed normal bone mineral density at all sites. There is no prior h/o fractures.     Past Medical History   Past Medical History:   Diagnosis Date     Cervical high risk HPV (human papillomavirus) test positive 03/31/2018     Diabetes (H)      Hypertension      Peptic ulcer, unspecified site, unspecified as acute or chronic, without mention of hemorrhage, perforation, or obstruction      Polycystic ovaries      Pure hypercholesterolemia      Tobacco use disorder      Unspecified multiple gestation, unspecified as to episode of care     Multiple gestation   Diverticulosis  Kidney stones  Bilateral adrenal adenomas  Lung nodules  Collagenous colitis  Anxiety     Past Surgical History   Past Surgical History:   Procedure Laterality Date     APPENDECTOMY       ARTHROPLASTY HIP Right 6/22/2023    Procedure: Total Hip Arthroplasty Right;  Surgeon: Albin Williamson MD;  Location: [facility]     BIOPSY BREAST       COLONOSCOPY N/A 12/11/2015    Procedure: COLONOSCOPY;  Surgeon: Tino Pryor MD;  Location: [facility]     COLONOSCOPY N/A 8/23/2023     Procedure: Colonoscopy with biopsies;  Surgeon: Celio Larry DO;  Location: [facility]     ENDOSCOPIC RELEASE CARPAL TUNNEL Right 11/01/2021    Procedure: Endoscopic carpal tunnel release;  Surgeon: Ac Lemus MD;  Location: [facility]     ENDOSCOPIC RELEASE CARPAL TUNNEL Left 11/15/2021    Procedure: ENDOSCOPIC Carpal Tunnel Release, LEFT, removal suture right wrist;  Surgeon: Ac Lemus MD;  Location: [facility]     LUMPECTOMY BREAST       RELEASE TRIGGER FINGER  05/23/2014    Procedure: RELEASE TRIGGER FINGER;  Surgeon: Ag Hoffman MD;  Location: [facility]     SURGICAL HISTORY OF -   01/01/1970    appendectomy     SURGICAL HISTORY OF -   01/01/1970    pilonidal cyst and sinuses       Current Medications    Current Outpatient Medications:      atorvastatin (LIPITOR) 40 MG tablet, Take 1 tablet (40 mg) by mouth daily, Disp: 90 tablet, Rfl: 3     blood glucose (ACCU-CHEK SIDDHARTHA PLUS) test strip, Use to test blood sugar 3 times daily or as directed., Disp: 200 strip, Rfl: 3     blood glucose monitoring (ACCU-CHEK MULTICLIX) lancets, Use to test blood sugar 1 time daily or as directed. Accu chek, Ins will cover, match machine., Disp: 100 each, Rfl: 2     dexAMETHasone (DECADRON) 4 MG tablet, Get labs 2 morning in a row.  Take 8mg dexamethasone late night in between lab draws, Disp: 2 tablet, Rfl: 0     Dulaglutide (TRULICITY) 3 MG/0.5ML SOPN, Inject 3 mg Subcutaneous every 7 days, Disp: 6 mL, Rfl: 1     empagliflozin (JARDIANCE) 10 MG TABS tablet, Take 1 tablet (10 mg) by mouth daily, Disp: 90 tablet, Rfl: 1     hydrOXYzine HCl (ATARAX) 25 MG tablet, TAKE ONE TABLET BY MOUTH EVERY 6 HOURS AS NEEDED FOR ITCHING OR ANXIETY (WITH PAIN, MODERATE PAIN), Disp: 30 tablet, Rfl: 0     lisinopril (ZESTRIL) 40 MG tablet, Take 1 tablet (40 mg) by mouth daily, Disp: 90 tablet, Rfl: 3     metoprolol succinate ER (TOPROL XL) 100 MG 24 hr tablet, Take 1 tablet (100 mg) by mouth daily, Disp: 90 tablet, Rfl:  3     traZODone (DESYREL) 50 MG tablet, Take 1 tablet (50 mg) by mouth at bedtime, Disp: 90 tablet, Rfl: 1    Current Facility-Administered Medications:      triamcinolone (KENALOG-40) injection 40 mg, 40 mg, , , Mich Bui, , 40 mg at 23 1215      Family History   Problem Relation Age of Onset     Unknown/Adopted Father      Dementia Father    Sister - breast cancer; had thyroidectomy. Mom  of cancer. Sister HTN. No f/h of diabetes. One sister is overweight, no other family members.     Social History  . She has 1 child. Smokes since age 18, mostly 1 PPD, now 3/4 PPD. She denies drinking alcohol or using illicit drugs. Occupation: retired.      Review of Systems   Systemic:             weight stable   Eye:                      No eye symptoms   Nidhi-Laryngeal:     No nidhi-laryngeal symptoms, no dysphagia, no hoarseness, no cough     Breast:                  No breast symptoms  Cardiovascular:    No cardiovascular symptoms, no CP or palpitations   Pulmonary:           No pulmonary symptoms, no SOB or cough    Gastrointestinal:   mild constipation  Genitourinary:       urinates more often since taking Jardiance, but not during the night    Endocrine:            as above   Neurological:        No headaches, no tremor, no numbness or tingling sensation, no dizziness; arms are not as strong as they used to be - not generalized weakness; no new stretch marks, no acne, no easy bruising; some facial hair - mild, on the upper lip and chin - present for >10-15 years and stable    Musculoskeletal:  hip joint pain   Skin:                     No skin symptoms, no dry skin, no hair falling out   Psychological:     No psychological symptoms                 Vital Signs     Previous Weights:    Wt Readings from Last 10 Encounters:   24 52.2 kg (115 lb)   23 54.2 kg (119 lb 8 oz)   23 52.2 kg (115 lb)   23 49.9 kg (110 lb)   23 50.8 kg (112 lb)   23 51 kg (112 lb 6.4  "oz)   06/22/23 51.9 kg (114 lb 8 oz)   06/20/23 51.9 kg (114 lb 8 oz)   04/04/23 55.8 kg (123 lb)   03/28/23 56.2 kg (123 lb 12.8 oz)        LMP 12/01/2009     Estimated body mass index is 19.29 kg/m  as calculated from the following:    Height as of 11/1/23: 1.676 m (5' 6\").    Weight as of 11/1/23: 54.2 kg (119 lb 8 oz).    Physical Exam  General Appearance: alert, no distress noted   Eyes: grossly normal to inspection, conjunctivae and sclerae normal, no lid lag or stare   Respiratory: no audible wheeze, cough, or visible cyanosis.  No visible retractions or increased work of breathing.  Able to speak fully in complete sentences.  Neurological: Cranial nerves grossly intact, mentation intact and speech normal; no tremor of the hands   Skin: no lesions on exposed skin   Psychological: mentation appears normal, affect normal, judgement and insight intact, normal speech and appearance well-groomed     Lab Results  I reviewed prior lab results documented in Epic.  TSH   Date Value Ref Range Status   10/12/2022 1.64 0.30 - 4.20 uIU/mL Final   04/15/2019 1.95 0.40 - 4.00 mU/L Final   02/15/2017 1.36 0.40 - 4.00 mU/L Final   04/29/2015 1.35 0.40 - 4.00 mU/L Final   07/30/2013 1.50 0.4 - 5.0 mU/L Final   04/12/2011 1.07 0.4 - 5.0 mU/L Final     45 minutes spent on the date of the encounter doing chart review, history and exam, documentation and further activities as noted above.                            Again, thank you for allowing me to participate in the care of your patient.        Sincerely,        Nani Avalos MD  "

## 2024-01-24 NOTE — TELEPHONE ENCOUNTER
1/24 Appointment is currently scheduled.     [facility]   Orthopedics, Podiatry, Sports Medicine, Ent ,Eye , Audiology, Adult Endocrine & Diabetes, Nutrition & Medication Therapy Management Specialties   [facility]

## 2024-01-24 NOTE — PROGRESS NOTES
Video-Visit Details    Type of service:  Video Visit    Joined the call at 1/24/2024, 7:32:56 am.  Left the call at 1/24/2024, 8:09:07 am  Originating Location (pt. Location): Home  Distant Location (provider location): Off-site    Mode of Communication:  Video Conference via Servio    =======================================================  Assessment     Asmita Jerez is a 68 year old female seen for evaluation of bilateral adrenal macroadenomas, associated with possible mild autonomous cortisol secretion.    1.  Bilateral adrenal macroadenomas  Prior evaluations have revealed an incompletely suppressed cortisol with 1 mg and 8 mg dexamethasone, normal salivary and urinary cortisol, a non-suppressed ACTH of 46 and a lowish ACTH of 38.   Clinically, the patient has a history of PCOS, type 2 diabetes.  She is not obese or overweight and does not endorse signs or symptoms suggestive of hypercortisolism.  In the absence of definite clinical features or comorbidities suggestive of hypercortisolism, I think clinical monitoring is reasonable.  I have counseled the patient on signs and symptoms of Cushing syndrome, the interpretation of the tests done.   Recommendations:  Perform a 8 mg dexamethasone suppression test and have the cortisol level checked by liquid chromatography  Recheck 24-hour urinary cortisol, since the patient is going to collect the urine for evaluation of hypercalcemia    2.  Hypercalcemia  Not of new onset and associated with kidney stones, incidentally discovered on imaging last year.  The patient has no history of bone fractures or osteoporosis.  There is a high suspicion she might have renal cancer.  Currently, she is not medicated with any calcium or vitamin D supplements.  Recommendations:  Check 24-hour urinary calcium  Check calcium, phosphorus, albumin, PTH RP  Follow-up to be determined based on lab results    3. Type 2 diabetes   BG numbers OK. We'll review in detail at a f/up  visit.      Orders Placed This Encounter   Procedures    [facility] [employer]; CINP; Cortisol, Mass Spectrometry, Serum (Laboratory Miscellaneous Order)    [employer]; [id number]; Dexamethasone, Serum or Plasma by LC-MS/MS (Laboratory Miscellaneous Order)    Basic metabolic panel    Phosphorus    Albumin level    Parathyroid Hormone Intact    25 Hydroxyvitamin D2 and D3    Cortisol free urine    Creatinine timed urine    Calcium timed urine    PTH Related Peptide Test     =======================================================  The patient is seen in consultation at the request of Dr. Moore.     History of Present Illness:  Asmita Jerez is a 68 year old female with a past medical history significant for type 2 diabetes, hypertension, polycystic ovaries and hypercholesterolemia, who was initially seen in our clinic by Dr. Moore, in September 2023.    1.  Adrenal incidentaloma  It was incidentally discovered on the abdominal CT from July 2023, which revealed 2 left adrenal nodules, measuring 1.7 and 1.2 cm and a 1.4 cm right adrenal nodule.  All nodules had low Hounsfield unit density on the noncontrast CT <10 so they were considered to represent adrenal adenomas.  Also, the nodules were stable compared with the prior imaging from 2018. I reviewed the CT images. The CT was also positive for kidney stones and a 2.6 cm left renal heterogeneous lesion for which the patient follows up with urology. The lesion is suspicious for renal cell carcinoma and it remained stable on the follow-up abdominal CT from January 2024.    She was diagnosed with hypertension in her 60s. Her blood pressure is currently medicated with lisinopril and metoprolol.  There is no prior history of hypokalemia.    Pertinent labs reviewed:   9/6/2023 aldosterone 6.3, renin 0.2, normal plasma metanephrines  9/13/2023 1 mg dexamethasone suppression test: cortisol level of 3.8; dexamethasone 352.6 (140-295)    9/28/2020 24-hour urinary  cortisol 45 (3.5-45) 24-hour urinary cortisone normal.  The urinary volume was 1.65 L, with urine creatinine 0.9 g  11/11, 11/13, 11/15/23 salivary cortisol normal twice and borderline normal on 11/13/2023 at 0.112 (less than 0.1)  12/6/2023 ACTH 46, cortisol 37.9, DHEA-S 38 () at 7:52 AM  12/7/2023 8 mg dexamethasone suppression test: ACTH undetectable, cortisol 4.9, DHEA-S 28, dexamethasone >3000 at 7:42 AM    2.  Type 2 diabetes, diagnosed around 2000, around age 45.  Average A1c over the years has been around 8%.   Most recent A1c was 8.4, on 11/1/2023.  She was only treated with metformin until staring Trulicity in 2023. Metformin caused diarrhea and it was replaced with Jardiance, in September 2023.    Currently antidiabetic medications:  Trulicity, 3 mg weekly (started in February 2023)  Jardiance, 10 mg daily (started in November 2023)    Diabetes complications:  Last eye exam: 2 years ago  Most recent urine microalbumin negative in November 2023. On 40 mg lisinopril daily.   No numbness or tingling sensation in her feet   Last lipid panel from 9/13/2023 LDL 56, HDL 50, triglycerides 47.  On 40 mg atorvastatin daily.     3.  Hypercalcemia  Most recent calcium level was 11, on 9/6/2023.  The patient has had intermittent episodes of mild hypercalcemia or high normal calcium levels dating back to 2008.  The highest calcium level was in September 2023.  On prior lab work, the albumin was normal, as well as alkaline phosphatase.     Calcium supplements: none   Vitamin D supplements: no   OVC - none besides ASA. No MVI   Dairies: 2 glasses of milk daily and one serving of cheese - a couple of times a week. Doesn't like yogurt.   Kidneys stones: pt not aware of this diagnosis   Menopause: in the 50s. She still has hot flashes and occasional night sweats. Overall, the hot flashes have been stable.   The DEXA scan from December 2022 revealed normal bone mineral density at all sites. There is no prior h/o  fractures.     Past Medical History   Past Medical History:   Diagnosis Date    Cervical high risk HPV (human papillomavirus) test positive 03/31/2018    Diabetes (H)     Hypertension     Peptic ulcer, unspecified site, unspecified as acute or chronic, without mention of hemorrhage, perforation, or obstruction     Polycystic ovaries     Pure hypercholesterolemia     Tobacco use disorder     Unspecified multiple gestation, unspecified as to episode of care     Multiple gestation   Diverticulosis  Kidney stones  Bilateral adrenal adenomas  Lung nodules  Collagenous colitis  Anxiety     Past Surgical History   Past Surgical History:   Procedure Laterality Date    APPENDECTOMY      ARTHROPLASTY HIP Right 6/22/2023    Procedure: Total Hip Arthroplasty Right;  Surgeon: Albin Williamson MD;  Location: [facility]    BIOPSY BREAST      COLONOSCOPY N/A 12/11/2015    Procedure: COLONOSCOPY;  Surgeon: Tino Pryor MD;  Location: [facility]    COLONOSCOPY N/A 8/23/2023    Procedure: Colonoscopy with biopsies;  Surgeon: Celio Larry DO;  Location: [facility]    ENDOSCOPIC RELEASE CARPAL TUNNEL Right 11/01/2021    Procedure: Endoscopic carpal tunnel release;  Surgeon: Ac Lemus MD;  Location: [facility]    ENDOSCOPIC RELEASE CARPAL TUNNEL Left 11/15/2021    Procedure: ENDOSCOPIC Carpal Tunnel Release, LEFT, removal suture right wrist;  Surgeon: Ac Lemus MD;  Location: [facility]    LUMPECTOMY BREAST      RELEASE TRIGGER FINGER  05/23/2014    Procedure: RELEASE TRIGGER FINGER;  Surgeon: Ag Hoffman MD;  Location: [facility]    SURGICAL HISTORY OF -   01/01/1970    appendectomy    SURGICAL HISTORY OF -   01/01/1970    pilonidal cyst and sinuses       Current Medications    Current Outpatient Medications:     atorvastatin (LIPITOR) 40 MG tablet, Take 1 tablet (40 mg) by mouth daily, Disp: 90 tablet, Rfl: 3    blood glucose (ACCU-CHEK SIDDHARTHA PLUS) test strip, Use to test blood sugar 3 times daily or  as directed., Disp: 200 strip, Rfl: 3    blood glucose monitoring (ACCU-CHEK MULTICLIX) lancets, Use to test blood sugar 1 time daily or as directed. Accu chek, Ins will cover, match machine., Disp: 100 each, Rfl: 2    dexAMETHasone (DECADRON) 4 MG tablet, Get labs 2 morning in a row.  Take 8mg dexamethasone late night in between lab draws, Disp: 2 tablet, Rfl: 0    Dulaglutide (TRULICITY) 3 MG/0.5ML SOPN, Inject 3 mg Subcutaneous every 7 days, Disp: 6 mL, Rfl: 1    empagliflozin (JARDIANCE) 10 MG TABS tablet, Take 1 tablet (10 mg) by mouth daily, Disp: 90 tablet, Rfl: 1    hydrOXYzine HCl (ATARAX) 25 MG tablet, TAKE ONE TABLET BY MOUTH EVERY 6 HOURS AS NEEDED FOR ITCHING OR ANXIETY (WITH PAIN, MODERATE PAIN), Disp: 30 tablet, Rfl: 0    lisinopril (ZESTRIL) 40 MG tablet, Take 1 tablet (40 mg) by mouth daily, Disp: 90 tablet, Rfl: 3    metoprolol succinate ER (TOPROL XL) 100 MG 24 hr tablet, Take 1 tablet (100 mg) by mouth daily, Disp: 90 tablet, Rfl: 3    traZODone (DESYREL) 50 MG tablet, Take 1 tablet (50 mg) by mouth at bedtime, Disp: 90 tablet, Rfl: 1    Current Facility-Administered Medications:     triamcinolone (KENALOG-40) injection 40 mg, 40 mg, , , Mich Bui, , 40 mg at 23 1215      Family History   Problem Relation Age of Onset    Unknown/Adopted Father     Dementia Father    Sister - breast cancer; had thyroidectomy. Mom  of cancer. Sister HTN. No f/h of diabetes. One sister is overweight, no other family members.     Social History  . She has 1 child. Smokes since age 18, mostly 1 PPD, now 3/4 PPD. She denies drinking alcohol or using illicit drugs. Occupation: retired.      Review of Systems   Systemic:             weight stable   Eye:                      No eye symptoms   Nidhi-Laryngeal:     No nidhi-laryngeal symptoms, no dysphagia, no hoarseness, no cough     Breast:                  No breast symptoms  Cardiovascular:    No cardiovascular symptoms, no CP or  "palpitations   Pulmonary:           No pulmonary symptoms, no SOB or cough    Gastrointestinal:   mild constipation  Genitourinary:       urinates more often since taking Jardiance, but not during the night    Endocrine:            as above   Neurological:        No headaches, no tremor, no numbness or tingling sensation, no dizziness; arms are not as strong as they used to be - not generalized weakness; no new stretch marks, no acne, no easy bruising; some facial hair - mild, on the upper lip and chin - present for >10-15 years and stable    Musculoskeletal:  hip joint pain   Skin:                     No skin symptoms, no dry skin, no hair falling out   Psychological:     No psychological symptoms                 Vital Signs     Previous Weights:    Wt Readings from Last 10 Encounters:   01/24/24 52.2 kg (115 lb)   11/01/23 54.2 kg (119 lb 8 oz)   09/06/23 52.2 kg (115 lb)   08/23/23 49.9 kg (110 lb)   08/03/23 50.8 kg (112 lb)   07/25/23 51 kg (112 lb 6.4 oz)   06/22/23 51.9 kg (114 lb 8 oz)   06/20/23 51.9 kg (114 lb 8 oz)   04/04/23 55.8 kg (123 lb)   03/28/23 56.2 kg (123 lb 12.8 oz)        LMP 12/01/2009     Estimated body mass index is 19.29 kg/m  as calculated from the following:    Height as of 11/1/23: 1.676 m (5' 6\").    Weight as of 11/1/23: 54.2 kg (119 lb 8 oz).    Physical Exam  General Appearance: alert, no distress noted   Eyes: grossly normal to inspection, conjunctivae and sclerae normal, no lid lag or stare   Respiratory: no audible wheeze, cough, or visible cyanosis.  No visible retractions or increased work of breathing.  Able to speak fully in complete sentences.  Neurological: Cranial nerves grossly intact, mentation intact and speech normal; no tremor of the hands   Skin: no lesions on exposed skin   Psychological: mentation appears normal, affect normal, judgement and insight intact, normal speech and appearance well-groomed     Lab Results  I reviewed prior lab results documented in " Epic.  TSH   Date Value Ref Range Status   10/12/2022 1.64 0.30 - 4.20 uIU/mL Final   04/15/2019 1.95 0.40 - 4.00 mU/L Final   02/15/2017 1.36 0.40 - 4.00 mU/L Final   04/29/2015 1.35 0.40 - 4.00 mU/L Final   07/30/2013 1.50 0.4 - 5.0 mU/L Final   04/12/2011 1.07 0.4 - 5.0 mU/L Final     45 minutes spent on the date of the encounter doing chart review, history and exam, documentation and further activities as noted above.

## 2024-01-26 ENCOUNTER — LAB (OUTPATIENT)
Dept: LAB | Facility: CLINIC | Age: 69
End: 2024-01-26
Payer: COMMERCIAL

## 2024-01-26 DIAGNOSIS — E83.52 HYPERCALCEMIA: ICD-10-CM

## 2024-01-26 NOTE — LETTER
February 2, 2024      Stephania Jerez  [address]  [place] [address]        Dear ,    We are writing to inform you of your test results.    Your urinary cortisol came back normal and the urinary calcium came back low.  The plan is to pursue the second set of labs and I will contact you with those results.     Resulted Orders   Cortisol free urine   Result Value Ref Range    Cortisol Free Urine Random 19.10 ug/L    Cortisol ug/g creatinine 36.73 ug/g CRT      Comment:      Reference Interval: Cortisol ug/g crt    Female    Prepubertal: Less than 25 ug/g crt  18 years and older: Less than 24 ug/g crt  Pregnancy: Less than 59 ug/g crt    Male    Prepubertal: Less than 25 ug/g crt  18 years and older: Less than 32 ug/g crt    Cortisol Free Urine 33.4 <=45.0 ug/d    Creatinine Urine/Volume 52 mg/dL    Creatinine Urine/24hr 910 500 - 1400 mg/d    Cortisol Free Urine Intrepretation See Note       Comment:      INTERPRETIVE INFORMATION: Cortisol Urine Free by LC-MS/MS    Access complete set of age- and/or gender-specific   reference intervals for this test in the [employer]   Test Directory ([employer]).    This test was developed and its performance characteristics   determined by [facility]. It has not been cleared or   approved by the US Food and Drug Administration. This test   was performed in a CLIA certified laboratory and is   intended for clinical purposes.  Performed By: [facility]  [address] [place] [id number]  : Hamilton Matute MD, PhD  CLIA Number: [id number]   Creatinine timed urine   Result Value Ref Range    Creatinine Urine mg/dL 49.2 mg/dL      Comment:      The reference ranges have not been established in urine creatinine. The results should be integrated into the clinical context for interpretation.    Duration in hours 24.0 h    Volume in mL 1,750 mL    Creatinine Urine Timed g/spec 0.86 0.72 - 1.51 g/spec   Calcium timed  urine   Result Value Ref Range    Calcium Urine mg/dL 6.8 mg/dL      Comment:      The reference ranges have not been established in urine calcium. The results should be integrated into the clinical context for interpretation.    Duration in hours 24.0 h    Volume in mL 1,750 mL    Calcium Urine g/spec 0.12 0.10 - 0.30 g/spec      Comment:      Reference range applicable for 24 hour only       If you have any questions or concerns, please call the clinic at the number listed above.       Sincerely,      Nani Avalos MD

## 2024-01-29 ENCOUNTER — LAB (OUTPATIENT)
Dept: LAB | Facility: CLINIC | Age: 69
End: 2024-01-29
Payer: COMMERCIAL

## 2024-01-29 DIAGNOSIS — E83.52 HYPERCALCEMIA: ICD-10-CM

## 2024-01-29 LAB
CALCIUM 24H UR-MRATE: 0.12 G/SPEC (ref 0.1–0.3)
CALCIUM UR-MCNC: 6.8 MG/DL
COLLECT DURATION TIME UR: 24 H
COLLECT DURATION TIME UR: 24 H
CREAT 24H UR-MRATE: 0.86 G/SPEC (ref 0.72–1.51)
CREAT UR-MCNC: 49.2 MG/DL
SPECIMEN VOL UR: 1750 ML
SPECIMEN VOL UR: 1750 ML

## 2024-01-29 PROCEDURE — 99000 SPECIMEN HANDLING OFFICE-LAB: CPT

## 2024-01-29 PROCEDURE — 81050 URINALYSIS VOLUME MEASURE: CPT

## 2024-01-29 PROCEDURE — 82530 CORTISOL FREE: CPT | Mod: 90

## 2024-01-29 PROCEDURE — 82340 ASSAY OF CALCIUM IN URINE: CPT

## 2024-01-29 PROCEDURE — 82570 ASSAY OF URINE CREATININE: CPT

## 2024-02-01 ENCOUNTER — OFFICE VISIT (OUTPATIENT)
Dept: UROLOGY | Facility: CLINIC | Age: 69
End: 2024-02-01
Payer: COMMERCIAL

## 2024-02-01 VITALS — HEART RATE: 96 BPM | RESPIRATION RATE: 16 BRPM | SYSTOLIC BLOOD PRESSURE: 171 MMHG | DIASTOLIC BLOOD PRESSURE: 85 MMHG

## 2024-02-01 DIAGNOSIS — N28.89 RENAL MASS: Primary | ICD-10-CM

## 2024-02-01 LAB
ANNOTATION COMMENT IMP: NORMAL
CORTIS F 24H UR HPLC-MCNC: 19.1 UG/L
CORTIS F 24H UR-MRATE: 33.4 UG/D
CORTIS F/CREAT 24H UR: 36.73 UG/G CRT
CREAT 24H UR-MRATE: 910 MG/D
CREAT UR-MCNC: 52 MG/DL

## 2024-02-01 PROCEDURE — 99214 OFFICE O/P EST MOD 30 MIN: CPT | Performed by: UROLOGY

## 2024-02-01 NOTE — PROGRESS NOTES
CC : left renal mass     HPI: 67 yo female with incidentally diagnosed approximately 2.7 cm left renal mass found on a workup for abdominal pain.  Patient has also had 6-8 months of diarrhea, weight loss and emesis of unclear etiology.  Also noted to have several adrenal nodules.  Patient was referred to endocrinology for work-up of the nodules.  Metformin was also stopped by endocrine with resolution of the diarrhea.  Patient appears to have a mildly hyperactive adrenal adenoma but this is under observation currently.  Here following a surveillance scan for her renal mass.           OBJECTIVE: CT 1/20/24 2.3 x 2.6 cm left renal mass, stable compared to July 2023. Stable adrenal nodules  CT chest/abd/pel 7/27/23: stable small pulmonary nodules        ASSESSMENT AND PLAN: Over half of today's 35-minute visit was spent reviewing the chart, results and counseling the patient regarding her renal mass.  We are pleased to see the renal mass is stable.  We will recheck a scan in 6 months.  Patient will follow with endocrine regarding her adrenal nodules.  Discussed patient's kidney stone.  They do not require intervention at this time.

## 2024-02-01 NOTE — NURSING NOTE
"Initial BP (!) 171/85 (BP Location: Left arm, Patient Position: Chair, Cuff Size: Adult Regular)   Pulse 96   Resp 16   LMP 12/01/2009  Estimated body mass index is 18.56 kg/m  as calculated from the following:    Height as of 11/1/23: 1.676 m (5' 6\").    Weight as of 1/24/24: 52.2 kg (115 lb). .  Patient is here for results. cassandra roberson LPN    "

## 2024-02-02 NOTE — RESULT ENCOUNTER NOTE
Please let the patient know the urinary cortisol came back normal and the urinary calcium came back low.  The plan is to pursue the second set of labs and I will contact her with those results.

## 2024-02-23 DIAGNOSIS — Z96.641 STATUS POST RIGHT HIP REPLACEMENT: ICD-10-CM

## 2024-02-23 RX ORDER — HYDROXYZINE HYDROCHLORIDE 25 MG/1
TABLET, FILM COATED ORAL
Qty: 30 TABLET | Refills: 0 | Status: SHIPPED | OUTPATIENT
Start: 2024-02-23 | End: 2024-06-27

## 2024-02-28 ENCOUNTER — TELEPHONE (OUTPATIENT)
Dept: FAMILY MEDICINE | Facility: CLINIC | Age: 69
End: 2024-02-28
Payer: COMMERCIAL

## 2024-02-28 NOTE — LETTER
February 28, 2024      Stephania Jerez  [address]  [place] [address]        Dear Stephania,    You are due to update some vaccines, Flu, shingles, RSV and pneumonia. Please contact your insurance company prior to receiving the shingles and RSV vaccines to ensure this is covered. Currently through Medicare these are only covered if given at a pharmacy.      Feel free to call [phone] to schedule a nurse only appointment to get these updated!        Sincerely,      Your [facility] Team

## 2024-02-28 NOTE — TELEPHONE ENCOUNTER
Patient Quality Outreach    Patient is due for the following:   None    Next Steps:   - Vaccine update    Type of outreach:    Sent letter.      Questions for provider review:    None           Emily Davies, CMA

## 2024-04-08 DIAGNOSIS — E11.9 TYPE 2 DIABETES MELLITUS WITHOUT COMPLICATION, WITHOUT LONG-TERM CURRENT USE OF INSULIN (H): ICD-10-CM

## 2024-04-08 RX ORDER — DULAGLUTIDE 3 MG/.5ML
3 INJECTION, SOLUTION SUBCUTANEOUS
Qty: 6 ML | Refills: 0 | Status: SHIPPED | OUTPATIENT
Start: 2024-04-08 | End: 2024-06-25

## 2024-04-26 ENCOUNTER — TELEPHONE (OUTPATIENT)
Dept: ENDOCRINOLOGY | Facility: CLINIC | Age: 69
End: 2024-04-26
Payer: COMMERCIAL

## 2024-04-26 NOTE — TELEPHONE ENCOUNTER
Called patient and left voicemail. Patient has an appointment on  4/30/24 . Need to collect the last 14 days worth of blood sugar readings to prepare for patient's visit.   Manda Dhillon MA

## 2024-04-26 NOTE — PROGRESS NOTES
Outcome for 04/26/24 1:36 PM: Left Voicemail   Manda Dhillon MA  Outcome for 04/26/24 2:02 PM:  Pt called clinic back. Blood sugar readings are below.   Manda Dhillon MA    Patient is showing 2/5 MNCM met. BP out range, A1c not in range, and Current tobacco use   Manda Dhillon MA

## 2024-04-26 NOTE — TELEPHONE ENCOUNTER
The patient only had 11 days worth so far, she said by the time of the follow up shell have more thank you.    Blood Sugars Readings:    4/26 128  4/25 122  4/24 117  4/23 122  4/22 115  4/21 119  4/20 119  4/19 144  4/18 122  4/17 121  4/16 165

## 2024-04-29 DIAGNOSIS — E11.9 TYPE 2 DIABETES MELLITUS WITHOUT COMPLICATION, WITHOUT LONG-TERM CURRENT USE OF INSULIN (H): ICD-10-CM

## 2024-04-29 RX ORDER — BLOOD SUGAR DIAGNOSTIC
STRIP MISCELLANEOUS
Qty: 200 STRIP | Refills: 3 | Status: SHIPPED | OUTPATIENT
Start: 2024-04-29

## 2024-04-29 NOTE — TELEPHONE ENCOUNTER
Pending Prescriptions:                       Disp   Refills    blood glucose (ACCU-CHEK SIDDHARTHA PLUS) test*200 st*3            Sig: Use to test blood sugar 3 times daily or as           directed.    Nadege Jett on 4/29/2024 at 9:23 AM

## 2024-04-29 NOTE — TELEPHONE ENCOUNTER
"Requested Prescriptions   Pending Prescriptions Disp Refills    blood glucose (ACCU-CHEK SIDDHARTHA PLUS) test strip 200 strip 3     Sig: Use to test blood sugar 3 times daily or as directed.       Diabetic Supplies Protocol Failed - 4/29/2024  9:23 AM        Failed - Recent (6 mo) or future (30 days) visit within the authorizing provider's specialty     Patient had office visit in the last 6 months or has a visit in the next 30 days with authorizing provider.  See \"Patient Info\" tab in inbasket, or \"Choose Columns\" in Meds & Orders section of the refill encounter.            Passed - Medication is active on med list        Passed - Medication indicated for associated diagnosis        Passed - Patient is 18 years of age or older             "

## 2024-04-30 ENCOUNTER — VIRTUAL VISIT (OUTPATIENT)
Dept: ENDOCRINOLOGY | Facility: CLINIC | Age: 69
End: 2024-04-30
Payer: COMMERCIAL

## 2024-04-30 DIAGNOSIS — D35.02 BILATERAL ADRENAL ADENOMAS: ICD-10-CM

## 2024-04-30 DIAGNOSIS — E83.52 HYPERCALCEMIA: ICD-10-CM

## 2024-04-30 DIAGNOSIS — E87.5 HIGH POTASSIUM: ICD-10-CM

## 2024-04-30 DIAGNOSIS — E11.9 TYPE 2 DIABETES MELLITUS WITHOUT COMPLICATION, WITHOUT LONG-TERM CURRENT USE OF INSULIN (H): ICD-10-CM

## 2024-04-30 DIAGNOSIS — D35.01 BILATERAL ADRENAL ADENOMAS: ICD-10-CM

## 2024-04-30 DIAGNOSIS — R79.89: Primary | ICD-10-CM

## 2024-04-30 DIAGNOSIS — R79.89 HIGH SERUM CORTISOL: ICD-10-CM

## 2024-04-30 PROCEDURE — 99214 OFFICE O/P EST MOD 30 MIN: CPT | Mod: 95 | Performed by: INTERNAL MEDICINE

## 2024-04-30 RX ORDER — DEXAMETHASONE 4 MG/1
TABLET ORAL
Qty: 2 TABLET | Refills: 0 | Status: SHIPPED | OUTPATIENT
Start: 2024-04-30 | End: 2024-05-13

## 2024-04-30 ASSESSMENT — PAIN SCALES - GENERAL: PAINLEVEL: NO PAIN (0)

## 2024-04-30 NOTE — LETTER
4/30/2024         RE: Asmita Jerez  [id number]  [place] [address]        Dear Colleague,    Thank you for referring your patient, Asmita Jerez, to the [facility]. Please see a copy of my visit note below.    Outcome for 04/26/24 1:36 PM: Left Voicemail   Manda Dhillon MA  Outcome for 04/26/24 2:02 PM:  Pt called clinic back. Blood sugar readings are below.   Manda Dhillon MA    Patient is showing 2/5 MNCM met. BP out range, A1c not in range, and Current tobacco use   Manda Dhillon MA          Video-Visit Details    Type of service:  Video Visit    Originating Location (pt. Location): car  Distant Location (provider location): Off-site  Joined the call at 4/30/2024, 1:15  pm.  Left the call at 4/30/2024, 1:34 pm.    Mode of Communication:  Video Conference via [employer] (poor sound connection via [employer])    =======================================================  Assessment     Asmita Jerez is a 68 year old female seen for evaluation of bilateral adrenal macroadenomas, associated with possible mild autonomous cortisol secretion.    1.  Bilateral adrenal macroadenomas  Prior evaluations have revealed an incompletely suppressed cortisol with 1 mg and 8 mg dexamethasone, normal salivary and urinary cortisol, a non-suppressed ACTH of 46 and a lowish ACTH of 38.   Clinically, the patient has a history of PCOS, type 2 diabetes.  She is not obese or overweight and does not endorse signs or symptoms suggestive of hypercortisolism.  I recommended to confirm the MACS by pursuing a cortisol level by LCMS.   Recommendations:  Perform a 8 mg dexamethasone suppression test and have the cortisol level checked by liquid chromatography at [facility]  Advised the patient to take the dexamethasone at 11 PM and schedule lab work the following morning at 8.  The patient is going to schedule lab at her local [facility].    2.  Hypercalcemia  Not of new onset and associated  with kidney stones, incidentally discovered on imaging last year.  The patient has no history of bone fractures or osteoporosis.  There is a high suspicion she might have renal cancer.  Currently, she is not medicated with any calcium or vitamin D supplements.  Recommendations:  Check calcium, phosphorus, albumin, PTH RP  Follow-up to be determined based on lab results    3. Type 2 diabetes   Well-controlled     No orders of the defined types were placed in this encounter.    =======================================================  The patient is seen in f/up.      History of Present Illness:  Asmita Jerez is a 68 year old female with a past medical history significant for type 2 diabetes, hypertension, polycystic ovaries and hypercholesterolemia, who was initially seen in our clinic by Dr. Moore, in September 2023.  She established care with me in January 2024.    1.  Adrenal incidentaloma  It was incidentally discovered on the abdominal CT from July 2023, which revealed 2 left adrenal nodules, measuring 1.7 and 1.2 cm and a 1.4 cm right adrenal nodule.  All nodules had low Hounsfield unit density on the noncontrast CT <10 so they were considered to represent adrenal adenomas.  Also, the nodules were stable compared with the prior imaging from 2018. The CT was also positive for kidney stones and a 2.6 cm left renal heterogeneous lesion for which the patient follows up with urology. The lesion is suspicious for renal cell carcinoma and it remained stable on the follow-up abdominal CT from January 2024.  Urology note reviewed.  The plan is to schedule a follow-up abdominal CT in August.    She was diagnosed with hypertension in her 60s. Her blood pressure is currently medicated with lisinopril and metoprolol.  There is no prior history of hypokalemia.    Pertinent labs reviewed:   9/6/2023 aldosterone 6.3, renin 0.2, normal plasma metanephrines  9/13/2023 1 mg dexamethasone suppression test: cortisol level of 3.8;  dexamethasone 352.6 (140-295)    9/28/2020 24-hour urinary cortisol 45 (3.5-45) 24-hour urinary cortisone normal.  The urinary volume was 1.65 L, with urine creatinine 0.9 g  11/11, 11/13, 11/15/23 salivary cortisol normal twice and borderline normal on 11/13/2023 at 0.112 (less than 0.1)  12/6/2023 ACTH 46, cortisol 37.9, DHEA-S 38 () at 7:52 AM  12/7/2023 8 mg dexamethasone suppression test: ACTH undetectable, cortisol 4.9, DHEA-S 28, dexamethasone >3000 at 7:42 AM  1/29/2024 24-hour urinary cortisol was normal at 33.  Patient took 8 mg dexamethasone the night prior.    At her last appointment I recommended to have the cortisol level checked by LCMS at [facility].  She has not had this done.  Misunderstood the instructions and took the dexamethasone prior to the urine collection.    2.  Type 2 diabetes, diagnosed around 2000, around age 45.  Average A1c over the years has been around 8%.   Most recent A1c was 8.4, on 11/1/2023.  She was only treated with metformin until staring Trulicity in 2023. Metformin caused diarrhea and it was replaced with Jardiance, in September 2023.    Currently antidiabetic medications:  Trulicity, 3 mg weekly (started in February 2023)  Jardiance, 10 mg daily (started in November 2023)    I reviewed the provided BG numbers:     Weight has been stable.     Diabetes complications:  Last eye exam: 2 years ago  Most recent urine microalbumin negative in November 2023. On 40 mg lisinopril daily.   No numbness or tingling sensation in her feet   Last lipid panel from 9/13/2023 LDL 56, HDL 50, triglycerides 47.  On 40 mg atorvastatin daily.     3.  Hypercalcemia  Most recent calcium level was 11, on 9/6/2023.  The patient has had intermittent episodes of mild hypercalcemia or high normal calcium levels dating back to 2008.  The highest calcium level was in September 2023.  On prior lab work, the albumin was normal, as well as alkaline phosphatase.   On lab work from 1/29/24, 24-hour  urinary calcium was 0.12 g.    On questioning , the patient denies n/v, muscle weakness, increased thirst or increased urination, confusion, memory changes.     Calcium supplements: none   Vitamin D supplements: no   OVC - none besides ASA. No MVI    Dairies: 2 glasses of milk daily and one serving of cheese - a couple of times a week. Doesn't like yogurt.   Kidneys stones: pt not aware of this diagnosis   Menopause: in the 50s. She still has hot flashes and occasional night sweats. Overall, the hot flashes have been stable.   The DEXA scan from December 2022 revealed normal bone mineral density at all sites. There is no prior h/o fractures.     Past Medical History   Past Medical History:   Diagnosis Date     Cervical high risk HPV (human papillomavirus) test positive 03/31/2018     Diabetes (H)      Hypertension      Peptic ulcer, unspecified site, unspecified as acute or chronic, without mention of hemorrhage, perforation, or obstruction      Polycystic ovaries      Pure hypercholesterolemia      Tobacco use disorder      Unspecified multiple gestation, unspecified as to episode of care     Multiple gestation   Diverticulosis  Kidney stones  Bilateral adrenal adenomas  Lung nodules  Collagenous colitis  Anxiety     Past Surgical History   Past Surgical History:   Procedure Laterality Date     APPENDECTOMY       ARTHROPLASTY HIP Right 6/22/2023    Procedure: Total Hip Arthroplasty Right;  Surgeon: Albin Williamson MD;  Location: [facility]     BIOPSY BREAST       COLONOSCOPY N/A 12/11/2015    Procedure: COLONOSCOPY;  Surgeon: Tino Pryor MD;  Location: [facility]     COLONOSCOPY N/A 8/23/2023    Procedure: Colonoscopy with biopsies;  Surgeon: Celio Larry DO;  Location: [facility]     ENDOSCOPIC RELEASE CARPAL TUNNEL Right 11/01/2021    Procedure: Endoscopic carpal tunnel release;  Surgeon: Ac Lemus MD;  Location: [facility]     ENDOSCOPIC RELEASE CARPAL TUNNEL Left 11/15/2021     Procedure: ENDOSCOPIC Carpal Tunnel Release, LEFT, removal suture right wrist;  Surgeon: Ac Lemus MD;  Location: [facility]     LUMPECTOMY BREAST       RELEASE TRIGGER FINGER  05/23/2014    Procedure: RELEASE TRIGGER FINGER;  Surgeon: Ag Hoffman MD;  Location: [facility]     SURGICAL HISTORY OF -   01/01/1970    appendectomy     SURGICAL HISTORY OF -   01/01/1970    pilonidal cyst and sinuses       Current Medications    Current Outpatient Medications:      atorvastatin (LIPITOR) 40 MG tablet, Take 1 tablet (40 mg) by mouth daily, Disp: 90 tablet, Rfl: 3     blood glucose (ACCU-CHEK SIDDHARTHA PLUS) test strip, Use to test blood sugar 3 times daily or as directed., Disp: 200 strip, Rfl: 3     blood glucose monitoring (ACCU-CHEK MULTICLIX) lancets, Use to test blood sugar 1 time daily or as directed. Accu chek, Ins will cover, match machine., Disp: 100 each, Rfl: 2     Dulaglutide (TRULICITY) 3 MG/0.5ML SOPN, Inject 3 mg Subcutaneous every 7 days ** VISIT DUE/LAST FILL **, Disp: 6 mL, Rfl: 0     empagliflozin (JARDIANCE) 10 MG TABS tablet, Take 1 tablet (10 mg) by mouth daily, Disp: 90 tablet, Rfl: 1     hydrOXYzine HCl (ATARAX) 25 MG tablet, TAKE ONE TABLET BY MOUTH EVERY 6 HOURS AS NEEDED FOR ITCHING OR ANXIETY (WITH PAIN AND MODERATE PAIN), Disp: 30 tablet, Rfl: 0     lisinopril (ZESTRIL) 40 MG tablet, Take 1 tablet (40 mg) by mouth daily, Disp: 90 tablet, Rfl: 3     metoprolol succinate ER (TOPROL XL) 100 MG 24 hr tablet, Take 1 tablet (100 mg) by mouth daily, Disp: 90 tablet, Rfl: 3     traZODone (DESYREL) 50 MG tablet, Take 1 tablet (50 mg) by mouth at bedtime, Disp: 90 tablet, Rfl: 1     dexAMETHasone (DECADRON) 4 MG tablet, Take 2 tablets (8 mg) at 11 PM and have a cortisol level checked the following day, at 8 AM. (Patient not taking: Reported on 2/1/2024), Disp: 2 tablet, Rfl: 0    Current Facility-Administered Medications:      triamcinolone (KENALOG-40) injection 40 mg, 40 mg, , , [place]a, [place]  Alymaría elenaDO, 40 mg at 23 1215      Family History   Problem Relation Age of Onset     Unknown/Adopted Father      Dementia Father    Sister - breast cancer; had thyroidectomy. Mom  of cancer. Sister HTN. No f/h of diabetes. One sister is overweight, no other family members.     Social History  . She has 1 child. Smokes since age 18, mostly 1 PPD, now 3/4 PPD. She denies drinking alcohol or using illicit drugs. Occupation: retired.      Review of Systems   Systemic:             weight stable   Eye:                      No eye symptoms   Nidhi-Laryngeal:     No nidhi-laryngeal symptoms, no dysphagia, no hoarseness, no cough     Breast:                  No breast symptoms  Cardiovascular:    No cardiovascular symptoms, no CP or palpitations   Pulmonary:           No pulmonary symptoms, no SOB or cough    Gastrointestinal:   mild constipation  Genitourinary:       urinates more often since taking Jardiance, but not during the night    Endocrine:            as above   Neurological:        No headaches, no tremor, no numbness or tingling sensation, no dizziness; arms are not as strong as they used to be - not generalized weakness; no new stretch marks, no acne, no easy bruising; some facial hair - mild, on the upper lip and chin - present for >10-15 years and stable    Musculoskeletal:  hip joint pain   Skin:                     No skin symptoms, no dry skin, no hair falling out   Psychological:     No psychological symptoms                 Vital Signs     Previous Weights:    Wt Readings from Last 10 Encounters:   24 52.2 kg (115 lb)   23 54.2 kg (119 lb 8 oz)   23 52.2 kg (115 lb)   23 49.9 kg (110 lb)   23 50.8 kg (112 lb)   23 51 kg (112 lb 6.4 oz)   23 51.9 kg (114 lb 8 oz)   23 51.9 kg (114 lb 8 oz)   23 55.8 kg (123 lb)   23 56.2 kg (123 lb 12.8 oz)        LMP 2009     Estimated body mass index is 18.56 kg/m  as calculated from the  "following:    Height as of 11/1/23: 1.676 m (5' 6\").    Weight as of 1/24/24: 52.2 kg (115 lb).    Physical Exam  General Appearance: alert, no distress noted   Eyes: grossly normal to inspection, conjunctivae and sclerae normal, no lid lag or stare   Respiratory: no audible wheeze, cough, or visible cyanosis.  No visible retractions or increased work of breathing.  Able to speak fully in complete sentences.  Neurological: Cranial nerves grossly intact, mentation intact and speech normal; no tremor of the hands   Skin: no lesions on exposed skin   Psychological: mentation appears normal, affect normal, judgement and insight intact, normal speech and appearance well-groomed     Lab Results  I reviewed prior lab results documented in Epic.  TSH   Date Value Ref Range Status   10/12/2022 1.64 0.30 - 4.20 uIU/mL Final   04/15/2019 1.95 0.40 - 4.00 mU/L Final   02/15/2017 1.36 0.40 - 4.00 mU/L Final   04/29/2015 1.35 0.40 - 4.00 mU/L Final   07/30/2013 1.50 0.4 - 5.0 mU/L Final   04/12/2011 1.07 0.4 - 5.0 mU/L Final     The longitudinal plan of care for the diagnosis(es)/condition(s) as documented were addressed during this visit. Due to the added complexity in care, I will continue to support Stephania in the subsequent management and with ongoing continuity of care.                   Again, thank you for allowing me to participate in the care of your patient.        Sincerely,        Nani Avalos MD  "

## 2024-04-30 NOTE — PROGRESS NOTES
Video-Visit Details    Type of service:  Video Visit    Originating Location (pt. Location): car  Distant Location (provider location): Off-site  Joined the call at 4/30/2024, 1:15  pm.  Left the call at 4/30/2024, 1:34 pm.    Mode of Communication:  Video Conference via GI Track (poor sound connection via WordStream)    =======================================================  Assessment     Asmita Jerez is a 68 year old female seen for evaluation of bilateral adrenal macroadenomas, associated with possible mild autonomous cortisol secretion.    1.  Bilateral adrenal macroadenomas  Prior evaluations have revealed an incompletely suppressed cortisol with 1 mg and 8 mg dexamethasone, normal salivary and urinary cortisol, a non-suppressed ACTH of 46 and a lowish DHEAS of 38.   Clinically, the patient has a history of PCOS, type 2 diabetes.  She is not obese or overweight and does not endorse signs or symptoms suggestive of hypercortisolism.  I recommended to confirm the MACS by pursuing a cortisol level by LCMS.   Recommendations:  Perform a 8 mg dexamethasone suppression test and have the cortisol level checked by liquid chromatography at [facility]  Advised the patient to take the dexamethasone at 11 PM and schedule lab work the following morning at 8.  The patient is going to schedule lab at her local [facility].    2.  Hypercalcemia  Not of new onset and associated with kidney stones, incidentally discovered on imaging last year.  The patient has no history of bone fractures or osteoporosis.  There is a high suspicion she might have renal cancer.  Currently, she is not medicated with any calcium or vitamin D supplements.  Recommendations:  Check calcium, phosphorus, albumin, PTH RP  Follow-up to be determined based on lab results    3. Type 2 diabetes   Well-controlled     No orders of the defined types were placed in this encounter.    =======================================================  The patient is  seen in f/up.      History of Present Illness:  Asmita Jerez is a 68 year old female with a past medical history significant for type 2 diabetes, hypertension, polycystic ovaries and hypercholesterolemia, who was initially seen in our clinic by Dr. Moore, in September 2023.  She established care with me in January 2024.    1.  Adrenal incidentaloma  It was incidentally discovered on the abdominal CT from July 2023, which revealed 2 left adrenal nodules, measuring 1.7 and 1.2 cm and a 1.4 cm right adrenal nodule.  All nodules had low Hounsfield unit density on the noncontrast CT <10 so they were considered to represent adrenal adenomas.  Also, the nodules were stable compared with the prior imaging from 2018. The CT was also positive for kidney stones and a 2.6 cm left renal heterogeneous lesion for which the patient follows up with urology. The lesion is suspicious for renal cell carcinoma and it remained stable on the follow-up abdominal CT from January 2024.  Urology note reviewed.  The plan is to schedule a follow-up abdominal CT in August.    She was diagnosed with hypertension in her 60s. Her blood pressure is currently medicated with lisinopril and metoprolol.  There is no prior history of hypokalemia.    Pertinent labs reviewed:   9/6/2023 aldosterone 6.3, renin 0.2, normal plasma metanephrines  9/13/2023 1 mg dexamethasone suppression test: cortisol level of 3.8; dexamethasone 352.6 (140-295)    9/28/2020 24-hour urinary cortisol 45 (3.5-45) 24-hour urinary cortisone normal.  The urinary volume was 1.65 L, with urine creatinine 0.9 g  11/11, 11/13, 11/15/23 salivary cortisol normal twice and borderline normal on 11/13/2023 at 0.112 (less than 0.1)  12/6/2023 ACTH 46, cortisol 37.9, DHEA-S 38 () at 7:52 AM  12/7/2023 8 mg dexamethasone suppression test: ACTH undetectable, cortisol 4.9, DHEA-S 28, dexamethasone >3000 at 7:42 AM  1/29/2024 24-hour urinary cortisol was normal at 33.  Patient took 8 mg  dexamethasone the night prior.    At her last appointment I recommended to have the cortisol level checked by LCMS at [facility].  She has not had this done.  Misunderstood the instructions and took the dexamethasone prior to the urine collection.    2.  Type 2 diabetes, diagnosed around 2000, around age 45.  Average A1c over the years has been around 8%.   Most recent A1c was 8.4, on 11/1/2023.  She was only treated with metformin until staring Trulicity in 2023. Metformin caused diarrhea and it was replaced with Jardiance, in September 2023.    Currently antidiabetic medications:  Trulicity, 3 mg weekly (started in February 2023)  Jardiance, 10 mg daily (started in November 2023)    I reviewed the provided BG numbers:     Weight has been stable.     Diabetes complications:  Last eye exam: 2 years ago  Most recent urine microalbumin negative in November 2023. On 40 mg lisinopril daily.   No numbness or tingling sensation in her feet   Last lipid panel from 9/13/2023 LDL 56, HDL 50, triglycerides 47.  On 40 mg atorvastatin daily.     3.  Hypercalcemia  Most recent calcium level was 11, on 9/6/2023.  The patient has had intermittent episodes of mild hypercalcemia or high normal calcium levels dating back to 2008.  The highest calcium level was in September 2023.  On prior lab work, the albumin was normal, as well as alkaline phosphatase.   On lab work from 1/29/24, 24-hour urinary calcium was 0.12 g.    On questioning , the patient denies n/v, muscle weakness, increased thirst or increased urination, confusion, memory changes.     Calcium supplements: none   Vitamin D supplements: no   OVC - none besides ASA. No MVI    Dairies: 2 glasses of milk daily and one serving of cheese - a couple of times a week. Doesn't like yogurt.   Kidneys stones: pt not aware of this diagnosis   Menopause: in the 50s. She still has hot flashes and occasional night sweats. Overall, the hot flashes have been stable.   The DEXA scan from  December 2022 revealed normal bone mineral density at all sites. There is no prior h/o fractures.     Past Medical History   Past Medical History:   Diagnosis Date    Cervical high risk HPV (human papillomavirus) test positive 03/31/2018    Diabetes (H)     Hypertension     Peptic ulcer, unspecified site, unspecified as acute or chronic, without mention of hemorrhage, perforation, or obstruction     Polycystic ovaries     Pure hypercholesterolemia     Tobacco use disorder     Unspecified multiple gestation, unspecified as to episode of care     Multiple gestation   Diverticulosis  Kidney stones  Bilateral adrenal adenomas  Lung nodules  Collagenous colitis  Anxiety     Past Surgical History   Past Surgical History:   Procedure Laterality Date    APPENDECTOMY      ARTHROPLASTY HIP Right 6/22/2023    Procedure: Total Hip Arthroplasty Right;  Surgeon: Albin Williamson MD;  Location: [facility]    BIOPSY BREAST      COLONOSCOPY N/A 12/11/2015    Procedure: COLONOSCOPY;  Surgeon: Tino Pryor MD;  Location: [facility]    COLONOSCOPY N/A 8/23/2023    Procedure: Colonoscopy with biopsies;  Surgeon: Celio Larry DO;  Location: [facility]    ENDOSCOPIC RELEASE CARPAL TUNNEL Right 11/01/2021    Procedure: Endoscopic carpal tunnel release;  Surgeon: Ac Lemus MD;  Location: [facility]    ENDOSCOPIC RELEASE CARPAL TUNNEL Left 11/15/2021    Procedure: ENDOSCOPIC Carpal Tunnel Release, LEFT, removal suture right wrist;  Surgeon: Ac Lemus MD;  Location: [facility]    LUMPECTOMY BREAST      RELEASE TRIGGER FINGER  05/23/2014    Procedure: RELEASE TRIGGER FINGER;  Surgeon: Ag Hoffman MD;  Location: [facility]    SURGICAL HISTORY OF -   01/01/1970    appendectomy    SURGICAL HISTORY OF -   01/01/1970    pilonidal cyst and sinuses       Current Medications    Current Outpatient Medications:     atorvastatin (LIPITOR) 40 MG tablet, Take 1 tablet (40 mg) by mouth daily, Disp: 90 tablet, Rfl: 3     blood glucose (ACCU-CHEK SIDDHARTHA PLUS) test strip, Use to test blood sugar 3 times daily or as directed., Disp: 200 strip, Rfl: 3    blood glucose monitoring (ACCU-CHEK MULTICLIX) lancets, Use to test blood sugar 1 time daily or as directed. Accu chek, Ins will cover, match machine., Disp: 100 each, Rfl: 2    Dulaglutide (TRULICITY) 3 MG/0.5ML SOPN, Inject 3 mg Subcutaneous every 7 days ** VISIT DUE/LAST FILL **, Disp: 6 mL, Rfl: 0    empagliflozin (JARDIANCE) 10 MG TABS tablet, Take 1 tablet (10 mg) by mouth daily, Disp: 90 tablet, Rfl: 1    hydrOXYzine HCl (ATARAX) 25 MG tablet, TAKE ONE TABLET BY MOUTH EVERY 6 HOURS AS NEEDED FOR ITCHING OR ANXIETY (WITH PAIN AND MODERATE PAIN), Disp: 30 tablet, Rfl: 0    lisinopril (ZESTRIL) 40 MG tablet, Take 1 tablet (40 mg) by mouth daily, Disp: 90 tablet, Rfl: 3    metoprolol succinate ER (TOPROL XL) 100 MG 24 hr tablet, Take 1 tablet (100 mg) by mouth daily, Disp: 90 tablet, Rfl: 3    traZODone (DESYREL) 50 MG tablet, Take 1 tablet (50 mg) by mouth at bedtime, Disp: 90 tablet, Rfl: 1    dexAMETHasone (DECADRON) 4 MG tablet, Take 2 tablets (8 mg) at 11 PM and have a cortisol level checked the following day, at 8 AM. (Patient not taking: Reported on 2024), Disp: 2 tablet, Rfl: 0    Current Facility-Administered Medications:     triamcinolone (KENALOG-40) injection 40 mg, 40 mg, , , Mich Bui, , 40 mg at 23 1215      Family History   Problem Relation Age of Onset    Unknown/Adopted Father     Dementia Father    Sister - breast cancer; had thyroidectomy. Mom  of cancer. Sister HTN. No f/h of diabetes. One sister is overweight, no other family members.     Social History  . She has 1 child. Smokes since age 18, mostly 1 PPD, now 3/4 PPD. She denies drinking alcohol or using illicit drugs. Occupation: retired.      Review of Systems   Systemic:             weight stable   Eye:                      No eye symptoms   Crow-Laryngeal:     No  "nidhi-laryngeal symptoms, no dysphagia, no hoarseness, no cough     Breast:                  No breast symptoms  Cardiovascular:    No cardiovascular symptoms, no CP or palpitations   Pulmonary:           No pulmonary symptoms, no SOB or cough    Gastrointestinal:   mild constipation  Genitourinary:       urinates more often since taking Jardiance, but not during the night    Endocrine:            as above   Neurological:        No headaches, no tremor, no numbness or tingling sensation, no dizziness; arms are not as strong as they used to be - not generalized weakness; no new stretch marks, no acne, no easy bruising; some facial hair - mild, on the upper lip and chin - present for >10-15 years and stable    Musculoskeletal:  hip joint pain   Skin:                     No skin symptoms, no dry skin, no hair falling out   Psychological:     No psychological symptoms                 Vital Signs     Previous Weights:    Wt Readings from Last 10 Encounters:   01/24/24 52.2 kg (115 lb)   11/01/23 54.2 kg (119 lb 8 oz)   09/06/23 52.2 kg (115 lb)   08/23/23 49.9 kg (110 lb)   08/03/23 50.8 kg (112 lb)   07/25/23 51 kg (112 lb 6.4 oz)   06/22/23 51.9 kg (114 lb 8 oz)   06/20/23 51.9 kg (114 lb 8 oz)   04/04/23 55.8 kg (123 lb)   03/28/23 56.2 kg (123 lb 12.8 oz)        [facility] 12/01/2009     Estimated body mass index is 18.56 kg/m  as calculated from the following:    Height as of 11/1/23: 1.676 m (5' 6\").    Weight as of 1/24/24: 52.2 kg (115 lb).    Physical Exam  General Appearance: alert, no distress noted   Eyes: grossly normal to inspection, conjunctivae and sclerae normal, no lid lag or stare   Respiratory: no audible wheeze, cough, or visible cyanosis.  No visible retractions or increased work of breathing.  Able to speak fully in complete sentences.  Neurological: Cranial nerves grossly intact, mentation intact and speech normal; no tremor of the hands   Skin: no lesions on exposed skin   Psychological: mentation appears " normal, affect normal, judgement and insight intact, normal speech and appearance well-groomed     Lab Results  I reviewed prior lab results documented in Epic.  TSH   Date Value Ref Range Status   10/12/2022 1.64 0.30 - 4.20 uIU/mL Final   04/15/2019 1.95 0.40 - 4.00 mU/L Final   02/15/2017 1.36 0.40 - 4.00 mU/L Final   04/29/2015 1.35 0.40 - 4.00 mU/L Final   07/30/2013 1.50 0.4 - 5.0 mU/L Final   04/12/2011 1.07 0.4 - 5.0 mU/L Final     The longitudinal plan of care for the diagnosis(es)/condition(s) as documented were addressed during this visit. Due to the added complexity in care, I will continue to support Stephania in the subsequent management and with ongoing continuity of care.

## 2024-04-30 NOTE — NURSING NOTE
Is the patient currently in the state of [place]? YES    Visit mode:VIDEO    If the visit is dropped, the patient can be reconnected by: VIDEO VISIT: Text to cell phone:   Telephone Information:   Mobile [phone]       Will anyone else be joining the visit? NO  (If patient encounters technical issues they should call [phone])    How would you like to obtain your AVS? MyChart    Are changes needed to the allergy or medication list? No    Are refills needed on medications prescribed by this physician? NO    Reason for visit: RECHECK Shelby Kocher VVF

## 2024-05-01 ENCOUNTER — LAB (OUTPATIENT)
Dept: LAB | Facility: CLINIC | Age: 69
End: 2024-05-01
Payer: COMMERCIAL

## 2024-05-01 DIAGNOSIS — E11.9 TYPE 2 DIABETES MELLITUS WITHOUT COMPLICATION, WITHOUT LONG-TERM CURRENT USE OF INSULIN (H): Primary | ICD-10-CM

## 2024-05-01 DIAGNOSIS — D35.01 BILATERAL ADRENAL ADENOMAS: ICD-10-CM

## 2024-05-01 DIAGNOSIS — D35.02 BILATERAL ADRENAL ADENOMAS: ICD-10-CM

## 2024-05-01 DIAGNOSIS — R79.89 HIGH SERUM CORTISOL: ICD-10-CM

## 2024-05-01 DIAGNOSIS — E83.52 HYPERCALCEMIA: ICD-10-CM

## 2024-05-01 LAB
ALBUMIN SERPL BCG-MCNC: 4.5 G/DL (ref 3.5–5.2)
ANION GAP SERPL CALCULATED.3IONS-SCNC: 11 MMOL/L (ref 7–15)
BUN SERPL-MCNC: 19.8 MG/DL (ref 8–23)
CALCIUM SERPL-MCNC: 10.7 MG/DL (ref 8.8–10.2)
CHLORIDE SERPL-SCNC: 104 MMOL/L (ref 98–107)
CREAT SERPL-MCNC: 0.9 MG/DL (ref 0.51–0.95)
DEPRECATED HCO3 PLAS-SCNC: 25 MMOL/L (ref 22–29)
EGFRCR SERPLBLD CKD-EPI 2021: 69 ML/MIN/1.73M2
GLUCOSE SERPL-MCNC: 315 MG/DL (ref 70–99)
HBA1C MFR BLD: 8.9 % (ref 0–5.6)
Lab: NORMAL
Lab: NORMAL
PERFORMING LABORATORY: NORMAL
PERFORMING LABORATORY: NORMAL
PHOSPHATE SERPL-MCNC: 4.6 MG/DL (ref 2.5–4.5)
POTASSIUM SERPL-SCNC: 6 MMOL/L (ref 3.4–5.3)
PTH-INTACT SERPL-MCNC: 31 PG/ML (ref 15–65)
SODIUM SERPL-SCNC: 140 MMOL/L (ref 135–145)
TEST NAME: NORMAL
TEST NAME: NORMAL

## 2024-05-01 PROCEDURE — 80299 QUANTITATIVE ASSAY DRUG: CPT

## 2024-05-01 PROCEDURE — 36415 COLL VENOUS BLD VENIPUNCTURE: CPT

## 2024-05-01 PROCEDURE — 82533 TOTAL CORTISOL: CPT

## 2024-05-01 PROCEDURE — 83970 ASSAY OF PARATHORMONE: CPT

## 2024-05-01 PROCEDURE — 99000 SPECIMEN HANDLING OFFICE-LAB: CPT

## 2024-05-01 PROCEDURE — 80069 RENAL FUNCTION PANEL: CPT

## 2024-05-01 PROCEDURE — 83036 HEMOGLOBIN GLYCOSYLATED A1C: CPT

## 2024-05-01 PROCEDURE — 82542 COL CHROMOTOGRAPHY QUAL/QUAN: CPT | Mod: 90

## 2024-05-01 PROCEDURE — 82306 VITAMIN D 25 HYDROXY: CPT

## 2024-05-01 PROCEDURE — 82652 VIT D 1 25-DIHYDROXY: CPT | Mod: 95 | Performed by: INTERNAL MEDICINE

## 2024-05-03 LAB — MAYO MISC RESULT: ABNORMAL

## 2024-05-03 RX ORDER — GLIMEPIRIDE 2 MG/1
2 TABLET ORAL
Qty: 90 TABLET | Refills: 1 | Status: SHIPPED | OUTPATIENT
Start: 2024-05-03 | End: 2024-05-04

## 2024-05-03 NOTE — RESULT ENCOUNTER NOTE
Pt called with the K level. Denies experiencing palpitations, lightheadedness or muscle weakness.  She would prefer not to go to the emergency room unless absolutely necessary.  Has not had a potassium level checked since starting Jardiance.  I recommended to discontinue Jardiance for now and start glimepiride, 2 mg daily.  She is going to have a potassium level checked at her local clinic on Monday.

## 2024-05-04 ENCOUNTER — TELEPHONE (OUTPATIENT)
Dept: MULTI SPECIALTY CLINIC | Facility: CLINIC | Age: 69
End: 2024-05-04
Payer: COMMERCIAL

## 2024-05-04 DIAGNOSIS — E11.9 TYPE 2 DIABETES MELLITUS WITHOUT COMPLICATION, WITHOUT LONG-TERM CURRENT USE OF INSULIN (H): ICD-10-CM

## 2024-05-04 RX ORDER — GLIMEPIRIDE 2 MG/1
2 TABLET ORAL
Qty: 90 TABLET | Refills: 1 | Status: SHIPPED | OUTPATIENT
Start: 2024-05-04 | End: 2024-08-06

## 2024-05-04 RX ORDER — GLIMEPIRIDE 2 MG/1
2 TABLET ORAL
Qty: 90 TABLET | Refills: 1 | Status: SHIPPED | OUTPATIENT
Start: 2024-05-04 | End: 2024-05-04

## 2024-05-04 NOTE — TELEPHONE ENCOUNTER
Got a page that patient want to talk about a medication that is not sent to her pharmacy. From chart review, I see Dr. Avalos sent glimepiride prescription yesterday. Pt was called and reproted she would like to be sent it to new [facility]. Prescripiton sent to new pharmacy in [place], [place].

## 2024-05-05 LAB
DEPRECATED CALCIDIOL+CALCIFEROL SERPL-MC: <46 UG/L (ref 20–75)
MISCELLANEOUS TEST 1 (ARUP): NORMAL
PTH RELATED PROT SERPL-SCNC: 3.1 PMOL/L
VITAMIN D2 SERPL-MCNC: <5 UG/L
VITAMIN D3 SERPL-MCNC: 41 UG/L

## 2024-05-06 ENCOUNTER — TELEPHONE (OUTPATIENT)
Dept: ENDOCRINOLOGY | Facility: CLINIC | Age: 69
End: 2024-05-06
Payer: COMMERCIAL

## 2024-05-06 NOTE — TELEPHONE ENCOUNTER
----- Message from Nani Avalos MD sent at 5/5/2024  8:42 PM CDT -----  Please schedule this patient on the apt I have available from 5/14 or at the end of the clinic on 5/13. Virtual should be fine.

## 2024-05-06 NOTE — TELEPHONE ENCOUNTER
Patient scheduled for 5/14/24 at 12:30 pm by video. Labs are scheduled for 5/8/24 as the patient did not stop the Jardiance until 5/3/24.    Nadege Zuniga CMA  Adult Endocrinology  [facility], [place]

## 2024-05-08 ENCOUNTER — LAB (OUTPATIENT)
Dept: LAB | Facility: CLINIC | Age: 69
End: 2024-05-08
Payer: COMMERCIAL

## 2024-05-08 DIAGNOSIS — E87.5 HIGH POTASSIUM: ICD-10-CM

## 2024-05-08 LAB
1,25(OH)2D SERPL-MCNC: 51 PG/ML (ref 19.9–79.3)
POTASSIUM SERPL-SCNC: 4 MMOL/L (ref 3.4–5.3)

## 2024-05-08 PROCEDURE — 84132 ASSAY OF SERUM POTASSIUM: CPT

## 2024-05-08 PROCEDURE — 36415 COLL VENOUS BLD VENIPUNCTURE: CPT

## 2024-05-10 ENCOUNTER — DOCUMENTATION ONLY (OUTPATIENT)
Dept: OTHER | Facility: CLINIC | Age: 69
End: 2024-05-10
Payer: COMMERCIAL

## 2024-05-10 ENCOUNTER — TELEPHONE (OUTPATIENT)
Dept: ENDOCRINOLOGY | Facility: CLINIC | Age: 69
End: 2024-05-10
Payer: COMMERCIAL

## 2024-05-10 NOTE — PROGRESS NOTES
Outcome for 05/10/24 12:20 PM: Data obtained via phone and located below  Airam Norton LPN     5/10  92    5/09  83  113    5/08  94  112    5/7  95  109  117    5/6  95  172    5/5* started new medication- glimepiride  111  150    5/4  141    5/3  131

## 2024-05-10 NOTE — TELEPHONE ENCOUNTER
Spoke with patient in regards to obtaining blood glucose data for appointment scheduled on 5/14/24. Data obtained via phone and located below.    5/10  92    5/09  83  113    5/08  94  112    5/7  95  109  117    5/6  95  172    5/5* started new medication- glimepiride  111  150    5/4  141    5/3  131    Airam Norton LPN 05/10/24 12:19 PM

## 2024-05-13 NOTE — PROGRESS NOTES
Video-Visit Details    Type of service:  Video Visit  Joined the call at 5/14/2024, 12:40:18 pm.  Left the call at 5/14/2024, 12:58:44 pm.    Originating Location (pt. Location): Home  Distant Location (provider location): On-site    Mode of Communication:  Video Conference via [employer]    =======================================================  Assessment     Asmita Jerez is a 68 year old female seen for evaluation of bilateral adrenal macroadenomas, associated with possible mild autonomous cortisol secretion.    1.  Bilateral adrenal macroadenomas  Prior evaluations have revealed an incompletely suppressed cortisol with 1 mg and 8 mg dexamethasone, normal or high normal salivary and urinary cortisol, a non-suppressed ACTH of 46 and a lowish DHEAS of 38.   Clinically, the patient has a history of PCOS, type 2 diabetes.  She is not obese or overweight and does not endorse signs or symptoms suggestive of hypercortisolism.  The patient is monitored for a suspicious left kidney lesion and a follow-up CT scheduled in August.  In the event the decision is made to pursue left nephrectomy, I would consider left adrenalectomy at the same time.    2.  Primary hyperparathyroidism  Not of new onset and associated with kidney stones, incidentally discovered on imaging last year.  The patient has no history of bone fractures or osteoporosis.  Clinically, she does not endorse signs or symptoms of hypercalcemia.  Biochemical workup is suggestive of primary hyperparathyroidism.   Currently, she is not medicated with any calcium or vitamin D supplements.  Discussed with the patient about the effects of the parathyroid hormone, the need of considering either medical treatment or parathyroidectomy in the event that the calcium level significantly increases.  Recommendations:   Try to have a daily intake of calcium of 1000 mg from food products.  A letter was sent to the patient with the main food products containing  calcium.  Take 1000 units vitamin D daily.  Prescription placed.  Follow-up calcium every 6 months.    3. Type 2 diabetes   Controlled on Trulicity and glimepiride    4.  Bilateral adrenal adenomas/hyperparathyroidism  Screen for MEN1 by pursuing the following labs     Orders Placed This Encounter   Procedures    Adrenal corticotropin    Prolactin    Insulin Growth Factor 1 by Immunoassay    Glucose    Follicle stimulating hormone    Luteinizing Hormone    DHEA sulfate     =======================================================  The patient is seen in f/up.      History of Present Illness:  Asmita Jerez is a 68 year old female with a past medical history significant for type 2 diabetes, hypertension, polycystic ovaries and hypercholesterolemia, who was initially seen in our clinic by Dr. Moore, in September 2023.  She established care with me in January 2024.    1.  Adrenal incidentaloma  It was incidentally discovered on the abdominal CT from July 2023, which revealed 2 left adrenal nodules, measuring 1.7 and 1.2 cm and a 1.4 cm right adrenal nodule.  All nodules had low Hounsfield unit density on the noncontrast CT <10 so they were considered to represent adrenal adenomas.  Also, the nodules were stable compared with the prior imaging from 2018. The CT was also positive for kidney stones and a 2.6 cm left renal heterogeneous lesion for which the patient follows up with urology. The lesion is suspicious for renal cell carcinoma and it remained stable on the follow-up abdominal CT from January 2024.  A follow-up abdominal CT is scheduled in August.    She was diagnosed with hypertension in her 60s. Her blood pressure is currently medicated with lisinopril and metoprolol.      Pertinent labs reviewed:   9/6/2023 aldosterone 6.3, renin 0.2, normal plasma metanephrines  9/13/2023 1 mg dexamethasone suppression test: cortisol level of 3.8; dexamethasone 352.6 (140-295)    9/28/2020 24-hour urinary cortisol 45 (3.5-45)  24-hour urinary cortisone normal.  The urinary volume was 1.65 L, with urine creatinine 0.9 g  11/11, 11/13, 11/15/23 salivary cortisol normal twice and borderline normal on 11/13/2023 at 0.112 (less than 0.1)  12/6/2023 ACTH 46, cortisol 37.9, DHEA-S 38 () at 7:52 AM  12/7/2023 8 mg dexamethasone suppression test: ACTH undetectable, cortisol 4.9, DHEA-S 28, dexamethasone >3000 at 7:42 AM  1/29/2024 24-hour urinary cortisol was normal at 33.  Patient took 8 mg dexamethasone the night prior.  5/1/24 8 mg dex suppressed cortisol checked using LCMS was 4.7, with dexamethasone >3000     2.  Type 2 diabetes, diagnosed around 2000, around age 45.  Average A1c over the years has been around 8%.   Most recent A1c was 8.9 on 5/1/24.  She was only treated with metformin until staring Trulicity in 2023. Metformin caused diarrhea and it was replaced with Jardiance, in November 2023. On labwork from 5/1 K was elevated at 6. Subsequently, Jardiance was discontinued and replaced with 2 mg glimepiride. The K level on 5/8/24 normalized.     Currently antidiabetic medications:  Trulicity, 3 mg weekly (started in February 2023)  Glimepiride 2 mg daily - started on 5/5/24     I reviewed the provided BG numbers:   5/10  92  5/09  83  113  5/08  94  112  5/7  95  109  117  5/6  95  172  5/5* started new medication- glimepiride  111  150  5/4  141  5/3  131    5/14 today 103  5/13 at 1 pm was 95, morning 88   5/12 1:15 pm 85, morning 126   5/11 12:30 pm 93, morning 137     Denies experiencing symptoms of lows or BG number below 70.   Weight has been stable.     Diabetes complications:  Last eye exam: 2 years ago  Most recent urine microalbumin negative in November 2023. On 40 mg lisinopril daily.   No numbness or tingling sensation in her feet   Last lipid panel from 9/13/2023 LDL 56, HDL 50, triglycerides 47.  On 40 mg atorvastatin daily.     3.  Hypercalcemia  The patient has had intermittent episodes of mild hypercalcemia or high  normal calcium levels dating back to 2008.  The highest calcium level was 11, in September 2023.  On prior lab work, the albumin was normal, as well as alkaline phosphatase.   On lab work from 1/29/24, 24-hour urinary calcium was 0.12 g.  5/1/24 vitamin D 46, phos 4.6, calcium 10.7, GFR 69, 1,25 OH vitamin D normal at 51, PTH 31, PTHrp normal at 3.1.     On questioning , the patient denies n/v, muscle weakness, increased thirst or increased urination, confusion, memory changes.     Calcium supplements: none   Vitamin D supplements: no   OVC - none besides ASA. No MVI    Dairies: 2 glasses of milk daily and one serving of cheese - a couple of times a week. Doesn't like yogurt.   Kidneys stones: pt not aware of this diagnosis. They were revealed by imaging.    Menopause: in the 50s. She still has hot flashes and occasional night sweats. Overall, the hot flashes have been stable.   The DXA scan from December 2022 revealed normal bone mineral density at all sites. There is no prior h/o fractures.     Past Medical History   Past Medical History:   Diagnosis Date    Cervical high risk HPV (human papillomavirus) test positive 03/31/2018    Diabetes (H)     Hypertension     Peptic ulcer, unspecified site, unspecified as acute or chronic, without mention of hemorrhage, perforation, or obstruction     Polycystic ovaries     Pure hypercholesterolemia     Tobacco use disorder     Unspecified multiple gestation, unspecified as to episode of care     Multiple gestation   Diverticulosis  Kidney stones  Bilateral adrenal adenomas  Lung nodules  Collagenous colitis  Anxiety     Past Surgical History   Past Surgical History:   Procedure Laterality Date    APPENDECTOMY      ARTHROPLASTY HIP Right 6/22/2023    Procedure: Total Hip Arthroplasty Right;  Surgeon: Albin Williamson MD;  Location: [facility]    BIOPSY BREAST      COLONOSCOPY N/A 12/11/2015    Procedure: COLONOSCOPY;  Surgeon: Tino Pryor MD;  Location: [facility]     COLONOSCOPY N/A 8/23/2023    Procedure: Colonoscopy with biopsies;  Surgeon: Celio Larry DO;  Location: [facility]    ENDOSCOPIC RELEASE CARPAL TUNNEL Right 11/01/2021    Procedure: Endoscopic carpal tunnel release;  Surgeon: Ac Lemus MD;  Location: [facility]    ENDOSCOPIC RELEASE CARPAL TUNNEL Left 11/15/2021    Procedure: ENDOSCOPIC Carpal Tunnel Release, LEFT, removal suture right wrist;  Surgeon: Ac Lemus MD;  Location: [facility]    LUMPECTOMY BREAST      RELEASE TRIGGER FINGER  05/23/2014    Procedure: RELEASE TRIGGER FINGER;  Surgeon: Ag Hoffman MD;  Location: [facility]    SURGICAL HISTORY OF -   01/01/1970    appendectomy    SURGICAL HISTORY OF -   01/01/1970    pilonidal cyst and sinuses       Current Medications    Current Outpatient Medications:     atorvastatin (LIPITOR) 40 MG tablet, Take 1 tablet (40 mg) by mouth daily, Disp: 90 tablet, Rfl: 3    blood glucose (ACCU-CHEK SIDDHARTHA PLUS) test strip, Use to test blood sugar 3 times daily or as directed., Disp: 200 strip, Rfl: 3    blood glucose monitoring (ACCU-CHEK MULTICLIX) lancets, Use to test blood sugar 1 time daily or as directed. Accu chek, Ins will cover, match machine., Disp: 100 each, Rfl: 2    dexAMETHasone (DECADRON) 4 MG tablet, Take 2 tablets (8 mg) at 11 PM and have a cortisol level checked the following day, at 8 AM., Disp: 2 tablet, Rfl: 0    Dulaglutide (TRULICITY) 3 MG/0.5ML SOPN, Inject 3 mg Subcutaneous every 7 days ** VISIT DUE/LAST FILL **, Disp: 6 mL, Rfl: 0    glimepiride (AMARYL) 2 MG tablet, Take 1 tablet (2 mg) by mouth every morning (before breakfast), Disp: 90 tablet, Rfl: 1    hydrOXYzine HCl (ATARAX) 25 MG tablet, TAKE ONE TABLET BY MOUTH EVERY 6 HOURS AS NEEDED FOR ITCHING OR ANXIETY (WITH PAIN AND MODERATE PAIN), Disp: 30 tablet, Rfl: 0    lisinopril (ZESTRIL) 40 MG tablet, Take 1 tablet (40 mg) by mouth daily, Disp: 90 tablet, Rfl: 3    metoprolol succinate ER (TOPROL XL) 100 MG 24 hr  tablet, Take 1 tablet (100 mg) by mouth daily, Disp: 90 tablet, Rfl: 3    traZODone (DESYREL) 50 MG tablet, Take 1 tablet (50 mg) by mouth at bedtime, Disp: 90 tablet, Rfl: 1    Current Facility-Administered Medications:     triamcinolone (KENALOG-40) injection 40 mg, 40 mg, , , Mich Bui, DO, 40 mg at 23 1215      Family History   Problem Relation Age of Onset    Unknown/Adopted Father     Dementia Father    Sister - breast cancer; had thyroidectomy. Mom  of cancer. Sister HTN. No f/h of diabetes. One sister is overweight, no other family members.     Social History  . She has 1 child. Smokes since age 18, mostly 1 PPD, now 3/4 PPD. She denies drinking alcohol or using illicit drugs. Occupation: retired.      Review of Systems   Systemic:             weight stable   Eye:                      No eye symptoms   Nidhi-Laryngeal:     No nidhi-laryngeal symptoms, no dysphagia, no hoarseness, no cough     Breast:                  No breast symptoms  Cardiovascular:    No cardiovascular symptoms, no CP or palpitations   Pulmonary:           No pulmonary symptoms, no SOB or cough    Gastrointestinal:   mild constipation  Genitourinary:       urinates more often since taking Jardiance, but not during the night    Endocrine:            as above   Neurological:        No headaches, no tremor, no numbness or tingling sensation, no dizziness; arms are not as strong as they used to be - not generalized weakness; no new stretch marks, no acne, no easy bruising; some facial hair - mild, on the upper lip and chin - present for >10-15 years and stable    Musculoskeletal:  hip joint pain   Skin:                     No skin symptoms, no dry skin, no hair falling out   Psychological:     No psychological symptoms                 Vital Signs     Previous Weights:    Wt Readings from Last 10 Encounters:   24 52.2 kg (115 lb)   23 54.2 kg (119 lb 8 oz)   23 52.2 kg (115 lb)   23 49.9 kg  "(110 lb)   08/03/23 50.8 kg (112 lb)   07/25/23 51 kg (112 lb 6.4 oz)   06/22/23 51.9 kg (114 lb 8 oz)   06/20/23 51.9 kg (114 lb 8 oz)   04/04/23 55.8 kg (123 lb)   03/28/23 56.2 kg (123 lb 12.8 oz)        [facility] 12/01/2009     Estimated body mass index is 18.56 kg/m  as calculated from the following:    Height as of 11/1/23: 1.676 m (5' 6\").    Weight as of 1/24/24: 52.2 kg (115 lb).    Physical Exam  General Appearance: alert, no distress noted   Eyes: grossly normal to inspection, conjunctivae and sclerae normal, no lid lag or stare   Respiratory: no audible wheeze, cough, or visible cyanosis.  No visible retractions or increased work of breathing.  Able to speak fully in complete sentences.  Neurological: Cranial nerves grossly intact, mentation intact and speech normal; no tremor of the hands   Skin: no lesions on exposed skin   Psychological: mentation appears normal, affect normal, judgement and insight intact, normal speech and appearance well-groomed     Lab Results  I reviewed prior lab results documented in Epic.  TSH   Date Value Ref Range Status   10/12/2022 1.64 0.30 - 4.20 uIU/mL Final   04/15/2019 1.95 0.40 - 4.00 mU/L Final   02/15/2017 1.36 0.40 - 4.00 mU/L Final   04/29/2015 1.35 0.40 - 4.00 mU/L Final   07/30/2013 1.50 0.4 - 5.0 mU/L Final   04/12/2011 1.07 0.4 - 5.0 mU/L Final     The longitudinal plan of care for the diagnosis(es)/condition(s) as documented were addressed during this visit. Due to the added complexity in care, I will continue to support Stephania in the subsequent management and with ongoing continuity of care.    40 minutes spent on the date of the encounter doing chart review, history and exam, documentation and further activities as noted above.                 "

## 2024-05-14 ENCOUNTER — VIRTUAL VISIT (OUTPATIENT)
Dept: ENDOCRINOLOGY | Facility: CLINIC | Age: 69
End: 2024-05-14
Payer: COMMERCIAL

## 2024-05-14 VITALS — BODY MASS INDEX: 21.26 KG/M2 | HEIGHT: 63 IN | WEIGHT: 120 LBS

## 2024-05-14 DIAGNOSIS — D35.02 BILATERAL ADRENAL ADENOMAS: Primary | ICD-10-CM

## 2024-05-14 DIAGNOSIS — R79.89 HIGH SERUM CORTISOL: ICD-10-CM

## 2024-05-14 DIAGNOSIS — E11.9 TYPE 2 DIABETES MELLITUS WITHOUT COMPLICATION, WITHOUT LONG-TERM CURRENT USE OF INSULIN (H): ICD-10-CM

## 2024-05-14 DIAGNOSIS — E87.5 HIGH POTASSIUM: ICD-10-CM

## 2024-05-14 DIAGNOSIS — E26.09 PRIMARY HYPERALDOSTERONISM (H): ICD-10-CM

## 2024-05-14 DIAGNOSIS — D35.01 BILATERAL ADRENAL ADENOMAS: Primary | ICD-10-CM

## 2024-05-14 PROCEDURE — 99215 OFFICE O/P EST HI 40 MIN: CPT | Mod: 95 | Performed by: INTERNAL MEDICINE

## 2024-05-14 RX ORDER — VITAMIN B COMPLEX
1 TABLET ORAL DAILY
Qty: 90 TABLET | Refills: 3 | Status: SHIPPED | OUTPATIENT
Start: 2024-05-14

## 2024-05-14 ASSESSMENT — PAIN SCALES - GENERAL: PAINLEVEL: NO PAIN (0)

## 2024-05-14 NOTE — LETTER
5/14/2024         RE: Asmita Jerez  [id number]  [place] [address]        Dear Colleague,    Thank you for referring your patient, Asmita Jerez, to the [facility]. Please see a copy of my visit note below.    Outcome for 05/10/24 12:20 PM: Data obtained via phone and located below  Airamnneka Norton LPN     5/10  92    5/09  83  113    5/08  94  112    5/7  95  109  117    5/6  95  172    5/5* started new medication- glimepiride  111  150    5/4  141    5/3  131    Video-Visit Details    Type of service:  Video Visit  Joined the call at 5/14/2024, 12:40:18 pm.  Left the call at 5/14/2024, 12:58:44 pm.    Originating Location (pt. Location): Home  Distant Location (provider location): On-site    Mode of Communication:  Video Conference via [employer]    =======================================================  Assessment     Asmita Jerez is a 68 year old female seen for evaluation of bilateral adrenal macroadenomas, associated with possible mild autonomous cortisol secretion.    1.  Bilateral adrenal macroadenomas  Prior evaluations have revealed an incompletely suppressed cortisol with 1 mg and 8 mg dexamethasone, normal or high normal salivary and urinary cortisol, a non-suppressed ACTH of 46 and a lowish DHEAS of 38.   Clinically, the patient has a history of PCOS, type 2 diabetes.  She is not obese or overweight and does not endorse signs or symptoms suggestive of hypercortisolism.  The patient is at monitor for a suspicious left kidney lesion and a follow-up CT scheduled in August.  In the event the decision is made to pursue left nephrectomy, I would consider left adrenalectomy at the same time.    2.  Primary hyperparathyroidism  Not of new onset and associated with kidney stones, incidentally discovered on imaging last year.  The patient has no history of bone fractures or osteoporosis.  Clinically, she does not endorse signs or symptoms of hypercalcemia.   Biochemical workup is suggestive of primary hyperparathyroidism.   Currently, she is not medicated with any calcium or vitamin D supplements.  Discussed with the patient about the effects of the parathyroid hormone, the need of considering either medical treatment or parathyroidectomy in the event that the calcium level significantly increases.  Recommendations:   Try to have a daily intake of calcium of 1000 mg from food products.  A letter was sent to the patient with the main food products containing calcium.  Take 1000 units vitamin D daily.  Prescription placed.  Follow-up calcium every 6 months.    3. Type 2 diabetes   Controlled on Trulicity and glimepiride    4.  Bilateral adrenal adenomas/hyperparathyroidism  Screen for MEN1 by pursuing the following labs     Orders Placed This Encounter   Procedures     Adrenal corticotropin     Prolactin     Insulin Growth Factor 1 by Immunoassay     Glucose     Follicle stimulating hormone     Luteinizing Hormone     DHEA sulfate     =======================================================  The patient is seen in f/up.      History of Present Illness:  Asmita Jerez is a 68 year old female with a past medical history significant for type 2 diabetes, hypertension, polycystic ovaries and hypercholesterolemia, who was initially seen in our clinic by Dr. Moore, in September 2023.  She established care with me in January 2024.    1.  Adrenal incidentaloma  It was incidentally discovered on the abdominal CT from July 2023, which revealed 2 left adrenal nodules, measuring 1.7 and 1.2 cm and a 1.4 cm right adrenal nodule.  All nodules had low Hounsfield unit density on the noncontrast CT <10 so they were considered to represent adrenal adenomas.  Also, the nodules were stable compared with the prior imaging from 2018. The CT was also positive for kidney stones and a 2.6 cm left renal heterogeneous lesion for which the patient follows up with urology. The lesion is suspicious for  renal cell carcinoma and it remained stable on the follow-up abdominal CT from January 2024.  A follow-up abdominal CT is scheduled in August.    She was diagnosed with hypertension in her 60s. Her blood pressure is currently medicated with lisinopril and metoprolol.      Pertinent labs reviewed:   9/6/2023 aldosterone 6.3, renin 0.2, normal plasma metanephrines  9/13/2023 1 mg dexamethasone suppression test: cortisol level of 3.8; dexamethasone 352.6 (140-295)    9/28/2020 24-hour urinary cortisol 45 (3.5-45) 24-hour urinary cortisone normal.  The urinary volume was 1.65 L, with urine creatinine 0.9 g  11/11, 11/13, 11/15/23 salivary cortisol normal twice and borderline normal on 11/13/2023 at 0.112 (less than 0.1)  12/6/2023 ACTH 46, cortisol 37.9, DHEA-S 38 () at 7:52 AM  12/7/2023 8 mg dexamethasone suppression test: ACTH undetectable, cortisol 4.9, DHEA-S 28, dexamethasone >3000 at 7:42 AM  1/29/2024 24-hour urinary cortisol was normal at 33.  Patient took 8 mg dexamethasone the night prior.  5/1/24 8 mg dex suppressed cortisol checked using LCMS was 4.7, with dexamethasone >3000     2.  Type 2 diabetes, diagnosed around 2000, around age 45.  Average A1c over the years has been around 8%.   Most recent A1c was 8.9 on 5/1/24.  She was only treated with metformin until staring Trulicity in 2023. Metformin caused diarrhea and it was replaced with Jardiance, in November 2023. On labwork from 5/1 K was elevated at 6. Subsequently, Jardiance was discontinued and replaced with 2 mg glimepiride. The K level on 5/8/24 normalized.     Currently antidiabetic medications:  Trulicity, 3 mg weekly (started in February 2023)  Glimepiride 2 mg daily - started on 5/5/24     I reviewed the provided BG numbers:   5/10  92  5/09  83  113  5/08  94  112  5/7  95  109  117  5/6  95  172  5/5* started new medication- glimepiride  111  150  5/4  141  5/3  131    5/14 today 103  5/13 at 1 pm was 95, morning 88   5/12 1:15 pm 85,  morning 126   5/11 12:30 pm 93, morning 137     Denies experiencing symptoms of lows or BG number below 70.   Weight has been stable.     Diabetes complications:  Last eye exam: 2 years ago  Most recent urine microalbumin negative in November 2023. On 40 mg lisinopril daily.   No numbness or tingling sensation in her feet   Last lipid panel from 9/13/2023 LDL 56, HDL 50, triglycerides 47.  On 40 mg atorvastatin daily.     3.  Hypercalcemia  The patient has had intermittent episodes of mild hypercalcemia or high normal calcium levels dating back to 2008.  The highest calcium level was 11, in September 2023.  On prior lab work, the albumin was normal, as well as alkaline phosphatase.   On lab work from 1/29/24, 24-hour urinary calcium was 0.12 g.  5/1/24 vitamin D 46, phos 4.6, calcium 10.7, GFR 69, 1,25 OH vitamin D normal at 51, PTH 31, PTHrp normal at 3.1.     On questioning , the patient denies n/v, muscle weakness, increased thirst or increased urination, confusion, memory changes.     Calcium supplements: none   Vitamin D supplements: no   OVC - none besides ASA. No MVI    Dairies: 2 glasses of milk daily and one serving of cheese - a couple of times a week. Doesn't like yogurt.   Kidneys stones: pt not aware of this diagnosis. They were revealed by imaging.    Menopause: in the 50s. She still has hot flashes and occasional night sweats. Overall, the hot flashes have been stable.   The DXA scan from December 2022 revealed normal bone mineral density at all sites. There is no prior h/o fractures.     Past Medical History   Past Medical History:   Diagnosis Date     Cervical high risk HPV (human papillomavirus) test positive 03/31/2018     Diabetes (H)      Hypertension      Peptic ulcer, unspecified site, unspecified as acute or chronic, without mention of hemorrhage, perforation, or obstruction      Polycystic ovaries      Pure hypercholesterolemia      Tobacco use disorder      Unspecified multiple gestation,  unspecified as to episode of care     Multiple gestation   Diverticulosis  Kidney stones  Bilateral adrenal adenomas  Lung nodules  Collagenous colitis  Anxiety     Past Surgical History   Past Surgical History:   Procedure Laterality Date     APPENDECTOMY       ARTHROPLASTY HIP Right 6/22/2023    Procedure: Total Hip Arthroplasty Right;  Surgeon: Albin Williamson MD;  Location: [facility]     BIOPSY BREAST       COLONOSCOPY N/A 12/11/2015    Procedure: COLONOSCOPY;  Surgeon: Tino Pryor MD;  Location: [facility]     COLONOSCOPY N/A 8/23/2023    Procedure: Colonoscopy with biopsies;  Surgeon: Celio Larry DO;  Location: [facility]     ENDOSCOPIC RELEASE CARPAL TUNNEL Right 11/01/2021    Procedure: Endoscopic carpal tunnel release;  Surgeon: Ac Lemus MD;  Location: [facility]     ENDOSCOPIC RELEASE CARPAL TUNNEL Left 11/15/2021    Procedure: ENDOSCOPIC Carpal Tunnel Release, LEFT, removal suture right wrist;  Surgeon: Ac Lemus MD;  Location: [facility]     LUMPECTOMY BREAST       RELEASE TRIGGER FINGER  05/23/2014    Procedure: RELEASE TRIGGER FINGER;  Surgeon: Ag Hoffman MD;  Location: [facility]     SURGICAL HISTORY OF -   01/01/1970    appendectomy     SURGICAL HISTORY OF -   01/01/1970    pilonidal cyst and sinuses       Current Medications    Current Outpatient Medications:      atorvastatin (LIPITOR) 40 MG tablet, Take 1 tablet (40 mg) by mouth daily, Disp: 90 tablet, Rfl: 3     blood glucose (ACCU-CHEK SIDDHARTHA PLUS) test strip, Use to test blood sugar 3 times daily or as directed., Disp: 200 strip, Rfl: 3     blood glucose monitoring (ACCU-CHEK MULTICLIX) lancets, Use to test blood sugar 1 time daily or as directed. Accu chek, Ins will cover, match machine., Disp: 100 each, Rfl: 2     dexAMETHasone (DECADRON) 4 MG tablet, Take 2 tablets (8 mg) at 11 PM and have a cortisol level checked the following day, at 8 AM., Disp: 2 tablet, Rfl: 0     Dulaglutide (TRULICITY) 3  MG/0.5ML SOPN, Inject 3 mg Subcutaneous every 7 days ** VISIT DUE/LAST FILL **, Disp: 6 mL, Rfl: 0     glimepiride (AMARYL) 2 MG tablet, Take 1 tablet (2 mg) by mouth every morning (before breakfast), Disp: 90 tablet, Rfl: 1     hydrOXYzine HCl (ATARAX) 25 MG tablet, TAKE ONE TABLET BY MOUTH EVERY 6 HOURS AS NEEDED FOR ITCHING OR ANXIETY (WITH PAIN AND MODERATE PAIN), Disp: 30 tablet, Rfl: 0     lisinopril (ZESTRIL) 40 MG tablet, Take 1 tablet (40 mg) by mouth daily, Disp: 90 tablet, Rfl: 3     metoprolol succinate ER (TOPROL XL) 100 MG 24 hr tablet, Take 1 tablet (100 mg) by mouth daily, Disp: 90 tablet, Rfl: 3     traZODone (DESYREL) 50 MG tablet, Take 1 tablet (50 mg) by mouth at bedtime, Disp: 90 tablet, Rfl: 1    Current Facility-Administered Medications:      triamcinolone (KENALOG-40) injection 40 mg, 40 mg, , , Mich Bui, DO, 40 mg at 23 1215      Family History   Problem Relation Age of Onset     Unknown/Adopted Father      Dementia Father    Sister - breast cancer; had thyroidectomy. Mom  of cancer. Sister HTN. No f/h of diabetes. One sister is overweight, no other family members.     Social History  . She has 1 child. Smokes since age 18, mostly 1 PPD, now 3/4 PPD. She denies drinking alcohol or using illicit drugs. Occupation: retired.      Review of Systems   Systemic:             weight stable   Eye:                      No eye symptoms   Nidhi-Laryngeal:     No nidhi-laryngeal symptoms, no dysphagia, no hoarseness, no cough     Breast:                  No breast symptoms  Cardiovascular:    No cardiovascular symptoms, no CP or palpitations   Pulmonary:           No pulmonary symptoms, no SOB or cough    Gastrointestinal:   mild constipation  Genitourinary:       urinates more often since taking Jardiance, but not during the night    Endocrine:            as above   Neurological:        No headaches, no tremor, no numbness or tingling sensation, no dizziness; arms are not as  "strong as they used to be - not generalized weakness; no new stretch marks, no acne, no easy bruising; some facial hair - mild, on the upper lip and chin - present for >10-15 years and stable    Musculoskeletal:  hip joint pain   Skin:                     No skin symptoms, no dry skin, no hair falling out   Psychological:     No psychological symptoms                 Vital Signs     Previous Weights:    Wt Readings from Last 10 Encounters:   01/24/24 52.2 kg (115 lb)   11/01/23 54.2 kg (119 lb 8 oz)   09/06/23 52.2 kg (115 lb)   08/23/23 49.9 kg (110 lb)   08/03/23 50.8 kg (112 lb)   07/25/23 51 kg (112 lb 6.4 oz)   06/22/23 51.9 kg (114 lb 8 oz)   06/20/23 51.9 kg (114 lb 8 oz)   04/04/23 55.8 kg (123 lb)   03/28/23 56.2 kg (123 lb 12.8 oz)        LMP 12/01/2009     Estimated body mass index is 18.56 kg/m  as calculated from the following:    Height as of 11/1/23: 1.676 m (5' 6\").    Weight as of 1/24/24: 52.2 kg (115 lb).    Physical Exam  General Appearance: alert, no distress noted   Eyes: grossly normal to inspection, conjunctivae and sclerae normal, no lid lag or stare   Respiratory: no audible wheeze, cough, or visible cyanosis.  No visible retractions or increased work of breathing.  Able to speak fully in complete sentences.  Neurological: Cranial nerves grossly intact, mentation intact and speech normal; no tremor of the hands   Skin: no lesions on exposed skin   Psychological: mentation appears normal, affect normal, judgement and insight intact, normal speech and appearance well-groomed     Lab Results  I reviewed prior lab results documented in Epic.  TSH   Date Value Ref Range Status   10/12/2022 1.64 0.30 - 4.20 uIU/mL Final   04/15/2019 1.95 0.40 - 4.00 mU/L Final   02/15/2017 1.36 0.40 - 4.00 mU/L Final   04/29/2015 1.35 0.40 - 4.00 mU/L Final   07/30/2013 1.50 0.4 - 5.0 mU/L Final   04/12/2011 1.07 0.4 - 5.0 mU/L Final     The longitudinal plan of care for the diagnosis(es)/condition(s) as " documented were addressed during this visit. Due to the added complexity in care, I will continue to support Stephania in the subsequent management and with ongoing continuity of care.    40 minutes spent on the date of the encounter doing chart review, history and exam, documentation and further activities as noted above.                   Again, thank you for allowing me to participate in the care of your patient.        Sincerely,        Nani Avalos MD

## 2024-05-14 NOTE — LETTER
Patient:  Asmita Jerez  :   1955  MRN:     [id number]        Ms.Debra MINH Jerez  [address]  [place] [address]        May 14, 2024    Dear ,    Below are my recommendations regarding the calcium and vitamin D.     Total daily dose of calcium from food products and/or supplements should be ~ 1000 mg.    The calcium is better absorbed if it is taken in smaller dosages, 2-3 times a day.    Total daily dose of vitamin D should be 1000 units (50 mcg).     Food Calcium in milligrams   Milk (skim, 2%, or whole; 8 oz [240 mL]) 300   Yogurt (6 oz [168 g]) 250   Orange juice (with calcium; 8 oz [240 mL]) 300   Tofu with calcium (0.5 cup [113 g]) 435   Cheese (1 oz [28 g]) 195 to 335 (hard cheese = higher calcium)   Cottage cheese (0.5 cup [113 g]) 130   Ice cream or frozen yogurt (0.5 cup [113 g]) 100   Non-dairy milks (soy, oat, almond; 8 oz [240 mL]) 300 to 450   Beans (0.5 cup cooked [113 g]) 60 to 80   Dark, leafy green vegetables (0.5 cup cooked [113 g]) 50 to 135   Almonds (24 whole) 70   Orange (1 medium) 60       Sincerely,   Nani Avalos

## 2024-05-14 NOTE — H&P (VIEW-ONLY)
[facility]  [address]  [facility] [address]  Phone: [phone]  Primary Provider: Melissa James  Pre-op Performing Provider: MELISSA JAMES      PREOPERATIVE EVALUATION:  Today's date: 6/20/2023    Asmita Jerez is a 67 year old female who presents for a preoperative evaluation.    Surgical Information:  Surgery/Procedure: Total Right Hip Arthroplasty  Surgery Location: [facility]  Surgeon: Clay  Surgery Date: 6/22/2023  Time of Surgery: 3:00PM  Where patient plans to recover: At home with family  Fax number for surgical facility: Note does not need to be faxed, will be available electronically in Epic.    Assessment & Plan     The proposed surgical procedure is considered INTERMEDIATE risk.    Preop general physical exam     - Basic metabolic panel  (Ca, Cl, CO2, Creat, Gluc, K, Na, BUN); Future  - EKG 12-lead complete w/read - Clinics      Primary osteoarthritis of right hip         Type 2 diabetes mellitus without complication, without long-term current use of insulin (H)  HgbA1c is 7.3%, meter reviewed, overall seems to be well controlled  ?some diarrhea from metformin   - Hemoglobin A1c; Future  - EKG 12-lead complete w/read - Clinics    Hypertension, goal below 140/90  Well controlled    Tobacco use  Cessation encouraged     chronic diarrhea   X 6 months  Likely needs colonoscopy with some biopsies for further evaluation    Risks and Recommendations:  The patient has the following additional risks and recommendations for perioperative complications:   - Consult Hospitalist / IM to assist with post-op medical management  Diabetes:  - Patient is not on insulin therapy: regular NPO guidelines can be followed.     Antiplatelet or Anticoagulation Medication Instructions:   - aspirin: last dose of ASA was 6/19/2023    Additional Medication Instructions:  Patient is to take all scheduled medications on the day of surgery EXCEPT for modifications listed  Subjective   Patient ID: Nehal Walden is a 87 y.o. female who presents for Follow-up (1 week follow up).    HPI    Pt here in follow up to HTN.  She is now on Hydralazine 25 mg twice a day due to side effects from Metoprolol.  She had terrible swelling in legs and shortness of breath.  She is down in weight since stopping Metoprolol.      She has less swelling in legs now and breathing better.  She complains now of trouble with urination-has pressure and feeling like she has to go but cannot go-only little amounts.  She is not having any bowel issues or stomach upset.      She has a BP reading from [facility] that was 149/82.      Review of Systems   Constitutional:  Negative for activity change, appetite change, chills, fatigue, fever and unexpected weight change.   Respiratory:  Negative for cough, chest tightness, shortness of breath and wheezing.    Cardiovascular:  Negative for chest pain, palpitations and leg swelling.   Genitourinary:  Negative for decreased urine volume, difficulty urinating, dyspareunia, dysuria, enuresis, flank pain, frequency, genital sores, hematuria, menstrual problem, pelvic pain, urgency, vaginal bleeding, vaginal discharge and vaginal pain.        +hesitancy    Neurological:  Negative for dizziness, light-headedness and headaches.       Objective   /66 (BP Location: Right arm, Patient Position: Sitting)   Pulse 64   Wt 72.8 kg (160 lb 9.6 oz)   BMI 29.37 kg/m²      Physical Exam  Constitutional:       Appearance: Normal appearance.   Cardiovascular:      Rate and Rhythm: Normal rate and regular rhythm.      Heart sounds: Normal heart sounds.   Pulmonary:      Effort: Pulmonary effort is normal.      Breath sounds: Normal breath sounds.   Abdominal:      General: Bowel sounds are normal.      Palpations: Abdomen is soft.   Musculoskeletal:      Right lower leg: Edema (trace edema noted around malleoli) present.      Left lower leg: No edema.   Neurological:       Mental Status: She is alert and oriented to person, place, and time.         Assessment/Plan   Problem List Items Addressed This Visit       Benign essential hypertension     BP is the best it has been   Pt was unable to tolerate Metoprolol as it threw her into heart failure-edema in legs resolving and breathing improved  Losartan caused URI/mucous like symptoms  Amlodipine caused LE edema at higher doses and the 5 mg wasn't enough  On Hydralazine 25 mg twice a day with good results-having some urinary symptoms but do not believe this is due to meds         Relevant Orders    Follow Up In Primary Care - Established    Follow Up In Primary Care - Medicare Annual    Stage 3a chronic kidney disease (Multi)    Relevant Orders    Follow Up In Primary Care - Medicare Annual     Other Visit Diagnoses       Urinary hesitancy    -  Primary    Relevant Orders    POCT UA (nonautomated) manually resulted    Urine Culture           below:   - ACE/ARB: HOLD on day of surgery (minimum 11 hours for general anesthesia).   - Beta Blockers: Continue taking on the day of surgery.   - Statins: Continue taking on the day of surgery.    - metformin: HOLD day of surgery.   - GLP-1 Injectable (exenitide, liraglutide, semaglutide, dulaglutide, etc.): HOLD day of surgery     RECOMMENDATION:  APPROVAL GIVEN to proceed with proposed procedure, without further diagnostic evaluation.            Subjective       HPI related to upcoming procedure: 66 yo female with hypertension, type 2 diabetes, hyperlipidemia and now right OA.            6/20/2023     9:47 AM   Preop Questions   1. Have you ever had a heart attack or stroke? No   2. Have you ever had surgery on your heart or blood vessels, such as a stent placement, a coronary artery bypass, or surgery on an artery in your head, neck, heart, or legs? No   3. Do you have chest pain with activity? No   4. Do you have a history of  heart failure? No   5. Do you currently have a cold, bronchitis or symptoms of other infection? No   6. Do you have a cough, shortness of breath, or wheezing? No   7. Do you or anyone in your family have previous history of blood clots? No   8. Do you or does anyone in your family have a serious bleeding problem such as prolonged bleeding following surgeries or cuts? No   9. Have you ever had problems with anemia or been told to take iron pills? No   10. Have you had any abnormal blood loss such as black, tarry or bloody stools, or abnormal vaginal bleeding? No   11. Have you ever had a blood transfusion? No   12. Are you willing to have a blood transfusion if it is medically needed before, during, or after your surgery? Yes   13. Have you or any of your relatives ever had problems with anesthesia? No   14. Do you have sleep apnea, excessive snoring or daytime drowsiness? No   15. Do you have any artifical heart valves or other implanted medical devices like a pacemaker, defibrillator,  or continuous glucose monitor? No   16. Do you have artificial joints? No   17. Are you allergic to latex? No     Health Care Directive:  Patient does not have a Health Care Directive or Living Will: Discussed advance care planning with patient; however, patient declined at this time.    Preoperative Review of :   reviewed - no record of controlled substances prescribed.      Status of Chronic Conditions:  See problem list for active medical problems.  Problems all longstanding and stable, except as noted/documented.  See ROS for pertinent symptoms related to these conditions.      Review of Systems  CONSTITUTIONAL: NEGATIVE for fever, chills, change in weight  ENT/MOUTH: NEGATIVE for ear, mouth and throat problems  RESP: NEGATIVE for significant cough or SOB  CV: NEGATIVE for chest pain, palpitations or peripheral edema  GI: POSITIVE for diarrhea    Patient Active Problem List    Diagnosis Date Noted     Generalized anxiety disorder 03/29/2021     Priority: Medium     Tobacco use 09/07/2018     Priority: Medium     Cervical high risk HPV (human papillomavirus) test positive 03/31/2018     Priority: Medium     2004, 2005, 2008, 2010, 2011, 2012, 2013 NIL paps  2014 NIL pap, Neg HPV  2015 NIL pap, Neg HPV  3/23/18 Pap: NIL, +HR HPV (Neg 16/18). Plan Pap+HPV in 1 year.  4/15/19 NIL, neg HPV. Plan: cotest 3 years  10/12/22 NIL pap, + HR HPV (not 16 or 18). Plan: cotest in 1 year  10/19/22 advised by phone. Letter sent in mail.        Essential hypertension with goal blood pressure less than 140/90 05/25/2016     Priority: Medium     Type 2 diabetes mellitus without complication (H) 10/28/2015     Priority: Medium     Hypertension, goal below 140/90 02/21/2013     Priority: Medium     Advanced directives, counseling/discussion 01/24/2012     Priority: Medium     Patient does not have an Advance/Health Care Directive (HCD), declines information/referral.    Nani Brown  January 24, 2012         CARDIOVASCULAR  SCREENING; LDL GOAL LESS THAN 100 10/31/2010     Priority: Medium     Syringoma 07/15/2010     Priority: Medium     Digital mucinous cyst 07/12/2010     Priority: Medium     Eyelid lesion 07/12/2010     Priority: Medium      Past Medical History:   Diagnosis Date     Cervical high risk HPV (human papillomavirus) test positive 03/31/2018     Diabetes (H)      Hypertension      Peptic ulcer, unspecified site, unspecified as acute or chronic, without mention of hemorrhage, perforation, or obstruction      Polycystic ovaries      Pure hypercholesterolemia      Tobacco use disorder      Unspecified multiple gestation, unspecified as to episode of care     Multiple gestation     Past Surgical History:   Procedure Laterality Date     APPENDECTOMY       BIOPSY BREAST       COLONOSCOPY N/A 12/11/2015    Procedure: COLONOSCOPY;  Surgeon: Tino Pryor MD;  Location: [facility]     ENDOSCOPIC RELEASE CARPAL TUNNEL Right 11/01/2021    Procedure: Endoscopic carpal tunnel release;  Surgeon: Ac Lemus MD;  Location: [facility]     ENDOSCOPIC RELEASE CARPAL TUNNEL Left 11/15/2021    Procedure: ENDOSCOPIC Carpal Tunnel Release, LEFT, removal suture right wrist;  Surgeon: Ac Lemus MD;  Location: [facility]     LUMPECTOMY BREAST       RELEASE TRIGGER FINGER  05/23/2014    Procedure: RELEASE TRIGGER FINGER;  Surgeon: Ag Hoffman MD;  Location: [facility]     SURGICAL HISTORY OF -   01/01/1970    appendectomy     SURGICAL HISTORY OF -   01/01/1970    pilonidal cyst and sinuses     Current Outpatient Medications   Medication Sig Dispense Refill     atorvastatin (LIPITOR) 40 MG tablet Take 1 tablet (40 mg) by mouth daily 90 tablet 3     Dulaglutide (TRULICITY) 3 MG/0.5ML SOPN Inject 3 mg Subcutaneous every 7 days 6 mL 1     lisinopril (ZESTRIL) 40 MG tablet Take 1 tablet (40 mg) by mouth daily 90 tablet 3     metFORMIN (GLUCOPHAGE XR) 500 MG 24 hr tablet TAKE 2 TABLETS BY MOUTH 2 TIMES DAILY WITH MEALS 360  "tablet 1     metoprolol succinate ER (TOPROL XL) 100 MG 24 hr tablet Take 1 tablet (100 mg) by mouth daily 90 tablet 3     acetaminophen (TYLENOL) 500 MG tablet Take 1,000 mg by mouth as needed for mild pain       ASPIRIN 81 MG OR TABS 1 TABLET DAILY       blood glucose (ACCU-CHEK SIDDHARTHA PLUS) test strip Use to test blood sugar 3 times daily or as directed. 200 strip 3     blood glucose monitoring (ACCU-CHEK MULTICLIX) lancets Use to test blood sugar 1 time daily or as directed. Accu chek, Ins will cover, match machine. 100 each 2     hydrOXYzine (ATARAX) 25 MG tablet Take 1 tablet (25 mg) by mouth 3 times daily as needed for anxiety 30 tablet 4       Allergies   Allergen Reactions     Amoxicillin Rash        Social History     Tobacco Use     Smoking status: Every Day     Packs/day: 1.00     Years: 40.00     Pack years: 40.00     Types: Cigarettes     Smokeless tobacco: Never     Tobacco comments:     pt not interested in quit plan 10/12/2022   Vaping Use     Vaping status: Never Used   Substance Use Topics     Alcohol use: No     Family History   Problem Relation Age of Onset     Unknown/Adopted Father      Dementia Father      History   Drug Use No         Objective     /80   Pulse 93   Temp 97.1  F (36.2  C) (Tympanic)   Resp 20   Ht 1.626 m (5' 4\")   Wt 51.9 kg (114 lb 8 oz)   LMP 12/01/2009   SpO2 97%   BMI 19.65 kg/m      Physical Exam  GENERAL APPEARANCE: healthy, alert and no distress  HENT: ear canals and TM's normal and nose and mouth without ulcers or lesions  RESP: lungs clear to auscultation - no rales, rhonchi or wheezes  CV: regular rate and rhythm, normal S1 S2, no S3 or S4 and no murmur, click or rub   ABDOMEN: soft, nontender, no HSM or masses and bowel sounds normal  NEURO: Normal strength and tone, sensory exam grossly normal, mentation intact and speech normal    Recent Labs   Lab Test 05/22/23  1400 02/17/23  1446 10/12/22  1526   HGB 13.2  --   --      --   --      " --  140   POTASSIUM 4.3  --  4.3   CR 0.82  --  0.82   A1C 8.5* 7.6* 7.5*        Diagnostics:  Results for orders placed or performed in visit on 06/20/23   Hemoglobin A1c     Status: Abnormal   Result Value Ref Range    Hemoglobin A1C 7.3 (H) 0.0 - 5.6 %        EKG: appears normal, NSR, normal axis, normal intervals, no acute ST/T changes c/w ischemia, no LVH by voltage criteria, unchanged from previous tracings    Revised Cardiac Risk Index (RCRI):  The patient has the following serious cardiovascular risks for perioperative complications:   - No serious cardiac risks = 0 points     RCRI Interpretation: 0 points: Class I (very low risk - 0.4% complication rate)           Signed Electronically by: Melissa Pitts MD  Copy of this evaluation report is provided to requesting physician.

## 2024-05-29 ENCOUNTER — LAB (OUTPATIENT)
Dept: LAB | Facility: CLINIC | Age: 69
End: 2024-05-29
Payer: COMMERCIAL

## 2024-05-29 DIAGNOSIS — D35.01 BILATERAL ADRENAL ADENOMAS: ICD-10-CM

## 2024-05-29 DIAGNOSIS — R79.89 HIGH SERUM CORTISOL: ICD-10-CM

## 2024-05-29 DIAGNOSIS — D35.02 BILATERAL ADRENAL ADENOMAS: ICD-10-CM

## 2024-05-29 LAB
FASTING STATUS PATIENT QL REPORTED: ABNORMAL
GLUCOSE SERPL-MCNC: 141 MG/DL (ref 70–99)

## 2024-05-29 PROCEDURE — 84305 ASSAY OF SOMATOMEDIN: CPT

## 2024-05-29 PROCEDURE — 82627 DEHYDROEPIANDROSTERONE: CPT

## 2024-05-29 PROCEDURE — 83001 ASSAY OF GONADOTROPIN (FSH): CPT

## 2024-05-29 PROCEDURE — 83002 ASSAY OF GONADOTROPIN (LH): CPT

## 2024-05-29 PROCEDURE — 82024 ASSAY OF ACTH: CPT

## 2024-05-29 PROCEDURE — 82947 ASSAY GLUCOSE BLOOD QUANT: CPT

## 2024-05-29 PROCEDURE — 84146 ASSAY OF PROLACTIN: CPT

## 2024-05-29 PROCEDURE — 36415 COLL VENOUS BLD VENIPUNCTURE: CPT

## 2024-05-30 LAB
ACTH PLAS-MCNC: 59 PG/ML
DHEA-S SERPL-MCNC: 23 UG/DL (ref 35–430)
FSH SERPL IRP2-ACNC: 58.9 MIU/ML
LH SERPL-ACNC: 46.6 MIU/ML
PROLACTIN SERPL 3RD IS-MCNC: 10 NG/ML (ref 5–23)

## 2024-05-31 LAB — IGF-I BLD-MCNC: 186 NG/ML (ref 27–228)

## 2024-05-31 NOTE — PROGRESS NOTES
Pt called. ACTH high raising concern of CD. Pituitary hormones normal. Recommended a pituitary MRI.

## 2024-06-03 ENCOUNTER — TELEPHONE (OUTPATIENT)
Dept: ENDOCRINOLOGY | Facility: CLINIC | Age: 69
End: 2024-06-03
Payer: COMMERCIAL

## 2024-06-03 NOTE — TELEPHONE ENCOUNTER
Left Voicemail (1st Attempt) for the patient to call back and schedule the following:    Appointment type: Pituitary MRI   Provider: Robbie   Return date: End of July   Specialty phone number: [phone]  Additional appointment(s) needed: NA   Additonal Notes: LVM, No MyC x1   Nani Avalos MD  [id number]  Please schedule a pituitary MRI at the end of July.    Janet Richard on 6/3/2024 at 9:26 AM

## 2024-06-25 DIAGNOSIS — E11.9 TYPE 2 DIABETES MELLITUS WITHOUT COMPLICATION, WITHOUT LONG-TERM CURRENT USE OF INSULIN (H): ICD-10-CM

## 2024-06-25 RX ORDER — DULAGLUTIDE 3 MG/.5ML
3 INJECTION, SOLUTION SUBCUTANEOUS
Qty: 2 ML | Refills: 0 | Status: SHIPPED | OUTPATIENT
Start: 2024-06-25 | End: 2024-08-06

## 2024-06-25 NOTE — TELEPHONE ENCOUNTER
Requested Prescriptions   Pending Prescriptions Disp Refills    TRULICITY 3 MG/0.5ML SOPN [Pharmacy Med Name: TRULICITY 3MG/0.5ML SOPN] 6 mL 0     Sig: INJECT 3MG UNDER THE SKIN EVERY 7 DAYS (NEED TO BE SEEN IN CLINIC FOR FURTHER REFILLS)       GLP-1 Agonists Protocol Failed - 6/25/2024  9:30 AM        Failed - Recent (6 mo) or future (90 days) visit within the authorizing provider's specialty     The patient must have completed an in-person or virtual visit within the past 6 months or has a future visit scheduled within the next 90 days with the authorizing provider s specialty.  Urgent care and e-visits do not quality as an office visit for this protocol.          Passed - HgbA1C in past 3 or 6 months     If HgbA1C is 8 or greater, it needs to be on file within the past 3 months.  If less than 8, must be on file within the past 6 months.     Recent Labs   Lab Test 05/01/24  0852   A1C 8.9*             Passed - Medication is active on med list        Passed - Has GFR on file in past 12 months and most recent value is normal        Passed - Medication indicated for associated diagnosis     Medication is associated with one or more of the following diagnoses:     Type 2 diabetes mellitus           Passed - Patient is age 18 or older        Passed - No active pregnancy on record        Passed - No positive pregnancy test in past 12 months

## 2024-06-27 DIAGNOSIS — Z96.641 STATUS POST RIGHT HIP REPLACEMENT: ICD-10-CM

## 2024-06-27 DIAGNOSIS — G47.09 OTHER INSOMNIA: ICD-10-CM

## 2024-06-27 RX ORDER — HYDROXYZINE HYDROCHLORIDE 25 MG/1
TABLET, FILM COATED ORAL
Qty: 30 TABLET | Refills: 0 | Status: SHIPPED | OUTPATIENT
Start: 2024-06-27 | End: 2024-08-19

## 2024-06-27 RX ORDER — TRAZODONE HYDROCHLORIDE 50 MG/1
50 TABLET, FILM COATED ORAL AT BEDTIME
Qty: 90 TABLET | Refills: 0 | Status: SHIPPED | OUTPATIENT
Start: 2024-06-27 | End: 2024-08-06 | Stop reason: SINTOL

## 2024-06-27 NOTE — TELEPHONE ENCOUNTER
Pending Prescriptions:                       Disp   Refills    hydrOXYzine HCl (ATARAX) 25 MG tablet [Ph*30 tab*0            Sig: TAKE ONE TABLET BY MOUTH EVERY 6 HOURS AS NEEDED           FOR ITCHING OR ANXIETY (WITH PAIN AND MODERATE           PAIN)    Signed Prescriptions:                        Disp   Refills    traZODone (DESYREL) 50 MG tablet           90 tab*0        Sig: TAKE 1 TABLET BY MOUTH AT BEDTIME  Authorizing Provider: TAYLER JAMES  Ordering User: CHEYANNE PELAYO    Routing refill request to provider for review/approval because:  Medication indicated for associated diagnosis       Cheyanne Pelayo, RN

## 2024-07-23 ENCOUNTER — HOSPITAL ENCOUNTER (OUTPATIENT)
Dept: MRI IMAGING | Facility: CLINIC | Age: 69
Discharge: HOME | End: 2024-07-23
Attending: INTERNAL MEDICINE | Admitting: INTERNAL MEDICINE
Payer: COMMERCIAL

## 2024-07-23 ENCOUNTER — TELEPHONE (OUTPATIENT)
Dept: FAMILY MEDICINE | Facility: CLINIC | Age: 69
End: 2024-07-23
Payer: COMMERCIAL

## 2024-07-23 DIAGNOSIS — R79.89 HIGH SERUM CORTISOL: ICD-10-CM

## 2024-07-23 PROCEDURE — 255N000002 HC RX 255 OP 636: Performed by: INTERNAL MEDICINE

## 2024-07-23 PROCEDURE — A9585 GADOBUTROL INJECTION: HCPCS | Performed by: INTERNAL MEDICINE

## 2024-07-23 PROCEDURE — 70553 MRI BRAIN STEM W/O & W/DYE: CPT

## 2024-07-23 PROCEDURE — 258N000003 HC RX IP 258 OP 636: Performed by: INTERNAL MEDICINE

## 2024-07-23 RX ORDER — GADOBUTROL 604.72 MG/ML
5.5 INJECTION INTRAVENOUS ONCE
Status: COMPLETED | OUTPATIENT
Start: 2024-07-23 | End: 2024-07-23

## 2024-07-23 RX ADMIN — SODIUM CHLORIDE 50 ML: 9 INJECTION, SOLUTION INTRAVENOUS at 07:25

## 2024-07-23 RX ADMIN — GADOBUTROL 5.5 ML: 604.72 INJECTION INTRAVENOUS at 07:22

## 2024-07-23 NOTE — TELEPHONE ENCOUNTER
Patient Quality Outreach    Patient is due for the following:   None    Next Steps:   - Schedule wellness visit with November with fasting labs  - Vaccine update    Type of outreach:    Phone, left message for patient/parent to call back.      Questions for provider review:    None           Emily Davies, CMA

## 2024-07-24 ENCOUNTER — DOCUMENTATION ONLY (OUTPATIENT)
Dept: FAMILY MEDICINE | Facility: CLINIC | Age: 69
End: 2024-07-24
Payer: COMMERCIAL

## 2024-07-24 NOTE — PROGRESS NOTES
Last Hemoglobin AiC 5/1/24 elevated 8.9.    Lab results note from Dr. Pitts directed patient to schedule a diabetic follow up office visit for next steps.     RN informed patient of recommendations and had to encourage the patient to accept an appointment. Patient asked if she could just have the A1c lab on Friday and RN reviewed lab and it appears she has had elevated A1c lab levels for above 8 for the last 8 months. Patient accepted to schedule an appointment. Patient scheduled with Dr. Pitts 8/6/24 in a same day appointment slot due to provider recommendations that was from 2 months ago.     Alexsandra Dutta RN on 7/24/2024 at 2:15 PM

## 2024-07-30 ENCOUNTER — TELEPHONE (OUTPATIENT)
Dept: ENDOCRINOLOGY | Facility: CLINIC | Age: 69
End: 2024-07-30
Payer: COMMERCIAL

## 2024-07-30 NOTE — TELEPHONE ENCOUNTER
----- Message from Nani Avalos sent at 7/29/2024  7:52 PM CDT -----  The pituitary MRI didn't identify pituitary tumors, just a small cyst which doesn't affect the pituitary gland function. For now, given the borderline cortisol level, I recommend just to have it monitored. We'll discuss in detail at your upcoming apt.

## 2024-07-30 NOTE — RESULT ENCOUNTER NOTE
The pituitary MRI didn't identify pituitary tumors, just a small cyst which doesn't affect the pituitary gland function. For now, given the borderline cortisol level, I recommend just to have it monitored. We'll discuss in detail at your upcoming apt.

## 2024-07-30 NOTE — TELEPHONE ENCOUNTER
Patient advised of results and recommendations. Patient verbalizes understanding and agrees to plan. Patient will follow-up as planned.       Laura Burch RN  Endocrine Care Coordinator  [employer] [facility]

## 2024-08-01 ENCOUNTER — TELEPHONE (OUTPATIENT)
Dept: FAMILY MEDICINE | Facility: CLINIC | Age: 69
End: 2024-08-01
Payer: COMMERCIAL

## 2024-08-01 DIAGNOSIS — E11.9 TYPE 2 DIABETES MELLITUS WITHOUT COMPLICATION, WITHOUT LONG-TERM CURRENT USE OF INSULIN (H): ICD-10-CM

## 2024-08-01 RX ORDER — GLIMEPIRIDE 2 MG/1
2 TABLET ORAL
Qty: 90 TABLET | Refills: 1 | OUTPATIENT
Start: 2024-08-01

## 2024-08-01 NOTE — TELEPHONE ENCOUNTER
Refill request for glimepiride 2 mg     Pt would like the refill sent to   [facility]   [place]  Phone [phone]  Fax [phone]       Thank you,  Anamaria Esteban, PhT  [facility]   [place]  Phone [phone]  Fax [phone]

## 2024-08-02 ENCOUNTER — HOSPITAL ENCOUNTER (OUTPATIENT)
Dept: CT IMAGING | Facility: CLINIC | Age: 69
Discharge: HOME | End: 2024-08-02
Attending: UROLOGY | Admitting: UROLOGY
Payer: COMMERCIAL

## 2024-08-02 DIAGNOSIS — N28.89 RENAL MASS: ICD-10-CM

## 2024-08-02 LAB
CREAT BLD-MCNC: 0.9 MG/DL (ref 0.5–1)
EGFRCR SERPLBLD CKD-EPI 2021: >60 ML/MIN/1.73M2

## 2024-08-02 PROCEDURE — 250N000011 HC RX IP 250 OP 636: Performed by: UROLOGY

## 2024-08-02 PROCEDURE — 74178 CT ABD&PLV WO CNTR FLWD CNTR: CPT

## 2024-08-02 PROCEDURE — 250N000009 HC RX 250: Performed by: UROLOGY

## 2024-08-02 PROCEDURE — 82565 ASSAY OF CREATININE: CPT

## 2024-08-02 RX ORDER — IOPAMIDOL 755 MG/ML
58 INJECTION, SOLUTION INTRAVASCULAR ONCE
Status: COMPLETED | OUTPATIENT
Start: 2024-08-02 | End: 2024-08-02

## 2024-08-02 RX ADMIN — SODIUM CHLORIDE 59 ML: 9 INJECTION, SOLUTION INTRAVENOUS at 10:17

## 2024-08-02 RX ADMIN — IOPAMIDOL 58 ML: 755 INJECTION, SOLUTION INTRAVENOUS at 10:17

## 2024-08-06 ENCOUNTER — OFFICE VISIT (OUTPATIENT)
Dept: FAMILY MEDICINE | Facility: CLINIC | Age: 69
End: 2024-08-06
Payer: COMMERCIAL

## 2024-08-06 VITALS
WEIGHT: 122.38 LBS | RESPIRATION RATE: 18 BRPM | HEIGHT: 66 IN | HEART RATE: 74 BPM | SYSTOLIC BLOOD PRESSURE: 144 MMHG | BODY MASS INDEX: 19.67 KG/M2 | TEMPERATURE: 97.4 F | OXYGEN SATURATION: 97 % | DIASTOLIC BLOOD PRESSURE: 80 MMHG

## 2024-08-06 DIAGNOSIS — F51.01 PRIMARY INSOMNIA: ICD-10-CM

## 2024-08-06 DIAGNOSIS — I10 HYPERTENSION, GOAL BELOW 140/90: ICD-10-CM

## 2024-08-06 DIAGNOSIS — E11.9 TYPE 2 DIABETES MELLITUS WITHOUT COMPLICATION, WITHOUT LONG-TERM CURRENT USE OF INSULIN (H): Primary | ICD-10-CM

## 2024-08-06 DIAGNOSIS — L82.0 INFLAMED SEBORRHEIC KERATOSIS: ICD-10-CM

## 2024-08-06 LAB — HBA1C MFR BLD: 7.4 % (ref 0–5.6)

## 2024-08-06 PROCEDURE — 17110 DESTRUCTION B9 LES UP TO 14: CPT | Performed by: FAMILY MEDICINE

## 2024-08-06 PROCEDURE — 99207 PR FOOT EXAM NO CHARGE: CPT | Performed by: FAMILY MEDICINE

## 2024-08-06 PROCEDURE — 99214 OFFICE O/P EST MOD 30 MIN: CPT | Mod: 25 | Performed by: FAMILY MEDICINE

## 2024-08-06 PROCEDURE — 36415 COLL VENOUS BLD VENIPUNCTURE: CPT | Performed by: FAMILY MEDICINE

## 2024-08-06 PROCEDURE — 83036 HEMOGLOBIN GLYCOSYLATED A1C: CPT | Performed by: FAMILY MEDICINE

## 2024-08-06 RX ORDER — AMLODIPINE BESYLATE 2.5 MG/1
2.5 TABLET ORAL DAILY
Qty: 90 TABLET | Refills: 1 | Status: SHIPPED | OUTPATIENT
Start: 2024-08-06

## 2024-08-06 RX ORDER — DULAGLUTIDE 3 MG/.5ML
3 INJECTION, SOLUTION SUBCUTANEOUS
Qty: 6 ML | Refills: 1 | Status: SHIPPED | OUTPATIENT
Start: 2024-08-06

## 2024-08-06 RX ORDER — GLIMEPIRIDE 2 MG/1
2 TABLET ORAL
Qty: 90 TABLET | Refills: 1 | Status: SHIPPED | OUTPATIENT
Start: 2024-08-06

## 2024-08-06 RX ORDER — GLIMEPIRIDE 4 MG/1
4 TABLET ORAL
Status: CANCELLED | OUTPATIENT
Start: 2024-08-06

## 2024-08-06 ASSESSMENT — PAIN SCALES - GENERAL: PAINLEVEL: NO PAIN (0)

## 2024-08-06 NOTE — PATIENT INSTRUCTIONS
"You are due for a diabetic eye exam.  Please schedule that and have results sent to me.    Due for medicare annual wellness visit in early November, please schedule.    Stop the trazodone - do this by cutting dose in 1/2 for a week      Unisom 10 mg - can try for sleep    Hypertension: add Amlodipine 2.5 mg  Recheck blood pressure with nurse in 2-3 weeks here in clinic      When you are out of refills or the refills say \"zero\", it is time to schedule your next appointment in clinic!    Labs are released to you almost immediately and sometimes before I have had a chance to review them.  I review labs regularly and once they are all in, you will either be sent a letter with your results or if you are signed up for on-line services, you will be notified that results are available to you on [employer]. If there are serious findings, you typically will be called.    If you have any questions about your visit, your symptoms, your medication, your test results or it is not clear what your diagnosis or treatment plan is please contact me (via [facility]) or call the care team at [phone] and say \"Care Team\"          "

## 2024-08-06 NOTE — PROGRESS NOTES
"  Assessment & Plan     Type 2 diabetes mellitus without complication, without long-term current use of insulin (H)  Improved control.  Declined increase in the   - Hemoglobin A1c; Future  - Hemoglobin A1c  - FOOT EXAM  - Dulaglutide (TRULICITY) 3 MG/0.5ML SOPN; Inject 3 mg subcutaneously every 7 days  - glimepiride (AMARYL) 2 MG tablet; Take 1 tablet (2 mg) by mouth every morning (before breakfast)    Hypertension, goal below 140/90  Uncontrolled  Continue lisinopril 40mg  Add amlodipine 2.5 mg  Recheck with RN In 2-3 weeks - nurse only visit  - amLODIPine (NORVASC) 2.5 MG tablet; Take 1 tablet (2.5 mg) by mouth daily    Insomnia  Trazodone \"didn't work\" and made her malave/irritable when she stopped taking it so she continued.  Will have her wean off over the next week and then stop.   Risks, benefits and alternatives discussed   Discussed options.  She didn't find melatonin \"at all\" helpful  She's well rested on just a few hours of sleep, denies that other than frustration with not sleeping that it's causing any other dysfunction  Can try Unisom over the counter.     Blood sugar testing frequency justification:  Patient modifying lifestyle changes (diet, exercise) with blood sugars        Subjective   Stephania is a 68 year old, presenting for the following health issues:  Diabetes        8/6/2024    10:39 AM   Additional Questions   Roomed by Emily chong CMA   Accompanied by Self       - Itchy patches on back and left leg    - Sleep problems. She notes that Trazodone does not help for this. She otes that when she tried to stop this medication she became very angry.    Diabetes Follow-up  Trulicity 3mg SubQ weekly, glimepiride 2mg every day    History of Present Illness       Diabetes:   She presents for follow up of diabetes.  She is checking home blood glucose three times daily.   She checks blood glucose before and after meals.  Blood glucose is never over 200 and never under 70.  When her blood glucose is low, the " "patient is asymptomatic for confusion, blurred vision, lethargy and reports not feeling dizzy, shaky, or weak.   She has no concerns regarding her diabetes at this time.   She is not experiencing numbness or burning in feet, excessive thirst, blurry vision, weight changes or redness, sores or blisters on feet. The patient has not had a diabetic eye exam in the last 12 months.          She eats 0-1 servings of fruits and vegetables daily.She consumes 0 sweetened beverage(s) daily.She exercises with enough effort to increase her heart rate 20 to 29 minutes per day.  She exercises with enough effort to increase her heart rate 3 or less days per week.   She is taking medications regularly.           Hyperlipidemia Follow-Up  Atorvastatin 40mg qd  Are you regularly taking any medication or supplement to lower your cholesterol?   Yes- statin  Are you having muscle aches or other side effects that you think could be caused by your cholesterol lowering medication?  No    Hypertension Follow-up  Lisinopril 40mg every day, metoprolol 100mg qd  Do you check your blood pressure regularly outside of the clinic? No   Are you following a low salt diet? No  Are your blood pressures ever more than 140 on the top number (systolic) OR more   than 90 on the bottom number (diastolic), for example 140/90? N/A    BP Readings from Last 2 Encounters:   08/06/24 (!) 144/80   02/01/24 (!) 171/85     Hemoglobin A1C (%)   Date Value   08/06/2024 7.4 (H)   05/01/2024 8.9 (H)   03/01/2021 6.9 (H)   08/06/2020 7.1 (H)     LDL Cholesterol Calculated (mg/dL)   Date Value   09/13/2023 56   10/12/2022 42   08/06/2020 57   12/16/2019 31         Review of Systems  Constitutional, HEENT, cardiovascular, pulmonary, gi and gu systems are negative, except as otherwise noted.      Objective    BP (!) 144/80   Pulse 74   Temp 97.4  F (36.3  C) (Tympanic)   Resp 18   Ht 1.676 m (5' 6\")   Wt 55.5 kg (122 lb 6 oz)   LMP 12/01/2009   SpO2 97%   BMI 19.75 " kg/m    Body mass index is 19.75 kg/m .  Physical Exam   GENERAL: alert and no distress  NECK: no adenopathy, no asymmetry, masses, or scars  RESP: lungs clear to auscultation - no rales, rhonchi or wheezes  CV: regular rate and rhythm, normal S1 S2, no S3 or S4, no murmur, click or rub, no peripheral edema  ABDOMEN: soft, nontender, no hepatosplenomegaly, no masses and bowel sounds normal  MS: no gross musculoskeletal defects noted, no edema  SKIN: inflamed seborrheic keratosis on left bra line and left posterior calf.  Both treated with liquid nitrogen x 3        Cryotherapy of two seborrheic keratosis (inflamed) one on left posterior calf and one on left flank.    Results for orders placed or performed in visit on 08/06/24   Hemoglobin A1c     Status: Abnormal   Result Value Ref Range    Hemoglobin A1C 7.4 (H) 0.0 - 5.6 %       Diabetic Foot Screen:  Any complaints of increased pain or numbness ? No  Is there a foot ulcer now or a history of foot ulcer? No  Does the foot have an abnormal shape? No  Are the nails thick, too long or ingrown? No  Are there any redness or open areas? No         Sensation Testing done at all points on the diagram with monofilament     Right Foot: Sensation Normal at all points  Left Foot: Sensation Normal at all points     Risk Category: 0- No loss of protective sensation  Performed by Melissa Pitts MD          Signed Electronically by: Melissa Pitts MD

## 2024-08-07 ENCOUNTER — ANCILLARY PROCEDURE (OUTPATIENT)
Dept: MAMMOGRAPHY | Facility: CLINIC | Age: 69
End: 2024-08-07
Attending: FAMILY MEDICINE
Payer: COMMERCIAL

## 2024-08-07 DIAGNOSIS — Z12.31 VISIT FOR SCREENING MAMMOGRAM: ICD-10-CM

## 2024-08-07 PROCEDURE — 77067 SCR MAMMO BI INCL CAD: CPT | Mod: TC | Performed by: RADIOLOGY

## 2024-08-07 PROCEDURE — 77063 BREAST TOMOSYNTHESIS BI: CPT | Mod: TC | Performed by: RADIOLOGY

## 2024-08-08 ENCOUNTER — OFFICE VISIT (OUTPATIENT)
Dept: UROLOGY | Facility: CLINIC | Age: 69
End: 2024-08-08
Payer: COMMERCIAL

## 2024-08-08 VITALS — RESPIRATION RATE: 16 BRPM | SYSTOLIC BLOOD PRESSURE: 162 MMHG | DIASTOLIC BLOOD PRESSURE: 72 MMHG | HEART RATE: 80 BPM

## 2024-08-08 DIAGNOSIS — N28.89 RENAL MASS: Primary | ICD-10-CM

## 2024-08-08 PROCEDURE — 99214 OFFICE O/P EST MOD 30 MIN: CPT | Performed by: UROLOGY

## 2024-08-08 NOTE — NURSING NOTE
"Initial BP (!) 162/72 (BP Location: Left arm, Patient Position: Chair, Cuff Size: Adult Regular)   Pulse 80   Resp 16   LMP 12/01/2009  Estimated body mass index is 19.75 kg/m  as calculated from the following:    Height as of 8/6/24: 1.676 m (5' 6\").    Weight as of 8/6/24: 55.5 kg (122 lb 6 oz). .  Patient is here for results.  cassandra roberson LPN    "

## 2024-08-08 NOTE — PROGRESS NOTES
CC : left renal mass     HPI: 69 yo female with incidentally diagnosed approximately 2.7 cm left renal mass found on a workup for abdominal pain.  Patient has also had 6-8 months of diarrhea, weight loss and emesis of unclear etiology.  Also noted to have several adrenal nodules.  Patient was referred to endocrinology for work-up of the nodules.  Metformin was also stopped by endocrine with resolution of the diarrhea.  Patient appears to have a mildly hyperactive adrenal adenoma but this is under observation currently.  Here following a surveillance scan for her renal mass.  Last scan was 1/20/24 and showed a 2.3 x 2.6 cm left renal mass, stable compared to July 2023. Stable adrenal nodules           OBJECTIVE: CT 8/2/24 2.8 x 2.9 cm left renal mass, slightly larger compared to July 2023. Stable adrenal nodules          ASSESSMENT AND PLAN: Over half of today's 39-minute visit was spent reviewing the chart, results and counseling the patient regarding her renal mass.  The renal mass may be a bit bigger but still at only 3 cm.  Of note, the patient does have increased levels of cortisol per endocrine.  We would plan on removing the hypersecreting adrenal gland at the time of nephrectomy if it is on the right, but we would want to be sure about this.  Would need to discuss with endocrine.  The patient wishes to continue to observe for now which is reasonable.  We discussed that the patient likely will require surgery in the not too distant future.  We will recheck a scan in 6 months.  Patient will follow with endocrine regarding her adrenal nodules.

## 2024-08-19 ENCOUNTER — ALLIED HEALTH/NURSE VISIT (OUTPATIENT)
Dept: FAMILY MEDICINE | Facility: CLINIC | Age: 69
End: 2024-08-19
Payer: COMMERCIAL

## 2024-08-19 ENCOUNTER — TELEPHONE (OUTPATIENT)
Dept: FAMILY MEDICINE | Facility: CLINIC | Age: 69
End: 2024-08-19

## 2024-08-19 ENCOUNTER — TRANSFERRED RECORDS (OUTPATIENT)
Dept: HEALTH INFORMATION MANAGEMENT | Facility: CLINIC | Age: 69
End: 2024-08-19

## 2024-08-19 VITALS — DIASTOLIC BLOOD PRESSURE: 80 MMHG | SYSTOLIC BLOOD PRESSURE: 142 MMHG | HEART RATE: 92 BPM | OXYGEN SATURATION: 98 %

## 2024-08-19 DIAGNOSIS — Z96.641 STATUS POST RIGHT HIP REPLACEMENT: ICD-10-CM

## 2024-08-19 DIAGNOSIS — I10 HYPERTENSION, GOAL BELOW 140/90: Primary | ICD-10-CM

## 2024-08-19 DIAGNOSIS — Z96.641 STATUS POST RIGHT HIP REPLACEMENT: Primary | ICD-10-CM

## 2024-08-19 LAB — RETINOPATHY: NEGATIVE

## 2024-08-19 PROCEDURE — 99207 PR NO CHARGE NURSE ONLY: CPT

## 2024-08-19 RX ORDER — HYDROXYZINE HYDROCHLORIDE 25 MG/1
25 TABLET, FILM COATED ORAL EVERY 6 HOURS PRN
Qty: 60 TABLET | Refills: 0 | Status: SHIPPED | OUTPATIENT
Start: 2024-08-19

## 2024-08-19 RX ORDER — HYDROXYZINE HYDROCHLORIDE 25 MG/1
TABLET, FILM COATED ORAL
Qty: 30 TABLET | Refills: 0 | Status: CANCELLED | OUTPATIENT
Start: 2024-08-19

## 2024-08-19 RX ORDER — AMLODIPINE BESYLATE 5 MG/1
5 TABLET ORAL DAILY
Qty: 90 TABLET | Refills: 1 | Status: SHIPPED | OUTPATIENT
Start: 2024-08-19

## 2024-08-19 NOTE — TELEPHONE ENCOUNTER
Routing refill request to provider for review/approval because:  Ordered post op hip replacement, unsure if patient should continue    Pending Prescriptions:                       Disp   Refills    hydrOXYzine HCl (ATARAX) 25 MG tablet     30 tab*0            Requested Prescriptions   Pending Prescriptions Disp Refills    hydrOXYzine HCl (ATARAX) 25 MG tablet 30 tablet 0       Antihistamines Protocol Failed - 8/19/2024 10:20 AM        Failed - Medication indicated for associated diagnosis     The medication is associated with one or more of the following diagnoses:  Allergies  Rhinitis  Upper respiratory tract allergy  Urticaria  Itching          Passed - Recent (12 mo) or future (30 days) visit within the authorizing provider's specialty     The patient must have completed an in-person or virtual visit within the past 12 months or has a future visit scheduled within the next 90 days with the authorizing provider s specialty.  Urgent care and e-visits do not quality as an office visit for this protocol.          Passed - Patient is age 3 or older     Apply age and/or weight-based dosing for peds patients age 3 and older.    Forward request to provider for patients under the age of 3.          Passed - Medication is active on med list           Maryanne Corrigan RN on 8/19/2024 at 10:21 AM

## 2024-08-19 NOTE — TELEPHONE ENCOUNTER
Asmita Jerez is a 68 year old year old patient who comes in today for a Blood Pressure check because of ongoing blood pressure monitoring.  Vital Signs as repeated by /72 right 142/80 left HR 92  SPO2 98%  Patient is taking medication as prescribed  Patient is tolerating medications well.  Patient is not monitoring Blood Pressure at home.  new equate home monitor, will start checking   Average readings if yes are na  Current complaints: none  Disposition:  patient to continue with the same medication or as advised    Equate home monitor:  164/73 right 141/73 left    [facility] pharmacy       Phone   [phone] (H)      Okay to leave detailed message    Maryanne Corrigan RN on 8/19/2024 at 10:19 AM

## 2024-08-19 NOTE — TELEPHONE ENCOUNTER
No pharmacy pended, I'm assuming [facility], please let me know if this is not correct.     BP Readings from Last 6 Encounters:   08/19/24 (!) 142/80   08/08/24 (!) 162/72   08/06/24 (!) 144/80   02/01/24 (!) 171/85   11/01/23 108/72   09/06/23 (!) 188/69       Please increase Amlodpine to 5 mg daily.  May take 2 of the 2.5 mg pills she has.     Rx for 5 mg pills sent to pharmacy.  There is improvement in blood pressure but still not at goal.    Recheck again with RN in 2-3 weeks.    Melissa Pitts M.D.

## 2024-08-19 NOTE — PROGRESS NOTES
Asmita Jerez is a 68 year old year old patient who comes in today for a Blood Pressure check because of ongoing blood pressure monitoring.  Vital Signs as repeated by /72 right 142/80 left HR 92  SPO2 98%  Patient is taking medication as prescribed  Patient is tolerating medications well.  Patient is not monitoring Blood Pressure at home.  new equate home monitor, will start checking   Average readings if yes are na  Current complaints: none  Disposition:  patient to continue with the same medication or as advised    Equate home monitor:  164/73 right 141/73 left    [facility] pharmacy       Phone   [phone] (H)      Okay to leave detailed message    Maryanne Corrigan RN on 8/19/2024 at 10:19 AM

## 2024-08-20 NOTE — TELEPHONE ENCOUNTER
RN called patient and informed her of the increase of Amlodipine to 5 mg and prescription sent to [facility] but patient needed to disconnect.     Please call patient back to schedule RN BP visit in 2-3 weeks.    Alexsandra Dutta RN on 8/20/2024 at 2:09 PM

## 2024-08-20 NOTE — TELEPHONE ENCOUNTER
Called patient back and she is going to have a blood pressure recheck at [facility] in 2-3 weeks.    Alexsandra Dutta RN on 8/20/2024 at 2:41 PM

## 2024-08-29 ENCOUNTER — TELEPHONE (OUTPATIENT)
Dept: ENDOCRINOLOGY | Facility: CLINIC | Age: 69
End: 2024-08-29
Payer: COMMERCIAL

## 2024-08-29 NOTE — TELEPHONE ENCOUNTER
[facility] Call Center    Phone Message    May a detailed message be left on voicemail: Yes    Reason for Call: Please call patient back to discuss further    Action Taken: Message routed to:  [facility] ENDO    Travel Screening: Not Applicable     Date of Service:

## 2024-08-29 NOTE — TELEPHONE ENCOUNTER
Called patient and left voicemail. Patient has an appointment on  9/3/24 . Need to collect the last 14 days worth of blood sugar readings to prepare for patient's visit.   Manda Dhillon MA

## 2024-08-29 NOTE — PROGRESS NOTES
Outcome for 08/29/24 1:53 PM: Left Voicemail   Manda Dhillon MA  Outcome for 08/30/24 10:56 AM: Data obtained via phone and located below  Manda Dhillon MA    Patient is showing 3/5 MNCM met. BP out range and Current tobacco use   Manda Dhillon MA    8/30/24  7:24am: 100    8/29/24  12:16pm: 116  8:10am: 125    8/28/24  6:40am: 108    8/27/24  7:04am: 102    8/26/24  5:45am: 138    8/25/24  7:32am: 137    8/24/24  2:25pm: 125  6:40am: 128    8/23/24  7:56am: 118    8/22/24  8:19am: 114    8/21/24  7:38am: 105    8/20/24  7:07am: 108    8//19/24  5:00am: 140    8/18/24  12:08pm: 127  7:09am: 121    8/17/24  7:27am: 113    8/16/24  5:27am: 105

## 2024-08-30 NOTE — TELEPHONE ENCOUNTER
8/30/24  7:24am: 100    8/29/24  12:16pm: 116  8:10am: 125    8/28/24  6:40am: 108    8/27/24  7:04am: 102    8/26/24  5:45am: 138    8/25/24  7:32am: 137    8/24/24  2:25pm: 125  6:40am: 128    8/23/24  7:56am: 118    8/22/24  8:19am: 114    8/21/24  7:38am: 105    8/20/24  7:07am: 108    8//19/24  5:00am: 140    8/18/24  12:08pm: 127  7:09am: 121    8/17/24  7:27am: 113    8/16/24  5:27am: 105

## 2024-09-03 ENCOUNTER — VIRTUAL VISIT (OUTPATIENT)
Dept: ENDOCRINOLOGY | Facility: CLINIC | Age: 69
End: 2024-09-03
Payer: COMMERCIAL

## 2024-09-03 VITALS — BODY MASS INDEX: 19.37 KG/M2 | WEIGHT: 120 LBS

## 2024-09-03 DIAGNOSIS — R79.89 HIGH SERUM CORTISOL: ICD-10-CM

## 2024-09-03 DIAGNOSIS — E26.09 PRIMARY HYPERALDOSTERONISM (H): ICD-10-CM

## 2024-09-03 DIAGNOSIS — E83.52 HYPERCALCEMIA: ICD-10-CM

## 2024-09-03 DIAGNOSIS — D35.02 BILATERAL ADRENAL ADENOMAS: Primary | ICD-10-CM

## 2024-09-03 DIAGNOSIS — D35.01 BILATERAL ADRENAL ADENOMAS: Primary | ICD-10-CM

## 2024-09-03 DIAGNOSIS — E11.9 TYPE 2 DIABETES MELLITUS WITHOUT COMPLICATION, WITHOUT LONG-TERM CURRENT USE OF INSULIN (H): ICD-10-CM

## 2024-09-03 PROCEDURE — G2211 COMPLEX E/M VISIT ADD ON: HCPCS | Performed by: INTERNAL MEDICINE

## 2024-09-03 PROCEDURE — 99215 OFFICE O/P EST HI 40 MIN: CPT | Mod: 95 | Performed by: INTERNAL MEDICINE

## 2024-09-03 NOTE — NURSING NOTE
Current patient location:  [place]    Is the patient currently in the state of [place]? YES    Visit mode:VIDEO    If the visit is dropped, the patient can be reconnected by: VIDEO VISIT: Text to cell phone:   Telephone Information:   Mobile [phone]       Will anyone else be joining the visit? NO  (If patient encounters technical issues they should call [phone])    How would you like to obtain your AVS? MyChart    Are changes needed to the allergy or medication list? No    Are refills needed on medications prescribed by this physician? NO    Rooming Documentation:  Questionnaire(s) not pre-assigned      Reason for visit: Video Visit and RECHECK    Alicia DIETRICH

## 2024-09-03 NOTE — LETTER
9/3/2024      Asmita Jerez  [address]  [place] [address]      Dear Colleague,    Thank you for referring your patient, Asmita Jerez, to the [facility]. Please see a copy of my visit note below.    Outcome for 08/29/24 1:53 PM: Left Voicemail   Manda HUNG Dhillon  Outcome for 08/30/24 10:56 AM: Data obtained via phone and located below  Manda Dhillon MA    Patient is showing 3/5 MNCM met. BP out range and Current tobacco use   Manda HUNG Dhillon    8/30/24  7:24am: 100    8/29/24  12:16pm: 116  8:10am: 125    8/28/24  6:40am: 108    8/27/24  7:04am: 102    8/26/24  5:45am: 138    8/25/24  7:32am: 137    8/24/24  2:25pm: 125  6:40am: 128    8/23/24  7:56am: 118    8/22/24  8:19am: 114    8/21/24  7:38am: 105    8/20/24  7:07am: 108    8//19/24  5:00am: 140    8/18/24  12:08pm: 127  7:09am: 121    8/17/24  7:27am: 113    8/16/24  5:27am: 105    Video-Visit Details    Type of service:  Video Visit    Call started at 11:35 am   Call ended at 12:10 pm   Originating Location (pt. Location): Home  Distant Location (provider location): On-site    Mode of Communication:  Video Conference via [employer]    =======================================================  Assessment     Asmita Jerez is a 68 year old female seen for evaluation of bilateral adrenal macroadenomas, associated with possible mild autonomous cortisol secretion.    1.  Bilateral adrenal macroadenomas  Prior evaluations have revealed an incompletely suppressed cortisol with 1 mg and 8 mg dexamethasone, normal or high normal salivary and urinary cortisol, a non-suppressed ACTH and a lowish DHEAS.  Clinically, the patient has a history of PCOS, type 2 diabetes.  She is not obese or overweight and does not endorse signs or symptoms suggestive of hypercortisolism.  The patient is considering left nephrectomy for a kidney lesion suspicious of malignancy.     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 The interrogation of the pituitary function and the pituitary MRI have not suggested pituitary lesions or dysfunction.   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 Given the diagnosis of primary hyperparathyroidism, MEN1 remains in the differential diagnosis.  Plan:   Check morning ACTH, cortisol, 17 OH progesterone, DHEAS, androstenedione  Pursue genetic screening for MEN1 and [facility]    2.  Primary hyperparathyroidism  Not of new onset and associated with kidney stones, incidentally discovered on imaging.  The patient has no history of bone fractures or osteoporosis.  Clinically, she does not endorse signs or symptoms of hypercalcemia.  Biochemical workup was suggestive of primary hyperparathyroidism. 24 hrs urinary calcium normal.   Recommendations:   Try to have a daily intake of calcium of 1000 mg from food products.  Take 1000 units vitamin D daily.   Follow-up calcium every 6 months.    3. Type 2 diabetes   Fairly well controlled on Trulicity and glimepiride. Keyur has not been able to tolerate jardiance.   Advised to check BG in the morning and at bedtime, alternative days.        Orders Placed This Encounter   Procedures     Adrenal corticotropin     Cortisol     DHEA sulfate     17 OH progesterone     Androstenedione     =======================================================  The patient is seen in f/up.      History of Present Illness:  Asmita Jerez is a 68 year old female with a past medical history significant for type 2 diabetes, hypertension, polycystic ovaries and hypercholesterolemia, who was initially seen in our clinic by Dr. Moore, in September 2023.  She established care with me in January 2024.    1.  Adrenal incidentaloma  It was incidentally discovered on the abdominal CT from July 2023, which revealed 2 left adrenal  nodules, measuring 1.7 and 1.2 cm and a 1.4 cm right adrenal nodule.  All nodules had low Hounsfield unit density on the noncontrast CT <10 so they were considered to represent adrenal adenomas.  Also, the nodules were stable compared with the prior imaging from 2018. The CT was also positive for kidney stones and a 2.6 cm left renal heterogeneous lesion for which the patient follows up with urology. The lesion is suspicious for renal cell carcinoma and it remained stable on the follow-up abdominal CT from January 2024.  A follow-up abdominal CT is scheduled in August.    She was diagnosed with hypertension in her 60s. Her blood pressure is currently medicated with lisinopril and metoprolol.      Pertinent labs reviewed:   9/6/2023 aldosterone 6.3, renin 0.2, normal plasma metanephrines  9/13/2023 1 mg dexamethasone suppression test: cortisol level of 3.8; dexamethasone 352.6 (140-295)    9/28/2020 24-hour urinary cortisol 45 (3.5-45) 24-hour urinary cortisone normal.  The urinary volume was 1.65 L, with urine creatinine 0.9 g  11/11, 11/13, 11/15/23 salivary cortisol normal twice and borderline normal on 11/13/2023 at 0.112 (less than 0.1)  12/6/2023 ACTH 46, cortisol 37.9, DHEA-S 38 () at 7:52 AM  12/7/2023 8 mg dexamethasone suppression test: ACTH undetectable, cortisol 4.9, DHEA-S 28, dexamethasone >3000 at 7:42 AM  1/29/2024 24-hour urinary cortisol was normal at 33.  Patient took 8 mg dexamethasone the night prior.  5/1/24 8 mg dex suppressed cortisol checked using LCMS was 4.7, with dexamethasone >3000  5/29/24  ACTH 59 at 7:58 am, DHEAS 23, FSH 58.9, , prolactin 10, IGF1 186      The pituitary MRI from 7/23/24 revealed a subtle pars intermedia cyst, measuring 2-3 mm.   Her weight has been stable.      2.  Type 2 diabetes, diagnosed around 2000, around age 45.  Average A1c over the years has been around 8%.   Most recent A1c was 7.4 on 8/6/24.  She was only treated with metformin until staring  Trulicity in 2023. Metformin caused diarrhea and it was replaced with Jardiance, in November 2023. On labwork from 5/1 K was elevated at 6 and the patient also reported experiencing constipation and abdominal bloating from Jardiance.. Subsequently, Jardiance was discontinued and replaced with 2 mg glimepiride. The K level on 5/8/24 normalized.     Currently antidiabetic medications:  Trulicity, 3 mg weekly (started in February 2023)  Glimepiride 2 mg daily - started on 5/5/24     I reviewed the provided BG numbers:   8/30/24  7:24am: 100     8/29/24  12:16pm: 116  8:10am: 125     8/28/24  6:40am: 108     8/27/24  7:04am: 102     8/26/24  5:45am: 138     8/25/24  7:32am: 137     8/24/24  2:25pm: 125  6:40am: 128     8/23/24  7:56am: 118     8/22/24  8:19am: 114     8/21/24  7:38am: 105     8/20/24  7:07am: 108     8//19/24  5:00am: 140     8/18/24  12:08pm: 127  7:09am: 121     8/17/24  7:27am: 113     8/16/24  5:27am: 105    She denies experiencing hypoglycemic symptoms.    Diabetes complications:  Last eye exam: 2 years ago  Most recent urine microalbumin negative in November 2023. On 40 mg lisinopril daily.   No numbness or tingling sensation in her feet   Last lipid panel from 9/13/2023 LDL 56, HDL 50, triglycerides 47.  On 40 mg atorvastatin daily.     3.  Hypercalcemia  The patient has had intermittent episodes of mild hypercalcemia or high normal calcium levels dating back to 2008.  The highest calcium level was 11, in September 2023.  On prior lab work, the albumin was normal, as well as alkaline phosphatase.   On lab work from 1/29/24, 24-hour urinary calcium was 0.12 g.  5/1/24 vitamin D 46, phos 4.6, calcium 10.7, GFR 69, 1,25 OH vitamin D normal at 51, PTH 31, PTHrp normal at 3.1.     On questioning , the patient denies n/v, muscle weakness, increased thirst or increased urination, confusion, memory changes.     Dairies: 2 glasses of milk daily and one serving of cheese - a couple of times a week.    Kidneys stones: pt not aware of this diagnosis. They were revealed by imaging.    Menopause: in the 50s. She still has daily hot flashes and occasional night sweats. Overall, the hot flashes have been stable.   The DXA scan from December 2022 revealed normal bone mineral density at all sites. There is no prior h/o fractures.     Past Medical History   Past Medical History:   Diagnosis Date     Cervical high risk HPV (human papillomavirus) test positive 03/31/2018     Diabetes (H)      Hypertension      Peptic ulcer, unspecified site, unspecified as acute or chronic, without mention of hemorrhage, perforation, or obstruction      Polycystic ovaries      Pure hypercholesterolemia      Tobacco use disorder      Unspecified multiple gestation, unspecified as to episode of care     Multiple gestation   Diverticulosis  Kidney stones  Bilateral adrenal adenomas  Lung nodules  Collagenous colitis  Anxiety     Past Surgical History   Past Surgical History:   Procedure Laterality Date     APPENDECTOMY       ARTHROPLASTY HIP Right 6/22/2023    Procedure: Total Hip Arthroplasty Right;  Surgeon: Albin Williamson MD;  Location: [facility]     BIOPSY BREAST       COLONOSCOPY N/A 12/11/2015    Procedure: COLONOSCOPY;  Surgeon: Tino Pryor MD;  Location: [facility]     COLONOSCOPY N/A 8/23/2023    Procedure: Colonoscopy with biopsies;  Surgeon: Celio Larry DO;  Location: [facility]     ENDOSCOPIC RELEASE CARPAL TUNNEL Right 11/01/2021    Procedure: Endoscopic carpal tunnel release;  Surgeon: Ac Lemus MD;  Location: [facility]     ENDOSCOPIC RELEASE CARPAL TUNNEL Left 11/15/2021    Procedure: ENDOSCOPIC Carpal Tunnel Release, LEFT, removal suture right wrist;  Surgeon: Ac Lemus MD;  Location: [facility]     LUMPECTOMY BREAST       RELEASE TRIGGER FINGER  05/23/2014    Procedure: RELEASE TRIGGER FINGER;  Surgeon: Ag Hoffman MD;  Location: [facility]     SURGICAL HISTORY OF -   01/01/1970     appendectomy     SURGICAL HISTORY OF -   1970    pilonidal cyst and sinuses       Current Medications    Current Outpatient Medications:      amLODIPine (NORVASC) 2.5 MG tablet, Take 1 tablet (2.5 mg) by mouth daily, Disp: 90 tablet, Rfl: 1     amLODIPine (NORVASC) 5 MG tablet, Take 1 tablet (5 mg) by mouth daily, Disp: 90 tablet, Rfl: 1     atorvastatin (LIPITOR) 40 MG tablet, Take 1 tablet (40 mg) by mouth daily, Disp: 90 tablet, Rfl: 3     blood glucose (ACCU-CHEK SIDDHARTHA PLUS) test strip, Use to test blood sugar 3 times daily or as directed., Disp: 200 strip, Rfl: 3     blood glucose monitoring (ACCU-CHEK MULTICLIX) lancets, Use to test blood sugar 1 time daily or as directed. Accu chek, Ins will cover, match machine., Disp: 100 each, Rfl: 2     Dulaglutide (TRULICITY) 3 MG/0.5ML SOPN, Inject 3 mg subcutaneously every 7 days, Disp: 6 mL, Rfl: 1     glimepiride (AMARYL) 2 MG tablet, Take 1 tablet (2 mg) by mouth every morning (before breakfast), Disp: 90 tablet, Rfl: 1     hydrOXYzine HCl (ATARAX) 25 MG tablet, Take 1 tablet (25 mg) by mouth every 6 hours as needed for itching, Disp: 60 tablet, Rfl: 0     lisinopril (ZESTRIL) 40 MG tablet, Take 1 tablet (40 mg) by mouth daily, Disp: 90 tablet, Rfl: 3     metoprolol succinate ER (TOPROL XL) 100 MG 24 hr tablet, Take 1 tablet (100 mg) by mouth daily, Disp: 90 tablet, Rfl: 3     Vitamin D3 (CHOLECALCIFEROL) 25 mcg (1000 units) tablet, Take 1 tablet (25 mcg) by mouth daily, Disp: 90 tablet, Rfl: 3    Current Facility-Administered Medications:      triamcinolone (KENALOG-40) injection 40 mg, 40 mg, , , Mich Bui DO, 40 mg at 23 1215      Family History   Problem Relation Age of Onset     Unknown/Adopted Father      Dementia Father    Sister - breast cancer; had thyroidectomy. Mom  of cancer. Sister HTN. No f/h of diabetes. One sister is overweight, no other family members.     Social History  . She has 1 child, a 38 yo son. Smokes  "since age 18, mostly 1 PPD, now 3/4 PPD. She denies drinking alcohol or using illicit drugs. Occupation: retired.      Review of Systems   Systemic:             weight stable   Eye:                      No eye symptoms   Nidhi-Laryngeal:     No nidhi-laryngeal symptoms, no dysphagia, no hoarseness, no cough     Breast:                  No breast symptoms  Cardiovascular:    No cardiovascular symptoms, no CP or palpitations   Pulmonary:           No pulmonary symptoms, no SOB or cough    Gastrointestinal:   mild constipation - longstanding   Genitourinary:       no increased thirst or urination   Endocrine:            as above   Neurological:        No headaches, no tremor, no numbness or tingling sensation, no dizziness; arms are not as strong as they used to be - not generalized weakness; no new stretch marks, no acne, no easy bruising; some facial hair - mild, on the upper lip and chin - present for >10-15 years and stable    Musculoskeletal:  hip joint pain   Skin:                     No skin symptoms, no dry skin, no hair falling out   Psychological:     some anxiety               Vital Signs     Previous Weights:    Wt Readings from Last 10 Encounters:   09/03/24 54.4 kg (120 lb)   08/06/24 55.5 kg (122 lb 6 oz)   05/14/24 54.4 kg (120 lb)   01/24/24 52.2 kg (115 lb)   11/01/23 54.2 kg (119 lb 8 oz)   09/06/23 52.2 kg (115 lb)   08/23/23 49.9 kg (110 lb)   08/03/23 50.8 kg (112 lb)   07/25/23 51 kg (112 lb 6.4 oz)   06/22/23 51.9 kg (114 lb 8 oz)        Wt 54.4 kg (120 lb)   [facility] 12/01/2009   BMI 19.37 kg/m      Estimated body mass index is 19.37 kg/m  as calculated from the following:    Height as of 8/6/24: 1.676 m (5' 6\").    Weight as of this encounter: 54.4 kg (120 lb).    Physical Exam  General Appearance: alert, no distress noted   Eyes: grossly normal to inspection, conjunctivae and sclerae normal, no lid lag or stare   Respiratory: no audible wheeze, cough, or visible cyanosis.  No visible retractions or " increased work of breathing.  Able to speak fully in complete sentences.  Neurological: Cranial nerves grossly intact, mentation intact and speech normal; no tremor of the hands   Skin: no lesions on exposed skin   Psychological: mentation appears normal, affect normal, judgement and insight intact, normal speech and appearance well-groomed     Lab Results  I reviewed prior lab results documented in Epic.  TSH   Date Value Ref Range Status   10/12/2022 1.64 0.30 - 4.20 uIU/mL Final   04/15/2019 1.95 0.40 - 4.00 mU/L Final   02/15/2017 1.36 0.40 - 4.00 mU/L Final   04/29/2015 1.35 0.40 - 4.00 mU/L Final   07/30/2013 1.50 0.4 - 5.0 mU/L Final   04/12/2011 1.07 0.4 - 5.0 mU/L Final     The longitudinal plan of care for the diagnosis(es)/condition(s) as documented were addressed during this visit. Due to the added complexity in care, I will continue to support Stephania in the subsequent management and with ongoing continuity of care.    45 minutes spent on the date of the encounter doing chart review, history and exam, documentation and further activities as noted above.                   Again, thank you for allowing me to participate in the care of your patient.        Sincerely,        Nani Avalos MD

## 2024-09-03 NOTE — PROGRESS NOTES
Video-Visit Details    Type of service:  Video Visit    Call started at 11:35 am   Call ended at 12:10 pm   Originating Location (pt. Location): Home  Distant Location (provider location): On-site    Mode of Communication:  Video Conference via [employer]    =======================================================  Assessment     Asmita Jerez is a 68 year old female seen for evaluation of bilateral adrenal macroadenomas, associated with possible mild autonomous cortisol secretion.    1.  Bilateral adrenal macroadenomas  Prior evaluations have revealed an incompletely suppressed cortisol with 1 mg and 8 mg dexamethasone, normal or high normal salivary and urinary cortisol, a non-suppressed ACTH and a lowish DHEAS.  Clinically, the patient has a history of PCOS, type 2 diabetes.  She is not obese or overweight and does not endorse signs or symptoms suggestive of hypercortisolism.  The patient is considering left nephrectomy for a kidney lesion suspicious of malignancy.     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 The interrogation of the pituitary function and the pituitary MRI have not suggested pituitary lesions or dysfunction.   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 Given the diagnosis of primary hyperparathyroidism, MEN1 remains in the differential diagnosis.  Plan:   Check morning ACTH, cortisol, 17 OH progesterone, DHEAS, androstenedione  Pursue genetic screening for MEN1 and [facility]    2.  Primary hyperparathyroidism  Not of new onset and associated with kidney stones, incidentally discovered on imaging.  The patient has no history of bone fractures or osteoporosis.  Clinically, she does not endorse signs or symptoms of hypercalcemia.  Biochemical workup was suggestive of primary hyperparathyroidism. 24 hrs urinary calcium  normal.   Recommendations:   Try to have a daily intake of calcium of 1000 mg from food products.  Take 1000 units vitamin D daily.   Follow-up calcium every 6 months.    3. Type 2 diabetes   Fairly well controlled on Trulicity and glimepiride. Keyur has not been able to tolerate jardiance.   Advised to check BG in the morning and at bedtime, alternative days.        Orders Placed This Encounter   Procedures    Adrenal corticotropin    Cortisol    DHEA sulfate    17 OH progesterone    Androstenedione     =======================================================  The patient is seen in f/up.      History of Present Illness:  Asmita Jerez is a 68 year old female with a past medical history significant for type 2 diabetes, hypertension, polycystic ovaries and hypercholesterolemia, who was initially seen in our clinic by Dr. Moore, in September 2023.  She established care with me in January 2024.    1.  Adrenal incidentaloma  It was incidentally discovered on the abdominal CT from July 2023, which revealed 2 left adrenal nodules, measuring 1.7 and 1.2 cm and a 1.4 cm right adrenal nodule.  All nodules had low Hounsfield unit density on the noncontrast CT <10 so they were considered to represent adrenal adenomas.  Also, the nodules were stable compared with the prior imaging from 2018. The CT was also positive for kidney stones and a 2.6 cm left renal heterogeneous lesion for which the patient follows up with urology. The lesion is suspicious for renal cell carcinoma and it remained stable on the follow-up abdominal CT from January 2024.  A follow-up abdominal CT is scheduled in August.    She was diagnosed with hypertension in her 60s. Her blood pressure is currently medicated with lisinopril and metoprolol.      Pertinent labs reviewed:   9/6/2023 aldosterone 6.3, renin 0.2, normal plasma metanephrines  9/13/2023 1 mg dexamethasone suppression test: cortisol level of 3.8; dexamethasone 352.6 (140-295)    9/28/2020  24-hour urinary cortisol 45 (3.5-45) 24-hour urinary cortisone normal.  The urinary volume was 1.65 L, with urine creatinine 0.9 g  11/11, 11/13, 11/15/23 salivary cortisol normal twice and borderline normal on 11/13/2023 at 0.112 (less than 0.1)  12/6/2023 ACTH 46, cortisol 37.9, DHEA-S 38 () at 7:52 AM  12/7/2023 8 mg dexamethasone suppression test: ACTH undetectable, cortisol 4.9, DHEA-S 28, dexamethasone >3000 at 7:42 AM  1/29/2024 24-hour urinary cortisol was normal at 33.  Patient took 8 mg dexamethasone the night prior.  5/1/24 8 mg dex suppressed cortisol checked using LCMS was 4.7, with dexamethasone >3000  5/29/24  ACTH 59 at 7:58 am, DHEAS 23, FSH 58.9, , prolactin 10, IGF1 186      The pituitary MRI from 7/23/24 revealed a subtle pars intermedia cyst, measuring 2-3 mm.   Her weight has been stable.      2.  Type 2 diabetes, diagnosed around 2000, around age 45.  Average A1c over the years has been around 8%.   Most recent A1c was 7.4 on 8/6/24.  She was only treated with metformin until staring Trulicity in 2023. Metformin caused diarrhea and it was replaced with Jardiance, in November 2023. On labwork from 5/1 K was elevated at 6 and the patient also reported experiencing constipation and abdominal bloating from Jardiance.. Subsequently, Jardiance was discontinued and replaced with 2 mg glimepiride. The K level on 5/8/24 normalized.     Currently antidiabetic medications:  Trulicity, 3 mg weekly (started in February 2023)  Glimepiride 2 mg daily - started on 5/5/24     I reviewed the provided BG numbers:   8/30/24  7:24am: 100     8/29/24  12:16pm: 116  8:10am: 125     8/28/24  6:40am: 108     8/27/24  7:04am: 102     8/26/24  5:45am: 138     8/25/24  7:32am: 137     8/24/24  2:25pm: 125  6:40am: 128     8/23/24  7:56am: 118     8/22/24  8:19am: 114     8/21/24  7:38am: 105     8/20/24  7:07am: 108     8//19/24  5:00am: 140     8/18/24  12:08pm: 127  7:09am: 121     8/17/24  7:27am: 113      8/16/24  5:27am: 105    She denies experiencing hypoglycemic symptoms.    Diabetes complications:  Last eye exam: 2 years ago  Most recent urine microalbumin negative in November 2023. On 40 mg lisinopril daily.   No numbness or tingling sensation in her feet   Last lipid panel from 9/13/2023 LDL 56, HDL 50, triglycerides 47.  On 40 mg atorvastatin daily.     3.  Hypercalcemia  The patient has had intermittent episodes of mild hypercalcemia or high normal calcium levels dating back to 2008.  The highest calcium level was 11, in September 2023.  On prior lab work, the albumin was normal, as well as alkaline phosphatase.   On lab work from 1/29/24, 24-hour urinary calcium was 0.12 g.  5/1/24 vitamin D 46, phos 4.6, calcium 10.7, GFR 69, 1,25 OH vitamin D normal at 51, PTH 31, PTHrp normal at 3.1.     On questioning , the patient denies n/v, muscle weakness, increased thirst or increased urination, confusion, memory changes.     Dairies: 2 glasses of milk daily and one serving of cheese - a couple of times a week.   Kidneys stones: pt not aware of this diagnosis. They were revealed by imaging.    Menopause: in the 50s. She still has daily hot flashes and occasional night sweats. Overall, the hot flashes have been stable.   The DXA scan from December 2022 revealed normal bone mineral density at all sites. There is no prior h/o fractures.     Past Medical History   Past Medical History:   Diagnosis Date    Cervical high risk HPV (human papillomavirus) test positive 03/31/2018    Diabetes (H)     Hypertension     Peptic ulcer, unspecified site, unspecified as acute or chronic, without mention of hemorrhage, perforation, or obstruction     Polycystic ovaries     Pure hypercholesterolemia     Tobacco use disorder     Unspecified multiple gestation, unspecified as to episode of care     Multiple gestation   Diverticulosis  Kidney stones  Bilateral adrenal adenomas  Lung nodules  Collagenous colitis  Anxiety     Past  Surgical History   Past Surgical History:   Procedure Laterality Date    APPENDECTOMY      ARTHROPLASTY HIP Right 6/22/2023    Procedure: Total Hip Arthroplasty Right;  Surgeon: Albin Williamson MD;  Location: [facility]    BIOPSY BREAST      COLONOSCOPY N/A 12/11/2015    Procedure: COLONOSCOPY;  Surgeon: Tino Pryor MD;  Location: [facility]    COLONOSCOPY N/A 8/23/2023    Procedure: Colonoscopy with biopsies;  Surgeon: Celio Larry DO;  Location: [facility]    ENDOSCOPIC RELEASE CARPAL TUNNEL Right 11/01/2021    Procedure: Endoscopic carpal tunnel release;  Surgeon: Ac Lemus MD;  Location: [facility]    ENDOSCOPIC RELEASE CARPAL TUNNEL Left 11/15/2021    Procedure: ENDOSCOPIC Carpal Tunnel Release, LEFT, removal suture right wrist;  Surgeon: Ac Lemus MD;  Location: [facility]    LUMPECTOMY BREAST      RELEASE TRIGGER FINGER  05/23/2014    Procedure: RELEASE TRIGGER FINGER;  Surgeon: Ag Hoffman MD;  Location: [facility]    SURGICAL HISTORY OF -   01/01/1970    appendectomy    SURGICAL HISTORY OF -   01/01/1970    pilonidal cyst and sinuses       Current Medications    Current Outpatient Medications:     amLODIPine (NORVASC) 2.5 MG tablet, Take 1 tablet (2.5 mg) by mouth daily, Disp: 90 tablet, Rfl: 1    amLODIPine (NORVASC) 5 MG tablet, Take 1 tablet (5 mg) by mouth daily, Disp: 90 tablet, Rfl: 1    atorvastatin (LIPITOR) 40 MG tablet, Take 1 tablet (40 mg) by mouth daily, Disp: 90 tablet, Rfl: 3    blood glucose (ACCU-CHEK SIDDHARTHA PLUS) test strip, Use to test blood sugar 3 times daily or as directed., Disp: 200 strip, Rfl: 3    blood glucose monitoring (ACCU-CHEK MULTICLIX) lancets, Use to test blood sugar 1 time daily or as directed. Accu chek, Ins will cover, match machine., Disp: 100 each, Rfl: 2    Dulaglutide (TRULICITY) 3 MG/0.5ML SOPN, Inject 3 mg subcutaneously every 7 days, Disp: 6 mL, Rfl: 1    glimepiride (AMARYL) 2 MG tablet, Take 1 tablet (2 mg) by mouth every  morning (before breakfast), Disp: 90 tablet, Rfl: 1    hydrOXYzine HCl (ATARAX) 25 MG tablet, Take 1 tablet (25 mg) by mouth every 6 hours as needed for itching, Disp: 60 tablet, Rfl: 0    lisinopril (ZESTRIL) 40 MG tablet, Take 1 tablet (40 mg) by mouth daily, Disp: 90 tablet, Rfl: 3    metoprolol succinate ER (TOPROL XL) 100 MG 24 hr tablet, Take 1 tablet (100 mg) by mouth daily, Disp: 90 tablet, Rfl: 3    Vitamin D3 (CHOLECALCIFEROL) 25 mcg (1000 units) tablet, Take 1 tablet (25 mcg) by mouth daily, Disp: 90 tablet, Rfl: 3    Current Facility-Administered Medications:     triamcinolone (KENALOG-40) injection 40 mg, 40 mg, , , Mich Bui, , 40 mg at 23 1215      Family History   Problem Relation Age of Onset    Unknown/Adopted Father     Dementia Father    Sister - breast cancer; had thyroidectomy. Mom  of cancer. Sister HTN. No f/h of diabetes. One sister is overweight, no other family members.     Social History  . She has 1 child, a 40 yo son. Smokes since age 18, mostly 1 PPD, now 3/4 PPD. She denies drinking alcohol or using illicit drugs. Occupation: retired.      Review of Systems   Systemic:             weight stable   Eye:                      No eye symptoms   Nidhi-Laryngeal:     No nidhi-laryngeal symptoms, no dysphagia, no hoarseness, no cough     Breast:                  No breast symptoms  Cardiovascular:    No cardiovascular symptoms, no CP or palpitations   Pulmonary:           No pulmonary symptoms, no SOB or cough    Gastrointestinal:   mild constipation - longstanding   Genitourinary:       no increased thirst or urination   Endocrine:            as above   Neurological:        No headaches, no tremor, no numbness or tingling sensation, no dizziness; arms are not as strong as they used to be - not generalized weakness; no new stretch marks, no acne, no easy bruising; some facial hair - mild, on the upper lip and chin - present for >10-15 years and stable   "  Musculoskeletal:  hip joint pain   Skin:                     No skin symptoms, no dry skin, no hair falling out   Psychological:     some anxiety               Vital Signs     Previous Weights:    Wt Readings from Last 10 Encounters:   09/03/24 54.4 kg (120 lb)   08/06/24 55.5 kg (122 lb 6 oz)   05/14/24 54.4 kg (120 lb)   01/24/24 52.2 kg (115 lb)   11/01/23 54.2 kg (119 lb 8 oz)   09/06/23 52.2 kg (115 lb)   08/23/23 49.9 kg (110 lb)   08/03/23 50.8 kg (112 lb)   07/25/23 51 kg (112 lb 6.4 oz)   06/22/23 51.9 kg (114 lb 8 oz)        Wt 54.4 kg (120 lb)   LMP 12/01/2009   BMI 19.37 kg/m      Estimated body mass index is 19.37 kg/m  as calculated from the following:    Height as of 8/6/24: 1.676 m (5' 6\").    Weight as of this encounter: 54.4 kg (120 lb).    Physical Exam  General Appearance: alert, no distress noted   Eyes: grossly normal to inspection, conjunctivae and sclerae normal, no lid lag or stare   Respiratory: no audible wheeze, cough, or visible cyanosis.  No visible retractions or increased work of breathing.  Able to speak fully in complete sentences.  Neurological: Cranial nerves grossly intact, mentation intact and speech normal; no tremor of the hands   Skin: no lesions on exposed skin   Psychological: mentation appears normal, affect normal, judgement and insight intact, normal speech and appearance well-groomed     Lab Results  I reviewed prior lab results documented in Epic.  TSH   Date Value Ref Range Status   10/12/2022 1.64 0.30 - 4.20 uIU/mL Final   04/15/2019 1.95 0.40 - 4.00 mU/L Final   02/15/2017 1.36 0.40 - 4.00 mU/L Final   04/29/2015 1.35 0.40 - 4.00 mU/L Final   07/30/2013 1.50 0.4 - 5.0 mU/L Final   04/12/2011 1.07 0.4 - 5.0 mU/L Final     The longitudinal plan of care for the diagnosis(es)/condition(s) as documented were addressed during this visit. Due to the added complexity in care, I will continue to support Stephania in the subsequent management and with ongoing continuity of " care.    45 minutes spent on the date of the encounter doing chart review, history and exam, documentation and further activities as noted above.

## 2024-09-06 ENCOUNTER — ALLIED HEALTH/NURSE VISIT (OUTPATIENT)
Dept: FAMILY MEDICINE | Facility: CLINIC | Age: 69
End: 2024-09-06
Payer: COMMERCIAL

## 2024-09-06 VITALS — HEART RATE: 72 BPM | OXYGEN SATURATION: 98 % | SYSTOLIC BLOOD PRESSURE: 128 MMHG | DIASTOLIC BLOOD PRESSURE: 62 MMHG

## 2024-09-06 DIAGNOSIS — Z01.30 BLOOD PRESSURE CHECK: Primary | ICD-10-CM

## 2024-09-06 PROCEDURE — 99207 PR NO CHARGE NURSE ONLY: CPT

## 2024-09-06 NOTE — PROGRESS NOTES
Asmita Jerez is a 68 year old year old patient who comes in today for a Blood Pressure check because of medication change Amlodipine increase to 5 mg  Vital Signs as repeated by /62 HR 72 SPO2 98%  Patient is taking medication as prescribed  Patient is tolerating medications well.  Patient is not monitoring Blood Pressure at home.  Average readings if yes are na  Current complaints: none  Disposition:  patient to continue with the same medication or as advised    CL  pharmacy if changes    Maryanne Corrigan RN on 9/6/2024 at 1:57 PM

## 2024-09-09 ENCOUNTER — LAB (OUTPATIENT)
Dept: LAB | Facility: CLINIC | Age: 69
End: 2024-09-09
Payer: COMMERCIAL

## 2024-09-09 DIAGNOSIS — D35.02 BILATERAL ADRENAL ADENOMAS: ICD-10-CM

## 2024-09-09 DIAGNOSIS — E11.9 TYPE 2 DIABETES MELLITUS WITHOUT COMPLICATION, WITHOUT LONG-TERM CURRENT USE OF INSULIN (H): Primary | ICD-10-CM

## 2024-09-09 DIAGNOSIS — D35.01 BILATERAL ADRENAL ADENOMAS: ICD-10-CM

## 2024-09-09 DIAGNOSIS — E26.09 PRIMARY HYPERALDOSTERONISM (H): ICD-10-CM

## 2024-09-09 LAB
CHOLEST SERPL-MCNC: 104 MG/DL
CORTIS SERPL-MCNC: 33.6 UG/DL
FASTING STATUS PATIENT QL REPORTED: NO
HDLC SERPL-MCNC: 36 MG/DL
LDLC SERPL CALC-MCNC: 48 MG/DL
NONHDLC SERPL-MCNC: 68 MG/DL
TRIGL SERPL-MCNC: 98 MG/DL

## 2024-09-09 PROCEDURE — 82627 DEHYDROEPIANDROSTERONE: CPT

## 2024-09-09 PROCEDURE — 82024 ASSAY OF ACTH: CPT

## 2024-09-09 PROCEDURE — 99000 SPECIMEN HANDLING OFFICE-LAB: CPT

## 2024-09-09 PROCEDURE — 83498 ASY HYDROXYPROGESTERONE 17-D: CPT

## 2024-09-09 PROCEDURE — 80061 LIPID PANEL: CPT

## 2024-09-09 PROCEDURE — 36415 COLL VENOUS BLD VENIPUNCTURE: CPT

## 2024-09-09 PROCEDURE — 82533 TOTAL CORTISOL: CPT

## 2024-09-09 PROCEDURE — 82157 ASSAY OF ANDROSTENEDIONE: CPT | Mod: 90

## 2024-09-09 NOTE — LETTER
September 9, 2024      Stephania Jerez  [address]  [place] [address]        Dear Stephania,    We are writing to inform you of your test results.    All recent lab results were negative/normal.    Resulted Orders   Lipid panel reflex to direct LDL Non-fasting   Result Value Ref Range    Cholesterol 104 <200 mg/dL    Triglycerides 98 <150 mg/dL    Direct Measure HDL 36 (L) >=50 mg/dL    LDL Cholesterol Calculated 48 <100 mg/dL    Non HDL Cholesterol 68 <130 mg/dL    Patient Fasting > 8hrs? No     Narrative    Cholesterol  Desirable: < 200 mg/dL  Borderline High: 200 - 239 mg/dL  High: >= 240 mg/dL    Triglycerides  Normal: < 150 mg/dL  Borderline High: 150 - 199 mg/dL  High: 200-499 mg/dL  Very High: >= 500 mg/dL    Direct Measure HDL  Female: >= 50 mg/dL   Male: >= 40 mg/dL    LDL Cholesterol  Desirable: < 100 mg/dL  Above Desirable: 100 - 129 mg/dL   Borderline High: 130 - 159 mg/dL   High:  160 - 189 mg/dL   Very High: >= 190 mg/dL    Non HDL Cholesterol  Desirable: < 130 mg/dL  Above Desirable: 130 - 159 mg/dL  Borderline High: 160 - 189 mg/dL  High: 190 - 219 mg/dL  Very High: >= 220 mg/dL       If you have any questions or concerns, please call the clinic at the number listed above.       Sincerely,      Melissa Pitts MD

## 2024-09-10 LAB
ACTH PLAS-MCNC: 26 PG/ML
DHEA-S SERPL-MCNC: 32 UG/DL (ref 35–430)

## 2024-09-11 LAB — INTERPRETATION: NORMAL

## 2024-09-12 LAB — 17OHP SERPL-MCNC: 140 NG/DL

## 2024-09-16 LAB — ANDROST SERPL-MCNC: 1.01 NG/ML

## 2024-09-17 ENCOUNTER — TELEPHONE (OUTPATIENT)
Dept: LAB | Facility: CLINIC | Age: 69
End: 2024-09-17
Payer: COMMERCIAL

## 2024-09-17 NOTE — PROGRESS NOTES
Notified Stephania, patient that no authorization is required for her genetic testing. This means that insurance will not pre-approve testing ahead of time. Coverage for testing will be determined by insurance after testing has been completed and insurance billed.    Explained that insurance benefits may still apply, therefore, there could be an out of pocket cost. Provided Stephania with estimated out of pocket cost for testing and for the cost of testing, if testing is not covered. Stephania is aware that she will will be responsible for the cost of the test if testing is not covered.    Stephania expressed understanding and stated that she does NOT want to proceed with testing due to no guarantees of coverage and potential high cost of testing if it was not covered. Stephania had no further questions.        Oscar English  Genomics Billing    [facility]  [facility] Diagn[facility]  [address] [address]  [email or web address]  [email or web address]  Office: [phone]  Fax:   Employed by [employer]

## 2024-09-23 RX ORDER — CORTICORELIN OVINE TRIFLUTATE 100 UG/2ML
1 INJECTION, POWDER, LYOPHILIZED, FOR SOLUTION INTRAVENOUS ONCE
Qty: 1.09 ML | Refills: 0 | Status: CANCELLED | OUTPATIENT
Start: 2024-09-23 | End: 2024-09-23

## 2024-09-27 NOTE — PROGRESS NOTES
Patient called.  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  Cushing disease remains a consideration in view of the unsuppressed ACTH but I think it is extremely unlikely, since ACTH suppressed with dexamethasone.  The unsuppressed ACTH can be explained by the intermittent hypercortisolism or by the ACTH secretion by the adrenal glands (well-described in bilateral macronodular adrenal hyperplasia).  CRH stimulation test might help completely ruling out Cushing disease, CRH it is not available in the [place].  Dr. Toro recommended to consider adrenal vein sampling prior to surgery.  Discussed with the patient about this option and what it involves, versus pursuing left adrenalectomy.  The patient prefers not to have invasive testing done and just have the left adrenal gland removed at the time of the kidney surgery.  She is aware that there is a postop risk of persistent cortisol over secretion from the right adrenal gland.  In this scenario, she would have to undergo right adrenalectomy in the future and require treatment with hydrocortisone for adrenal insufficiency.    Advised the patient to have the cortisol level checked in a 24-hour urine and in the lab/saliva, prior to her follow-up apt with me from December.

## 2024-11-01 DIAGNOSIS — E11.9 TYPE 2 DIABETES MELLITUS WITHOUT COMPLICATION, WITHOUT LONG-TERM CURRENT USE OF INSULIN (H): ICD-10-CM

## 2024-11-01 RX ORDER — ATORVASTATIN CALCIUM 40 MG/1
40 TABLET, FILM COATED ORAL DAILY
Qty: 90 TABLET | Refills: 3 | Status: SHIPPED | OUTPATIENT
Start: 2024-11-01 | End: 2024-11-12

## 2024-11-04 ENCOUNTER — OFFICE VISIT (OUTPATIENT)
Dept: FAMILY MEDICINE | Facility: CLINIC | Age: 69
End: 2024-11-04
Payer: COMMERCIAL

## 2024-11-04 VITALS
OXYGEN SATURATION: 96 % | WEIGHT: 123.25 LBS | HEIGHT: 63 IN | BODY MASS INDEX: 21.84 KG/M2 | TEMPERATURE: 97.1 F | DIASTOLIC BLOOD PRESSURE: 74 MMHG | HEART RATE: 73 BPM | RESPIRATION RATE: 18 BRPM | SYSTOLIC BLOOD PRESSURE: 138 MMHG

## 2024-11-04 DIAGNOSIS — Z00.00 ENCOUNTER FOR MEDICARE ANNUAL WELLNESS EXAM: Primary | ICD-10-CM

## 2024-11-04 DIAGNOSIS — D35.01 BILATERAL ADRENAL ADENOMAS: ICD-10-CM

## 2024-11-04 DIAGNOSIS — D35.02 BILATERAL ADRENAL ADENOMAS: ICD-10-CM

## 2024-11-04 DIAGNOSIS — R87.810 CERVICAL HIGH RISK HPV (HUMAN PAPILLOMAVIRUS) TEST POSITIVE: ICD-10-CM

## 2024-11-04 DIAGNOSIS — I10 ESSENTIAL HYPERTENSION WITH GOAL BLOOD PRESSURE LESS THAN 140/90: ICD-10-CM

## 2024-11-04 DIAGNOSIS — Z87.891 PERSONAL HISTORY OF TOBACCO USE, PRESENTING HAZARDS TO HEALTH: ICD-10-CM

## 2024-11-04 DIAGNOSIS — E11.9 TYPE 2 DIABETES MELLITUS WITHOUT COMPLICATION, WITHOUT LONG-TERM CURRENT USE OF INSULIN (H): ICD-10-CM

## 2024-11-04 DIAGNOSIS — E27.9 ADRENAL NODULE (H): ICD-10-CM

## 2024-11-04 DIAGNOSIS — Z96.641 STATUS POST RIGHT HIP REPLACEMENT: ICD-10-CM

## 2024-11-04 LAB
CREAT UR-MCNC: 322.9 MG/DL
MICROALBUMIN UR-MCNC: 72 MG/L
MICROALBUMIN/CREAT UR: 22.3 MG/G CR (ref 0–25)

## 2024-11-04 PROCEDURE — G0476 HPV COMBO ASSAY CA SCREEN: HCPCS | Mod: GZ | Performed by: FAMILY MEDICINE

## 2024-11-04 PROCEDURE — G0439 PPPS, SUBSEQ VISIT: HCPCS | Mod: 25 | Performed by: FAMILY MEDICINE

## 2024-11-04 PROCEDURE — 99214 OFFICE O/P EST MOD 30 MIN: CPT | Performed by: FAMILY MEDICINE

## 2024-11-04 PROCEDURE — 82570 ASSAY OF URINE CREATININE: CPT | Performed by: FAMILY MEDICINE

## 2024-11-04 PROCEDURE — 90480 ADMN SARSCOV2 VAC 1/ONLY CMP: CPT | Performed by: FAMILY MEDICINE

## 2024-11-04 PROCEDURE — 91320 SARSCV2 VAC 30MCG TRS-SUC IM: CPT | Performed by: FAMILY MEDICINE

## 2024-11-04 PROCEDURE — G0145 SCR C/V CYTO,THINLAYER,RESCR: HCPCS | Mod: GZ | Performed by: FAMILY MEDICINE

## 2024-11-04 PROCEDURE — 82043 UR ALBUMIN QUANTITATIVE: CPT | Performed by: FAMILY MEDICINE

## 2024-11-04 RX ORDER — METOPROLOL SUCCINATE 100 MG/1
100 TABLET, EXTENDED RELEASE ORAL DAILY
Qty: 90 TABLET | Refills: 3 | Status: SHIPPED | OUTPATIENT
Start: 2024-11-04

## 2024-11-04 RX ORDER — HYDROXYZINE HYDROCHLORIDE 25 MG/1
25 TABLET, FILM COATED ORAL EVERY 6 HOURS PRN
Qty: 60 TABLET | Refills: 0 | Status: SHIPPED | OUTPATIENT
Start: 2024-11-04

## 2024-11-04 RX ORDER — AMLODIPINE BESYLATE 5 MG/1
5 TABLET ORAL DAILY
Qty: 90 TABLET | Refills: 3 | Status: SHIPPED | OUTPATIENT
Start: 2024-11-04

## 2024-11-04 RX ORDER — LISINOPRIL 40 MG/1
40 TABLET ORAL DAILY
Qty: 90 TABLET | Refills: 3 | Status: SHIPPED | OUTPATIENT
Start: 2024-11-04

## 2024-11-04 SDOH — HEALTH STABILITY: PHYSICAL HEALTH: ON AVERAGE, HOW MANY DAYS PER WEEK DO YOU ENGAGE IN MODERATE TO STRENUOUS EXERCISE (LIKE A BRISK WALK)?: 1 DAY

## 2024-11-04 ASSESSMENT — PAIN SCALES - GENERAL: PAINLEVEL_OUTOF10: MODERATE PAIN (4)

## 2024-11-04 ASSESSMENT — SOCIAL DETERMINANTS OF HEALTH (SDOH): HOW OFTEN DO YOU GET TOGETHER WITH FRIENDS OR RELATIVES?: PATIENT DECLINED

## 2024-11-04 NOTE — PROGRESS NOTES
Preventive Care Visit  [facility]  Melissa Pitts MD, Family Medicine  Nov 4, 2024      Assessment & Plan     Encounter for Medicare annual wellness exam       Type 2 diabetes mellitus without complication, without long-term current use of insulin (H)  Control is fair with HgbA1c of 7.4% 3 months ago  - Albumin Random Urine Quantitative with Creat Ratio; Future    Cervical high risk HPV (human papillomavirus) test positive     - HPV and Gynecologic Cytology Panel - Recommended Age 30 - 65 Years    Status post right hip replacement     - hydrOXYzine HCl (ATARAX) 25 MG tablet; Take 1 tablet (25 mg) by mouth every 6 hours as needed for itching.      Essential hypertension with goal blood pressure less than 140/90  Fair control  - lisinopril (ZESTRIL) 40 MG tablet; Take 1 tablet (40 mg) by mouth daily.  - amLODIPine (NORVASC) 5 MG tablet; Take 1 tablet (5 mg) by mouth daily.  - metoprolol succinate ER (TOPROL XL) 100 MG 24 hr tablet; Take 1 tablet (100 mg) by mouth daily.      Adrenal nodule (H)  Seeing endocrinologist  Has some outstanding labs due in 6 weeks or so before her endocrinology apt - she will schedule that and do the 24 hour urine collection    Personal history of tobacco use, presenting hazards to health  Agrees to LDCT  - CT Chest Lung Cancer Screen Low Dose Without; Future    Patient has been advised of split billing requirements and indicates understanding: Yes        Nicotine/Tobacco Cessation  She reports that she has been smoking cigarettes. She has a 40 pack-year smoking history. She has never used smokeless tobacco.  Nicotine/Tobacco Cessation Plan  Information offered: Patient not interested at this time      Counseling  Appropriate preventive services were addressed with this patient via screening, questionnaire, or discussion as appropriate for fall prevention, nutrition, physical activity, Tobacco-use cessation, social engagement, weight loss and cognition.   Checklist reviewing preventive services available has been given to the patient.  Reviewed patient's diet, addressing concerns and/or questions.   She is at risk for lack of exercise and has been provided with information to increase physical activity for the benefit of her well-being.   The patient was instructed to see the dentist every 6 months.   The patient was provided with written information regarding signs of hearing loss.           Yessi Cat is a 68 year old, presenting for the following:  Medicare Visit        11/4/2024     8:55 AM   Additional Questions   Roomed by Emily chong CMA   Accompanied by Self           HPI    - Tobacco abuse. She is currently smoking cigarettes, 1ppd.        Health Care Directive  Patient has a Health Care Directive on file  Advance care planning document is on file and is current.      11/4/2024   General Health   How would you rate your overall physical health? Good   Feel stress (tense, anxious, or unable to sleep) Very much      (!) STRESS CONCERN      11/4/2024   Nutrition   Diet: Regular (no restrictions)            11/4/2024   Exercise   Days per week of moderate/strenous exercise 1 day      (!) EXERCISE CONCERN      11/4/2024   Social Factors   Frequency of gathering with friends or relatives Patient declined   Worry food won't last until get money to buy more No   Food not last or not have enough money for food? No   Do you have housing? (Housing is defined as stable permanent housing and does not include staying ouside in a car, in a tent, in an abandoned building, in an overnight shelter, or couch-surfing.) Yes   Are you worried about losing your housing? No   Lack of transportation? No   Unable to get utilities (heat,electricity)? No            11/4/2024   Fall Risk   Fallen 2 or more times in the past year? No     No    Trouble with walking or balance? No     No        Patient-reported    Multiple values from one day are sorted in reverse-chronological order           11/4/2024   Activities of Daily Living- Home Safety   Needs help with the following daily activites None of the above   Safety concerns in the home None of the above            11/4/2024   Dental   Dentist two times every year? (!) NO            11/4/2024   Hearing Screening   Hearing concerns? (!) I NEED TO ASK PEOPLE TO SPEAK UP OR REPEAT THEMSELVES.            11/4/2024   Driving Risk Screening   Patient/family members have concerns about driving No            11/4/2024   General Alertness/Fatigue Screening   Have you been more tired than usual lately? No            11/4/2024   Urinary Incontinence Screening   Bothered by leaking urine in past 6 months No            11/4/2024   TB Screening   Were you born outside of the [place]? No            Today's PHQ-2 Score:       11/4/2024     8:52 AM   PHQ-2 ( 1999 [employer])   Q1: Little interest or pleasure in doing things 0    Q2: Feeling down, depressed or hopeless 0    PHQ-2 Score 0    Q1: Little interest or pleasure in doing things Not at all   Q2: Feeling down, depressed or hopeless Not at all   PHQ-2 Score 0       Patient-reported           11/4/2024   Substance Use   If I could quit smoking, I would Neutral   I want to quit somking, worry about health affects Neutral   Willing to make a plan to quit smoking Neutral   Willing to cut down before quitting Neutral   Alcohol more than 3/day or more than 7/wk Not Applicable   Do you have a current opioid prescription? No   How severe/bad is pain from 1 to 10? 5/10   Do you use any other substances recreationally? No        Social History     Tobacco Use    Smoking status: Every Day     Current packs/day: 1.00     Average packs/day: 1 pack/day for 40.0 years (40.0 ttl pk-yrs)     Types: Cigarettes    Smokeless tobacco: Never    Tobacco comments:     pt not interested in quit plan 10/12/2022   Vaping Use    Vaping status: Never Used   Substance Use Topics    Alcohol use: No    Drug use: No           8/7/2024   LAST  FHS-7 RESULTS   1st degree relative breast or ovarian cancer No   Any relative bilateral breast cancer No   Any male have breast cancer No   Any ONE woman have BOTH breast AND ovarian cancer No   Any woman with breast cancer before 50yrs No   2 or more relatives with breast AND/OR ovarian cancer No   2 or more relatives with breast AND/OR bowel cancer No           Mammogram Screening - Mammogram every 1-2 years updated in Health Maintenance based on mutual decision making      History of abnormal Pap smear: YES - reflected in Problem List and Health Maintenance accordingly        Latest Ref Rng & Units 11/1/2023     1:18 PM 10/12/2022     3:49 PM 4/15/2019     4:26 PM   PAP / HPV   PAP  Negative for Intraepithelial Lesion or Malignancy (NILM)  Negative for Intraepithelial Lesion or Malignancy (NILM)     PAP (Historical)    NIL    HPV 16 DNA Negative Negative  Negative  Negative    HPV 18 DNA Negative Negative  Negative  Negative    Other HR HPV Negative Negative  Positive  Negative      ASCVD Risk   The ASCVD Risk score (Wayne DK, et al., 2019) failed to calculate for the following reasons:    The valid total cholesterol range is 130 to 320 mg/dL            Reviewed and updated as needed this visit by Provider                      Current providers sharing in care for this patient include:  Patient Care Team:  Melissa Pitts MD as PCP - General (Family Practice)  Melissa Pitts MD as Assigned PCP  Demetrius Moore MD as Physician (Endocrinology, Diabetes, and Metabolism)  Clayton Toro MD as Assigned Surgical Provider  No Ref-Primary, Physician  Ned Boucher RPH as Pharmacist (Pharmacist)  Nani Avalos MD as Assigned Endocrinology Provider    The following health maintenance items are reviewed in Epic and correct as of today:  Health Maintenance   Topic Date Due    Pneumococcal Vaccine: 65+ Years (1 of 2 - PCV) Never done    ZOSTER IMMUNIZATION (1 of 2) Never done    RSV  "VACCINE (1 - Risk 60-74 years 1-dose series) Never done    LUNG CANCER SCREENING  07/27/2024    INFLUENZA VACCINE (1) 09/01/2024    COVID-19 Vaccine (6 - 2024-25 season) 09/01/2024    MICROALBUMIN  11/01/2024    ANNUAL REVIEW OF HM ORDERS  11/01/2024    NICOTINE/TOBACCO CESSATION COUNSELING Q 1 YR  11/01/2024    PAP FOLLOW-UP  11/01/2024    HPV FOLLOW-UP  11/01/2024    A1C  02/06/2025    BMP  05/01/2025    DIABETIC FOOT EXAM  08/06/2025    EYE EXAM  08/19/2025    LIPID  09/09/2025    MEDICARE ANNUAL WELLNESS VISIT  11/04/2025    FALL RISK ASSESSMENT  11/04/2025    DTAP/TDAP/TD IMMUNIZATION (2 - Td or Tdap) 11/18/2025    MAMMO SCREENING  08/07/2026    ADVANCE CARE PLANNING  05/10/2029    COLORECTAL CANCER SCREENING  08/23/2033    DEXA  12/14/2037    HEPATITIS C SCREENING  Completed    PHQ-2 (once per calendar year)  Completed    HPV IMMUNIZATION  Aged Out    MENINGITIS IMMUNIZATION  Aged Out    RSV MONOCLONAL ANTIBODY  Aged Out         Review of Systems  Constitutional, HEENT, cardiovascular, pulmonary, gi and gu systems are negative, except as otherwise noted.     Objective    Exam  /74   Pulse 73   Temp 97.1  F (36.2  C) (Temporal)   Resp 18   Ht 1.6 m (5' 3\")   Wt 55.9 kg (123 lb 4 oz)   LMP 12/01/2009   SpO2 96%   BMI 21.83 kg/m     Estimated body mass index is 21.83 kg/m  as calculated from the following:    Height as of this encounter: 1.6 m (5' 3\").    Weight as of this encounter: 55.9 kg (123 lb 4 oz).    Physical Exam  GENERAL: alert and no distress  NECK: no adenopathy, no asymmetry, masses, or scars  RESP: lungs clear to auscultation - no rales, rhonchi or wheezes  BREAST: normal without masses, tenderness or nipple discharge and no palpable axillary masses or adenopathy  CV: regular rate and rhythm, normal S1 S2, no S3 or S4, no murmur, click or rub, no peripheral edema  ABDOMEN: soft, nontender, no hepatosplenomegaly, no masses and bowel sounds normal   (female): normal female external " genitalia, normal urethral meatus , normal vaginal mucosa, vaginal mucosal atrophy, and pap obtained  MS: no gross musculoskeletal defects noted, no edema  NEURO: Normal strength and tone, mentation intact and speech normal  PSYCH: mentation appears normal, affect normal/bright         11/4/2024   Mini Cog   Clock Draw Score 2 Normal   3 Item Recall 3 objects recalled   Mini Cog Total Score 5                 Signed Electronically by: Melissa Pitts MD

## 2024-11-04 NOTE — PATIENT INSTRUCTIONS
Labs before your endocrinology visit- Mid December- do a morning lab appointment    CT scan in Feb before your urology appointment            Patient Education   Preventive Care Advice   This is general advice given by our system to help you stay healthy. However, your care team may have specific advice just for you. Please talk to your care team about your preventive care needs.  Nutrition  Eat 5 or more servings of fruits and vegetables each day.  Try wheat bread, brown rice and whole grain pasta (instead of white bread, rice, and pasta).  Get enough calcium and vitamin D. Check the label on foods and aim for 100% of the RDA (recommended daily allowance).  Lifestyle  Exercise at least 150 minutes each week  (30 minutes a day, 5 days a week).  Do muscle strengthening activities 2 days a week. These help control your weight and prevent disease.  No smoking.  Wear sunscreen to prevent skin cancer.  Have a dental exam and cleaning every 6 months.  Yearly exams  See your health care team every year to talk about:  Any changes in your health.  Any medicines your care team has prescribed.  Preventive care, family planning, and ways to prevent chronic diseases.  Shots (vaccines)   HPV shots (up to age 26), if you've never had them before.  Hepatitis B shots (up to age 59), if you've never had them before.  COVID-19 shot: Get this shot when it's due.  Flu shot: Get a flu shot every year.  Tetanus shot: Get a tetanus shot every 10 years.  Pneumococcal, hepatitis A, and RSV shots: Ask your care team if you need these based on your risk.  Shingles shot (for age 50 and up)  General health tests  Diabetes screening:  Starting at age 35, Get screened for diabetes at least every 3 years.  If you are younger than age 35, ask your care team if you should be screened for diabetes.  Cholesterol test: At age 39, start having a cholesterol test every 5 years, or more often if advised.  Bone density scan (DEXA): At age 50, ask your care  team if you should have this scan for osteoporosis (brittle bones).  Hepatitis C: Get tested at least once in your life.  STIs (sexually transmitted infections)  Before age 24: Ask your care team if you should be screened for STIs.  After age 24: Get screened for STIs if you're at risk. You are at risk for STIs (including HIV) if:  You are sexually active with more than one person.  You don't use condoms every time.  You or a partner was diagnosed with a sexually transmitted infection.  If you are at risk for HIV, ask about PrEP medicine to prevent HIV.  Get tested for HIV at least once in your life, whether you are at risk for HIV or not.  Cancer screening tests  Cervical cancer screening: If you have a cervix, begin getting regular cervical cancer screening tests starting at age 21.  Breast cancer scan (mammogram): If you've ever had breasts, begin having regular mammograms starting at age 40. This is a scan to check for breast cancer.  Colon cancer screening: It is important to start screening for colon cancer at age 45.  Have a colonoscopy test every 10 years (or more often if you're at risk) Or, ask your provider about stool tests like a FIT test every year or Cologuard test every 3 years.  To learn more about your testing options, visit:   .  For help making a decision, visit:   https://bit.ly/sb60995.  Prostate cancer screening test: If you have a prostate, ask your care team if a prostate cancer screening test (PSA) at age 55 is right for you.  Lung cancer screening: If you are a current or former smoker ages 50 to 80, ask your care team if ongoing lung cancer screenings are right for you.  For informational purposes only. Not to replace the advice of your health care provider. Copyright   2023 [employer]. All rights reserved. Clinically reviewed by the [facility] Transitions Program. [employer] [id number] - REV 01/24.

## 2024-11-05 LAB
HPV HR 12 DNA CVX QL NAA+PROBE: NEGATIVE
HPV16 DNA CVX QL NAA+PROBE: NEGATIVE
HPV18 DNA CVX QL NAA+PROBE: NEGATIVE
HUMAN PAPILLOMA VIRUS FINAL DIAGNOSIS: NORMAL

## 2024-11-05 NOTE — RESULT ENCOUNTER NOTE
Asmita,    These labs are normal or acceptable.    Please contact my office if you have questions.    Melissa Pitts M.D.

## 2024-11-07 LAB
BKR AP ASSOCIATED HPV REPORT: NORMAL
BKR LAB AP GYN ADEQUACY: NORMAL
BKR LAB AP GYN INTERPRETATION: NORMAL
BKR LAB AP PREVIOUS ABNORMAL: NORMAL
PATH REPORT.COMMENTS IMP SPEC: NORMAL
PATH REPORT.COMMENTS IMP SPEC: NORMAL
PATH REPORT.RELEVANT HX SPEC: NORMAL

## 2024-11-12 ENCOUNTER — ALLIED HEALTH/NURSE VISIT (OUTPATIENT)
Dept: FAMILY MEDICINE | Facility: CLINIC | Age: 69
End: 2024-11-12
Payer: COMMERCIAL

## 2024-11-12 DIAGNOSIS — Z76.0 ENCOUNTER FOR MEDICATION REFILL: Primary | ICD-10-CM

## 2024-11-12 PROCEDURE — 99207 PR NO CHARGE NURSE ONLY: CPT

## 2024-11-12 RX ORDER — ATORVASTATIN CALCIUM 40 MG/1
40 TABLET, FILM COATED ORAL DAILY
Qty: 90 TABLET | Refills: 3 | Status: SHIPPED | OUTPATIENT
Start: 2024-11-12

## 2024-11-12 RX ORDER — DULAGLUTIDE 3 MG/.5ML
3 INJECTION, SOLUTION SUBCUTANEOUS WEEKLY
Qty: 6 ML | Refills: 1 | Status: SHIPPED | OUTPATIENT
Start: 2024-11-12

## 2024-11-12 NOTE — PROGRESS NOTES
RN spoke with Dr. Pitts and she sent the prescriptions and RN spoke to pharmacy and they confirmed they received the prescriptions for Atorvastatin and Trulicity and asked for urgent refill.       RN informed patient of the status of her medications.    Alexsandra Dutta RN on 11/12/2024 at 1:09 PM

## 2024-11-12 NOTE — TELEPHONE ENCOUNTER
Patient came to clinic due to not able to  her Atorvastatin or Trulicity.     Epic will not allow Trulicity to be reordered by RN.   11/1/24 it appears Atorvastatin was reordered and sent to pharmacy.     Alexsandra Dutta RN on 11/12/2024 at 12:45 PM

## 2024-12-11 LAB
COLLECT DURATION TIME UR: 24 H
CREAT 24H UR-MRATE: 1.07 G/SPEC (ref 0.72–1.51)
CREAT UR-MCNC: 97.4 MG/DL
SPECIMEN VOL UR: 1100 ML

## 2024-12-14 LAB
ANNOTATION COMMENT IMP: NORMAL
CORTIS F 24H UR HPLC-MCNC: 36.6 UG/L
CORTIS F 24H UR-MRATE: 40.3 UG/D
CORTIS F/CREAT 24H UR: 36.97 UG/G CRT
CREAT 24H UR-MRATE: 1089 MG/D
CREAT UR-MCNC: 99 MG/DL

## 2024-12-27 NOTE — PROGRESS NOTES
Outcome for 12/27/24 2:26 PM: Left Voicemail   Manda Dhillon MA  Outcome for 12/30/24 10:49 AM: Data obtained via phone and located below  Mnada Dhillon MA      Patient is showing 4/5 MNCM met. Current tobacco use   Manda Dhillon MA    12/30/24  8:41am: 116    12/29/24  1:21pm: 146  8:28am: 110    12/28/24  8:44am: 111    12/27/24  1:42pm: 144  8:11am: 120    12/26/24  6:41am: 108    12/25/24  7:38am: 106    12/24/24  9:34pm: 134  1:50pm: 106  8:51am: 103    12/23/24  2:25pm: 126  6:47am: 116    12/22/24  7:19am: 110    12/21/24  1:11pm: 92  6:49am: 112    12/20/24  8:42pm: 106  7:53am: 105    12/19/24  9:00am: 113    12/18/24  12:26pm: 126  8:27am: 104    12/17/24  7:32pm: 126  5:56am: 93    12/16/24  8:22pm: 143  2:45pm: 110  8:31am: 125

## 2024-12-31 ENCOUNTER — VIRTUAL VISIT (OUTPATIENT)
Dept: ENDOCRINOLOGY | Facility: CLINIC | Age: 69
End: 2024-12-31
Payer: COMMERCIAL

## 2024-12-31 VITALS — BODY MASS INDEX: 21.79 KG/M2 | WEIGHT: 123 LBS | HEIGHT: 63 IN

## 2024-12-31 DIAGNOSIS — E11.9 TYPE 2 DIABETES MELLITUS WITHOUT COMPLICATION, WITHOUT LONG-TERM CURRENT USE OF INSULIN (H): ICD-10-CM

## 2024-12-31 DIAGNOSIS — D35.01 BILATERAL ADRENAL ADENOMAS: Primary | ICD-10-CM

## 2024-12-31 DIAGNOSIS — E83.52 HYPERCALCEMIA: ICD-10-CM

## 2024-12-31 DIAGNOSIS — D35.02 BILATERAL ADRENAL ADENOMAS: Primary | ICD-10-CM

## 2024-12-31 DIAGNOSIS — R79.89 HIGH SERUM CORTISOL: ICD-10-CM

## 2024-12-31 PROCEDURE — 99215 OFFICE O/P EST HI 40 MIN: CPT | Mod: 95 | Performed by: INTERNAL MEDICINE

## 2024-12-31 PROCEDURE — G2211 COMPLEX E/M VISIT ADD ON: HCPCS | Performed by: INTERNAL MEDICINE

## 2024-12-31 ASSESSMENT — PAIN SCALES - GENERAL: PAINLEVEL_OUTOF10: NO PAIN (0)

## 2024-12-31 NOTE — NURSING NOTE
Current patient location: [address] [place] [address] [address]  [facility] [id number]    Is the patient currently in the state of [place]? YES    Visit mode:VIDEO    If the visit is dropped, the patient can be reconnected by:VIDEO VISIT: Text to cell phone:   Telephone Information:   Mobile [phone]       Will anyone else be joining the visit? NO  (If patient encounters technical issues they should call [phone]    Are changes needed to the allergy or medication list? No    Are refills needed on medications prescribed by this physician? NO    Rooming Documentation:  Questionnaire(s) completed    Reason for visit: RECHECK    Nadege YEPEZF

## 2024-12-31 NOTE — LETTER
12/31/2024      Asmita Jerez  [id number]  [facility] [address]      Dear Colleague,    Thank you for referring your patient, Asmita Jerez, to the [facility]. Please see a copy of my visit note below.    Outcome for 12/27/24 2:26 PM: Left Voicemail   Manda Dhillon MA  Outcome for 12/30/24 10:49 AM: Data obtained via phone and located below  Manda Dhillon MA      Patient is showing 4/5 MNCM met. Current tobacco use   Manda Dhillon MA    12/30/24  8:41am: 116    12/29/24  1:21pm: 146  8:28am: 110    12/28/24  8:44am: 111    12/27/24  1:42pm: 144  8:11am: 120    12/26/24  6:41am: 108    12/25/24  7:38am: 106    12/24/24  9:34pm: 134  1:50pm: 106  8:51am: 103    12/23/24  2:25pm: 126  6:47am: 116    12/22/24  7:19am: 110    12/21/24  1:11pm: 92  6:49am: 112    12/20/24  8:42pm: 106  7:53am: 105    12/19/24  9:00am: 113    12/18/24  12:26pm: 126  8:27am: 104    12/17/24  7:32pm: 126  5:56am: 93    12/16/24  8:22pm: 143  2:45pm: 110  8:31am: 125        Video-Visit Details    Type of service:  Video Visit    Call started 10:46 am   Call ended 11:15 am     Originating Location (pt. Location): Home  Distant Location (provider location): Off-site    Mode of Communication:  Video Conference via Doxim[employer]    =======================================================  Assessment     Asmita Jerez is a 69 year old female seen for evaluation of bilateral adrenal macroadenomas, associated with possible mild autonomous cortisol secretion.    1.  Bilateral adrenal macroadenomas  Prior evaluations have revealed an incompletely suppressed cortisol with 1 mg and 8 mg dexamethasone, normal or high normal salivary and urinary cortisol, a non-suppressed ACTH, lowish DHEAS.  Clinically, the patient has a history of PCOS, type 2 diabetes, dyslipidemia, HTN.  She is not obese or overweight and does not endorse signs or symptoms suggestive of hypercortisolism.  The patient is considering left  nephrectomy for a kidney lesion suspicious of malignancy.   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 The interrogation of the pituitary function and the pituitary MRI have not suggested pituitary lesions or dysfunction.   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  Of note that the ACTH secretion did suppressed with dexamethasone.  Given the diagnosis of primary hyperparathyroidism, MEN1 remains in the differential diagnosis. Her insurance did not provide coverage for genetic screening.  Cushing disease is extremely unlikely given the MRI and the biochemical interrogation of the pituitary gland function. CR[facility] stimulation test might help completely ruling out Cushing disease, unfortunately CRH it is not available in the [place].    Overall, I discussed with the patient and I recommended to consider having the left adrenal gland surgically removed at the time of nephrectomy. She is scheduled for a follow-up CT and an appointment with Dr. Toro in February 2025.  F/up morning ACTH and cortisol levels     2.  Hypercalcemia,  Not of new onset and associated with kidney stones, incidentally discovered on imaging.  The patient has no history of bone fractures or osteoporosis.  Clinically, she does not endorse signs or symptoms of hypercalcemia.  Biochemical workup was suggestive of primary hyperparathyroidism. 24 hrs urinary calcium normal.   Recommendations:   F/up PTH, BMP, phos, albumin, SPEP    3. Type 2 diabetes   Fairly well controlled on Trulicity and glimepiride. Keyur has not been able to tolerate jardiance.   F/up A1c with her next labs     Orders Placed This Encounter   Procedures     Basic metabolic panel     Phosphorus     Albumin level     Parathyroid Hormone Intact     Hemoglobin A1c     Adrenal corticotropin      Cortisol     Protein electrophoresis     =======================================================  The patient is seen in f/up.      History of Present Illness:  Asmita Jerez is a 69 year old female with a past medical history significant for type 2 diabetes, hypertension, polycystic ovaries and hypercholesterolemia, who was initially seen in our clinic by Dr. Moore, in September 2023.  She established care with me in January 2024. Last clinic visit was on  her last visit with me was on 9/3/24.     1.  Adrenal incidentaloma  It was incidentally discovered on the abdominal CT from July 2023, which revealed 2 left adrenal nodules, measuring 1.7 and 1.2 cm and a 1.4 cm right adrenal nodule.  All nodules had low Hounsfield unit density on the noncontrast CT <10 so they were considered to represent adrenal adenomas.  Also, the nodules were stable compared with the prior imaging from 2018. The CT was also positive for kidney stones and a 2.6 cm left renal heterogeneous lesion for which the patient follows up with urology. The lesion is suspicious for renal cell carcinoma and it remained minimally increase on the follow-up abdominal CT from August 2024. A f/up renal CT is scheduled in 2/25.     She was diagnosed with hypertension in her 60s. Her blood pressure is currently medicated with amlodipine, lisinopril and metoprolol.      Pertinent labs reviewed:   12/11/2024 24-hour urinary cortisol 40.3, creatinine 1.089 urinary volume 1.1 L  9/9/24 ACTH 26, cortisol 33.6 at 8:34 am, DHEAS 32, 17 OH progesterone 140, androstenedione 1.013 (0.13-0.82)  9/6/2023 aldosterone 6.3, renin 0.2, normal plasma metanephrines  9/13/2023 1 mg dexamethasone suppression test: cortisol level of 3.8; dexamethasone 352.6 (140-295)    9/28/2020 24-hour urinary cortisol 45 (3.5-45) 24-hour urinary cortisone normal.  The urinary volume was 1.65 L, with urine creatinine 0.9 g  11/11, 11/13, 11/15/23 salivary cortisol normal twice and borderline  normal on 11/13/2023 at 0.112 (less than 0.1)  12/6/2023 ACTH 46, cortisol 37.9, DHEA-S 38 () at 7:52 AM  12/7/2023 8 mg dexamethasone suppression test: ACTH undetectable, cortisol 4.9, DHEA-S 28, dexamethasone >3000 at 7:42 AM  1/29/2024 24-hour urinary cortisol was normal at 33.  Patient took 8 mg dexamethasone the night prior.  5/1/24 8 mg dex suppressed cortisol checked using LCMS was 4.7, with dexamethasone >3000  5/29/24  ACTH 59 at 7:58 am, DHEAS 23, FSH 58.9, , prolactin 10, IGF1 186      The pituitary MRI from 7/23/24 revealed a subtle pars intermedia cyst, measuring 2-3 mm.     2.  Type 2 diabetes, diagnosed around 2000, around age 45.  Average A1c over the years has been around 8%.   Most recent A1c was 7.4 on 8/6/24.    Currently antidiabetic medications:  Trulicity, 3 mg weekly (started in February 2023)  Glimepiride 2 mg daily - started on 5/5/24     She was only treated with metformin until staring Trulicity in 2023. Metformin caused diarrhea and it was replaced with Jardiance, in November 2023. On labwork from 5/1 K was elevated at 6 and the patient also reported experiencing constipation and abdominal bloating from Jardiance.. Subsequently, Jardiance was discontinued and replaced with 2 mg glimepiride. The K level on 5/8/24 normalized.     I reviewed the provided BG numbers:   12/30/24  8:41am: 116     12/29/24  1:21pm: 146  8:28am: 110     12/28/24  8:44am: 111     12/27/24  1:42pm: 144  8:11am: 120     12/26/24  6:41am: 108     12/25/24  7:38am: 106     12/24/24  9:34pm: 134  1:50pm: 106  8:51am: 103     12/23/24  2:25pm: 126  6:47am: 116     12/22/24  7:19am: 110     12/21/24  1:11pm: 92  6:49am: 112     12/20/24  8:42pm: 106  7:53am: 105     12/19/24  9:00am: 113     12/18/24  12:26pm: 126  8:27am: 104     12/17/24  7:32pm: 126  5:56am: 93     12/16/24  8:22pm: 143  2:45pm: 110  8:31am: 125    She denies experiencing hypoglycemic symptoms.    Diabetes complications:  Last eye exam:  8/24  Most recent urine microalbumin negative in November 2024. On 40 mg lisinopril daily.   No numbness or tingling sensation in her feet   Last lipid panel from 9/9/24: LDL 48, HDL 36, triglycerides 98.  On 40 mg atorvastatin daily.     3.  Hypercalcemia  The patient has had intermittent episodes of mild hypercalcemia or high normal calcium levels dating back to 2008.  The highest calcium level was 11, in September 2023.  On prior lab work, the albumin was normal, as well as alkaline phosphatase.   On lab work from 1/29/24, 24-hour urinary calcium was 0.12 g.  5/1/24 vitamin D 46, phos 4.6, calcium 10.7, GFR 69, 1,25 OH vitamin D normal at 51, PTH 31, PTHrp normal at 3.1.     On questioning the patient denies n/v, muscle weakness, increased thirst or increased urination, confusion, memory changes.     Dairies: has cheese daily, one glass of milk daily.   Kidneys stones: revealed by imaging.    Menopause: in the 50s. She still has daily hot flashes and occasional night sweats. Overall, the hot flashes have been stable.   The DXA scan from December 2022 revealed normal bone mineral density at all sites. There is no prior h/o fractures.     Past Medical History   Past Medical History:   Diagnosis Date     Cervical high risk HPV (human papillomavirus) test positive 03/31/2018     Diabetes (H)      Hypertension      Peptic ulcer, unspecified site, unspecified as acute or chronic, without mention of hemorrhage, perforation, or obstruction      Polycystic ovaries      Pure hypercholesterolemia      Tobacco use disorder      Unspecified multiple gestation, unspecified as to episode of care     Multiple gestation   Diverticulosis  Kidney stones  Bilateral adrenal adenomas  Lung nodules  Collagenous colitis  Anxiety     Past Surgical History   Past Surgical History:   Procedure Laterality Date     APPENDECTOMY       ARTHROPLASTY HIP Right 6/22/2023    Procedure: Total Hip Arthroplasty Right;  Surgeon: Albin Williamson,  MD;  Location: [facility]     BIOPSY BREAST       COLONOSCOPY N/A 12/11/2015    Procedure: COLONOSCOPY;  Surgeon: Tino Pryor MD;  Location: [facility]     COLONOSCOPY N/A 8/23/2023    Procedure: Colonoscopy with biopsies;  Surgeon: Celio Larry DO;  Location: [facility]     ENDOSCOPIC RELEASE CARPAL TUNNEL Right 11/01/2021    Procedure: Endoscopic carpal tunnel release;  Surgeon: Ac Lemus MD;  Location: [facility]     ENDOSCOPIC RELEASE CARPAL TUNNEL Left 11/15/2021    Procedure: ENDOSCOPIC Carpal Tunnel Release, LEFT, removal suture right wrist;  Surgeon: Ac Lemus MD;  Location: [facility]     LUMPECTOMY BREAST       RELEASE TRIGGER FINGER  05/23/2014    Procedure: RELEASE TRIGGER FINGER;  Surgeon: Ag Hoffman MD;  Location: [facility]     SURGICAL HISTORY OF -   01/01/1970    appendectomy     SURGICAL HISTORY OF -   01/01/1970    pilonidal cyst and sinuses       Current Medications    Current Outpatient Medications:      amLODIPine (NORVASC) 5 MG tablet, Take 1 tablet (5 mg) by mouth daily., Disp: 90 tablet, Rfl: 3     atorvastatin (LIPITOR) 40 MG tablet, Take 1 tablet (40 mg) by mouth daily., Disp: 90 tablet, Rfl: 3     blood glucose (ACCU-CHEK ISDDHARTHA PLUS) test strip, Use to test blood sugar 3 times daily or as directed., Disp: 200 strip, Rfl: 3     blood glucose monitoring (ACCU-CHEK MULTICLIX) lancets, Use to test blood sugar 1 time daily or as directed. Accu chek, Ins will cover, match machine., Disp: 100 each, Rfl: 2     Dulaglutide (TRULICITY) 3 MG/0.5ML SOAJ, Inject 3 mg subcutaneously once a week., Disp: 6 mL, Rfl: 1     glimepiride (AMARYL) 2 MG tablet, Take 1 tablet (2 mg) by mouth every morning (before breakfast), Disp: 90 tablet, Rfl: 1     hydrOXYzine HCl (ATARAX) 25 MG tablet, Take 1 tablet (25 mg) by mouth every 6 hours as needed for itching., Disp: 60 tablet, Rfl: 0     lisinopril (ZESTRIL) 40 MG tablet, Take 1 tablet (40 mg) by mouth daily., Disp: 90 tablet, Rfl:  3     metoprolol succinate ER (TOPROL XL) 100 MG 24 hr tablet, Take 1 tablet (100 mg) by mouth daily., Disp: 90 tablet, Rfl: 3     Vitamin D3 (CHOLECALCIFEROL) 25 mcg (1000 units) tablet, Take 1 tablet (25 mcg) by mouth daily, Disp: 90 tablet, Rfl: 3    Current Facility-Administered Medications:      triamcinolone (KENALOG-40) injection 40 mg, 40 mg, , , Mich Bui, DO, 40 mg at 23 1215      Family History   Problem Relation Age of Onset     Unknown/Adopted Father      Dementia Father    Sister - breast cancer; had thyroidectomy. Mom  of cancer. Sister HTN. No f/h of diabetes. One sister is overweight, no other family members.     Social History  . She has 1 child, a 40 yo son. Smokes since age 18, mostly 1 PPD, now 3/4 PPD. She denies drinking alcohol or using illicit drugs. Occupation: retired.      Review of Systems   Systemic:             her baseline weight prior to staring metformin used to be ~ 130 lb; with metformin, she developed diarrhea and she lost a significant amount of weight.  Her current weight is 123 pounds.  Eye:                      No eye symptoms   Nidhi-Laryngeal:     No nidhi-laryngeal symptoms, no dysphagia, no hoarseness, no cough     Breast:                  No breast symptoms  Cardiovascular:    No cardiovascular symptoms, no CP or palpitations   Pulmonary:           No pulmonary symptoms, no SOB or cough    Gastrointestinal:   mild constipation - longstanding   Genitourinary:       no increased thirst or urination   Endocrine:            as above   Neurological:        No headaches, no tremor, no numbness or tingling sensation, no dizziness; arms are not as strong as they used to be - not generalized weakness; no new stretch marks, no acne, no easy bruising; some facial hair - mild, on the upper lip and chin - present for >10-15 years and stable    Musculoskeletal:  hip joint pain   Skin:                     No skin symptoms, no dry skin, no hair falling out  "  Psychological:     some anxiety               Vital Signs     Previous Weights:    Wt Readings from Last 10 Encounters:   11/04/24 55.9 kg (123 lb 4 oz)   09/03/24 54.4 kg (120 lb)   08/06/24 55.5 kg (122 lb 6 oz)   05/14/24 54.4 kg (120 lb)   01/24/24 52.2 kg (115 lb)   11/01/23 54.2 kg (119 lb 8 oz)   09/06/23 52.2 kg (115 lb)   08/23/23 49.9 kg (110 lb)   08/03/23 50.8 kg (112 lb)   07/25/23 51 kg (112 lb 6.4 oz)        LMP 12/01/2009     Estimated body mass index is 21.83 kg/m  as calculated from the following:    Height as of 11/4/24: 1.6 m (5' 3\").    Weight as of 11/4/24: 55.9 kg (123 lb 4 oz).    Physical Exam  General Appearance: alert, no distress noted   Eyes: grossly normal to inspection, conjunctivae and sclerae normal, no lid lag or stare   Respiratory: no audible wheeze, cough, or visible cyanosis.  No visible retractions or increased work of breathing.  Able to speak fully in complete sentences.  Neurological: Cranial nerves grossly intact, mentation intact and speech normal; no tremor of the hands   Skin: no lesions on exposed skin   Psychological: mentation appears normal, affect normal, judgement and insight intact, normal speech and appearance well-groomed     Lab Results  I reviewed prior lab results documented in Epic.  TSH   Date Value Ref Range Status   10/12/2022 1.64 0.30 - 4.20 uIU/mL Final   04/15/2019 1.95 0.40 - 4.00 mU/L Final   02/15/2017 1.36 0.40 - 4.00 mU/L Final   04/29/2015 1.35 0.40 - 4.00 mU/L Final   07/30/2013 1.50 0.4 - 5.0 mU/L Final   04/12/2011 1.07 0.4 - 5.0 mU/L Final     The longitudinal plan of care for the diagnosis(es)/condition(s) as documented were addressed during this visit. Due to the added complexity in care, I will continue to support Stephania in the subsequent management and with ongoing continuity of care.    45 minutes spent on the date of the encounter doing chart review, history and exam, documentation and further activities as noted above.           "         Again, thank you for allowing me to participate in the care of your patient.        Sincerely,        Nani Avalos MD    Electronically signed

## 2024-12-31 NOTE — PROGRESS NOTES
Video-Visit Details    Type of service:  Video Visit    Call started 10:46 am   Call ended 11:15 am     Originating Location (pt. Location): Home  Distant Location (provider location): Off-site    Mode of Communication:  Video Conference via Doximity    =======================================================  Assessment     Asmita Jerez is a 69 year old female seen for evaluation of bilateral adrenal macroadenomas, associated with possible mild autonomous cortisol secretion.    1.  Bilateral adrenal macroadenomas  Prior evaluations have revealed an incompletely suppressed cortisol with 1 mg and 8 mg dexamethasone, normal or high normal salivary and urinary cortisol, a non-suppressed ACTH, lowish DHEAS.  Clinically, the patient has a history of PCOS, type 2 diabetes, dyslipidemia, HTN.  She is not obese or overweight and does not endorse signs or symptoms suggestive of hypercortisolism.  The patient is considering left nephrectomy for a kidney lesion suspicious of malignancy.   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 The interrogation of the pituitary function and the pituitary MRI have not suggested pituitary lesions or dysfunction.   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  Of note that the ACTH secretion did suppressed with dexamethasone.  Given the diagnosis of primary hyperparathyroidism, MEN1 remains in the differential diagnosis. Her insurance did not provide coverage for genetic screening.  Cushing disease is extremely unlikely given the MRI and the biochemical interrogation of the pituitary gland function. [facility] stimulation test might help completely ruling out Cushing disease, unfortunately CRH it is not available in the [place].    Overall, I discussed with the patient and I recommended to consider having the left  adrenal gland surgically removed at the time of nephrectomy. She is scheduled for a follow-up CT and an appointment with Dr. Toro in February 2025.  F/up morning ACTH and cortisol levels     2.  Hypercalcemia,  Not of new onset and associated with kidney stones, incidentally discovered on imaging.  The patient has no history of bone fractures or osteoporosis.  Clinically, she does not endorse signs or symptoms of hypercalcemia.  Biochemical workup was suggestive of primary hyperparathyroidism. 24 hrs urinary calcium normal.   Recommendations:   F/up PTH, BMP, phos, albumin, SPEP    3. Type 2 diabetes   Fairly well controlled on Trulicity and glimepiride. Keyur has not been able to tolerate jardiance.   F/up A1c with her next labs     Orders Placed This Encounter   Procedures    Basic metabolic panel    Phosphorus    Albumin level    Parathyroid Hormone Intact    Hemoglobin A1c    Adrenal corticotropin    Cortisol    Protein electrophoresis     =======================================================  The patient is seen in f/up.      History of Present Illness:  Asmita Jerez is a 69 year old female with a past medical history significant for type 2 diabetes, hypertension, polycystic ovaries and hypercholesterolemia, who was initially seen in our clinic by Dr. Moore, in September 2023.  She established care with me in January 2024. Last clinic visit was on  her last visit with me was on 9/3/24.     1.  Adrenal incidentaloma  It was incidentally discovered on the abdominal CT from July 2023, which revealed 2 left adrenal nodules, measuring 1.7 and 1.2 cm and a 1.4 cm right adrenal nodule.  All nodules had low Hounsfield unit density on the noncontrast CT <10 so they were considered to represent adrenal adenomas.  Also, the nodules were stable compared with the prior imaging from 2018. The CT was also positive for kidney stones and a 2.6 cm left renal heterogeneous lesion for which the patient follows up with  urology. The lesion is suspicious for renal cell carcinoma and it remained minimally increase on the follow-up abdominal CT from August 2024. A f/up renal CT is scheduled in 2/25.     She was diagnosed with hypertension in her 60s. Her blood pressure is currently medicated with amlodipine, lisinopril and metoprolol.      Pertinent labs reviewed:   12/11/2024 24-hour urinary cortisol 40.3, creatinine 1.089 urinary volume 1.1 L  9/9/24 ACTH 26, cortisol 33.6 at 8:34 am, DHEAS 32, 17 OH progesterone 140, androstenedione 1.013 (0.13-0.82)  9/6/2023 aldosterone 6.3, renin 0.2, normal plasma metanephrines  9/13/2023 1 mg dexamethasone suppression test: cortisol level of 3.8; dexamethasone 352.6 (140-295)    9/28/2020 24-hour urinary cortisol 45 (3.5-45) 24-hour urinary cortisone normal.  The urinary volume was 1.65 L, with urine creatinine 0.9 g  11/11, 11/13, 11/15/23 salivary cortisol normal twice and borderline normal on 11/13/2023 at 0.112 (less than 0.1)  12/6/2023 ACTH 46, cortisol 37.9, DHEA-S 38 () at 7:52 AM  12/7/2023 8 mg dexamethasone suppression test: ACTH undetectable, cortisol 4.9, DHEA-S 28, dexamethasone >3000 at 7:42 AM  1/29/2024 24-hour urinary cortisol was normal at 33.  Patient took 8 mg dexamethasone the night prior.  5/1/24 8 mg dex suppressed cortisol checked using LCMS was 4.7, with dexamethasone >3000  5/29/24  ACTH 59 at 7:58 am, DHEAS 23, FSH 58.9, , prolactin 10, IGF1 186      The pituitary MRI from 7/23/24 revealed a subtle pars intermedia cyst, measuring 2-3 mm.     2.  Type 2 diabetes, diagnosed around 2000, around age 45.  Average A1c over the years has been around 8%.   Most recent A1c was 7.4 on 8/6/24.    Currently antidiabetic medications:  Trulicity, 3 mg weekly (started in February 2023)  Glimepiride 2 mg daily - started on 5/5/24     She was only treated with metformin until staring Trulicity in 2023. Metformin caused diarrhea and it was replaced with Jardiance, in  November 2023. On labwork from 5/1 K was elevated at 6 and the patient also reported experiencing constipation and abdominal bloating from Jardiance.. Subsequently, Jardiance was discontinued and replaced with 2 mg glimepiride. The K level on 5/8/24 normalized.     I reviewed the provided BG numbers:   12/30/24  8:41am: 116     12/29/24  1:21pm: 146  8:28am: 110     12/28/24  8:44am: 111     12/27/24  1:42pm: 144  8:11am: 120     12/26/24  6:41am: 108     12/25/24  7:38am: 106     12/24/24  9:34pm: 134  1:50pm: 106  8:51am: 103     12/23/24  2:25pm: 126  6:47am: 116     12/22/24  7:19am: 110     12/21/24  1:11pm: 92  6:49am: 112     12/20/24  8:42pm: 106  7:53am: 105     12/19/24  9:00am: 113     12/18/24  12:26pm: 126  8:27am: 104     12/17/24  7:32pm: 126  5:56am: 93     12/16/24  8:22pm: 143  2:45pm: 110  8:31am: 125    She denies experiencing hypoglycemic symptoms.    Diabetes complications:  Last eye exam: 8/24  Most recent urine microalbumin negative in November 2024. On 40 mg lisinopril daily.   No numbness or tingling sensation in her feet   Last lipid panel from 9/9/24: LDL 48, HDL 36, triglycerides 98.  On 40 mg atorvastatin daily.     3.  Hypercalcemia  The patient has had intermittent episodes of mild hypercalcemia or high normal calcium levels dating back to 2008.  The highest calcium level was 11, in September 2023.  On prior lab work, the albumin was normal, as well as alkaline phosphatase.   On lab work from 1/29/24, 24-hour urinary calcium was 0.12 g.  5/1/24 vitamin D 46, phos 4.6, calcium 10.7, GFR 69, 1,25 OH vitamin D normal at 51, PTH 31, PTHrp normal at 3.1.     On questioning the patient denies n/v, muscle weakness, increased thirst or increased urination, confusion, memory changes.     Dairies: has cheese daily, one glass of milk daily.   Kidneys stones: revealed by imaging.    Menopause: in the 50s. She still has daily hot flashes and occasional night sweats. Overall, the hot flashes have  been stable.   The DXA scan from December 2022 revealed normal bone mineral density at all sites. There is no prior h/o fractures.     Past Medical History   Past Medical History:   Diagnosis Date    Cervical high risk HPV (human papillomavirus) test positive 03/31/2018    Diabetes (H)     Hypertension     Peptic ulcer, unspecified site, unspecified as acute or chronic, without mention of hemorrhage, perforation, or obstruction     Polycystic ovaries     Pure hypercholesterolemia     Tobacco use disorder     Unspecified multiple gestation, unspecified as to episode of care     Multiple gestation   Diverticulosis  Kidney stones  Bilateral adrenal adenomas  Lung nodules  Collagenous colitis  Anxiety     Past Surgical History   Past Surgical History:   Procedure Laterality Date    APPENDECTOMY      ARTHROPLASTY HIP Right 6/22/2023    Procedure: Total Hip Arthroplasty Right;  Surgeon: Albin Williamson MD;  Location: [facility]    BIOPSY BREAST      COLONOSCOPY N/A 12/11/2015    Procedure: COLONOSCOPY;  Surgeon: Tino Pryor MD;  Location: [facility]    COLONOSCOPY N/A 8/23/2023    Procedure: Colonoscopy with biopsies;  Surgeon: Celio Larry DO;  Location: [facility]    ENDOSCOPIC RELEASE CARPAL TUNNEL Right 11/01/2021    Procedure: Endoscopic carpal tunnel release;  Surgeon: Ac Lemus MD;  Location: [facility]    ENDOSCOPIC RELEASE CARPAL TUNNEL Left 11/15/2021    Procedure: ENDOSCOPIC Carpal Tunnel Release, LEFT, removal suture right wrist;  Surgeon: Ac Lemus MD;  Location: [facility]    LUMPECTOMY BREAST      RELEASE TRIGGER FINGER  05/23/2014    Procedure: RELEASE TRIGGER FINGER;  Surgeon: Ag Hoffman MD;  Location: [facility]    SURGICAL HISTORY OF -   01/01/1970    appendectomy    SURGICAL HISTORY OF -   01/01/1970    pilonidal cyst and sinuses       Current Medications    Current Outpatient Medications:     amLODIPine (NORVASC) 5 MG tablet, Take 1 tablet (5 mg) by mouth daily.,  Disp: 90 tablet, Rfl: 3    atorvastatin (LIPITOR) 40 MG tablet, Take 1 tablet (40 mg) by mouth daily., Disp: 90 tablet, Rfl: 3    blood glucose (ACCU-CHEK SIDDHARTHA PLUS) test strip, Use to test blood sugar 3 times daily or as directed., Disp: 200 strip, Rfl: 3    blood glucose monitoring (ACCU-CHEK MULTICLIX) lancets, Use to test blood sugar 1 time daily or as directed. Accu chek, Ins will cover, match machine., Disp: 100 each, Rfl: 2    Dulaglutide (TRULICITY) 3 MG/0.5ML SOAJ, Inject 3 mg subcutaneously once a week., Disp: 6 mL, Rfl: 1    glimepiride (AMARYL) 2 MG tablet, Take 1 tablet (2 mg) by mouth every morning (before breakfast), Disp: 90 tablet, Rfl: 1    hydrOXYzine HCl (ATARAX) 25 MG tablet, Take 1 tablet (25 mg) by mouth every 6 hours as needed for itching., Disp: 60 tablet, Rfl: 0    lisinopril (ZESTRIL) 40 MG tablet, Take 1 tablet (40 mg) by mouth daily., Disp: 90 tablet, Rfl: 3    metoprolol succinate ER (TOPROL XL) 100 MG 24 hr tablet, Take 1 tablet (100 mg) by mouth daily., Disp: 90 tablet, Rfl: 3    Vitamin D3 (CHOLECALCIFEROL) 25 mcg (1000 units) tablet, Take 1 tablet (25 mcg) by mouth daily, Disp: 90 tablet, Rfl: 3    Current Facility-Administered Medications:     triamcinolone (KENALOG-40) injection 40 mg, 40 mg, , , Mich Bui, DO, 40 mg at 23 1215      Family History   Problem Relation Age of Onset    Unknown/Adopted Father     Dementia Father    Sister - breast cancer; had thyroidectomy. Mom  of cancer. Sister HTN. No f/h of diabetes. One sister is overweight, no other family members.     Social History  . She has 1 child, a 40 yo son. Smokes since age 18, mostly 1 PPD, now 3/4 PPD. She denies drinking alcohol or using illicit drugs. Occupation: retired.      Review of Systems   Systemic:             her baseline weight prior to staring metformin used to be ~ 130 lb; with metformin, she developed diarrhea and she lost a significant amount of weight.  Her current  "weight is 123 pounds.  Eye:                      No eye symptoms   Nidhi-Laryngeal:     No nidhi-laryngeal symptoms, no dysphagia, no hoarseness, no cough     Breast:                  No breast symptoms  Cardiovascular:    No cardiovascular symptoms, no CP or palpitations   Pulmonary:           No pulmonary symptoms, no SOB or cough    Gastrointestinal:   mild constipation - longstanding   Genitourinary:       no increased thirst or urination   Endocrine:            as above   Neurological:        No headaches, no tremor, no numbness or tingling sensation, no dizziness; arms are not as strong as they used to be - not generalized weakness; no new stretch marks, no acne, no easy bruising; some facial hair - mild, on the upper lip and chin - present for >10-15 years and stable    Musculoskeletal:  hip joint pain   Skin:                     No skin symptoms, no dry skin, no hair falling out   Psychological:     some anxiety               Vital Signs     Previous Weights:    Wt Readings from Last 10 Encounters:   11/04/24 55.9 kg (123 lb 4 oz)   09/03/24 54.4 kg (120 lb)   08/06/24 55.5 kg (122 lb 6 oz)   05/14/24 54.4 kg (120 lb)   01/24/24 52.2 kg (115 lb)   11/01/23 54.2 kg (119 lb 8 oz)   09/06/23 52.2 kg (115 lb)   08/23/23 49.9 kg (110 lb)   08/03/23 50.8 kg (112 lb)   07/25/23 51 kg (112 lb 6.4 oz)        LMP 12/01/2009     Estimated body mass index is 21.83 kg/m  as calculated from the following:    Height as of 11/4/24: 1.6 m (5' 3\").    Weight as of 11/4/24: 55.9 kg (123 lb 4 oz).    Physical Exam  General Appearance: alert, no distress noted   Eyes: grossly normal to inspection, conjunctivae and sclerae normal, no lid lag or stare   Respiratory: no audible wheeze, cough, or visible cyanosis.  No visible retractions or increased work of breathing.  Able to speak fully in complete sentences.  Neurological: Cranial nerves grossly intact, mentation intact and speech normal; no tremor of the hands   Skin: no lesions on " exposed skin   Psychological: mentation appears normal, affect normal, judgement and insight intact, normal speech and appearance well-groomed     Lab Results  I reviewed prior lab results documented in Epic.  TSH   Date Value Ref Range Status   10/12/2022 1.64 0.30 - 4.20 uIU/mL Final   04/15/2019 1.95 0.40 - 4.00 mU/L Final   02/15/2017 1.36 0.40 - 4.00 mU/L Final   04/29/2015 1.35 0.40 - 4.00 mU/L Final   07/30/2013 1.50 0.4 - 5.0 mU/L Final   04/12/2011 1.07 0.4 - 5.0 mU/L Final     The longitudinal plan of care for the diagnosis(es)/condition(s) as documented were addressed during this visit. Due to the added complexity in care, I will continue to support Stephania in the subsequent management and with ongoing continuity of care.    45 minutes spent on the date of the encounter doing chart review, history and exam, documentation and further activities as noted above.

## 2025-01-02 ENCOUNTER — TELEPHONE (OUTPATIENT)
Dept: ENDOCRINOLOGY | Facility: CLINIC | Age: 70
End: 2025-01-02
Payer: COMMERCIAL

## 2025-01-02 NOTE — TELEPHONE ENCOUNTER
1/2/2025 11:18AM  Patient Contacted for the patient to call back and schedule the following:    Appointment type: Return Endo  Provider: Dr. Avalos  Return date: 6 month follow-up (6/30/2025)  Specialty phone number: [phone]  Additional appointment(s) needed: non-fasting labs prior  Additonal Notes: 1/2 Called pt to schedule Return Endo w/ Dr. Avalos w/ non-fasting labs prior- pt will call to schedule. [facility]   Rheumatology, Gastroenterology, Nephrology, Infectious Disease, Pulmonology  [facility]       
Awake

## 2025-01-09 PROBLEM — M25.551 HIP PAIN, RIGHT: Status: RESOLVED | Noted: 2023-12-01 | Resolved: 2025-01-09

## 2025-02-04 ENCOUNTER — HOSPITAL ENCOUNTER (OUTPATIENT)
Dept: CT IMAGING | Facility: CLINIC | Age: 70
Discharge: HOME | End: 2025-02-04
Attending: UROLOGY
Payer: COMMERCIAL

## 2025-02-04 DIAGNOSIS — N28.89 RENAL MASS: ICD-10-CM

## 2025-02-04 LAB
CREAT BLD-MCNC: 1.1 MG/DL (ref 0.5–1)
EGFRCR SERPLBLD CKD-EPI 2021: 54 ML/MIN/1.73M2

## 2025-02-04 PROCEDURE — 250N000009 HC RX 250: Performed by: RADIOLOGY

## 2025-02-04 PROCEDURE — 74178 CT ABD&PLV WO CNTR FLWD CNTR: CPT

## 2025-02-04 PROCEDURE — 250N000011 HC RX IP 250 OP 636: Performed by: RADIOLOGY

## 2025-02-04 PROCEDURE — 82565 ASSAY OF CREATININE: CPT

## 2025-02-04 RX ORDER — IOPAMIDOL 755 MG/ML
59 INJECTION, SOLUTION INTRAVASCULAR ONCE
Status: COMPLETED | OUTPATIENT
Start: 2025-02-04 | End: 2025-02-04

## 2025-02-04 RX ADMIN — SODIUM CHLORIDE 60 ML: 9 INJECTION, SOLUTION INTRAVENOUS at 10:00

## 2025-02-04 RX ADMIN — IOPAMIDOL 59 ML: 755 INJECTION, SOLUTION INTRAVENOUS at 10:00

## 2025-02-13 ENCOUNTER — PREP FOR PROCEDURE (OUTPATIENT)
Dept: UROLOGY | Facility: CLINIC | Age: 70
End: 2025-02-13

## 2025-02-13 ENCOUNTER — OFFICE VISIT (OUTPATIENT)
Dept: UROLOGY | Facility: CLINIC | Age: 70
End: 2025-02-13
Payer: COMMERCIAL

## 2025-02-13 VITALS — HEART RATE: 77 BPM | DIASTOLIC BLOOD PRESSURE: 80 MMHG | RESPIRATION RATE: 16 BRPM | SYSTOLIC BLOOD PRESSURE: 152 MMHG

## 2025-02-13 DIAGNOSIS — N28.89 RENAL MASS: Primary | ICD-10-CM

## 2025-02-13 DIAGNOSIS — N28.89 RENAL MASS, LEFT: Primary | ICD-10-CM

## 2025-02-13 RX ORDER — CLINDAMYCIN PHOSPHATE 900 MG/50ML
900 INJECTION, SOLUTION INTRAVENOUS SEE ADMIN INSTRUCTIONS
OUTPATIENT
Start: 2025-02-13

## 2025-02-13 RX ORDER — ACETAMINOPHEN 325 MG/1
975 TABLET ORAL ONCE
OUTPATIENT
Start: 2025-02-13 | End: 2025-02-13

## 2025-02-13 RX ORDER — CLINDAMYCIN PHOSPHATE 900 MG/50ML
900 INJECTION, SOLUTION INTRAVENOUS
OUTPATIENT
Start: 2025-02-13

## 2025-02-13 RX ORDER — ACETAMINOPHEN 650 MG/1
650 SUPPOSITORY RECTAL ONCE
OUTPATIENT
Start: 2025-02-13

## 2025-02-13 NOTE — PROGRESS NOTES
CC : left renal mass     HPI: 70 yo female with incidentally diagnosed approximately 2.4 cm left renal mass found on a workup for abdominal pain in 07/2023.  Patient has also had 6-8 months of diarrhea, weight loss and emesis of unclear etiology.  Also noted to have several adrenal nodules.  Patient was referred to endocrinology for work-up of the nodules.  Metformin was also stopped by endocrine with resolution of the diarrhea.  Patient appears to have a mildly hyperactive adrenal adenoma but this is under observation currently.  Here following a surveillance scan for her renal mass.  CT scan 1/20/24 showed a 2.3 x 2.6 cm left renal mass, stable compared to July 2023. CT 8/2/24 shows a 2.8 x 2.9 cm left renal mass, slightly larger compared to July 2023. Stable adrenal nodulesStable adrenal nodules           OBJECTIVE: CT 2/4/25 shows a 3.2 x 2.8 cm left renal mass. Stable adrenal nodules.           ASSESSMENT AND PLAN: 47-minutes were spent reviewing the chart, interpreting results and counseling the patient regarding her renal mass.  The renal mass is slowly enlarging and is now over 3 cm.  We again discussed options and the patient wishes to move forward with radical nephrectomy given the enlarging size of the mass and its central location.  Of note, the patient does have increased levels of cortisol per endocrine.  Endocrinology is requesting that we remove the left adrenal gland along with the kidney given the adrenal nodules.  We will also complete a lung CT to exclude metastatic disease since the patient has not had one in the last year.  The patient was also counseled to stop smoking.

## 2025-02-13 NOTE — NURSING NOTE
"Initial BP (!) 152/80 (BP Location: Right arm, Patient Position: Chair, Cuff Size: Adult Regular)   Pulse 77   Resp 16   LMP 12/01/2009  Estimated body mass index is 21.79 kg/m  as calculated from the following:    Height as of 12/31/24: 1.6 m (5' 3\").    Weight as of 12/31/24: 55.8 kg (123 lb). .  Patient is here for ct scan results.  cassandra roberson LPN    "

## 2025-02-26 ENCOUNTER — TELEPHONE (OUTPATIENT)
Dept: UROLOGY | Facility: CLINIC | Age: 70
End: 2025-02-26
Payer: COMMERCIAL

## 2025-02-26 NOTE — TELEPHONE ENCOUNTER
ANABELAM asking for a call back to schedule with Dr. Toro. Holding time on 4/30. Provided [phone] as direct call back number. Janeth Broussard on 2/26/2025 at 9:02 AM

## 2025-02-26 NOTE — TELEPHONE ENCOUNTER
Called patient to schedule surgery with Dr Toro    Spoke with: Stephania    Date of Surgery: 4/30/25    Approximate surgery arrival time given:  Yes - 0530    Location of surgery: [facility] [place]     Pre-Op H&P: PAC 4/16 at 1015 - virtual (pt would not come out to [facility]    Pre-op Imaging: No :     Post-Op Appt Date: Dr. Toro Needs 7 day po in person. [facility] will schedule     Post-op Imaging:  No     Discussed with patient PAC RN will provide arrival time and instructions for surgery at the time of the appointment: [[place] locations only]: Yes    Packet sent out: Yes 02/26/25  via Mail - Standard      Additional Comments:  NA    All patients questions were answered and was instructed to review surgical packet and call back with any questions or concerns.       Janeth Broussard on 2/26/2025 at 10:49 AM

## 2025-02-27 NOTE — TELEPHONE ENCOUNTER
FUTURE VISIT INFORMATION      SURGERY INFORMATION:  Date: 25  Location: [facility] or  Surgeon:  Clayton Toro MD   Anesthesia Type:  general  Procedure: ROBOT-ASSISTED LEFT RADICAL NEPHRECTOMY   Consult: ov 25    RECORDS REQUESTED FROM:       Primary Care Provider: Melissa Pitts MD - [facility]    Pertinent Medical History: hypertension    Most recent EKG+ Tracin23

## 2025-03-11 ENCOUNTER — TELEPHONE (OUTPATIENT)
Dept: UROLOGY | Facility: CLINIC | Age: 70
End: 2025-03-11
Payer: COMMERCIAL

## 2025-03-11 DIAGNOSIS — N39.0 URINARY TRACT INFECTION: Primary | ICD-10-CM

## 2025-03-11 NOTE — TELEPHONE ENCOUNTER
Pre Op Teaching Flowsheet       Pre and Post op Patient Education  Relevant Diagnosis:  kidney mass  Teaching Topic:  Pre and post op teaching  Person Involved in teaching:  pt    Motivation Level:  Asks Questions: Yes  Eager to Learn:  Yes  Cooperative: Yes  Receptive (willing/able to accept information):  Yes  Patient demonstrates understanding of the following:  Date and time of surgery:  4/30/25 @0730  Location of surgery:  3rd [facility]  History and Physical and any other testing necessary prior to surgery: Yes  Required time line for completion of History and Physical and any pre-op testing: Yes    NPO Guidelines: NPO after midnight   Do not eat anything after midnight (12 a.m.).     If you have questions about whether or not to  take certain medicines, ask your doctor.  Failure to follow these directions will cause a delay  in your surgery.    The morning of your surgery:   If you need to take pills, take them with a sip of  water.    If you have any questions, please call:  [facility] ([facility]) registered  nurse: [phone], or [facility] [place] [facility]: [phone].    Pre-op showering/scrub information with PCMX Soap: Yes  Medications to take the day of surgery:  Per PCP  Blood thinner medications discussed and when to stop (if applicable):  Yes  Diabetes medication management (if applicable):  Patient to discuss with Primary Care Provider  Discussed pain control after surgery: pain scale  Infection Prevention: Patient demonstrates understanding of the following:  Patient instructed on hand hygiene:  Yes  Surgical procedure site care taught: Yes  Signs and symptoms of infection taught:  Yes  Wound care will be taught at the time of discharge.  Central venous catheter care will be taught at the time of discharge (if applicable).    Post-op follow-up:  Discussed how to contact the hospital, nurse, and clinic scheduling staff if  necessary.    Instructional materials used/given/mailed:  [facility] Booklet, post op teaching sheet, Map, Soap, and arrival/location information.    Surgical instructions mailed to patient Yes.I spoke to patient on the phone.

## 2025-03-13 ENCOUNTER — HOSPITAL ENCOUNTER (OUTPATIENT)
Dept: CT IMAGING | Facility: CLINIC | Age: 70
Discharge: HOME | End: 2025-03-13
Attending: UROLOGY
Payer: COMMERCIAL

## 2025-03-13 DIAGNOSIS — N28.89 RENAL MASS: ICD-10-CM

## 2025-03-13 LAB
CREAT BLD-MCNC: 1 MG/DL (ref 0.5–1)
EGFRCR SERPLBLD CKD-EPI 2021: 60 ML/MIN/1.73M2

## 2025-03-13 PROCEDURE — 250N000009 HC RX 250: Performed by: RADIOLOGY

## 2025-03-13 PROCEDURE — 250N000011 HC RX IP 250 OP 636: Performed by: RADIOLOGY

## 2025-03-13 PROCEDURE — 82565 ASSAY OF CREATININE: CPT

## 2025-03-13 PROCEDURE — 71260 CT THORAX DX C+: CPT

## 2025-03-13 RX ORDER — IOPAMIDOL 755 MG/ML
59 INJECTION, SOLUTION INTRAVASCULAR ONCE
Status: COMPLETED | OUTPATIENT
Start: 2025-03-13 | End: 2025-03-13

## 2025-03-13 RX ADMIN — IOPAMIDOL 59 ML: 755 INJECTION, SOLUTION INTRAVENOUS at 14:48

## 2025-03-13 RX ADMIN — SODIUM CHLORIDE 55 ML: 9 INJECTION, SOLUTION INTRAVENOUS at 14:48

## 2025-03-18 DIAGNOSIS — Z96.641 STATUS POST RIGHT HIP REPLACEMENT: ICD-10-CM

## 2025-03-18 NOTE — TELEPHONE ENCOUNTER
Diagnosis does not match    Requested Prescriptions   Pending Prescriptions Disp Refills    hydrOXYzine HCl (ATARAX) 25 MG tablet [Pharmacy Med Name: hydrOXYzine HCL 25MG TAB] 60 tablet 0     Sig: TAKE 1 TABLET BY MOUTH EVERY 6 HOURS AS NEEDED FOR ITCHING.       Antihistamines Protocol Failed - 3/18/2025  1:35 PM        Failed - Medication is active on med list and the sig matches. RN to manually verify dose and sig if red X/fail.     If the protocol passes (green check), you do not need to verify med dose and sig.    A prescription matches if they are the same clinical intention.    For Example: once daily and every morning are the same.    The protocol can not identify upper and lower case letters as matching and will fail.     For Example: Take 1 tablet (50 mg) by mouth daily     TAKE 1 TABLET (50 MG) BY MOUTH DAILY    For all fails (red x), verify dose and sig.    If the refill does match what is on file, the RN can still proceed to approve the refill request.       If they do not match, route to the appropriate provider.             Failed - Medication indicated for associated diagnosis     The medication is associated with one or more of the following diagnoses:  Allergies  Rhinitis  Upper respiratory tract allergy  Urticaria  Itching  Cystic Fibrosis  Bronchiectasis          Passed - Patient is age 3 or older     Apply age and/or weight-based dosing for peds patients age 3 and older.    Forward request to provider for patients under the age of 3.          Passed - Recent (12 month) or future (90 days) visit with authorizing provider s specialty     The patient must have completed an in-person or virtual visit within the past 12 months or has a future visit scheduled within the next 90 days with the authorizing provider s specialty.  Urgent care and e-visits do not qualify as an office visit for this protocol.

## 2025-03-19 RX ORDER — HYDROXYZINE HYDROCHLORIDE 25 MG/1
TABLET, FILM COATED ORAL
Qty: 60 TABLET | Refills: 0 | Status: SHIPPED | OUTPATIENT
Start: 2025-03-19

## 2025-04-14 ENCOUNTER — TELEPHONE (OUTPATIENT)
Dept: ENDOCRINOLOGY | Facility: CLINIC | Age: 70
End: 2025-04-14
Payer: COMMERCIAL

## 2025-04-14 NOTE — TELEPHONE ENCOUNTER
Called pt as requested but call was not answered. Left a message to call back.    DANIELLE Nayak  [facility]

## 2025-04-16 ENCOUNTER — VIRTUAL VISIT (OUTPATIENT)
Dept: SURGERY | Facility: CLINIC | Age: 70
End: 2025-04-16
Payer: COMMERCIAL

## 2025-04-16 ENCOUNTER — PRE VISIT (OUTPATIENT)
Dept: SURGERY | Facility: CLINIC | Age: 70
End: 2025-04-16

## 2025-04-16 ENCOUNTER — TELEPHONE (OUTPATIENT)
Dept: ENDOCRINOLOGY | Facility: CLINIC | Age: 70
End: 2025-04-16

## 2025-04-16 ENCOUNTER — ANESTHESIA EVENT (OUTPATIENT)
Dept: SURGERY | Facility: CLINIC | Age: 70
End: 2025-04-16
Payer: COMMERCIAL

## 2025-04-16 VITALS — WEIGHT: 130 LBS | HEIGHT: 63 IN | BODY MASS INDEX: 23.04 KG/M2

## 2025-04-16 DIAGNOSIS — N28.89 RENAL MASS, LEFT: ICD-10-CM

## 2025-04-16 DIAGNOSIS — Z01.818 PRE-OP EVALUATION: Primary | ICD-10-CM

## 2025-04-16 DIAGNOSIS — E11.9 TYPE 2 DIABETES MELLITUS WITHOUT COMPLICATION, WITHOUT LONG-TERM CURRENT USE OF INSULIN (H): ICD-10-CM

## 2025-04-16 LAB
ABO + RH BLD: NORMAL
BLD GP AB SCN SERPL QL: NEGATIVE
SPECIMEN EXP DATE BLD: NORMAL

## 2025-04-16 RX ORDER — ACETAMINOPHEN 500 MG
500-1000 TABLET ORAL EVERY 6 HOURS PRN
COMMUNITY

## 2025-04-16 ASSESSMENT — LIFESTYLE VARIABLES: TOBACCO_USE: 1

## 2025-04-16 ASSESSMENT — COPD QUESTIONNAIRES: COPD: 0

## 2025-04-16 ASSESSMENT — PAIN SCALES - GENERAL: PAINLEVEL_OUTOF10: NO PAIN (0)

## 2025-04-16 NOTE — TELEPHONE ENCOUNTER
Called pt back as requested. Call not answered. Left a call back number for pt to call back.    DANIELLE Nayak  [facility]

## 2025-04-16 NOTE — PROGRESS NOTES
Stephania is a 69 year old who is being evaluated via a billable video visit.    How would you like to obtain your AVS? MyChart  If the video visit is dropped, the invitation should be resent by: Text to cell phone: [phone]    Yessi Cat is a 69 year old, presenting for the following health issues:  Pre-Op Exam      HPI          Physical Exam

## 2025-04-16 NOTE — H&P
Pre-Operative H & P     CC:  Preoperative exam to assess for increased cardiopulmonary risk while undergoing surgery and anesthesia.    Date of Encounter: 4/16/2025  Primary Care Physician:  Melissa Pitts     Reason for visit:   Encounter Diagnoses   Name Primary?    Pre-op evaluation Yes    Renal mass, left     Type 2 diabetes mellitus without complication, without long-term current use of insulin (H)        HPI  Asmita Jerez is a 69 year old female who presents for pre-operative H & P in preparation for  Procedure Information       Case: [id number] Date/Time: 04/30/25 0730    Procedure: ROBOT-ASSISTED LEFT RADICAL NEPHRECTOMY (Left: Abdomen)    Anesthesia type: General    Diagnosis: Renal mass, left [N28.89]    Pre-op diagnosis: Renal mass, left [N28.89]    [facility]    Providers: Clayton Toro MD            Patient is being evaluated for comorbid conditions of HTN, HLD, tobacco use, PCOS, type 2 diabetes, left adrenal adenoma, osteoarthritis.    She has a history of a left renal mass, initially diagnosed in 2023. A recent surveillance scan showed that the mass had increased in size and it is now over 3 cm. She completed a visit with Dr. Toro in February to discuss treatment options and the above surgery was recommended for further management.    Of note, patient has been working with endocrinology for history of adrenal adenomas. She was noted to have increased levels of cortisol and endocrinology has requested that the left adrenal gland be removed along with the kidney.     History is obtained from the patient and chart review    Hx of abnormal bleeding or anti-platelet use: Denies    Menstrual history: Patient's last menstrual period was 12/01/2009.     Past Medical History  Past Medical History:   Diagnosis Date    Cervical high risk HPV (human papillomavirus) test positive 03/31/2018    Diabetes (H)     Hypertension     Peptic ulcer, unspecified site, unspecified as acute  or chronic, without mention of hemorrhage, perforation, or obstruction     Polycystic ovaries     Pure hypercholesterolemia     Tobacco use disorder     Unspecified multiple gestation, unspecified as to episode of care     Multiple gestation       Past Surgical History  Past Surgical History:   Procedure Laterality Date    APPENDECTOMY  1970    ARTHROPLASTY HIP Right 06/22/2023    Procedure: Total Hip Arthroplasty Right;  Surgeon: Albin Williamson MD;  Location: [facility]    BIOPSY BREAST Right     CHOLECYSTECTOMY  1990    COLONOSCOPY N/A 12/11/2015    Procedure: COLONOSCOPY;  Surgeon: Tino Pryor MD;  Location: [facility]    COLONOSCOPY N/A 08/23/2023    Procedure: Colonoscopy with biopsies;  Surgeon: Celio Larry DO;  Location: [facility]    CYSTECTOMY PILONIDAL  1970    ENDOSCOPIC RELEASE CARPAL TUNNEL Right 11/01/2021    Procedure: Endoscopic carpal tunnel release;  Surgeon: Ac Lemus MD;  Location: [facility]    ENDOSCOPIC RELEASE CARPAL TUNNEL Left 11/15/2021    Procedure: ENDOSCOPIC Carpal Tunnel Release, LEFT, removal suture right wrist;  Surgeon: Ac Lemus MD;  Location: [facility]    RELEASE TRIGGER FINGER  05/23/2014    Procedure: RELEASE TRIGGER FINGER;  Surgeon: Ag Hoffman MD;  Location: [facility]       Prior to Admission Medications  Current Outpatient Medications   Medication Sig Dispense Refill    acetaminophen (TYLENOL) 500 MG tablet Take 500-1,000 mg by mouth every 6 hours as needed for mild pain.      amLODIPine (NORVASC) 5 MG tablet Take 1 tablet (5 mg) by mouth daily. (Patient taking differently: Take 5 mg by mouth every evening.) 90 tablet 3    atorvastatin (LIPITOR) 40 MG tablet Take 1 tablet (40 mg) by mouth daily. (Patient taking differently: Take 40 mg by mouth every evening.) 90 tablet 3    Dulaglutide (TRULICITY) 3 MG/0.5ML SOAJ Inject 3 mg subcutaneously once a week. (Patient taking differently: Inject 3 mg subcutaneously once a week. SATURDAY'S) 6  mL 1    glimepiride (AMARYL) 2 MG tablet Take 1 tablet (2 mg) by mouth every morning (before breakfast) 90 tablet 1    hydrOXYzine HCl (ATARAX) 25 MG tablet TAKE 1 TABLET BY MOUTH EVERY 6 HOURS AS NEEDED FOR ITCHING. 60 tablet 0    lisinopril (ZESTRIL) 40 MG tablet Take 1 tablet (40 mg) by mouth daily. (Patient taking differently: Take 40 mg by mouth every morning.) 90 tablet 3    metoprolol succinate ER (TOPROL XL) 100 MG 24 hr tablet Take 1 tablet (100 mg) by mouth daily. (Patient taking differently: Take 100 mg by mouth every evening.) 90 tablet 3    Vitamin D3 (CHOLECALCIFEROL) 25 mcg (1000 units) tablet Take 1 tablet (25 mcg) by mouth daily (Patient taking differently: Take 1 tablet by mouth every morning.) 90 tablet 3    blood glucose (ACCU-CHEK SIDDHARTHA PLUS) test strip Use to test blood sugar 3 times daily or as directed. 200 strip 3    blood glucose monitoring (ACCU-CHEK MULTICLIX) lancets Use to test blood sugar 1 time daily or as directed. Accu chek, Ins will cover, match machine. 100 each 2       Allergies  Allergies   Allergen Reactions    Amoxicillin Rash       Social History  Social History     Socioeconomic History    Marital status:      Spouse name: Not on file    Number of children: Not on file    Years of education: Not on file    Highest education level: Not on file   Occupational History    Not on file   Tobacco Use    Smoking status: Every Day     Current packs/day: 1.00     Average packs/day: 1 pack/day for 40.0 years (40.0 ttl pk-yrs)     Types: Cigarettes    Smokeless tobacco: Never    Tobacco comments:     pt not interested in quit plan 10/12/2022     1ppd   Vaping Use    Vaping status: Never Used   Substance and Sexual Activity    Alcohol use: No    Drug use: No    Sexual activity: Yes     Partners: Male   Other Topics Concern    Parent/sibling w/ CABG, MI or angioplasty before 65F 55M? No   Social History Narrative    Not on file     Social Drivers of Health     Financial Resource  Strain: Low Risk  (11/4/2024)    Financial Resource Strain     Within the past 12 months, have you or your family members you live with been unable to get utilities (heat, electricity) when it was really needed?: No   Food Insecurity: Low Risk  (11/4/2024)    Food Insecurity     Within the past 12 months, did you worry that your food would run out before you got money to buy more?: No     Within the past 12 months, did the food you bought just not last and you didn t have money to get more?: No   Transportation Needs: Low Risk  (11/4/2024)    Transportation Needs     Within the past 12 months, has lack of transportation kept you from medical appointments, getting your medicines, non-medical meetings or appointments, work, or from getting things that you need?: No   Physical Activity: Unknown (11/4/2024)    Exercise Vital Sign     Days of Exercise per Week: 1 day     Minutes of Exercise per Session: Not on file   Stress: Stress Concern Present (11/4/2024)    [employer] [place] [employer] - Occupational Stress Questionnaire     Feeling of Stress : Very much   Social Connections: Unknown (11/4/2024)    Social Connection and Isolation Panel [NHANES]     Frequency of Communication with Friends and Family: Not on file     Frequency of Social Gatherings with Friends and Family: Patient declined     Attends Baptism Services: Not on file     Active Member of Clubs or Organizations: Not on file     Attends Club or Organization Meetings: Not on file     Marital Status: Not on file   Interpersonal Safety: Low Risk  (11/4/2024)    Interpersonal Safety     Do you feel physically and emotionally safe where you currently live?: Yes     Within the past 12 months, have you been hit, slapped, kicked or otherwise physically hurt by someone?: No     Within the past 12 months, have you been humiliated or emotionally abused in other ways by your partner or ex-partner?: No   Housing Stability: Low Risk  (11/4/2024)    Housing  Stability     Do you have housing? : Yes     Are you worried about losing your housing?: No       Family History  Family History   Problem Relation Age of Onset    Unknown/Adopted Father     Dementia Father     Anesthesia Reaction No family hx of     Deep Vein Thrombosis (DVT) No family hx of        Review of Systems  The complete review of systems is negative other than noted in the HPI or here.   Anesthesia Evaluation   Pt has had prior anesthetic. Type: General.    No history of anesthetic complications       ROS/MED HX  ENT/Pulmonary:     (+)     NINFA risk factors,  hypertension,         tobacco use, Current use,                    (-) COPD and recent URI   Neurologic:  - neg neurologic ROS     Cardiovascular: Comment: Denies chest pain/pressure, SALEH, orthopnea, dizziness, palpitations, edema.     (+) Dyslipidemia hypertension- -   -  - -                                 Previous cardiac testing   Echo: Date: Results:    Stress Test:  Date: Results:    ECG Reviewed:  Date: 6/20/23 Results:  Sinus  Rhythm   WITHIN NORMAL LIMITS  Cath:  Date: Results:      METS/Exercise Tolerance: >4 METS Comment: Reports she is very active with yard work - raking, shoveling, digging   Hematologic:  - neg hematologic  ROS     Musculoskeletal: Comment: S/p right BOO  (+)  arthritis,             GI/Hepatic: Comment: S/p appendectomy    S/p cholecystectomy   (-) GERD and liver disease   Renal/Genitourinary: Comment: PCOS    Left renal mass      Endo: Comment: Left adrenal adenoma    (+)  type II DM, Last HgA1c: 7.4, date: 8/6/24, Not using insulin,  Normal glucose range: Home BG averaging between 106-150,            (-) thyroid disease and obesity   Psychiatric/Substance Use:  - neg psychiatric ROS     Infectious Disease:  - neg infectious disease ROS     Malignancy:  - neg malignancy ROS     Other:  - neg other ROS          Virtual visit -  No vitals were obtained    Physical Exam  Constitutional: Pleasant, no apparent distress, and  appears stated age.  Eyes: Pupils equal  HENT: Normocephalic and atraumatic  Respiratory: Non labored breathing on room air  Neurologic: Awake, alert, oriented to name, place and time.   Neuropsychiatric: Calm, cooperative. Normal affect.       Prior Labs/Diagnostic Studies   All labs and imaging personally reviewed     EKG/ stress test - if available please see in ROS above   No results found.       No data to display                  The patient's records and results personally reviewed by this provider.     Outside records reviewed from: Care Everywhere      Assessment  Asmita Jerez is a 69 year old female seen as a PAC referral for risk assessment and optimization for anesthesia.    Plan/Recommendations  Pt will be optimized for the proposed procedure.  See below for details on the assessment, risk, and preoperative recommendations    NEUROLOGY  - No history of TIA, CVA or seizure    -Post Op delirium risk factors:  No risk identified    ENT  - No current airway concerns.  Will need to be reassessed day of surgery.  Mallampati: Unable to assess  TM: Unable to assess    CARDIAC  - No history of CAD and Afib  - Hypertension  Continue amlodipine and metoprolol as scheduled  Hold lisinopril day of surgery  - Hyperlipidemia  Continue atorvastatin    - METS (Metabolic Equivalents)  Patient performs 4 or more METS exercise without symptoms             Total Score: 0      RCRI-Low risk: Class 2 0.9% complication rate             Total Score: 1    RCRI: High Risk Surgery        PULMONARY    NINFA Low Risk             Total Score: 2    NINFA: Hypertension    NINFA: Over 50 ys old      - Denies asthma or inhaler use  - Tobacco History    History   Smoking Status    Every Day    Types: Cigarettes   Smokeless Tobacco    Never       GI  - history of appendectomy and cholecystectomy  PONV Medium Risk  Total Score: 2           1 AN PONV: Pt is Female    1 AN PONV: Intended Post Op Opioids        /RENAL  - Left renal mass  Above  "surgery planned for further management  - Baseline Creatinine  1.0    ENDOCRINE    - BMI: Estimated body mass index is 23.03 kg/m  as calculated from the following:    Height as of this encounter: 1.6 m (5' 3\").    Weight as of this encounter: 59 kg (130 lb).  Healthy Weight (BMI 18.5-24.9)  - Diabetes  Hemoglobin A1C (%)   Date Value   08/06/2024 7.4 (H)   03/01/2021 6.9 (H)   Will update A1c prior to surgery  Diabetes Mellitus, Type 2, non-insulin dependent.  Hold morning oral hypoglycemic medications. Recommend close monitoring of the patient's blood glucose levels throughout the perioperative period.  - Adrenal nodules - felt to be adrenal microadenomas, associated with possible mild autonomous cortisol secretion  Following with endocrinology, last visit 12/31/24. Endocrinology has requested that left adrenalectomy be performed at the time of nephrectomy.     HEME  VTE Low Risk 0.26%             Total Score: 1    VTE: Greater than 59 yrs old      - No history of abnormal bleeding or antiplatelet use.  - A type and screen has been ordered for this patient    MSK  - s/p right BOO      Different anesthesia methods/types have been discussed with the patient, but they are aware that the final plan will be decided by the assigned anesthesia provider on the date of service.      The patient is optimized for their procedure. AVS with information on surgery time/arrival time, meds and NPO status given by nursing staff. No further diagnostic testing indicated.    Please refer to the physical examination documented by the anesthesiologist in the anesthesia record on the day of surgery.    Video-Visit Details    Type of service:  Video Visit    Provider received verbal consent for a Video Visit from the patient? Yes   Video Start Time: 10:13 AM   Video End Time: 10:26 AM    Originating Location (pt. Location): Home    Distant Location (provider location):  Off-site  Mode of Communication:  Video Conference via [employer]  On " the day of service:     Prep time: 8 minutes  Visit time: 13 minutes  Documentation time: 9 minutes  ------------------------------------------  Total time: 30 minutes      Demetria Jovel PA-C  Preoperative Assessment Center  [facility]  [facility]  Phone: [phone]  Fax: [phone]

## 2025-04-17 ENCOUNTER — LAB (OUTPATIENT)
Dept: LAB | Facility: CLINIC | Age: 70
End: 2025-04-17
Payer: COMMERCIAL

## 2025-04-17 DIAGNOSIS — N39.0 URINARY TRACT INFECTION: ICD-10-CM

## 2025-04-17 DIAGNOSIS — E11.9 TYPE 2 DIABETES MELLITUS WITHOUT COMPLICATION, WITHOUT LONG-TERM CURRENT USE OF INSULIN (H): ICD-10-CM

## 2025-04-17 DIAGNOSIS — N28.89 RENAL MASS, LEFT: ICD-10-CM

## 2025-04-17 DIAGNOSIS — Z01.818 PRE-OP EVALUATION: ICD-10-CM

## 2025-04-17 LAB
ALBUMIN UR-MCNC: 30 MG/DL
ANION GAP SERPL CALCULATED.3IONS-SCNC: 15 MMOL/L (ref 7–15)
APPEARANCE UR: CLEAR
BILIRUB UR QL STRIP: ABNORMAL
BUN SERPL-MCNC: 20.9 MG/DL (ref 8–23)
CALCIUM SERPL-MCNC: 10 MG/DL (ref 8.8–10.4)
CHLORIDE SERPL-SCNC: 103 MMOL/L (ref 98–107)
COLOR UR AUTO: YELLOW
CREAT SERPL-MCNC: 0.92 MG/DL (ref 0.51–0.95)
EGFRCR SERPLBLD CKD-EPI 2021: 67 ML/MIN/1.73M2
ERYTHROCYTE [DISTWIDTH] IN BLOOD BY AUTOMATED COUNT: 13 % (ref 10–15)
EST. AVERAGE GLUCOSE BLD GHB EST-MCNC: 183 MG/DL
GLUCOSE SERPL-MCNC: 211 MG/DL (ref 70–99)
GLUCOSE UR STRIP-MCNC: NEGATIVE MG/DL
HBA1C MFR BLD: 8 % (ref 0–5.6)
HCO3 SERPL-SCNC: 21 MMOL/L (ref 22–29)
HCT VFR BLD AUTO: 42.2 % (ref 35–47)
HGB BLD-MCNC: 13.9 G/DL (ref 11.7–15.7)
HGB UR QL STRIP: NEGATIVE
KETONES UR STRIP-MCNC: NEGATIVE MG/DL
LEUKOCYTE ESTERASE UR QL STRIP: NEGATIVE
MCH RBC QN AUTO: 30.3 PG (ref 26.5–33)
MCHC RBC AUTO-ENTMCNC: 32.9 G/DL (ref 31.5–36.5)
MCV RBC AUTO: 92 FL (ref 78–100)
NITRATE UR QL: NEGATIVE
PH UR STRIP: 5.5 [PH] (ref 5–7)
PLATELET # BLD AUTO: 264 10E3/UL (ref 150–450)
POTASSIUM SERPL-SCNC: 4.5 MMOL/L (ref 3.4–5.3)
RBC # BLD AUTO: 4.58 10E6/UL (ref 3.8–5.2)
RBC #/AREA URNS AUTO: ABNORMAL /HPF
SODIUM SERPL-SCNC: 139 MMOL/L (ref 135–145)
SP GR UR STRIP: 1.02 (ref 1–1.03)
SQUAMOUS #/AREA URNS AUTO: ABNORMAL /LPF
UROBILINOGEN UR STRIP-ACNC: 0.2 E.U./DL
WBC # BLD AUTO: 10.9 10E3/UL (ref 4–11)
WBC #/AREA URNS AUTO: ABNORMAL /HPF

## 2025-04-20 DIAGNOSIS — E11.9 TYPE 2 DIABETES MELLITUS WITHOUT COMPLICATION, WITHOUT LONG-TERM CURRENT USE OF INSULIN (H): ICD-10-CM

## 2025-04-21 RX ORDER — GLIMEPIRIDE 2 MG/1
2 TABLET ORAL
Qty: 90 TABLET | Refills: 1 | Status: SHIPPED | OUTPATIENT
Start: 2025-04-21

## 2025-04-29 ASSESSMENT — COPD QUESTIONNAIRES: COPD: 0

## 2025-04-29 ASSESSMENT — LIFESTYLE VARIABLES: TOBACCO_USE: 1

## 2025-04-30 ENCOUNTER — ANESTHESIA (OUTPATIENT)
Dept: SURGERY | Facility: CLINIC | Age: 70
End: 2025-04-30
Payer: COMMERCIAL

## 2025-04-30 ENCOUNTER — HOSPITAL ENCOUNTER (INPATIENT)
Facility: CLINIC | Age: 70
LOS: 1 days | Discharge: HOME | End: 2025-05-01
Attending: UROLOGY | Admitting: UROLOGY
Payer: COMMERCIAL

## 2025-04-30 DIAGNOSIS — N28.89 LEFT RENAL MASS: Primary | ICD-10-CM

## 2025-04-30 LAB
ABO + RH BLD: NORMAL
BLD GP AB SCN SERPL QL: NEGATIVE
GLUCOSE BLDC GLUCOMTR-MCNC: 109 MG/DL (ref 70–99)
GLUCOSE BLDC GLUCOMTR-MCNC: 151 MG/DL (ref 70–99)
GLUCOSE BLDC GLUCOMTR-MCNC: 160 MG/DL (ref 70–99)
GLUCOSE BLDC GLUCOMTR-MCNC: 232 MG/DL (ref 70–99)
GLUCOSE BLDC GLUCOMTR-MCNC: 244 MG/DL (ref 70–99)
GLUCOSE BLDC GLUCOMTR-MCNC: 84 MG/DL (ref 70–99)
SPECIMEN EXP DATE BLD: NORMAL

## 2025-04-30 PROCEDURE — 8E0W4CZ ROBOTIC ASSISTED PROCEDURE OF TRUNK REGION, PERCUTANEOUS ENDOSCOPIC APPROACH: ICD-10-PCS | Performed by: UROLOGY

## 2025-04-30 PROCEDURE — 250N000011 HC RX IP 250 OP 636: Mod: JZ | Performed by: ANESTHESIOLOGY

## 2025-04-30 PROCEDURE — 88341 IMHCHEM/IMCYTCHM EA ADD ANTB: CPT | Mod: TC | Performed by: UROLOGY

## 2025-04-30 PROCEDURE — 0DNM4ZZ RELEASE DESCENDING COLON, PERCUTANEOUS ENDOSCOPIC APPROACH: ICD-10-PCS | Performed by: UROLOGY

## 2025-04-30 PROCEDURE — 250N000011 HC RX IP 250 OP 636

## 2025-04-30 PROCEDURE — 120N000002 HC R&B MED SURG/OB UMMC

## 2025-04-30 PROCEDURE — 710N000010 HC RECOVERY PHASE 1, LEVEL 2, PER MIN: Performed by: UROLOGY

## 2025-04-30 PROCEDURE — 250N000013 HC RX MED GY IP 250 OP 250 PS 637

## 2025-04-30 PROCEDURE — 250N000025 HC SEVOFLURANE, PER MIN: Performed by: UROLOGY

## 2025-04-30 PROCEDURE — 258N000003 HC RX IP 258 OP 636

## 2025-04-30 PROCEDURE — 272N000001 HC OR GENERAL SUPPLY STERILE: Performed by: UROLOGY

## 2025-04-30 PROCEDURE — 360N000080 HC SURGERY LEVEL 7, PER MIN: Performed by: UROLOGY

## 2025-04-30 PROCEDURE — 250N000011 HC RX IP 250 OP 636: Performed by: UROLOGY

## 2025-04-30 PROCEDURE — 86901 BLOOD TYPING SEROLOGIC RH(D): CPT | Performed by: UROLOGY

## 2025-04-30 PROCEDURE — 250N000009 HC RX 250

## 2025-04-30 PROCEDURE — 370N000017 HC ANESTHESIA TECHNICAL FEE, PER MIN: Performed by: UROLOGY

## 2025-04-30 PROCEDURE — 88307 TISSUE EXAM BY PATHOLOGIST: CPT | Mod: 26 | Performed by: PATHOLOGY

## 2025-04-30 PROCEDURE — 88342 IMHCHEM/IMCYTCHM 1ST ANTB: CPT | Mod: 26 | Performed by: PATHOLOGY

## 2025-04-30 PROCEDURE — 0TT14ZZ RESECTION OF LEFT KIDNEY, PERCUTANEOUS ENDOSCOPIC APPROACH: ICD-10-PCS | Performed by: UROLOGY

## 2025-04-30 PROCEDURE — 0GT24ZZ RESECTION OF LEFT ADRENAL GLAND, PERCUTANEOUS ENDOSCOPIC APPROACH: ICD-10-PCS | Performed by: UROLOGY

## 2025-04-30 PROCEDURE — 999N000141 HC STATISTIC PRE-PROCEDURE NURSING ASSESSMENT: Performed by: UROLOGY

## 2025-04-30 PROCEDURE — 0TT74ZZ RESECTION OF LEFT URETER, PERCUTANEOUS ENDOSCOPIC APPROACH: ICD-10-PCS | Performed by: UROLOGY

## 2025-04-30 PROCEDURE — 88341 IMHCHEM/IMCYTCHM EA ADD ANTB: CPT | Mod: 26 | Performed by: PATHOLOGY

## 2025-04-30 PROCEDURE — 250N000013 HC RX MED GY IP 250 OP 250 PS 637: Performed by: UROLOGY

## 2025-04-30 RX ORDER — ACETAMINOPHEN 325 MG/1
975 TABLET ORAL ONCE
Status: COMPLETED | OUTPATIENT
Start: 2025-04-30 | End: 2025-04-30

## 2025-04-30 RX ORDER — DEXAMETHASONE SODIUM PHOSPHATE 4 MG/ML
INJECTION, SOLUTION INTRA-ARTICULAR; INTRALESIONAL; INTRAMUSCULAR; INTRAVENOUS; SOFT TISSUE PRN
Status: DISCONTINUED | OUTPATIENT
Start: 2025-04-30 | End: 2025-04-30

## 2025-04-30 RX ORDER — HYDRALAZINE HYDROCHLORIDE 20 MG/ML
2.5-5 INJECTION INTRAMUSCULAR; INTRAVENOUS EVERY 10 MIN PRN
Status: DISCONTINUED | OUTPATIENT
Start: 2025-04-30 | End: 2025-04-30

## 2025-04-30 RX ORDER — ONDANSETRON 4 MG/1
4 TABLET, ORALLY DISINTEGRATING ORAL EVERY 6 HOURS PRN
Status: DISCONTINUED | OUTPATIENT
Start: 2025-04-30 | End: 2025-05-01 | Stop reason: HOSPADM

## 2025-04-30 RX ORDER — MAGNESIUM SULFATE HEPTAHYDRATE 40 MG/ML
2 INJECTION, SOLUTION INTRAVENOUS ONCE
Status: COMPLETED | OUTPATIENT
Start: 2025-04-30 | End: 2025-04-30

## 2025-04-30 RX ORDER — SODIUM CHLORIDE, SODIUM LACTATE, POTASSIUM CHLORIDE, CALCIUM CHLORIDE 600; 310; 30; 20 MG/100ML; MG/100ML; MG/100ML; MG/100ML
INJECTION, SOLUTION INTRAVENOUS CONTINUOUS
Status: DISCONTINUED | OUTPATIENT
Start: 2025-04-30 | End: 2025-05-01 | Stop reason: HOSPADM

## 2025-04-30 RX ORDER — HYDROXYZINE HYDROCHLORIDE 25 MG/1
25 TABLET, FILM COATED ORAL EVERY 6 HOURS PRN
Status: DISCONTINUED | OUTPATIENT
Start: 2025-04-30 | End: 2025-05-01 | Stop reason: HOSPADM

## 2025-04-30 RX ORDER — HYDROMORPHONE HYDROCHLORIDE 1 MG/ML
0.4 INJECTION, SOLUTION INTRAMUSCULAR; INTRAVENOUS; SUBCUTANEOUS EVERY 5 MIN PRN
Status: DISCONTINUED | OUTPATIENT
Start: 2025-04-30 | End: 2025-04-30

## 2025-04-30 RX ORDER — ACETAMINOPHEN 650 MG/1
650 SUPPOSITORY RECTAL ONCE
Status: COMPLETED | OUTPATIENT
Start: 2025-04-30 | End: 2025-04-30

## 2025-04-30 RX ORDER — PROPOFOL 10 MG/ML
INJECTION, EMULSION INTRAVENOUS CONTINUOUS PRN
Status: DISCONTINUED | OUTPATIENT
Start: 2025-04-30 | End: 2025-04-30

## 2025-04-30 RX ORDER — CALCIUM CARBONATE 500 MG/1
500 TABLET, CHEWABLE ORAL 4 TIMES DAILY PRN
Status: DISCONTINUED | OUTPATIENT
Start: 2025-04-30 | End: 2025-05-01 | Stop reason: HOSPADM

## 2025-04-30 RX ORDER — POLYETHYLENE GLYCOL 3350 17 G/17G
17 POWDER, FOR SOLUTION ORAL DAILY
Status: DISCONTINUED | OUTPATIENT
Start: 2025-05-01 | End: 2025-05-01 | Stop reason: HOSPADM

## 2025-04-30 RX ORDER — HYDROMORPHONE HYDROCHLORIDE 1 MG/ML
0.2 INJECTION, SOLUTION INTRAMUSCULAR; INTRAVENOUS; SUBCUTANEOUS EVERY 5 MIN PRN
Status: DISCONTINUED | OUTPATIENT
Start: 2025-04-30 | End: 2025-04-30

## 2025-04-30 RX ORDER — SODIUM CHLORIDE, SODIUM LACTATE, POTASSIUM CHLORIDE, CALCIUM CHLORIDE 600; 310; 30; 20 MG/100ML; MG/100ML; MG/100ML; MG/100ML
INJECTION, SOLUTION INTRAVENOUS CONTINUOUS PRN
Status: DISCONTINUED | OUTPATIENT
Start: 2025-04-30 | End: 2025-04-30

## 2025-04-30 RX ORDER — ASPIRIN 81 MG/1
81 TABLET ORAL DAILY
COMMUNITY

## 2025-04-30 RX ORDER — SODIUM CHLORIDE, SODIUM LACTATE, POTASSIUM CHLORIDE, CALCIUM CHLORIDE 600; 310; 30; 20 MG/100ML; MG/100ML; MG/100ML; MG/100ML
INJECTION, SOLUTION INTRAVENOUS CONTINUOUS
Status: DISCONTINUED | OUTPATIENT
Start: 2025-04-30 | End: 2025-04-30

## 2025-04-30 RX ORDER — FENTANYL CITRATE 50 UG/ML
INJECTION, SOLUTION INTRAMUSCULAR; INTRAVENOUS PRN
Status: DISCONTINUED | OUTPATIENT
Start: 2025-04-30 | End: 2025-04-30

## 2025-04-30 RX ORDER — METOPROLOL TARTRATE 1 MG/ML
1-2 INJECTION, SOLUTION INTRAVENOUS EVERY 5 MIN PRN
Status: DISCONTINUED | OUTPATIENT
Start: 2025-04-30 | End: 2025-04-30

## 2025-04-30 RX ORDER — NALOXONE HYDROCHLORIDE 0.4 MG/ML
0.4 INJECTION, SOLUTION INTRAMUSCULAR; INTRAVENOUS; SUBCUTANEOUS
Status: DISCONTINUED | OUTPATIENT
Start: 2025-04-30 | End: 2025-05-01 | Stop reason: HOSPADM

## 2025-04-30 RX ORDER — LIDOCAINE HYDROCHLORIDE 20 MG/ML
INJECTION, SOLUTION INFILTRATION; PERINEURAL PRN
Status: DISCONTINUED | OUTPATIENT
Start: 2025-04-30 | End: 2025-04-30

## 2025-04-30 RX ORDER — ONDANSETRON 2 MG/ML
4 INJECTION INTRAMUSCULAR; INTRAVENOUS EVERY 6 HOURS PRN
Status: DISCONTINUED | OUTPATIENT
Start: 2025-04-30 | End: 2025-05-01 | Stop reason: HOSPADM

## 2025-04-30 RX ORDER — FENTANYL CITRATE 50 UG/ML
50 INJECTION, SOLUTION INTRAMUSCULAR; INTRAVENOUS EVERY 5 MIN PRN
Status: DISCONTINUED | OUTPATIENT
Start: 2025-04-30 | End: 2025-04-30

## 2025-04-30 RX ORDER — ATORVASTATIN CALCIUM 40 MG/1
40 TABLET, FILM COATED ORAL EVERY EVENING
Status: DISCONTINUED | OUTPATIENT
Start: 2025-04-30 | End: 2025-05-01 | Stop reason: HOSPADM

## 2025-04-30 RX ORDER — AMOXICILLIN 250 MG
1 CAPSULE ORAL 2 TIMES DAILY
Status: DISCONTINUED | OUTPATIENT
Start: 2025-04-30 | End: 2025-05-01 | Stop reason: HOSPADM

## 2025-04-30 RX ORDER — NALOXONE HYDROCHLORIDE 0.4 MG/ML
0.1 INJECTION, SOLUTION INTRAMUSCULAR; INTRAVENOUS; SUBCUTANEOUS
Status: DISCONTINUED | OUTPATIENT
Start: 2025-04-30 | End: 2025-04-30

## 2025-04-30 RX ORDER — FAMOTIDINE 20 MG/1
20 TABLET, FILM COATED ORAL DAILY
Status: DISCONTINUED | OUTPATIENT
Start: 2025-04-30 | End: 2025-05-01 | Stop reason: HOSPADM

## 2025-04-30 RX ORDER — OXYCODONE HYDROCHLORIDE 5 MG/1
10 TABLET ORAL EVERY 4 HOURS PRN
Status: DISCONTINUED | OUTPATIENT
Start: 2025-04-30 | End: 2025-05-01 | Stop reason: HOSPADM

## 2025-04-30 RX ORDER — OXYCODONE HYDROCHLORIDE 5 MG/1
5 TABLET ORAL EVERY 4 HOURS PRN
Status: DISCONTINUED | OUTPATIENT
Start: 2025-04-30 | End: 2025-05-01 | Stop reason: HOSPADM

## 2025-04-30 RX ORDER — DEXTROSE MONOHYDRATE 25 G/50ML
25-50 INJECTION, SOLUTION INTRAVENOUS
Status: DISCONTINUED | OUTPATIENT
Start: 2025-04-30 | End: 2025-05-01 | Stop reason: HOSPADM

## 2025-04-30 RX ORDER — DEXTROSE MONOHYDRATE 100 MG/ML
INJECTION, SOLUTION INTRAVENOUS CONTINUOUS PRN
Status: DISCONTINUED | OUTPATIENT
Start: 2025-04-30 | End: 2025-05-01 | Stop reason: HOSPADM

## 2025-04-30 RX ORDER — FENTANYL CITRATE 50 UG/ML
25 INJECTION, SOLUTION INTRAMUSCULAR; INTRAVENOUS EVERY 5 MIN PRN
Status: DISCONTINUED | OUTPATIENT
Start: 2025-04-30 | End: 2025-04-30

## 2025-04-30 RX ORDER — ONDANSETRON 4 MG/1
4 TABLET, ORALLY DISINTEGRATING ORAL EVERY 30 MIN PRN
Status: DISCONTINUED | OUTPATIENT
Start: 2025-04-30 | End: 2025-04-30

## 2025-04-30 RX ORDER — HYDROMORPHONE HYDROCHLORIDE 1 MG/ML
0.2 INJECTION, SOLUTION INTRAMUSCULAR; INTRAVENOUS; SUBCUTANEOUS
Status: DISCONTINUED | OUTPATIENT
Start: 2025-04-30 | End: 2025-05-01 | Stop reason: HOSPADM

## 2025-04-30 RX ORDER — DEXTROSE MONOHYDRATE 25 G/50ML
25-50 INJECTION, SOLUTION INTRAVENOUS
Status: DISCONTINUED | OUTPATIENT
Start: 2025-04-30 | End: 2025-04-30

## 2025-04-30 RX ORDER — NALOXONE HYDROCHLORIDE 0.4 MG/ML
0.2 INJECTION, SOLUTION INTRAMUSCULAR; INTRAVENOUS; SUBCUTANEOUS
Status: DISCONTINUED | OUTPATIENT
Start: 2025-04-30 | End: 2025-05-01 | Stop reason: HOSPADM

## 2025-04-30 RX ORDER — NICOTINE POLACRILEX 4 MG
15-30 LOZENGE BUCCAL
Status: DISCONTINUED | OUTPATIENT
Start: 2025-04-30 | End: 2025-05-01 | Stop reason: HOSPADM

## 2025-04-30 RX ORDER — PROPOFOL 10 MG/ML
INJECTION, EMULSION INTRAVENOUS PRN
Status: DISCONTINUED | OUTPATIENT
Start: 2025-04-30 | End: 2025-04-30

## 2025-04-30 RX ORDER — METOPROLOL SUCCINATE 100 MG/1
100 TABLET, EXTENDED RELEASE ORAL EVERY EVENING
Status: DISCONTINUED | OUTPATIENT
Start: 2025-04-30 | End: 2025-05-01 | Stop reason: HOSPADM

## 2025-04-30 RX ORDER — HALOPERIDOL 5 MG/ML
1 INJECTION INTRAMUSCULAR
Status: DISCONTINUED | OUTPATIENT
Start: 2025-04-30 | End: 2025-04-30

## 2025-04-30 RX ORDER — BUPIVACAINE HYDROCHLORIDE 2.5 MG/ML
INJECTION, SOLUTION INFILTRATION; PERINEURAL PRN
Status: DISCONTINUED | OUTPATIENT
Start: 2025-04-30 | End: 2025-04-30 | Stop reason: HOSPADM

## 2025-04-30 RX ORDER — ONDANSETRON 2 MG/ML
INJECTION INTRAMUSCULAR; INTRAVENOUS PRN
Status: DISCONTINUED | OUTPATIENT
Start: 2025-04-30 | End: 2025-04-30

## 2025-04-30 RX ORDER — LIDOCAINE 40 MG/G
CREAM TOPICAL
Status: DISCONTINUED | OUTPATIENT
Start: 2025-04-30 | End: 2025-05-01 | Stop reason: HOSPADM

## 2025-04-30 RX ORDER — BISACODYL 10 MG
10 SUPPOSITORY, RECTAL RECTAL DAILY PRN
Status: DISCONTINUED | OUTPATIENT
Start: 2025-05-03 | End: 2025-05-01 | Stop reason: HOSPADM

## 2025-04-30 RX ORDER — ONDANSETRON 2 MG/ML
4 INJECTION INTRAMUSCULAR; INTRAVENOUS EVERY 30 MIN PRN
Status: DISCONTINUED | OUTPATIENT
Start: 2025-04-30 | End: 2025-04-30

## 2025-04-30 RX ORDER — PROCHLORPERAZINE MALEATE 5 MG/1
5 TABLET ORAL EVERY 6 HOURS PRN
Status: DISCONTINUED | OUTPATIENT
Start: 2025-04-30 | End: 2025-05-01 | Stop reason: HOSPADM

## 2025-04-30 RX ORDER — CLINDAMYCIN PHOSPHATE 900 MG/50ML
900 INJECTION, SOLUTION INTRAVENOUS
Status: DISCONTINUED | OUTPATIENT
Start: 2025-04-30 | End: 2025-04-30 | Stop reason: HOSPADM

## 2025-04-30 RX ORDER — CLINDAMYCIN PHOSPHATE 900 MG/50ML
900 INJECTION, SOLUTION INTRAVENOUS SEE ADMIN INSTRUCTIONS
Status: DISCONTINUED | OUTPATIENT
Start: 2025-04-30 | End: 2025-04-30 | Stop reason: HOSPADM

## 2025-04-30 RX ORDER — HYDROMORPHONE HYDROCHLORIDE 1 MG/ML
0.4 INJECTION, SOLUTION INTRAMUSCULAR; INTRAVENOUS; SUBCUTANEOUS
Status: DISCONTINUED | OUTPATIENT
Start: 2025-04-30 | End: 2025-05-01 | Stop reason: HOSPADM

## 2025-04-30 RX ORDER — ACETAMINOPHEN 325 MG/1
975 TABLET ORAL EVERY 8 HOURS
Status: DISCONTINUED | OUTPATIENT
Start: 2025-04-30 | End: 2025-05-01 | Stop reason: HOSPADM

## 2025-04-30 RX ORDER — DEXAMETHASONE SODIUM PHOSPHATE 4 MG/ML
4 INJECTION, SOLUTION INTRA-ARTICULAR; INTRALESIONAL; INTRAMUSCULAR; INTRAVENOUS; SOFT TISSUE
Status: DISCONTINUED | OUTPATIENT
Start: 2025-04-30 | End: 2025-04-30

## 2025-04-30 RX ORDER — NICOTINE POLACRILEX 4 MG
15-30 LOZENGE BUCCAL
Status: DISCONTINUED | OUTPATIENT
Start: 2025-04-30 | End: 2025-04-30

## 2025-04-30 RX ORDER — ACETAMINOPHEN 325 MG/1
975 TABLET ORAL ONCE
Status: DISCONTINUED | OUTPATIENT
Start: 2025-04-30 | End: 2025-04-30

## 2025-04-30 RX ADMIN — CLINDAMYCIN PHOSPHATE 900 MG: 900 INJECTION, SOLUTION INTRAVENOUS at 07:31

## 2025-04-30 RX ADMIN — PHENYLEPHRINE HYDROCHLORIDE 100 MCG: 10 INJECTION INTRAVENOUS at 08:40

## 2025-04-30 RX ADMIN — SODIUM CHLORIDE, SODIUM LACTATE, POTASSIUM CHLORIDE, AND CALCIUM CHLORIDE: .6; .31; .03; .02 INJECTION, SOLUTION INTRAVENOUS at 17:08

## 2025-04-30 RX ADMIN — ONDANSETRON 4 MG: 2 INJECTION INTRAMUSCULAR; INTRAVENOUS at 12:31

## 2025-04-30 RX ADMIN — PROPOFOL 120 MG: 10 INJECTION, EMULSION INTRAVENOUS at 07:58

## 2025-04-30 RX ADMIN — FENTANYL CITRATE 50 MCG: 50 INJECTION INTRAMUSCULAR; INTRAVENOUS at 11:10

## 2025-04-30 RX ADMIN — FAMOTIDINE 20 MG: 20 TABLET, FILM COATED ORAL at 17:55

## 2025-04-30 RX ADMIN — FENTANYL CITRATE 50 MCG: 0.05 INJECTION, SOLUTION INTRAMUSCULAR; INTRAVENOUS at 15:23

## 2025-04-30 RX ADMIN — HYDROMORPHONE HYDROCHLORIDE 0.5 MG: 1 INJECTION, SOLUTION INTRAMUSCULAR; INTRAVENOUS; SUBCUTANEOUS at 12:34

## 2025-04-30 RX ADMIN — ACETAMINOPHEN 975 MG: 325 TABLET ORAL at 21:44

## 2025-04-30 RX ADMIN — Medication 20 MG: at 09:00

## 2025-04-30 RX ADMIN — Medication 10 MG: at 12:24

## 2025-04-30 RX ADMIN — PROPOFOL 30 MCG/KG/MIN: 10 INJECTION, EMULSION INTRAVENOUS at 08:43

## 2025-04-30 RX ADMIN — MAGNESIUM SULFATE HEPTAHYDRATE 2 G: 40 INJECTION, SOLUTION INTRAVENOUS at 14:05

## 2025-04-30 RX ADMIN — METOPROLOL SUCCINATE 100 MG: 100 TABLET, EXTENDED RELEASE ORAL at 20:12

## 2025-04-30 RX ADMIN — ACETAMINOPHEN 975 MG: 325 TABLET ORAL at 15:54

## 2025-04-30 RX ADMIN — FENTANYL CITRATE 25 MCG: 0.05 INJECTION, SOLUTION INTRAMUSCULAR; INTRAVENOUS at 13:46

## 2025-04-30 RX ADMIN — MIDAZOLAM 1 MG: 1 INJECTION INTRAMUSCULAR; INTRAVENOUS at 07:49

## 2025-04-30 RX ADMIN — Medication 30 MG: at 10:17

## 2025-04-30 RX ADMIN — DEXAMETHASONE SODIUM PHOSPHATE 6 MG: 4 INJECTION, SOLUTION INTRAMUSCULAR; INTRAVENOUS at 07:59

## 2025-04-30 RX ADMIN — HYDROMORPHONE HYDROCHLORIDE 0.4 MG: 1 INJECTION, SOLUTION INTRAMUSCULAR; INTRAVENOUS; SUBCUTANEOUS at 15:00

## 2025-04-30 RX ADMIN — HYDROMORPHONE HYDROCHLORIDE 0.4 MG: 1 INJECTION, SOLUTION INTRAMUSCULAR; INTRAVENOUS; SUBCUTANEOUS at 14:17

## 2025-04-30 RX ADMIN — HYDROXYZINE HYDROCHLORIDE 25 MG: 25 TABLET, FILM COATED ORAL at 15:31

## 2025-04-30 RX ADMIN — LIDOCAINE HYDROCHLORIDE 80 MG: 20 INJECTION, SOLUTION INFILTRATION; PERINEURAL at 07:58

## 2025-04-30 RX ADMIN — SODIUM CHLORIDE, SODIUM LACTATE, POTASSIUM CHLORIDE, AND CALCIUM CHLORIDE: .6; .31; .03; .02 INJECTION, SOLUTION INTRAVENOUS at 07:49

## 2025-04-30 RX ADMIN — Medication 40 MG: at 07:58

## 2025-04-30 RX ADMIN — FENTANYL CITRATE 50 MCG: 0.05 INJECTION, SOLUTION INTRAMUSCULAR; INTRAVENOUS at 15:15

## 2025-04-30 RX ADMIN — HYDROMORPHONE HYDROCHLORIDE 0.4 MG: 1 INJECTION, SOLUTION INTRAMUSCULAR; INTRAVENOUS; SUBCUTANEOUS at 14:40

## 2025-04-30 RX ADMIN — SODIUM CHLORIDE, SODIUM LACTATE, POTASSIUM CHLORIDE, CALCIUM CHLORIDE: 600; 310; 30; 20 INJECTION, SOLUTION INTRAVENOUS at 11:40

## 2025-04-30 RX ADMIN — SODIUM CHLORIDE, SODIUM LACTATE, POTASSIUM CHLORIDE, CALCIUM CHLORIDE: 600; 310; 30; 20 INJECTION, SOLUTION INTRAVENOUS at 08:15

## 2025-04-30 RX ADMIN — SODIUM CHLORIDE, SODIUM LACTATE, POTASSIUM CHLORIDE, AND CALCIUM CHLORIDE: .6; .31; .03; .02 INJECTION, SOLUTION INTRAVENOUS at 23:36

## 2025-04-30 RX ADMIN — INSULIN HUMAN 2.5 UNITS/HR: 1 INJECTION, SOLUTION INTRAVENOUS at 12:06

## 2025-04-30 RX ADMIN — SENNOSIDES AND DOCUSATE SODIUM 1 TABLET: 50; 8.6 TABLET ORAL at 20:12

## 2025-04-30 RX ADMIN — ACETAMINOPHEN 975 MG: 325 TABLET ORAL at 06:34

## 2025-04-30 RX ADMIN — OXYCODONE 5 MG: 5 TABLET ORAL at 15:50

## 2025-04-30 RX ADMIN — FENTANYL CITRATE 50 MCG: 50 INJECTION INTRAMUSCULAR; INTRAVENOUS at 09:11

## 2025-04-30 RX ADMIN — HYDROMORPHONE HYDROCHLORIDE 0.2 MG: 1 INJECTION, SOLUTION INTRAMUSCULAR; INTRAVENOUS; SUBCUTANEOUS at 14:00

## 2025-04-30 RX ADMIN — SUGAMMADEX 200 MG: 100 INJECTION, SOLUTION INTRAVENOUS at 13:08

## 2025-04-30 RX ADMIN — ATORVASTATIN CALCIUM 40 MG: 40 TABLET, FILM COATED ORAL at 20:12

## 2025-04-30 RX ADMIN — FENTANYL CITRATE 25 MCG: 0.05 INJECTION, SOLUTION INTRAMUSCULAR; INTRAVENOUS at 13:29

## 2025-04-30 RX ADMIN — HYDROMORPHONE HYDROCHLORIDE 0.2 MG: 1 INJECTION, SOLUTION INTRAMUSCULAR; INTRAVENOUS; SUBCUTANEOUS at 14:11

## 2025-04-30 RX ADMIN — FENTANYL CITRATE 100 MCG: 50 INJECTION INTRAMUSCULAR; INTRAVENOUS at 07:57

## 2025-04-30 ASSESSMENT — ACTIVITIES OF DAILY LIVING (ADL)
ADLS_ACUITY_SCORE: 39
ADLS_ACUITY_SCORE: 39
ADLS_ACUITY_SCORE: 37
ADLS_ACUITY_SCORE: 40
ADLS_ACUITY_SCORE: 40
ADLS_ACUITY_SCORE: 39
ADLS_ACUITY_SCORE: 40
ADLS_ACUITY_SCORE: 38
ADLS_ACUITY_SCORE: 37
ADLS_ACUITY_SCORE: 37
ADLS_ACUITY_SCORE: 39
ADLS_ACUITY_SCORE: 37
ADLS_ACUITY_SCORE: 39
ADLS_ACUITY_SCORE: 40
ADLS_ACUITY_SCORE: 37
ADLS_ACUITY_SCORE: 40

## 2025-04-30 NOTE — ANESTHESIA PREPROCEDURE EVALUATION
Pre-Operative H & P     CC:  Preoperative exam to assess for increased cardiopulmonary risk while undergoing surgery and anesthesia.    Date of Encounter: 4/16/2025  Primary Care Physician:  Melissa Pitts     Reason for visit:   No diagnosis found.      HPI  Asmita Jerez is a 69 year old female who presents for pre-operative H & P in preparation for  Procedure Information       Case: [id number] Date/Time: 04/30/25 0730    Procedure: ROBOT-ASSISTED LEFT RADICAL NEPHRECTOMY (Left: Abdomen)    Anesthesia type: General    Diagnosis: Renal mass, left [N28.89]    Pre-op diagnosis: Renal mass, left [N28.89]    [facility]    Providers: Clayton Toro MD            Patient is being evaluated for comorbid conditions of HTN, HLD, tobacco use, PCOS, type 2 diabetes, left adrenal adenoma, osteoarthritis.    She has a history of a left renal mass, initially diagnosed in 2023. A recent surveillance scan showed that the mass had increased in size and it is now over 3 cm. She completed a visit with Dr. Toro in February to discuss treatment options and the above surgery was recommended for further management.    Of note, patient has been working with endocrinology for history of adrenal adenomas. She was noted to have increased levels of cortisol and endocrinology has requested that the left adrenal gland be removed along with the kidney.     History is obtained from the patient and chart review    Hx of abnormal bleeding or anti-platelet use: Denies    Menstrual history: Patient's last menstrual period was 12/01/2009.     Past Medical History  Past Medical History:   Diagnosis Date    Cervical high risk HPV (human papillomavirus) test positive 03/31/2018    Diabetes (H)     Hypertension     Peptic ulcer, unspecified site, unspecified as acute or chronic, without mention of hemorrhage, perforation, or obstruction     Polycystic ovaries     Pure hypercholesterolemia     Tobacco use disorder      Unspecified multiple gestation, unspecified as to episode of care     Multiple gestation       Past Surgical History  Past Surgical History:   Procedure Laterality Date    APPENDECTOMY  1970    ARTHROPLASTY HIP Right 06/22/2023    Procedure: Total Hip Arthroplasty Right;  Surgeon: Albin Williamson MD;  Location: [facility]    BIOPSY BREAST Right     CHOLECYSTECTOMY  1990    COLONOSCOPY N/A 12/11/2015    Procedure: COLONOSCOPY;  Surgeon: Tino Pryor MD;  Location: [facility]    COLONOSCOPY N/A 08/23/2023    Procedure: Colonoscopy with biopsies;  Surgeon: Celio Larry DO;  Location: [facility]    CYSTECTOMY PILONIDAL  1970    ENDOSCOPIC RELEASE CARPAL TUNNEL Right 11/01/2021    Procedure: Endoscopic carpal tunnel release;  Surgeon: Ac Lemus MD;  Location: [facility]    ENDOSCOPIC RELEASE CARPAL TUNNEL Left 11/15/2021    Procedure: ENDOSCOPIC Carpal Tunnel Release, LEFT, removal suture right wrist;  Surgeon: Ac Lemus MD;  Location: [facility]    RELEASE TRIGGER FINGER  05/23/2014    Procedure: RELEASE TRIGGER FINGER;  Surgeon: Ag Hoffman MD;  Location: [facility]       Prior to Admission Medications  Current Outpatient Medications   Medication Sig Dispense Refill    acetaminophen (TYLENOL) 500 MG tablet Take 500-1,000 mg by mouth every 6 hours as needed for mild pain.      amLODIPine (NORVASC) 5 MG tablet Take 1 tablet (5 mg) by mouth daily. (Patient taking differently: Take 5 mg by mouth every evening.) 90 tablet 3    atorvastatin (LIPITOR) 40 MG tablet Take 1 tablet (40 mg) by mouth daily. (Patient taking differently: Take 40 mg by mouth every evening.) 90 tablet 3    blood glucose (ACCU-CHEK SIDDHARTHA PLUS) test strip Use to test blood sugar 3 times daily or as directed. 200 strip 3    blood glucose monitoring (ACCU-CHEK MULTICLIX) lancets Use to test blood sugar 1 time daily or as directed. Accu chek, Ins will cover, match machine. 100 each 2    Dulaglutide (TRULICITY) 3 MG/0.5ML  SOAJ Inject 3 mg subcutaneously once a week. (Patient taking differently: Inject 3 mg subcutaneously once a week. SATURDAY'S) 6 mL 1    glimepiride (AMARYL) 2 MG tablet TAKE ONE TABLET BY MOUTH EVERY MORNING BEFORE BREAKFAST 90 tablet 1    hydrOXYzine HCl (ATARAX) 25 MG tablet TAKE 1 TABLET BY MOUTH EVERY 6 HOURS AS NEEDED FOR ITCHING. 60 tablet 0    lisinopril (ZESTRIL) 40 MG tablet Take 1 tablet (40 mg) by mouth daily. (Patient taking differently: Take 40 mg by mouth every morning.) 90 tablet 3    metoprolol succinate ER (TOPROL XL) 100 MG 24 hr tablet Take 1 tablet (100 mg) by mouth daily. (Patient taking differently: Take 100 mg by mouth every evening.) 90 tablet 3    Vitamin D3 (CHOLECALCIFEROL) 25 mcg (1000 units) tablet Take 1 tablet (25 mcg) by mouth daily (Patient taking differently: Take 1 tablet by mouth every morning.) 90 tablet 3       Allergies  Allergies   Allergen Reactions    Amoxicillin Rash       Social History  Social History     Socioeconomic History    Marital status:      Spouse name: Not on file    Number of children: Not on file    Years of education: Not on file    Highest education level: Not on file   Occupational History    Not on file   Tobacco Use    Smoking status: Every Day     Current packs/day: 1.00     Average packs/day: 1 pack/day for 40.0 years (40.0 ttl pk-yrs)     Types: Cigarettes    Smokeless tobacco: Never    Tobacco comments:     pt not interested in quit plan 10/12/2022     1ppd   Vaping Use    Vaping status: Never Used   Substance and Sexual Activity    Alcohol use: No    Drug use: No    Sexual activity: Yes     Partners: Male   Other Topics Concern    Parent/sibling w/ CABG, MI or angioplasty before 65F 55M? No   Social History Narrative    Not on file     Social Drivers of Health     Financial Resource Strain: Low Risk  (11/4/2024)    Financial Resource Strain     Within the past 12 months, have you or your family members you live with been unable to get  utilities (heat, electricity) when it was really needed?: No   Food Insecurity: Low Risk  (11/4/2024)    Food Insecurity     Within the past 12 months, did you worry that your food would run out before you got money to buy more?: No     Within the past 12 months, did the food you bought just not last and you didn t have money to get more?: No   Transportation Needs: Low Risk  (11/4/2024)    Transportation Needs     Within the past 12 months, has lack of transportation kept you from medical appointments, getting your medicines, non-medical meetings or appointments, work, or from getting things that you need?: No   Physical Activity: Unknown (11/4/2024)    Exercise Vital Sign     Days of Exercise per Week: 1 day     Minutes of Exercise per Session: Not on file   Stress: Stress Concern Present (11/4/2024)    [employer] - Occupational Stress Questionnaire     Feeling of Stress : Very much   Social Connections: Unknown (11/4/2024)    Social Connection and Isolation Panel [NHANES]     Frequency of Communication with Friends and Family: Not on file     Frequency of Social Gatherings with Friends and Family: Patient declined     Attends [employer] Services: Not on file     Active Member of Clubs or Organizations: Not on file     Attends Club or Organization Meetings: Not on file     Marital Status: Not on file   Interpersonal Safety: Low Risk  (11/4/2024)    Interpersonal Safety     Do you feel physically and emotionally safe where you currently live?: Yes     Within the past 12 months, have you been hit, slapped, kicked or otherwise physically hurt by someone?: No     Within the past 12 months, have you been humiliated or emotionally abused in other ways by your partner or ex-partner?: No   Housing Stability: Low Risk  (11/4/2024)    Housing Stability     Do you have housing? : Yes     Are you worried about losing your housing?: No       Family History  Family History   Problem Relation Age of  Onset    Unknown/Adopted Father     Dementia Father     Anesthesia Reaction No family hx of     Deep Vein Thrombosis (DVT) No family hx of        Review of Systems  The complete review of systems is negative other than noted in the HPI or here.   Anesthesia Evaluation   Pt has had prior anesthetic. Type: General.    No history of anesthetic complications       ROS/MED HX  ENT/Pulmonary:     (+)     NINAF risk factors,  hypertension,         tobacco use, Current use,                    (-) COPD and recent URI   Neurologic:  - neg neurologic ROS     Cardiovascular: Comment: Denies chest pain/pressure, SALEH, orthopnea, dizziness, palpitations, edema.     (+) Dyslipidemia hypertension- -   -  - -                                 Previous cardiac testing   Echo: Date: Results:    Stress Test:  Date: Results:    ECG Reviewed:  Date: 6/20/23 Results:  Sinus  Rhythm   WITHIN NORMAL LIMITS  Cath:  Date: Results:      METS/Exercise Tolerance: >4 METS Comment: Reports she is very active with yard work - raking, shoveling, digging   Hematologic:  - neg hematologic  ROS     Musculoskeletal: Comment: S/p right BOO  (+)  arthritis,             GI/Hepatic: Comment: S/p appendectomy    S/p cholecystectomy   (-) GERD and liver disease   Renal/Genitourinary: Comment: PCOS    Left renal mass      Endo: Comment: Left adrenal adenoma    (+)  type II DM, Last HgA1c: 7.4, date: 8/6/24, Not using insulin,  Normal glucose range: Home BG averaging between 106-150,            (-) thyroid disease and obesity   Psychiatric/Substance Use:  - neg psychiatric ROS     Infectious Disease:  - neg infectious disease ROS     Malignancy:  - neg malignancy ROS     Other:  - neg other ROS          Virtual visit -  No vitals were obtained    Physical Exam  Constitutional: Pleasant, no apparent distress, and appears stated age.  Eyes: Pupils equal  HENT: Normocephalic and atraumatic  Respiratory: Non labored breathing on room air  Neurologic: Awake, alert,  oriented to name, place and time.   Neuropsychiatric: Calm, cooperative. Normal affect.       Prior Labs/Diagnostic Studies   All labs and imaging personally reviewed     EKG/ stress test - if available please see in ROS above   No results found.       No data to display                  The patient's records and results personally reviewed by this provider.     Outside records reviewed from: Care Everywhere      Assessment  Asmita Jerez is a 69 year old female seen as a PAC referral for risk assessment and optimization for anesthesia.    Plan/Recommendations  Pt will be optimized for the proposed procedure.  See below for details on the assessment, risk, and preoperative recommendations    NEUROLOGY  - No history of TIA, CVA or seizure    -Post Op delirium risk factors:  No risk identified    ENT  - No current airway concerns.  Will need to be reassessed day of surgery.  Mallampati: Unable to assess  TM: Unable to assess    CARDIAC  - No history of CAD and Afib  - Hypertension  Continue amlodipine and metoprolol as scheduled  Hold lisinopril day of surgery  - Hyperlipidemia  Continue atorvastatin    - METS (Metabolic Equivalents)  Patient performs 4 or more METS exercise without symptoms             Total Score: 0      RCRI-Low risk: Class 2 0.9% complication rate             Total Score: 1    RCRI: High Risk Surgery        PULMONARY    NINFA Low Risk             Total Score: 2    NINFA: Hypertension    NINFA: Over 50 ys old      - Denies asthma or inhaler use  - Tobacco History    History   Smoking Status    Every Day    Types: Cigarettes   Smokeless Tobacco    Never       GI  - history of appendectomy and cholecystectomy  PONV Medium Risk  Total Score: 2           1 AN PONV: Pt is Female    1 AN PONV: Intended Post Op Opioids        /RENAL  - Left renal mass  Above surgery planned for further management  - Baseline Creatinine  1.0    ENDOCRINE    - BMI: Estimated body mass index is 23.03 kg/m  as calculated from the  "following:    Height as of 4/16/25: 1.6 m (5' 3\").    Weight as of 4/16/25: 59 kg (130 lb).  Healthy Weight (BMI 18.5-24.9)  - Diabetes  Hemoglobin A1C (%)   Date Value   04/17/2025 8.0 (H)   03/01/2021 6.9 (H)   Will update A1c prior to surgery  Diabetes Mellitus, Type 2, non-insulin dependent.  Hold morning oral hypoglycemic medications. Recommend close monitoring of the patient's blood glucose levels throughout the perioperative period.  - Adrenal nodules - felt to be adrenal microadenomas, associated with possible mild autonomous cortisol secretion  Following with endocrinology, last visit 12/31/24. Endocrinology has requested that left adrenalectomy be performed at the time of nephrectomy.     HEME  VTE Low Risk 0.26%             Total Score: 1    VTE: Greater than 59 yrs old      - No history of abnormal bleeding or antiplatelet use.  - A type and screen has been ordered for this patient    MSK  - s/p right BOO      Different anesthesia methods/types have been discussed with the patient, but they are aware that the final plan will be decided by the assigned anesthesia provider on the date of service.      The patient is optimized for their procedure. AVS with information on surgery time/arrival time, meds and NPO status given by nursing staff. No further diagnostic testing indicated.    Please refer to the physical examination documented by the anesthesiologist in the anesthesia record on the day of surgery.    Video-Visit Details    Type of service:  Video Visit    Provider received verbal consent for a Video Visit from the patient? Yes   Video Start Time: 10:13 AM   Video End Time: 10:26 AM    Originating Location (pt. Location): Home    Distant Location (provider location):  Off-site  Mode of Communication:  Video Conference via [employer]  On the day of service:     Prep time: 8 minutes  Visit time: 13 minutes  Documentation time: 9 minutes  ------------------------------------------  Total time: 30 " minutes      Demetria Jovel PA-C  Preoperative Assessment Center  [facility]  Phone: [phone]  Fax: [phone]    Physical Exam    Airway        Mallampati: II   TM distance: > 3 FB   Neck ROM: full   Mouth opening: > 3 cm    Respiratory Devices and Support         Dental     Comment: Edentulous upper, missing many lower    (+) Multiple visibly decayed, broken teeth      Cardiovascular          Rhythm and rate: regular and normal     Pulmonary           breath sounds clear to auscultation           Anesthesia Plan    ASA Status:  3    NPO Status:  NPO Appropriate    Anesthesia Type: General.     - Airway: ETT   Induction: Intravenous, Propofol.   Maintenance: Balanced.   Techniques and Equipment:     - Lines/Monitors: BIS, 2nd IV     - Blood: T&C     Consents    Anesthesia Plan(s) and associated risks, benefits, and realistic alternatives discussed. Questions answered and patient/representative(s) expressed understanding.     - Discussed:     - Discussed with:  Patient      - Extended Intubation/Ventilatory Support Discussed: No.      - Patient is DNR/DNI Status: No     Use of blood products discussed: Yes.     - Discussed with: Patient.     - Consented: consented to blood products     Postoperative Care    Pain management: Multi-modal analgesia, IV analgesics, Oral pain medications.   PONV prophylaxis: Ondansetron (or other 5HT-3), Dexamethasone or Solumedrol     Comments:

## 2025-04-30 NOTE — ANESTHESIA CARE TRANSFER NOTE
Patient: Asmita Jerez    Procedure: Procedure(s):  ROBOT-ASSISTED LEFT RADICAL NEPHRECTOMY and left adrenalectomy       Diagnosis: Renal mass, left [N28.89]  Diagnosis Additional Information: No value filed.    Anesthesia Type:   General     Note:    Oropharynx: oropharynx clear of all foreign objects and spontaneously breathing  Level of Consciousness: drowsy  Oxygen Supplementation: face mask  Level of Supplemental Oxygen (L/min / FiO2): 6  Independent Airway: airway patency satisfactory and stable  Dentition: dentition unchanged  Vital Signs Stable: post-procedure vital signs reviewed and stable    Patient transferred to: PACU    Handoff Report: Identifed the Patient, Identified the Reponsible Provider, Reviewed the pertinent medical history, Discussed the surgical course, Reviewed Intra-OP anesthesia mangement and issues during anesthesia, Set expectations for post-procedure period and Allowed opportunity for questions and acknowledgement of understanding      Vitals:  Vitals Value Taken Time   /35 04/30/25 1318   Temp 36.0    Pulse 74 04/30/25 1327   Resp 16 04/30/25 1327   SpO2 89 % 04/30/25 1327   Vitals shown include unfiled device data.    Electronically Signed By: GAURANG Duncan CRNA  April 30, 2025  1:27 PM

## 2025-04-30 NOTE — OR NURSING
"PACU to Inpatient Nursing Handoff    Patient Asmita Jerez is a 69 year old female who speaks English.   Procedure Procedure(s):  ROBOT-ASSISTED LEFT RADICAL NEPHRECTOMY and left adrenalectomy   Surgeon(s) Primary: Clayton Toro MD  Resident - Assisting: Harshil Mancuso MD     Allergies   Allergen Reactions    Amoxicillin Rash       Isolation  No active isolations     Past Medical History   has a past medical history of Cervical high risk HPV (human papillomavirus) test positive (03/31/2018), Diabetes (H), Hypertension, Peptic ulcer, unspecified site, unspecified as acute or chronic, without mention of hemorrhage, perforation, or obstruction, Polycystic ovaries, Pure hypercholesterolemia, Tobacco use disorder, and Unspecified multiple gestation, unspecified as to episode of care.    Anesthesia General   Dermatome Level     Preop Meds acetaminophen (Tylenol) - time given: 0634   Nerve block Not applicable   Intraop Meds dexamethasone (Decadron)  fentanyl (Sublimaze): 200 mcg total  hydromorphone (Dilaudid): 0.5 mg total  ondansetron (Zofran): last given at 1231  Regular insulin drip started   Local Meds Yes   Antibiotics Not applicable     Pain Patient Currently in Pain: yes   PACU meds  acetaminophen (Tylenol): 975 mg (total dose) last given at 1554   fentanyl (Sublimaze): 150 mcg (total dose) last given at 1523   hydromorphone (Dilaudid): 1.6 mg (total dose) last given at 1500   oxycodone (Roxicodone): 5 mg (total dose) last given at 1550   Magnesium 2gm at 1410  and atarax 25mg at 1531   PCA / epidural No   Capnography     Telemetry ECG Rhythm: Normal sinus rhythm   Inpatient Telemetry Monitor Ordered? No        Labs Glucose Lab Results   Component Value Date     04/30/2025     10/07/2021     03/01/2021       Hgb Lab Results   Component Value Date    HGB 13.9 04/17/2025    HGB 13.5 04/29/2015       INR No results found for: \"INR\"   PACU Imaging Not applicable     Wound/Incision " Incision/Surgical Site 11/01/21 Right Wrist (Active)   Number of days: 1276       Incision/Surgical Site 11/15/21 Left Arm (Active)   Number of days: 1262       Incision/Surgical Site 06/22/23 Right Hip (Active)   Number of days: 678       Incision/Surgical Site 04/30/25 Left;Mid Abdomen (Active)   Incision Assessment WDL 04/30/25 1320   Closure Liquid bandage 04/30/25 1320   Incision Drainage Amount None 04/30/25 1320   Dressing Intervention Open to air / No Dressing 04/30/25 1320   Number of days: 0       Incision/Surgical Site 04/30/25 Upper;Left Abdomen (Active)   Incision Assessment WDL 04/30/25 1320   Closure Liquid bandage 04/30/25 1320   Incision Drainage Amount None 04/30/25 1320   Dressing Intervention Open to air / No Dressing 04/30/25 1320   Number of days: 0       Incision/Surgical Site 04/30/25 Upper;Midline Abdomen (Active)   Incision Assessment WDL 04/30/25 1320   Closure Liquid bandage 04/30/25 1320   Incision Drainage Amount None 04/30/25 1320   Dressing Intervention Open to air / No Dressing 04/30/25 1320   Number of days: 0       Incision/Surgical Site 04/30/25 Left;Lower Abdomen (Active)   Incision Assessment WDL 04/30/25 1320   Closure Liquid bandage 04/30/25 1320   Incision Drainage Amount None 04/30/25 1320   Dressing Intervention Open to air / No Dressing 04/30/25 1320   Number of days: 0       Incision/Surgical Site 04/30/25 Inner;Left;Lower Abdomen (Active)   Incision Assessment WDL 04/30/25 1320   Closure Liquid bandage 04/30/25 1320   Incision Drainage Amount None 04/30/25 1320   Dressing Intervention Open to air / No Dressing 04/30/25 1320   Number of days: 0      CMS        Equipment ice pack   Other LDA       IV Access Peripheral IV 04/30/25 Right Hand (Active)   Site Assessment WDL 04/30/25 1320   Line Status Infusing 04/30/25 1320   Dressing Transparent 04/30/25 1320   Dressing Status clean;dry;intact 04/30/25 1320   Dressing Intervention New dressing  04/30/25 0704   Line  Intervention Flushed 04/30/25 0704   Line Necessity Yes, meets criteria 04/30/25 0704   Phlebitis Scale 0-->no symptoms 04/30/25 1320   Infiltration? no 04/30/25 0704   Number of days: 0       Peripheral IV 04/30/25 Anterior;Left Wrist (Active)   Site Assessment WDL 04/30/25 1320   Line Status Saline locked 04/30/25 1320   Dressing Transparent 04/30/25 1320   Dressing Status clean;dry;intact 04/30/25 1320   Phlebitis Scale 0-->no symptoms 04/30/25 1320   Number of days: 0      Blood Products Not applicable EBL 20 mL   Intake/Output Date 04/30/25 0700 - 05/01/25 0659   Shift 3609-6832 2237-5670 7611-8539 24 Hour Total   INTAKE   I.V. 2000 2000   Shift Total(mL/kg) 2000(34.48)   2000(34.48)   OUTPUT   Urine 400   400   Blood 20   20   Shift Total(mL/kg) 420(7.24)   420(7.24)   Weight (kg) 58 58 58 58      Drains / Esquivel Urinary Drain 04/30/25 Urethral Catheter Latex 16 fr (Active)   Collection Container Standard 04/30/25 1320   Securement Method Commercial securement device 04/30/25 1320   Rationale for Continued Use Surgical procedure 04/30/25 1320   Number of days: 0      Time of void PreOp Time of Void Prior to Procedure: 0500 (04/30/25 0708)    PostOp      Diapered? No   Bladder Scan     PO    ice chips     Vitals    B/P: (!) 123/94  T: 98.1  F (36.7  C)    Temp src: Oral  P:  Pulse: 77 (04/30/25 1415)          R: 14  O2:  SpO2: 95 %    O2 Device: Nasal cannula (04/30/25 1346)    Oxygen Delivery: 3 LPM (04/30/25 1346)         Family/support present sister   Patient belongings     Patient transported on cart and air mat   DC meds/scripts (obs/outpt) Not applicable   Inpatient Pain Meds Released? Yes       Special needs/considerations Regular insulin drip on Algorithm 2. Last BG was 244 at 1333, re-check at 1440   Tasks needing completion None       Nichelle Fabian, RN  [place]

## 2025-04-30 NOTE — BRIEF OP NOTE
[facility]    Brief Operative Note    Pre-operative diagnosis: Renal mass, left [N28.89]  Post-operative diagnosis Same as pre-operative diagnosis    Procedure: ROBOT-ASSISTED LEFT RADICAL NEPHRECTOMY and left adrenalectomy, Left - Abdomen    Surgeon: Surgeons and Role:     * Clayton Toro MD - Primary     * Harshil Mancuso MD - Resident - Assisting     * Juan Wheatley MD  Anesthesia: General   Estimated Blood Loss: 20 mL from 4/30/2025  7:52 AM to 4/30/2025  1:15 PM      Drains: 16 Fr holloway  Specimens:   ID Type Source Tests Collected by Time Destination   1 : left kidney, ureter, and left adrenal gland Tissue Kidney with Ureter, Left SURGICAL PATHOLOGY EXAM Clayton Toro MD 4/30/2025 12:48 PM

## 2025-04-30 NOTE — ANESTHESIA PROCEDURE NOTES
Airway         Procedure Start/Stop Times: 4/30/2025 8:04 AM  Staff -        Anesthesiologist:  Neena Lopez MD       CRNA: Debi Ricketts APRN CRNA       Performed By: CRNAIndications and Patient Condition       Indications for airway management: desean-procedural       Induction type:intravenous       Mask difficulty assessment: 1 - vent by mask    Final Airway Details       Final airway type: endotracheal airway       Successful airway: ETT - single  Endotracheal Airway Details        ETT size (mm): 7.0       Cuffed: yes       Cuff volume (mL): 10       Successful intubation technique: direct laryngoscopy       DL Blade Type: MAC 3       Grade View of Cords: 1       Adjucts: stylet       Position: Right       Measured from: lips       Secured at (cm): 22       Bite block used: Oral Airway    Post intubation assessment        Placement verified by: capnometry, equal breath sounds and chest rise        Number of attempts at approach: 1       Number of other approaches attempted: 0       Secured with: tape       Ease of procedure: easy       Dentition: Intact and Unchanged    Medication(s) Administered   Medication Administration Time: 4/30/2025 8:04 AM

## 2025-04-30 NOTE — ANESTHESIA POSTPROCEDURE EVALUATION
Patient: Asmita Jerez    Procedure: Procedure(s):  ROBOT-ASSISTED LEFT RADICAL NEPHRECTOMY and left adrenalectomy       Anesthesia Type:  General    Note:  Disposition: Admission   Postop Pain Control: Uneventful            Sign Out: Well controlled pain   PONV: No   Neuro/Psych: Uneventful            Sign Out: Acceptable/Baseline neuro status   Airway/Respiratory: Uneventful            Sign Out: Acceptable/Baseline resp. status   CV/Hemodynamics: Uneventful            Sign Out: Acceptable CV status; No obvious hypovolemia; No obvious fluid overload   Other NRE: NONE   DID A NON-ROUTINE EVENT OCCUR? No           Last vitals:  Vitals Value Taken Time   /53 04/30/25 1615   Temp 36.6  C (97.8  F) 04/30/25 1530   Pulse 58 04/30/25 1545   Resp 14 04/30/25 1615   SpO2 92 % 04/30/25 1624   Vitals shown include unfiled device data.    Electronically Signed By: MARTHA RICO MD  April 30, 2025  4:38 PM

## 2025-04-30 NOTE — PLAN OF CARE
End of shift Summary: See flowsheet for VS and detail assessments.     Changes this Shift:     Neuro/CMS: A&Ox4. Calm and cooperative. Able to make needs known. Denies dizziness, headaches, N/V and N/T.      Pulmonary: On 1LPM of O2 via NC; saturating above 93%. On CAPNO. Educated pt the importance of IS; tolerated well. Denies chest pain and SOB.      Output: Esquivel in place that is patent and draining clear/yellow urine. Continent of bowel. LBM: 4/28 per pt report.      Activity: Up SBA. Pt has steady gait      Skin: Generalized dry skin. Five incisions on midline and L side of abdomen. Pt has abdominal binder on.      Pain: Rates pain 4/10; PRN PO oxycodone due at 1950.    Dressings/Drains: Incisions on midline and L side of abdomen SHADI.      IV: R PIV SL and patent. L PIV infusing LR 100ml/hr.     Additional info: Received report from PACU that pt received insulin drip due to elevated BG and was stopped when BG was 85. BG when arrived to unit was 109. Paged urology resident, Harshil Mancuso MD, if pt still needed insulin drip and replied that pt no longer needs insulin drip.      Plan: Continue with POC.

## 2025-04-30 NOTE — OP NOTE
PREOPERATIVE DIAGNOSIS: Left renal mass, left adrenal mass    POSTOPERATIVE DIAGNOSIS: Same  PROCEDURES PERFORMED:   1.  Left robot-assisted laparoscopic radical nephrectomy, with adrenalectomy  Modifier 22: This case was more challenging due to the amount of adhesions from prior surgery, causing the case to take 50% longer than the average similar case    STAFF SURGEON: Clayton Toro MD PhD present for entire case.   ASSISTANT: Harshil Mancuso MD  ANESTHESIA: GET  ESTIMATED BLOOD LOSS: 20 mL.   IV FLUIDS: see dictated anesthesia record  COMPLICATIONS: Superficial colonic thermal injury, mild.   SPECIMEN: Left kidney and adrenal gland  DRAINS/TUBES:   16 Fr Esquivel catheter  SIGNIFICANT FINDINGS: Left adrenal mass and renal mass, adrenal gland and kidney removed en bloc.    BRIEF OPERATIVE INDICATIONS: Asmita Jerez is a 69 year old female with left kidney mass that has been enlarging, has a central location.  She was counseled on options, and elects to proceed with stated procedure.  She also has a functional left adrenal mass and after consultation with endocrinology she elects to undergo adrenalectomy as well.  After discussion with patient about the risks, benefits, and alternatives of surgery she elected to proceed.     DESCRIPTION OF PROCEDURE:  Informed consent was obtained and the patient was brought to the operating room where general anesthesia was induced. she was given appropriate preoperative antibiotics within 1 hour of incision. she  was then positioned in the modified flank position with Left side up where all  pressure points were carefully padded and secured. she was then prepped and draped in the usual sterile fashion. We then performed a timeout, verifying the correct patient's site and procedure to be performed.    We began by inserting the Veress needle into the abdomen at the umbilicus. After confirmatory drop test, the abdomen was insufflated with low opening pressures. We then proceeded to  place robotic ports to triangulate the  Left kidney.  One of the robotic ports were 12 mm allowing the use of robotic stapler.  The 12mm assistant port was placed cephalad to the umbilicus between the robot ports.  Upon visualization inside the abdomen, there was no injury from the Veress needle, however there is significant amount of adhesion from the midline all the way to the left lateral abdomen.  We used sharp incisions using laparoscopic scissors to drop down some of the adhesions, allowing the placement of the other robotic ports.  We placed 4 robotic ports and the assistant port in total.  We docked the robot.  We began by first continuing the lysis of adhesion and then mobilizing the colon from that the lateral aspect of the abdominal wall and reflecting it medially.  Notably there was a superficial cautery burn to the descending colon, this was quickly noticed and stopped.  We carefully inspected the colon which appears mildly blanched superficially.  Due to the very mild extent of the injury, we elected to watch for now and we reached out to Dr. Wheatley from general surgery.  Gerota's fascia was then opened and the lower pole of the kidney mobilized. The gonadal vessels were identified and divided. The ureter was also identified. We then traced these back to the hilum. We were able to identify the left renal vein. The vein were carefully dissected and freed from surrounding strctures.  We can visualize the left adrenal vein and the gonadal vein joining into the renal vein which has a smaller posterior branch as well.  We were also able to visualize the renal artery as well posterior to the vein which was also dissected free from surrounding   We divided the renal artery with a 45 mm robot stapler.  We used another load for each branches of the vein including the suprarenal vein, sparing the gonadal vein. We then continued to march up towards the adrenal gland, and we were able to visualize the adrenal  nodule.  The plane between the spleen and the adrenal gland, and dissected the adrenal gland freed from the superior aspect.  We visualized the area of the colon that had the cautery injury again which appears well-perfused with improvement on the blanching, and this was assessed with Dr. Wheatley who agrees with conservative management.  At this point we began to mobilize the kidney posteriorly and free of all lateral attachments until it was free circumferentially and was placed into a 15mm endocatch bag.  The robot was undocked. The specimen was extracted through an extension of the midline port. The incisions were irrigated. The extraction incision was closed with 0 Vicryl in figure-of-eight interrupted. The skin incisions were all closed with 4-0 Monocryl. The wounds were dressed with surgical glue. The patient was then awakened and taken to the PACU in stable condition.       Plans:  Admit to urology postop  Trial void on postop day 1  Advance diet as tolerated, likely discharge postop day 1

## 2025-04-30 NOTE — PROGRESS NOTES
5 MS Admission Note    Reason for admission: L radical nephrectomy and L adrenalectomy  Primary team notified of pt arrival.  Admitted from: PACU  Via: Stretcher  Accompanied by: None  Belongings: Placed in closet; valuables sent home with family  Admission Required Doc Completed: No  Mobility Devices Utilized by Patient Provided (i.e. walker, wheelchair, etc.): NA  Teaching: Orientation to unit and call light- call light within reach, use of console, meal times, when to call for the RN, and enforced importance of safety.  IV Access: L and R PIV   Telemetry: No  Ht./Wt.: Not Completed  Code Status verified on armband: Yes  2 RN Skin Assessment Completed with: Goyo CONKLIN RN  Suction/Ambu bag/Flowmeter at bedside: Yes

## 2025-04-30 NOTE — PHARMACY-ADMISSION MEDICATION HISTORY
Pharmacist Admission Medication History    Admission medication history is complete. The information provided in this note is only as accurate as the sources available at the time of the update.    Information Source(s): Patient and CareEverywhere/SureScripts via phone    Pertinent Information: Stephania was a good historian and she came with a list of her medications.    Changes made to PTA medication list:  Added: None  Deleted: None  Changed: None    Allergies reviewed with patient and updates made in EHR: yes    Medication History Completed By: JONEL PETTY [facility] 4/30/2025 5:08 PM    PTA Med List   Medication Sig Last Dose/Taking    acetaminophen (TYLENOL) 500 MG tablet Take 500-1,000 mg by mouth every 6 hours as needed for mild pain. 4/29/2025 Evening    amLODIPine (NORVASC) 5 MG tablet Take 1 tablet (5 mg) by mouth daily. (Patient taking differently: Take 5 mg by mouth every evening.) 4/29/2025 Bedtime    aspirin 81 MG EC tablet Take 81 mg by mouth daily. 4/21/2025    atorvastatin (LIPITOR) 40 MG tablet Take 1 tablet (40 mg) by mouth daily. (Patient taking differently: Take 40 mg by mouth every evening.) 4/29/2025 Evening    glimepiride (AMARYL) 2 MG tablet TAKE ONE TABLET BY MOUTH EVERY MORNING BEFORE BREAKFAST 4/29/2025 Morning    hydrOXYzine HCl (ATARAX) 25 MG tablet TAKE 1 TABLET BY MOUTH EVERY 6 HOURS AS NEEDED FOR ITCHING. 4/26/2025    lisinopril (ZESTRIL) 40 MG tablet Take 1 tablet (40 mg) by mouth daily. (Patient taking differently: Take 40 mg by mouth every morning.) 4/29/2025 Morning    metoprolol succinate ER (TOPROL XL) 100 MG 24 hr tablet Take 1 tablet (100 mg) by mouth daily. (Patient taking differently: Take 100 mg by mouth every evening.) 4/29/2025 Bedtime

## 2025-05-01 VITALS
HEART RATE: 75 BPM | RESPIRATION RATE: 18 BRPM | SYSTOLIC BLOOD PRESSURE: 138 MMHG | TEMPERATURE: 97.6 F | OXYGEN SATURATION: 94 % | BODY MASS INDEX: 22.65 KG/M2 | WEIGHT: 127.87 LBS | DIASTOLIC BLOOD PRESSURE: 61 MMHG

## 2025-05-01 LAB
ANION GAP SERPL CALCULATED.3IONS-SCNC: 10 MMOL/L (ref 7–15)
BUN SERPL-MCNC: 20.3 MG/DL (ref 8–23)
CALCIUM SERPL-MCNC: 9.3 MG/DL (ref 8.8–10.4)
CHLORIDE SERPL-SCNC: 103 MMOL/L (ref 98–107)
CREAT SERPL-MCNC: 1.35 MG/DL (ref 0.51–0.95)
EGFRCR SERPLBLD CKD-EPI 2021: 42 ML/MIN/1.73M2
ERYTHROCYTE [DISTWIDTH] IN BLOOD BY AUTOMATED COUNT: 13.1 % (ref 10–15)
GLUCOSE BLDC GLUCOMTR-MCNC: 94 MG/DL (ref 70–99)
GLUCOSE SERPL-MCNC: 97 MG/DL (ref 70–99)
HCO3 SERPL-SCNC: 22 MMOL/L (ref 22–29)
HCT VFR BLD AUTO: 34.9 % (ref 35–47)
HGB BLD-MCNC: 11.6 G/DL (ref 11.7–15.7)
MCH RBC QN AUTO: 30.3 PG (ref 26.5–33)
MCHC RBC AUTO-ENTMCNC: 33.2 G/DL (ref 31.5–36.5)
MCV RBC AUTO: 91 FL (ref 78–100)
PLATELET # BLD AUTO: 217 10E3/UL (ref 150–450)
POTASSIUM SERPL-SCNC: 4.5 MMOL/L (ref 3.4–5.3)
RBC # BLD AUTO: 3.83 10E6/UL (ref 3.8–5.2)
SODIUM SERPL-SCNC: 135 MMOL/L (ref 135–145)
WBC # BLD AUTO: 12.4 10E3/UL (ref 4–11)

## 2025-05-01 PROCEDURE — 36415 COLL VENOUS BLD VENIPUNCTURE: CPT

## 2025-05-01 PROCEDURE — 85027 COMPLETE CBC AUTOMATED: CPT

## 2025-05-01 PROCEDURE — 250N000013 HC RX MED GY IP 250 OP 250 PS 637

## 2025-05-01 PROCEDURE — 80048 BASIC METABOLIC PNL TOTAL CA: CPT

## 2025-05-01 RX ORDER — METHOCARBAMOL 500 MG/1
500 TABLET, FILM COATED ORAL 4 TIMES DAILY PRN
Qty: 28 TABLET | Refills: 0 | Status: SHIPPED | OUTPATIENT
Start: 2025-05-01 | End: 2025-05-08

## 2025-05-01 RX ORDER — SENNA AND DOCUSATE SODIUM 50; 8.6 MG/1; MG/1
1 TABLET, FILM COATED ORAL AT BEDTIME
Qty: 15 TABLET | Refills: 0 | Status: SHIPPED | OUTPATIENT
Start: 2025-05-01 | End: 2025-05-16

## 2025-05-01 RX ORDER — OXYCODONE HYDROCHLORIDE 5 MG/1
5 TABLET ORAL EVERY 6 HOURS PRN
Qty: 12 TABLET | Refills: 0 | Status: SHIPPED | OUTPATIENT
Start: 2025-05-01 | End: 2025-05-04

## 2025-05-01 RX ADMIN — HYDROXYZINE HYDROCHLORIDE 25 MG: 25 TABLET, FILM COATED ORAL at 04:34

## 2025-05-01 RX ADMIN — OXYCODONE 5 MG: 5 TABLET ORAL at 10:30

## 2025-05-01 RX ADMIN — OXYCODONE 5 MG: 5 TABLET ORAL at 05:52

## 2025-05-01 RX ADMIN — ACETAMINOPHEN 975 MG: 325 TABLET ORAL at 05:51

## 2025-05-01 ASSESSMENT — ACTIVITIES OF DAILY LIVING (ADL)
ADLS_ACUITY_SCORE: 40
ADLS_ACUITY_SCORE: 53
ADLS_ACUITY_SCORE: 40
ADLS_ACUITY_SCORE: 53
ADLS_ACUITY_SCORE: 40
ADLS_ACUITY_SCORE: 53
ADLS_ACUITY_SCORE: 40
ADLS_ACUITY_SCORE: 40
ADLS_ACUITY_SCORE: 53
ADLS_ACUITY_SCORE: 40
ADLS_ACUITY_SCORE: 53

## 2025-05-01 NOTE — PROGRESS NOTES
Urology  Progress Note    NAEO  Pain well controlled  Tolerating CL diet  Not yet ambulating    Exam  BP (!) 149/67   Pulse 77   Temp 97.6  F (36.4  C) (Oral)   Resp 11   Wt 58 kg (127 lb 13.9 oz)   LMP 12/01/2009   SpO2 94%   BMI 22.65 kg/m    No acute distress  Unlabored breathing  Abdomen soft,  appropriately tender, nondistended. Incisions CDI  Esquivel with clear urine in tubing        Intake/Output Summary (Last 24 hours) at 5/1/2025 0714  Last data filed at 5/1/2025 0541  Gross per 24 hour   Intake 2380 ml   Output 2920 ml   Net -540 ml        Labs  Recent Labs   Lab Test 05/01/25  0601 04/17/25  1032 03/13/25  1445 02/04/25  0953 07/27/23  1351 06/23/23  0448 06/20/23  1010 05/22/23  1400   WBC 12.4* 10.9  --   --   --   --   --  9.8   HGB 11.6* 13.9  --   --   --  10.8*  --  13.2   CR  --  0.92 1.0 1.1*   < >  --    < > 0.82    < > = values in this interval not displayed.            Assessment/Plan  69 year old female with   1 Day Post-Op s/p left radical nephrectomy with adrenalectomy.     Neuro: Tylenol, prn oxy/dilaudid for pain control  CV: POOJA  Pulm: IS while awake  FEN/GI: ADAT, wean mIVF as tolerate. Bowel regimen.  Endo: Euglycemia  : TOV today, monitor for Cr, and UOP  Heme/ID: Hgb stable  Activity: Up ad lisa  PPx: SCDs.   Dispo: Likely discharge today    Will discuss with Dr. Toro.    Harshil Mancuso MD  Urology Resident     Contacting the Urology Team     Please use the following job codes to reach the Urology Team. Note that you must use an in house phone and that job codes cannot receive text pages.   On weekdays, dial [phone] (or star-star-star [phone] on the new [employer] telephones) then [phone] to reach the Adult Urology resident or PA on call  On weekdays, dial [phone] (or star-star-star [phone] on the new [place] telephones) then [phone] to reach the Pediatric Urology resident  On weeknights and weekends, dial [phone] (or star-star-star [phone] on the new [employer] telephones) then [phone] to reach the Urology resident on  call (for both Adult and Pediatrics)

## 2025-05-01 NOTE — PLAN OF CARE
Pt. discharged at 1400 via automobile to home.  Pt. was accompanied by friend, and left with personal belongings.  Prior to discharge, PIV was removed.  Pt. received complete discharge paperwork and new medications as filled by discharge pharmacy.  Pt. was given times of last dose for all discharge medications in writing on discharge medication sheets.  Discharge teaching included new medication, pain management, activity restrictions, dressing changes, and signs and symptoms of infection.  Pt. to follow up with Dr. Toro in clinic.  Pt. had no further questions at the time of discharge and no unmet needs were identified.

## 2025-05-01 NOTE — DISCHARGE SUMMARY
[facility]   Urology Discharge Summary    Date of Admission: 4/30/2025  Date of Discharge: 05/01/2025    Admission Diagnosis:  Renal mass, left [N28.89]  Left renal mass [N28.89]    Discharge Diagnosis:  1. Same as above    Consultations:  PHARMACY IP CONSULT     Procedures:  Procedure(s):  ROBOT-ASSISTED LEFT RADICAL NEPHRECTOMY and left adrenalectomy    Brief HPI:  Asmita Jerez is a 69 year old year old female with renal mass and adrenal nodeule. After discussion of the risks, benefits, and alternatives, they underwent the aforementioned procedure.     Hospital Course:  The patient tolerated the procedure well without complications and was transferred to the floor post-operatively.The patient's diet was slowly advanced as bowel function returned. Pain was controlled with oral pain medication and the patient was able to ambulate and void without difficulty. The patient received appropriate education post operatively. On 05/01/2025 the patient was discharged to home.     Discharge Physical Exam:  /61 (BP Location: Right arm)   Pulse 75   Temp 97.6  F (36.4  C) (Oral)   Resp 18   Wt 58 kg (127 lb 13.9 oz)   LMP 12/01/2009   SpO2 94%   BMI 22.65 kg/m      Gen: NAD  Lungs: non-labored breathing  CV: regular rhythm, normal rate   Abd: soft, non-distended, appropriately tender, incisions are c/d/i  Ext: no peripheral edema  Neuro: CNII-XII grossly intact    Meds:       Review of your medicines        START taking        Dose / Directions   methocarbamol 500 MG tablet  Commonly known as: ROBAXIN      Dose: 500 mg  Take 1 tablet (500 mg) by mouth 4 times daily as needed for muscle spasms.  Quantity: 28 tablet  Refills: 0     oxyCODONE 5 MG tablet  Commonly known as: ROXICODONE      Dose: 5 mg  Take 1 tablet (5 mg) by mouth every 6 hours as needed.  Quantity: 12 tablet  Refills: 0     SENNA-docusate sodium 8.6-50 MG tablet  Commonly known as: SENNA S      Dose: 1  tablet  Take 1 tablet by mouth at bedtime for 15 days.  Quantity: 15 tablet  Refills: 0            CONTINUE these medicines which may have CHANGED, or have new prescriptions. If we are uncertain of the size of tablets/capsules you have at home, strength may be listed as something that might have changed.        Dose / Directions   amLODIPine 5 MG tablet  Commonly known as: NORVASC  This may have changed: when to take this      Dose: 5 mg  Take 1 tablet (5 mg) by mouth daily.  Quantity: 90 tablet  Refills: 3     atorvastatin 40 MG tablet  Commonly known as: LIPITOR  This may have changed: when to take this  Used for: Type 2 diabetes mellitus without complication, without long-term current use of insulin (H)      Dose: 40 mg  Take 1 tablet (40 mg) by mouth daily.  Quantity: 90 tablet  Refills: 3     lisinopril 40 MG tablet  Commonly known as: ZESTRIL  This may have changed: when to take this  Used for: Essential hypertension with goal blood pressure less than 140/90      Dose: 40 mg  Take 1 tablet (40 mg) by mouth daily.  Quantity: 90 tablet  Refills: 3     metoprolol succinate  MG 24 hr tablet  Commonly known as: TOPROL XL  This may have changed: when to take this      Dose: 100 mg  Take 1 tablet (100 mg) by mouth daily.  Quantity: 90 tablet  Refills: 3     Vitamin D3 25 mcg (1000 units) tablet  Commonly known as: CHOLECALCIFEROL  This may have changed: when to take this  Used for: Primary hyperaldosteronism      Dose: 1 tablet  Take 1 tablet (25 mcg) by mouth daily  Quantity: 90 tablet  Refills: 3            CONTINUE these medicines which have NOT CHANGED        Dose / Directions   Accu-Chek Margaret Plus test strip  Used for: Type 2 diabetes mellitus without complication, without long-term current use of insulin (H)  Generic drug: blood glucose      Use to test blood sugar 3 times daily or as directed.  Quantity: 200 strip  Refills: 3     acetaminophen 500 MG tablet  Commonly known as: TYLENOL      Dose:  500-1,000 mg  Take 500-1,000 mg by mouth every 6 hours as needed for mild pain.  Refills: 0     aspirin 81 MG EC tablet      Dose: 81 mg  Take 81 mg by mouth daily.  Refills: 0     blood glucose monitoring lancets  Used for: Type 2 diabetes mellitus without complication, without long-term current use of insulin (H)      Use to test blood sugar 1 time daily or as directed. Accu chek, Ins will cover, match machine.  Quantity: 100 each  Refills: 2     glimepiride 2 MG tablet  Commonly known as: AMARYL  Used for: Type 2 diabetes mellitus without complication, without long-term current use of insulin (H)      Dose: 2 mg  TAKE ONE TABLET BY MOUTH EVERY MORNING BEFORE BREAKFAST  Quantity: 90 tablet  Refills: 1     hydrOXYzine HCl 25 MG tablet  Commonly known as: ATARAX  Used for: Status post right hip replacement      TAKE 1 TABLET BY MOUTH EVERY 6 HOURS AS NEEDED FOR ITCHING.  Quantity: 60 tablet  Refills: 0     Trulicity 3 MG/0.5ML Soaj  Used for: Type 2 diabetes mellitus without complication, without long-term current use of insulin (H)  Generic drug: Dulaglutide      Dose: 3 mg  Inject 3 mg subcutaneously once a week.  Quantity: 6 mL  Refills: 1               Where to get your medicines        These medications were sent to [facility] [place], [place] - [address]  [address] [address]      Phone: [phone]   methocarbamol 500 MG tablet  SENNA-docusate sodium 8.6-50 MG tablet       Some of these will need a paper prescription and others can be bought over the counter. Ask your nurse if you have questions.    Bring a paper prescription for each of these medications  oxyCODONE 5 MG tablet         Additional instructions:  After Care       Future Labs/Procedures    Activity     Comments:    Your activity upon discharge: see your discharge instruction    Diet     Comments:    Follow this diet upon discharge: see your discharge instruction            Follow Up:  - Follow-up  "with your urologist as scheduled   - Call or return sooner than your regularly scheduled visit if you develop any of the following: fever (greater than 101.5), uncontrolled pain, uncontrolled nausea or vomiting, as well as increased redness, swelling, or drainage from your wound.     Phone numbers:   - Monday through Friday 8am to 4:30pm: Call [phone] with questions or to schedule or confirm appointment.    - Nights or weekends: call the after hours emergency pager - [phone] and tell the  \"I would like to page the Urology Resident on call.\"  - For emergencies, call [phone]    The patient was discussed with the chief resident and staff on the day of discharge.     Harshil Mancuso MD  Urology Resident              "

## 2025-05-01 NOTE — PLAN OF CARE
Goal Outcome Evaluation:    Plan of Care Reviewed With: patient    Overall Patient Progress: no change    A/O x4    Standby assist    Pt wearing abdominal binder    1 L O2 via NC; CAPNO    Esquivel in place - to be removed today 5/1  Continent bowel  LBM 4/28    No acute issues overnight    Call light within reach, will continue to monitor and follow POC

## 2025-05-05 ENCOUNTER — TELEPHONE (OUTPATIENT)
Dept: UROLOGY | Facility: CLINIC | Age: 70
End: 2025-05-05
Payer: COMMERCIAL

## 2025-05-05 NOTE — TELEPHONE ENCOUNTER
Spoke to patient, she states, she is having a lot of pain, in her abd, but she has only been taking 1 tylenol in a 24 hr period.  I suggested she take 1 tylenol every 6 hours, also try 1/2 oxycodone before  before bed . She is not eating much. I suggested , a few snacks so we can regulate the blood sugars.  Blood sugars are running a little high.   Patient has a a few bms in the last few days,   Patient states abd wounds are dry and intact. Denies fever cassandra roberson LPN

## 2025-05-07 ENCOUNTER — TELEPHONE (OUTPATIENT)
Dept: UROLOGY | Facility: CLINIC | Age: 70
End: 2025-05-07
Payer: COMMERCIAL

## 2025-05-07 NOTE — TELEPHONE ENCOUNTER
Spoke to Stephania, her ain is finally under control, with tylenol, and occasional half a oxycodone,   Patient has not had a bm in 2 days,I suggested, she continue with the senna 2  times a day. Patient is eating in very little, I did suggest she start eating a little more solid food.  Patient did say her blood sugars are stable.   Patient is scheduled to see Dr Toro tomorrow for a follow up appt.  cassandra roberson LPN

## 2025-05-08 ENCOUNTER — OFFICE VISIT (OUTPATIENT)
Dept: UROLOGY | Facility: CLINIC | Age: 70
End: 2025-05-08
Payer: COMMERCIAL

## 2025-05-08 VITALS
BODY MASS INDEX: 22.5 KG/M2 | SYSTOLIC BLOOD PRESSURE: 122 MMHG | DIASTOLIC BLOOD PRESSURE: 64 MMHG | WEIGHT: 127 LBS | OXYGEN SATURATION: 98 % | HEART RATE: 86 BPM | TEMPERATURE: 97.3 F

## 2025-05-08 DIAGNOSIS — C64.2 RENAL CELL CARCINOMA, LEFT (H): Primary | ICD-10-CM

## 2025-05-08 DIAGNOSIS — N28.89 LEFT RENAL MASS: ICD-10-CM

## 2025-05-08 LAB
ANION GAP SERPL CALCULATED.3IONS-SCNC: 12 MMOL/L (ref 7–15)
BUN SERPL-MCNC: 28.1 MG/DL (ref 8–23)
CALCIUM SERPL-MCNC: 10.6 MG/DL (ref 8.8–10.4)
CHLORIDE SERPL-SCNC: 102 MMOL/L (ref 98–107)
CREAT SERPL-MCNC: 1.66 MG/DL (ref 0.51–0.95)
EGFRCR SERPLBLD CKD-EPI 2021: 33 ML/MIN/1.73M2
GLUCOSE SERPL-MCNC: 214 MG/DL (ref 70–99)
HCO3 SERPL-SCNC: 25 MMOL/L (ref 22–29)
PATH REPORT.COMMENTS IMP SPEC: ABNORMAL
PATH REPORT.COMMENTS IMP SPEC: YES
PATH REPORT.FINAL DX SPEC: ABNORMAL
PATH REPORT.GROSS SPEC: ABNORMAL
PATH REPORT.MICROSCOPIC SPEC OTHER STN: ABNORMAL
PATH REPORT.RELEVANT HX SPEC: ABNORMAL
PATHOLOGY SYNOPTIC REPORT: ABNORMAL
PHOTO IMAGE: ABNORMAL
POTASSIUM SERPL-SCNC: 5.4 MMOL/L (ref 3.4–5.3)
SODIUM SERPL-SCNC: 139 MMOL/L (ref 135–145)

## 2025-05-08 NOTE — PROGRESS NOTES
CC: renal cell carcinoma    HPI:70 yo female with history of left renal mass and presumed functional adrenal adenoma.  Had radical left nephrectomy and adrenalectomy on 4/30/25.  Doing well.    OBJECTIVE:  Abd soft  Incisions C/D/I    Pathology from 4/30/25 shows:  Left kidney: Radical nephrectomy  -Renal cell carcinoma (3.3 cm), clear-cell type, histological grade: 4 /4  - Focal rhabdoid differentiation is seen  -All resection margins, negative for carcinoma  -Adrenal gland: Adrenal cortical nodule proliferation, consistent with Adrenal cortical adenoma  pT1a neg mar    ASSESSMENT AND PLAN:   25-minutes were spent today reviewing the chart, interpreting results and counseling the patient regarding her kidney cancer.  We are pleased to see negative margins.  The patient will follow up with Ivana jenkins PA in 6 months with CMP, CT chest abd pel.

## 2025-05-08 NOTE — NURSING NOTE
"Initial /64   Pulse 86   Temp 97.3  F (36.3  C) (Tympanic)   Wt 57.6 kg (127 lb)   LMP 12/01/2009   SpO2 98%   BMI 22.50 kg/m   Estimated body mass index is 22.5 kg/m  as calculated from the following:    Height as of 4/16/25: 1.6 m (5' 3\").    Weight as of this encounter: 57.6 kg (127 lb). .  Skye Babcock MA    "

## 2025-05-10 NOTE — DISCHARGE SUMMARY
[facility]   Urology Discharge Summary    Date of Admission: 4/30/2025  Date of Discharge: 05/09/2025    Admission Diagnosis:  Renal mass, left [N28.89]  Left renal mass [N28.89]    Discharge Diagnosis:  1. Same as above  2. Renal cell carcinoma    See pathology results:  Left kidney: Radical nephrectomy  -Renal cell carcinoma (3.3 cm), clear-cell type, histological grade: 4 /4  - Focal rhabdoid differentiation is seen  -All resection margins, negative for carcinoma  -Adrenal gland: Adrenal cortical nodule proliferation, consistent with Adrenal cortical adenoma      Consultations:  PHARMACY IP CONSULT     Procedures:  Procedure(s):  ROBOT-ASSISTED LEFT RADICAL NEPHRECTOMY and left adrenalectomy    Brief HPI:  Asmita Jerez is a 69 year old year old female with renal mass and adrenal nodeule. After discussion of the risks, benefits, and alternatives, they underwent the aforementioned procedure.     Hospital Course:  The patient tolerated the procedure well without complications and was transferred to the floor post-operatively.The patient's diet was slowly advanced as bowel function returned. Pain was controlled with oral pain medication and the patient was able to ambulate and void without difficulty. The patient received appropriate education post operatively. On 05/09/2025 the patient was discharged to home.     Discharge Physical Exam:  /61 (BP Location: Right arm)   Pulse 75   Temp 97.6  F (36.4  C) (Oral)   Resp 18   Wt 58 kg (127 lb 13.9 oz)   LMP 12/01/2009   SpO2 94%   BMI 22.65 kg/m      Gen: NAD  Lungs: non-labored breathing  CV: regular rhythm, normal rate   Abd: soft, non-distended, appropriately tender, incisions are c/d/i  Ext: no peripheral edema  Neuro: CNII-XII grossly intact    Meds:       Review of your medicines        UNREVIEWED medicines. Ask your doctor about these medicines        Dose / Directions   methocarbamol 500 MG tablet  Commonly known  as: ROBAXIN  Used for: Left renal mass  Ask about: Should I take this medication?      Dose: 500 mg  Take 1 tablet (500 mg) by mouth 4 times daily as needed for muscle spasms.  Quantity: 28 tablet  Refills: 0     oxyCODONE 5 MG tablet  Commonly known as: ROXICODONE  Used for: Left renal mass  Ask about: Should I take this medication?      Dose: 5 mg  Take 1 tablet (5 mg) by mouth every 6 hours as needed.  Quantity: 12 tablet  Refills: 0            START taking        Dose / Directions   SENNA-docusate sodium 8.6-50 MG tablet  Commonly known as: SENNA S  Used for: Left renal mass      Dose: 1 tablet  Take 1 tablet by mouth at bedtime for 15 days.  Quantity: 15 tablet  Refills: 0            CONTINUE these medicines which may have CHANGED, or have new prescriptions. If we are uncertain of the size of tablets/capsules you have at home, strength may be listed as something that might have changed.        Dose / Directions   amLODIPine 5 MG tablet  Commonly known as: NORVASC  This may have changed: when to take this      Dose: 5 mg  Take 1 tablet (5 mg) by mouth daily.  Quantity: 90 tablet  Refills: 3     atorvastatin 40 MG tablet  Commonly known as: LIPITOR  This may have changed: when to take this  Used for: Type 2 diabetes mellitus without complication, without long-term current use of insulin (H)      Dose: 40 mg  Take 1 tablet (40 mg) by mouth daily.  Quantity: 90 tablet  Refills: 3     lisinopril 40 MG tablet  Commonly known as: ZESTRIL  This may have changed: when to take this  Used for: Essential hypertension with goal blood pressure less than 140/90      Dose: 40 mg  Take 1 tablet (40 mg) by mouth daily.  Quantity: 90 tablet  Refills: 3     metoprolol succinate  MG 24 hr tablet  Commonly known as: TOPROL XL  This may have changed: when to take this      Dose: 100 mg  Take 1 tablet (100 mg) by mouth daily.  Quantity: 90 tablet  Refills: 3     Vitamin D3 25 mcg (1000 units) tablet  Commonly known as:  CHOLECALCIFEROL  This may have changed: when to take this  Used for: Primary hyperaldosteronism      Dose: 1 tablet  Take 1 tablet (25 mcg) by mouth daily  Quantity: 90 tablet  Refills: 3            CONTINUE these medicines which have NOT CHANGED        Dose / Directions   Accu-Chek Margaret Plus test strip  Used for: Type 2 diabetes mellitus without complication, without long-term current use of insulin (H)  Generic drug: blood glucose      Use to test blood sugar 3 times daily or as directed.  Quantity: 200 strip  Refills: 3     acetaminophen 500 MG tablet  Commonly known as: TYLENOL      Dose: 500-1,000 mg  Take 500-1,000 mg by mouth every 6 hours as needed for mild pain.  Refills: 0     aspirin 81 MG EC tablet      Dose: 81 mg  Take 81 mg by mouth daily.  Refills: 0     blood glucose monitoring lancets  Used for: Type 2 diabetes mellitus without complication, without long-term current use of insulin (H)      Use to test blood sugar 1 time daily or as directed. Accu chek, Ins will cover, match machine.  Quantity: 100 each  Refills: 2     glimepiride 2 MG tablet  Commonly known as: AMARYL  Used for: Type 2 diabetes mellitus without complication, without long-term current use of insulin (H)      Dose: 2 mg  TAKE ONE TABLET BY MOUTH EVERY MORNING BEFORE BREAKFAST  Quantity: 90 tablet  Refills: 1     hydrOXYzine HCl 25 MG tablet  Commonly known as: ATARAX  Used for: Status post right hip replacement      TAKE 1 TABLET BY MOUTH EVERY 6 HOURS AS NEEDED FOR ITCHING.  Quantity: 60 tablet  Refills: 0     Trulicity 3 MG/0.5ML Soaj  Used for: Type 2 diabetes mellitus without complication, without long-term current use of insulin (H)  Generic drug: Dulaglutide      Dose: 3 mg  Inject 3 mg subcutaneously once a week.  Quantity: 6 mL  Refills: 1               Where to get your medicines        These medications were sent to [facility] [place], [place] - [address]  [address]  "[id number]      Phone: [phone]   methocarbamol 500 MG tablet  SENNA-docusate sodium 8.6-50 MG tablet       Some of these will need a paper prescription and others can be bought over the counter. Ask your nurse if you have questions.    Bring a paper prescription for each of these medications  oxyCODONE 5 MG tablet         Additional instructions:  After Care       Future Labs/Procedures    Activity     Comments:    Your activity upon discharge: see your discharge instruction    Diet     Comments:    Follow this diet upon discharge: see your discharge instruction            Follow Up:  - Follow-up with your urologist as scheduled   - Call or return sooner than your regularly scheduled visit if you develop any of the following: fever (greater than 101.5), uncontrolled pain, uncontrolled nausea or vomiting, as well as increased redness, swelling, or drainage from your wound.     Phone numbers:   - Monday through Friday 8am to 4:30pm: Call [phone] with questions or to schedule or confirm appointment.    - Nights or weekends: call the after hours emergency pager - [phone] and tell the  \"I would like to page the Urology Resident on call.\"  - For emergencies, call [phone]    The patient was discussed with the chief resident and staff on the day of discharge.     Harshil Mancuso MD  Urology Resident              "

## 2025-05-19 ENCOUNTER — OFFICE VISIT (OUTPATIENT)
Dept: FAMILY MEDICINE | Facility: CLINIC | Age: 70
End: 2025-05-19
Payer: COMMERCIAL

## 2025-05-19 VITALS
DIASTOLIC BLOOD PRESSURE: 78 MMHG | RESPIRATION RATE: 12 BRPM | TEMPERATURE: 97 F | HEIGHT: 63 IN | BODY MASS INDEX: 21.84 KG/M2 | OXYGEN SATURATION: 98 % | SYSTOLIC BLOOD PRESSURE: 139 MMHG | WEIGHT: 123.25 LBS | HEART RATE: 74 BPM

## 2025-05-19 DIAGNOSIS — Z72.0 TOBACCO USE: ICD-10-CM

## 2025-05-19 DIAGNOSIS — C64.2 RENAL CELL CARCINOMA, LEFT (H): Primary | ICD-10-CM

## 2025-05-19 DIAGNOSIS — I10 HYPERTENSION, GOAL BELOW 140/90: ICD-10-CM

## 2025-05-19 DIAGNOSIS — E11.9 TYPE 2 DIABETES MELLITUS WITHOUT COMPLICATION, WITHOUT LONG-TERM CURRENT USE OF INSULIN (H): ICD-10-CM

## 2025-05-19 DIAGNOSIS — Z90.5 SINGLE KIDNEY: ICD-10-CM

## 2025-05-19 PROBLEM — N28.89 LEFT RENAL MASS: Status: RESOLVED | Noted: 2025-04-30 | Resolved: 2025-05-19

## 2025-05-19 PROBLEM — E27.9 ADRENAL NODULE: Status: RESOLVED | Noted: 2024-11-04 | Resolved: 2025-05-19

## 2025-05-19 LAB
ANION GAP SERPL CALCULATED.3IONS-SCNC: 11 MMOL/L (ref 7–15)
BUN SERPL-MCNC: 23.8 MG/DL (ref 8–23)
CALCIUM SERPL-MCNC: 10.3 MG/DL (ref 8.8–10.4)
CHLORIDE SERPL-SCNC: 106 MMOL/L (ref 98–107)
CREAT SERPL-MCNC: 1.41 MG/DL (ref 0.51–0.95)
EGFRCR SERPLBLD CKD-EPI 2021: 40 ML/MIN/1.73M2
ERYTHROCYTE [DISTWIDTH] IN BLOOD BY AUTOMATED COUNT: 13 % (ref 10–15)
GLUCOSE SERPL-MCNC: 125 MG/DL (ref 70–99)
HCO3 SERPL-SCNC: 22 MMOL/L (ref 22–29)
HCT VFR BLD AUTO: 43.6 % (ref 35–47)
HGB BLD-MCNC: 14.1 G/DL (ref 11.7–15.7)
MCH RBC QN AUTO: 29.9 PG (ref 26.5–33)
MCHC RBC AUTO-ENTMCNC: 32.3 G/DL (ref 31.5–36.5)
MCV RBC AUTO: 92 FL (ref 78–100)
PLATELET # BLD AUTO: 206 10E3/UL (ref 150–450)
POTASSIUM SERPL-SCNC: 5.2 MMOL/L (ref 3.4–5.3)
RBC # BLD AUTO: 4.72 10E6/UL (ref 3.8–5.2)
SODIUM SERPL-SCNC: 139 MMOL/L (ref 135–145)
WBC # BLD AUTO: 9.9 10E3/UL (ref 4–11)

## 2025-05-19 PROCEDURE — 36415 COLL VENOUS BLD VENIPUNCTURE: CPT | Performed by: FAMILY MEDICINE

## 2025-05-19 PROCEDURE — 80048 BASIC METABOLIC PNL TOTAL CA: CPT | Performed by: FAMILY MEDICINE

## 2025-05-19 PROCEDURE — 85027 COMPLETE CBC AUTOMATED: CPT | Performed by: FAMILY MEDICINE

## 2025-05-19 ASSESSMENT — PAIN SCALES - GENERAL: PAINLEVEL_OUTOF10: NO PAIN (0)

## 2025-05-19 NOTE — PROGRESS NOTES
Assessment & Plan     Renal cell carcinoma, left (H)  S/p left nephrectomy  Appears that labs showed decreased renal function before discharge and on first post hospital follow up on 5/8.  Potassium was also elevated at that time.   Will recheck labs today.    Has follow up with urology in 6 months    - Basic metabolic panel  (Ca, Cl, CO2, Creat, Gluc, K, Na, BUN); Future  - CBC with platelets; Future  - Basic metabolic panel  (Ca, Cl, CO2, Creat, Gluc, K, Na, BUN)  - CBC with platelets    Type 2 diabetes mellitus without complication, without long-term current use of insulin (H)  Uncontrolled with HgbA1c of 8% 1 month ago.  Recheck in 2 months  Patient tells me her 7 day average is 105 currently  Poor oral intake    Hypertension, goal below 140/90  Fair control  If potassium continue to be high, we may  need to lower her lisinopril dose and re-assess.  May need to have her consult with nephrology    Tobacco use  Encourage cessation    Single kidney  As above  - Basic metabolic panel  (Ca, Cl, CO2, Creat, Gluc, K, Na, BUN); Future  - CBC with platelets; Future  - Basic metabolic panel  (Ca, Cl, CO2, Creat, Gluc, K, Na, BUN)  - CBC with platelets        MED REC REQUIRED  Post Medication Reconciliation Status: discharge medications reconciled, continue medications without change        Subjective   Stephania is a 69 year old, presenting for the following health issues:  Cancer        5/19/2025     1:09 PM   Additional Questions   Roomed by Emily RUDOLPH CMA   Accompanied by Self     HPI          Hospital Follow-up Visit:    Hospital/Nursing Home/IP Rehab Facility: [facility]  Most Recent Admission Date: 4/30/2025   Most Recent Admission Diagnosis: Renal mass, left - N28.89  Left renal mass - N28.89     Most Recent Discharge Date: 5/1/2025   Most Recent Discharge Diagnosis: Left renal mass - N28.89   Was the patient in the ICU or did the patient experience delirium during  "hospitalization?  No  Do you have any other stressors you would like to discuss with your provider? No    Problems taking medications regularly:  None  Medication changes since discharge: Senna 8.6-50mg at bedtime, methocarbamol 500mg qid prn, oxycodone 5mg q6h prn  Problems adhering to non-medication therapy:  None    Summary of hospitalization:  [facility] discharge summary reviewed  Diagnostic Tests/Treatments reviewed.  Follow up needed: Urology in 6 months  Other Healthcare Providers Involved in Patient s Care:         None  Update since discharge: She notes that she does have some nausea in the mornings and that food makes her stomach \"turn\" and that she is having sleep problems         Plan of care communicated with patient                   Review of Systems  Constitutional, HEENT, cardiovascular, pulmonary, gi and gu systems are negative, except as otherwise noted.      Objective    /78   Pulse 74   Temp 97  F (36.1  C) (Tympanic)   Resp 12   Ht 1.6 m (5' 3\")   Wt 55.9 kg (123 lb 4 oz)   LMP 12/01/2009   SpO2 98%   BMI 21.83 kg/m    Body mass index is 21.83 kg/m .  Physical Exam   GENERAL: alert and no distress  NECK: no adenopathy, no asymmetry, masses, or scars  RESP: lungs clear to auscultation - no rales, rhonchi or wheezes  CV: regular rate and rhythm, normal S1 S2, no S3 or S4, no murmur, click or rub, no peripheral edema  ABDOMEN: incisions c/d/I.  No drainage/erythema.  Very mildly tender to palpation at the proximal end of the mid-abdominal incision.   MS: no gross musculoskeletal defects noted, no edema        4/30/2025 Pathology:  Final Diagnosis   Left kidney: Radical nephrectomy  -Renal cell carcinoma (3.3 cm), clear-cell type, histological grade: 4 /4  - Focal rhabdoid differentiation is seen  -All resection margins, negative for carcinoma  -Adrenal gland: Adrenal cortical nodule proliferation, consistent with Adrenal cortical adenoma     Signed Electronically by: " Melissa Pitts MD

## 2025-05-20 ENCOUNTER — RESULTS FOLLOW-UP (OUTPATIENT)
Dept: FAMILY MEDICINE | Facility: CLINIC | Age: 70
End: 2025-05-20

## 2025-06-12 ENCOUNTER — TELEPHONE (OUTPATIENT)
Dept: UROLOGY | Facility: CLINIC | Age: 70
End: 2025-06-12
Payer: COMMERCIAL

## 2025-06-12 NOTE — TELEPHONE ENCOUNTER
Received forms from pt via faxed to be completed from [employer].    Forms put on Care teams desk to complete and approve.      Return to [facility] once completed.      Radha Coburn   Clinic Station [place]   [facility]  [phone]

## 2025-06-18 NOTE — PROGRESS NOTES
[facility]       Rapid Response Note        Stat team was called at 19:46  for Josemanuel Ma Jr. for fall. Chart reviewed briefly; patient was admitted for frequent falls, weakness under the hospitalist service with a pertinent history of frequent falls, failure to thrive, squamous cell chiquita cancer, COPD, DM-2, HTN, dementia, BPH.    Focused exam:  Vitals: BP: 160/81; T: 97.9 F; HR: 16; sPO2: 93 % on RA L/min  General: not in distress, awake, alert, makes jokes with staff  Chest: no sings of injury, no pain on palpation  CVS: regular  Extremities: sarcopenia  Neuro: spontaneously moves extremities, normal sensation, no complaints    Interventions performed:  No interventions, detailed physical exam including neuro exam      Lab tests/Imaging:  No need    Assessment and Plan:  1. Fall precaustions    Plan of care discussed with patient/family member, nursing.  Patient transferred to ICU: no  Sign out provided to: daytime hospitalist will be updated    Total critical care time spent - 30 minutes.    Ranjith Hutson MD  20:28  6/17/2025      Subjective     Asmita Jerez is a 64 year old female who presents to clinic today for the following health issues:    HPI   Chief Complaint   Patient presents with     Hypertension     Patient is not checking blood pressures at home, medication increase     Mass     Pea size lump in her stomach by an old scar- it's getting irritated by her waist band on her jeans- pea size, noticed it 2 weeks ago.     Anxiety       Hypertension Follow-up      Do you check your blood pressure regularly outside of the clinic? No     Are you following a low salt diet? Yes    Are your blood pressures ever more than 140 on the top number (systolic) OR more   than 90 on the bottom number (diastolic), for example 140/90? Yes      How many servings of fruits and vegetables do you eat daily?  2-3    On average, how many sweetened beverages do you drink each day (Examples: soda, juice, sweet tea, etc.  Do NOT count diet or artificially sweetened beverages)?   0    How many days per week do you exercise enough to make your heart beat faster? 3 or less    How many minutes a day do you exercise enough to make your heart beat faster? 20 - 29    How many days per week do you miss taking your medication? 0    Abnormal Mood Symptoms      Duration: Has been ongoing for about 3 months    Description:  Depression: YES  Anxiety: YES  Panic attacks: YES     Accompanying signs and symptoms: see PHQ-9 and RENETTA scores    History (similar episodes/previous evaluation): previous panic attacks    Precipitating or alleviating factors: family stress,     Therapies tried and outcome: Ativan (Lorezepam) -2013, not currently taking anything    Mass: had a emergency appendectomy couple years ago, since she has had 2 abscess that have had to have I/D. Now currently has pea sized lump on surgical site. For a couple months. No fevers.     HTN: doesn't check at home. PCP increased medication a month ago and wanted her to come in for recheck. That is part of today's  "visit.     Anxiety: increased over the past few months \"lot of life events.\" Anxiety usually comes on at night. Lays in bed and hears the \"heart beat in ear.\"        Patient Active Problem List   Diagnosis     Digital mucinous cyst     Eyelid lesion     Syringoma     CARDIOVASCULAR SCREENING; LDL GOAL LESS THAN 100     Advanced directives, counseling/discussion     Hypertension, goal below 140/90     Type 2 diabetes mellitus without complication (H)     Essential hypertension with goal blood pressure less than 140/90     Cervical high risk HPV (human papillomavirus) test positive     Tobacco use     Past Surgical History:   Procedure Laterality Date     APPENDECTOMY       COLONOSCOPY N/A 12/11/2015    Procedure: COLONOSCOPY;  Surgeon: Tino Pryor MD;  Location: [facility]     RELEASE TRIGGER FINGER  5/23/2014    Procedure: RELEASE TRIGGER FINGER;  Surgeon: Ag Hoffman MD;  Location: [facility]     SURGICAL HISTORY OF -   1970    appendectomy     SURGICAL HISTORY OF - 1970    pilonidal cyst and sinuses       Social History     Tobacco Use     Smoking status: Current Every Day Smoker     Packs/day: 1.00     Years: 40.00     Pack years: 40.00     Types: Cigarettes     Smokeless tobacco: Never Used     Tobacco comment: pt not interested in quit plan 4/22/09   Substance Use Topics     Alcohol use: No     Family History   Problem Relation Age of Onset     Unknown/Adopted Father          Current Outpatient Medications   Medication Sig Dispense Refill     ASPIRIN 81 MG OR TABS 1 TABLET DAILY       atorvastatin (LIPITOR) 40 MG tablet Take 1 tablet (40 mg) by mouth daily 90 tablet 3     CENTRUM SILVER OR TABS 1 TABLET DAILY       dulaglutide (TRULICITY) 0.75 MG/0.5ML pen Inject 0.75 mg Subcutaneous every 7 days 10 mL 1     lisinopril 20 MG PO tablet Take 2 tablets (40 mg) by mouth daily 180 tablet 0     LORazepam 0.5 MG PO tablet Take 1 tablet (0.5 mg) by mouth every 6 hours as needed for anxiety 20 tablet 0     " "metFORMIN (GLUCOPHAGE-XR) 500 MG 24 hr tablet Take 2 tablets (1,000 mg) by mouth 2 times daily (with meals) 360 tablet 1     blood glucose (ACCU-CHEK SIDDHARTHA PLUS) test strip Use to test blood sugar 2 times daily or as directed.  Ok to substitute alternative if insurance prefers. 200 strip 2     blood glucose monitoring (ACCU-CHEK MULTICLIX) lancets Use to test blood sugar 1 time daily or as directed. Accu chek, Ins will cover, match machine. 100 each 2     Allergies   Allergen Reactions     Amoxicillin Rash     BP Readings from Last 3 Encounters:   02/20/20 138/86   12/16/19 (!) 150/88   05/14/19 157/86    Wt Readings from Last 3 Encounters:   02/20/20 59.9 kg (132 lb)   12/16/19 58.1 kg (128 lb)   04/15/19 58.5 kg (129 lb)            REENTTA-7 SCORE 2/20/2020   Total Score 18     PHQ 2/20/2020   PHQ-9 Total Score 14   Q9: Thoughts of better off dead/self-harm past 2 weeks Not at all               Reviewed and updated as needed this visit by Provider  Tobacco  Allergies  Meds  Problems  Med Hx  Surg Hx  Fam Hx         Review of Systems   Constitutional: Negative.    HENT:        Ear pulsating when anxious/elevated blood pressure   Respiratory: Negative.    Cardiovascular: Negative.    Psychiatric/Behavioral: Positive for agitation. Negative for suicidal ideas. The patient is nervous/anxious.             Objective    /86 (BP Location: Right arm, Patient Position: Sitting)   Pulse 104   Temp 97.2  F (36.2  C) (Tympanic)   Resp 16   Ht 1.6 m (5' 3\")   Wt 59.9 kg (132 lb)   LMP 12/01/2009   SpO2 99%   BMI 23.38 kg/m    Body mass index is 23.38 kg/m .  Physical Exam  Vitals signs and nursing note reviewed.   Constitutional:       Appearance: Normal appearance. She is not ill-appearing or toxic-appearing.   Cardiovascular:      Rate and Rhythm: Normal rate and regular rhythm.      Pulses: Normal pulses.      Heart sounds: Normal heart sounds.   Pulmonary:      Effort: Pulmonary effort is normal.      " Breath sounds: Normal breath sounds.   Abdominal:      Tenderness: There is no abdominal tenderness.      Comments: Pea sized nodule on the left edge of abdominal scar, no erythema, warmth or swelling   Skin:     General: Skin is warm and dry.   Neurological:      General: No focal deficit present.      Mental Status: She is alert and oriented to person, place, and time.            Diagnostic Test Results:  Labs reviewed in Epic        Assessment & Plan     1. Essential hypertension with goal blood pressure less than 140/90  Uncontrolled despite recent increase. Increased lisinopril to 40mg. Discussed rechecking BP and returning if it remains >140/90. Continue to monitor salt intake.    - lisinopril 20 MG PO tablet; Take 2 tablets (40 mg) by mouth daily  Dispense: 180 tablet; Refill: 0    2. RENETTA (generalized anxiety disorder)  Increased recently. Short course of Lorazepam prescribed to help her get through suspected short term concerns. More anxiety at night. Discussed taking Ativan at night.  verified.    - LORazepam 0.5 MG PO tablet; Take 1 tablet (0.5 mg) by mouth every 6 hours as needed for anxiety  Dispense: 20 tablet; Refill: 0    3. Nodule of skin of abdomen  Likely scar tissue. She has had abscess in the past, but there are no s/s of infection. She would prefer to wait and watch for worsening symptoms. Just here b/c her sister wanted her to be seen. Recommended follow-up if size increases or it becomes painful.         See Patient Instructions    Return in about 1 month (around 3/20/2020) for Routine Visit with PCP.    [facility]       checked.

## 2025-06-19 NOTE — TELEPHONE ENCOUNTER
Forms completed and signed. Pt notified. Pt will come and  at clinic.    1 copy sent to scanning, 1 copy in drawer.     Radha Coburn   Clinic Station    [facility]  [phone]

## 2025-06-23 DIAGNOSIS — E11.9 TYPE 2 DIABETES MELLITUS WITHOUT COMPLICATION, WITHOUT LONG-TERM CURRENT USE OF INSULIN (H): ICD-10-CM

## 2025-06-23 RX ORDER — BLOOD SUGAR DIAGNOSTIC
STRIP MISCELLANEOUS
Qty: 300 STRIP | Refills: 3 | Status: SHIPPED | OUTPATIENT
Start: 2025-06-23

## 2025-06-23 NOTE — TELEPHONE ENCOUNTER
Last office visit: 5/19/2025 ; last virtual visit: Visit date not found with prescribing provider:     Future Office Visit:   Next 5 appointments (look out 90 days)      Jul 17, 2025 11:00 AM  (Arrive by 10:40 AM)  Provider Visit with Melissa Pitts MD  [facility] ([facility] ) [address] [facility] [address]  [phone]

## 2025-06-24 RX ORDER — DULAGLUTIDE 3 MG/.5ML
INJECTION, SOLUTION SUBCUTANEOUS
Qty: 6 ML | Refills: 1 | Status: SHIPPED | OUTPATIENT
Start: 2025-06-24

## 2025-06-27 NOTE — PROGRESS NOTES
Video Visit Details    Type of service:  Video Visit  Joined the call at 7/1/2025, 11:49:39 am.  Left the call at 7/1/2025, 12:06:24 pm.  Originating Location (pt. Location): Home  Distant Location (provider location): Off-site    Mode of Communication:  Video Conference via [employer]    =======================================================  Assessment     Asmita Jerez is a 69 year old female seen for evaluation of bilateral adrenal macroadenomas, associated with possible mild autonomous cortisol secretion.    1.  Bilateral adrenal macroadenomas  Prior evaluations have revealed an incompletely suppressed cortisol with 1 mg and 8 mg dexamethasone, normal or high normal salivary and urinary cortisol, a non-suppressed ACTH, lowish DHEAS.  Clinically, the patient has a history of PCOS, type 2 diabetes, dyslipidemia, HTN.  She is not obese or overweight.   Given the high suspicion for MACS, she underwent left adrenalectomy in April of this year, at the same time she had surgery for left renal carcinoma.  Postop, the patient denies feeling significantly different with the exception of the leg swelling, which is non endocrine in etiology.  She does not endorse signs or symptoms suggestive of adrenal insufficiency.  The antihypertensive regimen has not changed.  Recommendations:  Received a 1 mg dexamethasone suppression test at her convenience  Continue to monitor clinically.    2.  Hypercalcemia, longstaning and benign   ? PTH mediated. Associated with kidney stones, incidentally discovered on imaging.  The patient has no history of bone fractures or osteoporosis. 2022 DXA WNL.   Biochemical workup was suggestive of primary hyperparathyroidism but 24 hrs urinary calcium normal.   Recommendations:   In the absence of sun exposure, continue to take 2000 units vitamin D daily.  F/up PTH, BMP, phos, albumin and vitamin D    3. Type 2 diabetes   No known to be complicated by microvascular disease.  The glycemic control has  been improving since the patient resumes taking Trulicity.  The plan is to reevaluate the A1c in 3 months.  Follow-up urine microalbumin, GFR and lipid panel at that time.     Orders Placed This Encounter   Procedures    Adrenal corticotropin    Cortisol, Dexamethasone Suppression    Comprehensive metabolic panel    Lipid panel reflex to direct LDL Fasting    Hematocrit    Albumin Random Urine Quantitative with Creat Ratio    Hemoglobin A1c    Phosphorus    25 Hydroxyvitamin D2 and D3    Parathyroid Hormone Intact    Albumin level     =======================================================  The patient is seen in f/up.  Her last visit in our clinic was in 12/24.     History of Present Illness:  Asmita Jerez is a 69 year old female with a past medical history significant for type 2 diabetes, hypertension, polycystic ovaries and hypercholesterolemia, who was initially seen in our clinic by Dr. Moore, in September 2023.  She established care with me in January 2024. Last clinic visit was in 12/24.    1.  Adrenal incidentaloma  It was incidentally discovered on the abdominal CT from July 2023, which revealed 2 left adrenal nodules, measuring 1.7 and 1.2 cm and a 1.4 cm right adrenal nodule.  All nodules had low Hounsfield unit density on the noncontrast CT <10 so they were considered to represent adrenal adenomas. The nodules were stable compared with the prior imaging from 2018. The CT was also positive for kidney stones and a 2.6 cm left renal heterogeneous lesion suspicious for renal cell carcinoma.     She was diagnosed with hypertension in her 60s. Her blood pressure is currently medicated with amlodipine, lisinopril and metoprolol.  The dose of the antihypertensive medication has not changed since surgery.    She underwent left nephrectomy and left adrenalectomy on 4/30/25, with Dr. Toro. The pathology was positive for renal cell carcinoma, 3.3 cm and two adrenal cortical adenomas (1.8 and 2.6 cm).   Since  surgery, the GFR has been lower, around 40s. Prior GFR was 70s or above.     The patient denies feeling any different after surgery with the exception of bilateral lower extremity swelling, present with prolonged standing, and some discomfort at the incision site, mainly positional.  The leg edema is not present in the morning.  She drinks approximately 3 glasses of liquids daily. Weight is stable.    Pertinent labs reviewed:   12/11/2024 24-hour urinary cortisol 40.3, creatinine 1.089 urinary volume 1.1 L  9/9/24 ACTH 26, cortisol 33.6 at 8:34 am, DHEAS 32, 17 OH progesterone 140, androstenedione 1.013 (0.13-0.82)  9/6/2023 aldosterone 6.3, renin 0.2, normal plasma metanephrines  9/13/2023 1 mg dexamethasone suppression test: cortisol level of 3.8; dexamethasone 352.6 (140-295)    9/28/2020 24-hour urinary cortisol 45 (3.5-45) 24-hour urinary cortisone normal.  The urinary volume was 1.65 L, with urine creatinine 0.9 g  11/11, 11/13, 11/15/23 salivary cortisol normal twice and borderline normal on 11/13/2023 at 0.112 (less than 0.1)  12/6/2023 ACTH 46, cortisol 37.9, DHEA-S 38 () at 7:52 AM  12/7/2023 8 mg dexamethasone suppression test: ACTH undetectable, cortisol 4.9, DHEA-S 28, dexamethasone >3000 at 7:42 AM  1/29/2024 24-hour urinary cortisol was normal at 33.  Patient took 8 mg dexamethasone the night prior.  5/1/24 8 mg dex suppressed cortisol checked using LCMS was 4.7, with dexamethasone >3000  5/29/24  ACTH 59 at 7:58 am, DHEAS 23, FSH 58.9, , prolactin 10, IGF1 186      The pituitary MRI from 7/23/24 revealed a subtle pars intermedia cyst, measuring 2-3 mm.     2.  Type 2 diabetes, diagnosed around 2000, around age 45.  Average A1c over the years has been around 8%.   Most recent A1c was 8% on 4/17/25    Currently antidiabetic medications:  Trulicity, 3 mg weekly (started in February 2023). Quit taking Trulicity on 4/17/25 on her own (to see it it makes difference on her BG numbers) and  restarted it on 6/26/25.   Glimepiride 2 mg daily - started on 5/5/24     She was only treated with metformin until staring Trulicity in 2023. Metformin caused diarrhea and it was replaced with Jardiance, in November 2023. On labwork from 5/1 K was elevated at 6 and the patient also reported experiencing constipation and abdominal bloating from Jardiance. Subsequently, Jardiance was discontinued and replaced with 2 mg glimepiride.    I reviewed the provided BG numbers.  She denies experiencing hypoglycemic symptoms.    Diabetes complications:  Last eye exam: 8/24  Most recent urine microalbumin negative in November 2024. On 40 mg lisinopril daily.   No numbness or tingling sensation in her feet   Last lipid panel from 9/9/24: LDL 48, HDL 36, triglycerides 98.  On 40 mg atorvastatin daily.     3.  Hypercalcemia  The patient has had intermittent episodes of mild hypercalcemia or high normal calcium levels dating back to 2008.  The highest calcium level was 11, in September 2023.  On prior lab work, the albumin was normal, as well as alkaline phosphatase.   On lab work from 1/29/24, 24-hour urinary calcium was 0.12 g.  5/1/24 vitamin D 46, phos 4.6, calcium 10.7, GFR 69, 1,25 OH vitamin D normal at 51, PTH 31, PTHrp normal at 3.1.    Dairies: has cheese daily, one glass of milk daily.   Kidneys stones: revealed by imaging.    Menopause: in the 50s. She still has daily hot flashes and occasional night sweats. Overall, the hot flashes have been stable.   The DXA scan from December 2022 revealed normal bone mineral density at all sites. There is no prior h/o fractures.     Past Medical History   Past Medical History:   Diagnosis Date    Cervical high risk HPV (human papillomavirus) test positive 03/31/2018    Diabetes (H)     Hypertension     Peptic ulcer, unspecified site, unspecified as acute or chronic, without mention of hemorrhage, perforation, or obstruction     Polycystic ovaries     Pure hypercholesterolemia      Tobacco use disorder     Unspecified multiple gestation, unspecified as to episode of care     Multiple gestation   Diverticulosis  Kidney stones  Bilateral adrenal adenomas  Lung nodules  Collagenous colitis  Anxiety     Past Surgical History   Past Surgical History:   Procedure Laterality Date    APPENDECTOMY  1970    ARTHROPLASTY HIP Right 06/22/2023    Procedure: Total Hip Arthroplasty Right;  Surgeon: Albin Williamson MD;  Location: [facility]    BIOPSY BREAST Right     CHOLECYSTECTOMY  1990    COLONOSCOPY N/A 12/11/2015    Procedure: COLONOSCOPY;  Surgeon: Tino Pryor MD;  Location: [facility]    COLONOSCOPY N/A 08/23/2023    Procedure: Colonoscopy with biopsies;  Surgeon: Celio Larry DO;  Location: [facility]    CYSTECTOMY PILONIDAL  1970    DAVINCI NEPHRECTOMY Left 4/30/2025    Procedure: ROBOT-ASSISTED LEFT RADICAL NEPHRECTOMY and left adrenalectomy;  Surgeon: Clayton Toro MD;  [facility]    ENDOSCOPIC RELEASE CARPAL TUNNEL Right 11/01/2021    Procedure: Endoscopic carpal tunnel release;  Surgeon: Ac Lemus MD;  Location: [facility]    ENDOSCOPIC RELEASE CARPAL TUNNEL Left 11/15/2021    Procedure: ENDOSCOPIC Carpal Tunnel Release, LEFT, removal suture right wrist;  Surgeon: Ac Lemus MD;  Location: [facility]    RELEASE TRIGGER FINGER  05/23/2014    Procedure: RELEASE TRIGGER FINGER;  Surgeon: Ag Hoffman MD;  Location: [facility]       Current Medications    Current Outpatient Medications:     acetaminophen (TYLENOL) 500 MG tablet, Take 500-1,000 mg by mouth every 6 hours as needed for mild pain., Disp: , Rfl:     amLODIPine (NORVASC) 5 MG tablet, Take 1 tablet (5 mg) by mouth daily., Disp: 90 tablet, Rfl: 3    aspirin 81 MG EC tablet, Take 81 mg by mouth daily., Disp: , Rfl:     atorvastatin (LIPITOR) 40 MG tablet, Take 1 tablet (40 mg) by mouth daily., Disp: 90 tablet, Rfl: 3    blood glucose (ACCU-CHEK SIDDHARTHA PLUS) test strip, USE TO TEST BLOOD  SUGAR 3 TIMES DAILY OR AS DIRECTED., Disp: 300 strip, Rfl: 3    blood glucose monitoring (ACCU-CHEK MULTICLIX) lancets, Use to test blood sugar 1 time daily or as directed. Accu chek, Ins will cover, match machine., Disp: 100 each, Rfl: 2    glimepiride (AMARYL) 2 MG tablet, TAKE ONE TABLET BY MOUTH EVERY MORNING BEFORE BREAKFAST, Disp: 90 tablet, Rfl: 1    hydrOXYzine HCl (ATARAX) 25 MG tablet, TAKE 1 TABLET BY MOUTH EVERY 6 HOURS AS NEEDED FOR ITCHING., Disp: 60 tablet, Rfl: 0    lisinopril (ZESTRIL) 40 MG tablet, Take 1 tablet (40 mg) by mouth daily., Disp: 90 tablet, Rfl: 3    metoprolol succinate ER (TOPROL XL) 100 MG 24 hr tablet, Take 1 tablet (100 mg) by mouth daily., Disp: 90 tablet, Rfl: 3    TRULICITY 3 MG/0.5ML SOAJ, INJECT 3MG UNDER THE SKIN ONCE A WEEK, Disp: 6 mL, Rfl: 1    Vitamin D3 (CHOLECALCIFEROL) 25 mcg (1000 units) tablet, Take 1 tablet (25 mcg) by mouth daily, Disp: 90 tablet, Rfl: 3      Family History   Problem Relation Age of Onset    Unknown/Adopted Father     Dementia Father     Anesthesia Reaction No family hx of     Deep Vein Thrombosis (DVT) No family hx of    Sister - breast cancer; had thyroidectomy. Mom  of cancer. Sister HTN. No f/h of diabetes. One sister is overweight, no other family members.     Social History  . She has 1 child, a 38 yo son. Smokes since age 18, mostly 1 PPD, now 3/4 PPD. She denies drinking alcohol or using illicit drugs. Occupation: retired.                Vital Signs     Previous Weights:    Wt Readings from Last 10 Encounters:   25 55.8 kg (123 lb)   25 55.9 kg (123 lb 4 oz)   25 57.6 kg (127 lb)   25 58 kg (127 lb 13.9 oz)   25 59 kg (130 lb)   24 55.8 kg (123 lb)   24 55.9 kg (123 lb 4 oz)   24 54.4 kg (120 lb)   24 55.5 kg (122 lb 6 oz)   24 54.4 kg (120 lb)        LMP 2009     Estimated body mass index is 21.83 kg/m  as calculated from the following:    Height as of 25:  "1.6 m (5' 3\").    Weight as of 5/19/25: 55.9 kg (123 lb 4 oz).    BP Readings from Last 6 Encounters:   05/19/25 139/78   05/08/25 122/64   05/01/25 138/61   02/13/25 (!) 152/80   11/04/24 138/74   09/06/24 128/62       Physical Exam  General Appearance: alert, no distress noted   Eyes: grossly normal to inspection, conjunctivae and sclerae normal, no lid lag or stare   Respiratory: no audible wheeze, cough, or visible cyanosis.  No visible retractions or increased work of breathing.  Able to speak fully in complete sentences.  Neurological: Cranial nerves grossly intact, mentation intact and speech normal; no tremor of the hands   Skin: no lesions on exposed skin   Psychological: mentation appears normal, affect normal, judgement and insight intact, normal speech and appearance well-groomed     Lab Results  I reviewed prior lab results documented in Epic.  TSH   Date Value Ref Range Status   10/12/2022 1.64 0.30 - 4.20 uIU/mL Final   04/15/2019 1.95 0.40 - 4.00 mU/L Final   02/15/2017 1.36 0.40 - 4.00 mU/L Final   04/29/2015 1.35 0.40 - 4.00 mU/L Final   07/30/2013 1.50 0.4 - 5.0 mU/L Final   04/12/2011 1.07 0.4 - 5.0 mU/L Final     The longitudinal plan of care for the diagnosis(es)/condition(s) as documented were addressed during this visit. Due to the added complexity in care, I will continue to support Stephania in the subsequent management and with ongoing continuity of care.    45 minutes spent on the date of the encounter doing chart review, history and exam, documentation and further activities as noted above.    Outcome for 06/27/25 2:06 PM: Left Voicemail   Manda Dhillon MA  Outcome for 06/30/25 9:44 AM: Data obtained via phone and located below  Manda Dhillon MA    Patient is showing 3/5 MNCM met. A1c not in range and Current tobacco use   Manda Dhillon MA    6/30/25  4:55am: 105    6/29/25  4:49am: 117    6/28/29  10:39am: 120  5:18am: 156    6/27/25  11:05pm: 126  11:22am: 125  5:08am: " 121  1:50am: 126    6/26/25  7:11pm: 118  9:45am: 107  6:21am: 159    6/25/25  7:16am: 141    6/24/25  11:53am: 230  5:24am: 135    6/23/25  11:16am: 125  5:52am: 136  12:19am: 113    6/22/25  11:20am: 133  6:20am: 131    6/21/25  6:19am: 148    6/20/25  5:36am: 144    6/19/25  9:31pm: 278  12:34pm: 101  11:11am: 113  7:16am 124    6/18/25  7:02am: 113    6/17/25  6:22am: 137    6/16/25  7:48am: 156  4:13am: 134    6/15/25  1:08pm: 83  7:21am: 158    45 minutes spent on the date of the encounter doing chart review, history and exam, coordinating care and documenting in the EHR, as detailed above, exclusive of CGM time.  The longitudinal plan of care for the diagnosis(es)/condition(s) as documented were addressed during this visit. Due to the added complexity in care, I will continue to support Stephania in the subsequent management and with ongoing continuity of care.

## 2025-07-01 ENCOUNTER — VIRTUAL VISIT (OUTPATIENT)
Dept: ENDOCRINOLOGY | Facility: CLINIC | Age: 70
End: 2025-07-01
Payer: COMMERCIAL

## 2025-07-01 VITALS — HEIGHT: 63 IN | BODY MASS INDEX: 21.79 KG/M2 | WEIGHT: 123 LBS

## 2025-07-01 DIAGNOSIS — D35.01 BILATERAL ADRENAL ADENOMAS: Primary | ICD-10-CM

## 2025-07-01 DIAGNOSIS — E83.52 HYPERCALCEMIA: ICD-10-CM

## 2025-07-01 DIAGNOSIS — D35.02 BILATERAL ADRENAL ADENOMAS: Primary | ICD-10-CM

## 2025-07-01 DIAGNOSIS — E11.9 TYPE 2 DIABETES MELLITUS WITHOUT COMPLICATION, WITHOUT LONG-TERM CURRENT USE OF INSULIN (H): ICD-10-CM

## 2025-07-01 DIAGNOSIS — R79.89 HIGH SERUM CORTISOL: ICD-10-CM

## 2025-07-01 DIAGNOSIS — E89.6 H/O PARTIAL ADRENALECTOMY: ICD-10-CM

## 2025-07-01 PROCEDURE — 98007 SYNCH AUDIO-VIDEO EST HI 40: CPT | Performed by: INTERNAL MEDICINE

## 2025-07-01 PROCEDURE — 1126F AMNT PAIN NOTED NONE PRSNT: CPT | Mod: 95 | Performed by: INTERNAL MEDICINE

## 2025-07-01 PROCEDURE — G2211 COMPLEX E/M VISIT ADD ON: HCPCS | Mod: 95 | Performed by: INTERNAL MEDICINE

## 2025-07-01 RX ORDER — VITAMIN B COMPLEX
1 TABLET ORAL DAILY
Qty: 90 TABLET | Refills: 3 | Status: SHIPPED | OUTPATIENT
Start: 2025-07-01

## 2025-07-01 RX ORDER — DEXAMETHASONE 1 MG
TABLET ORAL
Qty: 1 TABLET | Refills: 0 | Status: SHIPPED | OUTPATIENT
Start: 2025-07-01

## 2025-07-01 ASSESSMENT — PAIN SCALES - GENERAL: PAINLEVEL_OUTOF10: NO PAIN (0)

## 2025-07-01 NOTE — LETTER
7/1/2025      Asmita Jerez  [address]  [place] [address]      Dear Colleague,    Thank you for referring your patient, Asmita Jerez, to the [facility]. Please see a copy of my visit note below.    Video Visit Details    Type of service:  Video Visit  Joined the call at 7/1/2025, 11:49:39 am.  Left the call at 7/1/2025, 12:06:24 pm.  Originating Location (pt. Location): Home  Distant Location (provider location): Off-site    Mode of Communication:  Video Conference via [employer]    =======================================================  Assessment     Asmita Jerez is a 69 year old female seen for evaluation of bilateral adrenal macroadenomas, associated with possible mild autonomous cortisol secretion.    1.  Bilateral adrenal macroadenomas  Prior evaluations have revealed an incompletely suppressed cortisol with 1 mg and 8 mg dexamethasone, normal or high normal salivary and urinary cortisol, a non-suppressed ACTH, lowish DHEAS.  Clinically, the patient has a history of PCOS, type 2 diabetes, dyslipidemia, HTN.  She is not obese or overweight.   Given the high suspicion for MACS, she underwent left adrenalectomy in April of this year, at the same time she had surgery for left renal carcinoma.  Postop, the patient denies feeling significantly different with the exception of the leg swelling, which is non endocrine in etiology.  She does not endorse signs or symptoms suggestive of adrenal insufficiency.  The antihypertensive regimen has not changed.  Recommendations:  Received a 1 mg dexamethasone suppression test at her convenience  Continue to monitor clinically.    2.  Hypercalcemia, longstaning and benign   ? PTH mediated. Associated with kidney stones, incidentally discovered on imaging.  The patient has no history of bone fractures or osteoporosis. 2022 DXA WNL.   Biochemical workup was suggestive of primary hyperparathyroidism but 24 hrs urinary calcium normal.  Telephone Encounter by Salome Whipple CMA at 10/01/18 01:07 PM     Author:  Salome Whipple CMA Service:  (none) Author Type:  Certified Medical Assistant     Filed:  10/01/18 01:10 PM Encounter Date:  10/1/2018 Status:  Signed     :  Salome Whipple CMA (Certified Medical Assistant)            I spoke to the patient and I informed him that Dr. Forde is out of the office until tomorrow. The patient would like a work in appointment at [facility] any or and time. Patient is having pain and redness in his LF5D but no drainage. The patient stated he is tired of playing phone tag with [facility] and he would like us to make an appointment any day and time and call and leave it on his voicemail. If they don't call back it means they will be at the appointment. If it doesn;t work for them then they will call back.[LA1.1M] August verbalized understanding of information given.[LA1.1T]      Revision History        User Key Date/Time User Provider Type Action    > LA1.1 10/01/18 01:10 PM Salome Whipple CMA Certified Medical Assistant Sign    M - Manual, T - Template               Recommendations:   In the absence of sun exposure, continue to take 2000 units vitamin D daily.  F/up PTH, BMP, phos, albumin and vitamin D    3. Type 2 diabetes   No known to be complicated by microvascular disease.  The glycemic control has been improving since the patient resumes taking Trulicity.  The plan is to reevaluate the A1c in 3 months.  Follow-up urine microalbumin, GFR and lipid panel at that time.     Orders Placed This Encounter   Procedures     Adrenal corticotropin     Cortisol, Dexamethasone Suppression     Comprehensive metabolic panel     Lipid panel reflex to direct LDL Fasting     Hematocrit     Albumin Random Urine Quantitative with Creat Ratio     Hemoglobin A1c     Phosphorus     25 Hydroxyvitamin D2 and D3     Parathyroid Hormone Intact     Albumin level     =======================================================  The patient is seen in f/up.  Her last visit in our clinic was in 12/24.     History of Present Illness:  Asmita Jerez is a 69 year old female with a past medical history significant for type 2 diabetes, hypertension, polycystic ovaries and hypercholesterolemia, who was initially seen in our clinic by Dr. Moore, in September 2023.  She established care with me in January 2024. Last clinic visit was in 12/24.    1.  Adrenal incidentaloma  It was incidentally discovered on the abdominal CT from July 2023, which revealed 2 left adrenal nodules, measuring 1.7 and 1.2 cm and a 1.4 cm right adrenal nodule.  All nodules had low Hounsfield unit density on the noncontrast CT <10 so they were considered to represent adrenal adenomas. The nodules were stable compared with the prior imaging from 2018. The CT was also positive for kidney stones and a 2.6 cm left renal heterogeneous lesion suspicious for renal cell carcinoma.     She was diagnosed with hypertension in her 60s. Her blood pressure is currently medicated with amlodipine, lisinopril and metoprolol.  The dose of the  antihypertensive medication has not changed since surgery.    She underwent left nephrectomy and left adrenalectomy on 4/30/25, with Dr. Toro. The pathology was positive for renal cell carcinoma, 3.3 cm and two adrenal cortical adenomas (1.8 and 2.6 cm).   Since surgery, the GFR has been lower, around 40s. Prior GFR was 70s or above.     The patient denies feeling any different after surgery with the exception of bilateral lower extremity swelling, present with prolonged standing, and some discomfort at the incision site, mainly positional.  The leg edema is not present in the morning.  She drinks approximately 3 glasses of liquids daily. Weight is stable.    Pertinent labs reviewed:   12/11/2024 24-hour urinary cortisol 40.3, creatinine 1.089 urinary volume 1.1 L  9/9/24 ACTH 26, cortisol 33.6 at 8:34 am, DHEAS 32, 17 OH progesterone 140, androstenedione 1.013 (0.13-0.82)  9/6/2023 aldosterone 6.3, renin 0.2, normal plasma metanephrines  9/13/2023 1 mg dexamethasone suppression test: cortisol level of 3.8; dexamethasone 352.6 (140-295)    9/28/2020 24-hour urinary cortisol 45 (3.5-45) 24-hour urinary cortisone normal.  The urinary volume was 1.65 L, with urine creatinine 0.9 g  11/11, 11/13, 11/15/23 salivary cortisol normal twice and borderline normal on 11/13/2023 at 0.112 (less than 0.1)  12/6/2023 ACTH 46, cortisol 37.9, DHEA-S 38 () at 7:52 AM  12/7/2023 8 mg dexamethasone suppression test: ACTH undetectable, cortisol 4.9, DHEA-S 28, dexamethasone >3000 at 7:42 AM  1/29/2024 24-hour urinary cortisol was normal at 33.  Patient took 8 mg dexamethasone the night prior.  5/1/24 8 mg dex suppressed cortisol checked using LCMS was 4.7, with dexamethasone >3000  5/29/24  ACTH 59 at 7:58 am, DHEAS 23, FSH 58.9, , prolactin 10, IGF1 186      The pituitary MRI from 7/23/24 revealed a subtle pars intermedia cyst, measuring 2-3 mm.     2.  Type 2 diabetes, diagnosed around 2000, around age 45.  Average A1c  over the years has been around 8%.   Most recent A1c was 8% on 4/17/25    Currently antidiabetic medications:  Trulicity, 3 mg weekly (started in February 2023). Quit taking Trulicity on 4/17/25 on her own (to see it it makes difference on her BG numbers) and restarted it on 6/26/25.   Glimepiride 2 mg daily - started on 5/5/24     She was only treated with metformin until staring Trulicity in 2023. Metformin caused diarrhea and it was replaced with Jardiance, in November 2023. On labwork from 5/1 K was elevated at 6 and the patient also reported experiencing constipation and abdominal bloating from Jardiance. Subsequently, Jardiance was discontinued and replaced with 2 mg glimepiride.    I reviewed the provided BG numbers.  She denies experiencing hypoglycemic symptoms.    Diabetes complications:  Last eye exam: 8/24  Most recent urine microalbumin negative in November 2024. On 40 mg lisinopril daily.   No numbness or tingling sensation in her feet   Last lipid panel from 9/9/24: LDL 48, HDL 36, triglycerides 98.  On 40 mg atorvastatin daily.     3.  Hypercalcemia  The patient has had intermittent episodes of mild hypercalcemia or high normal calcium levels dating back to 2008.  The highest calcium level was 11, in September 2023.  On prior lab work, the albumin was normal, as well as alkaline phosphatase.   On lab work from 1/29/24, 24-hour urinary calcium was 0.12 g.  5/1/24 vitamin D 46, phos 4.6, calcium 10.7, GFR 69, 1,25 OH vitamin D normal at 51, PTH 31, PTHrp normal at 3.1.    Dairies: has cheese daily, one glass of milk daily.   Kidneys stones: revealed by imaging.    Menopause: in the 50s. She still has daily hot flashes and occasional night sweats. Overall, the hot flashes have been stable.   The DXA scan from December 2022 revealed normal bone mineral density at all sites. There is no prior h/o fractures.     Past Medical History   Past Medical History:   Diagnosis Date     Cervical high risk HPV (human  papillomavirus) test positive 03/31/2018     Diabetes (H)      Hypertension      Peptic ulcer, unspecified site, unspecified as acute or chronic, without mention of hemorrhage, perforation, or obstruction      Polycystic ovaries      Pure hypercholesterolemia      Tobacco use disorder      Unspecified multiple gestation, unspecified as to episode of care     Multiple gestation   Diverticulosis  Kidney stones  Bilateral adrenal adenomas  Lung nodules  Collagenous colitis  Anxiety     Past Surgical History   Past Surgical History:   Procedure Laterality Date     APPENDECTOMY  1970     ARTHROPLASTY HIP Right 06/22/2023    Procedure: Total Hip Arthroplasty Right;  Surgeon: Albin Williamson MD;  Location: [facility]     BIOPSY BREAST Right      CHOLECYSTECTOMY  1990     COLONOSCOPY N/A 12/11/2015    Procedure: COLONOSCOPY;  Surgeon: Tino Pryor MD;  Location: [facility]     COLONOSCOPY N/A 08/23/2023    Procedure: Colonoscopy with biopsies;  Surgeon: Celio Larry DO;  Location: [facility]     CYSTECTOMY PILONIDAL  1970     DAVINCI NEPHRECTOMY Left 4/30/2025    Procedure: ROBOT-ASSISTED LEFT RADICAL NEPHRECTOMY and left adrenalectomy;  Surgeon: Clayton Toro MD;  [facility]     ENDOSCOPIC RELEASE CARPAL TUNNEL Right 11/01/2021    Procedure: Endoscopic carpal tunnel release;  Surgeon: Ac Lemus MD;  Location: [facility]     ENDOSCOPIC RELEASE CARPAL TUNNEL Left 11/15/2021    Procedure: ENDOSCOPIC Carpal Tunnel Release, LEFT, removal suture right wrist;  Surgeon: Ac Lemus MD;  Location: [facility]     RELEASE TRIGGER FINGER  05/23/2014    Procedure: RELEASE TRIGGER FINGER;  Surgeon: Ag Hoffman MD;  Location: [facility]       Current Medications    Current Outpatient Medications:      acetaminophen (TYLENOL) 500 MG tablet, Take 500-1,000 mg by mouth every 6 hours as needed for mild pain., Disp: , Rfl:      amLODIPine (NORVASC) 5 MG tablet, Take 1 tablet (5 mg) by mouth  daily., Disp: 90 tablet, Rfl: 3     aspirin 81 MG EC tablet, Take 81 mg by mouth daily., Disp: , Rfl:      atorvastatin (LIPITOR) 40 MG tablet, Take 1 tablet (40 mg) by mouth daily., Disp: 90 tablet, Rfl: 3     blood glucose (ACCU-CHEK SIDDHARTHA PLUS) test strip, USE TO TEST BLOOD SUGAR 3 TIMES DAILY OR AS DIRECTED., Disp: 300 strip, Rfl: 3     blood glucose monitoring (ACCU-CHEK MULTICLIX) lancets, Use to test blood sugar 1 time daily or as directed. Accu chek, Ins will cover, match machine., Disp: 100 each, Rfl: 2     glimepiride (AMARYL) 2 MG tablet, TAKE ONE TABLET BY MOUTH EVERY MORNING BEFORE BREAKFAST, Disp: 90 tablet, Rfl: 1     hydrOXYzine HCl (ATARAX) 25 MG tablet, TAKE 1 TABLET BY MOUTH EVERY 6 HOURS AS NEEDED FOR ITCHING., Disp: 60 tablet, Rfl: 0     lisinopril (ZESTRIL) 40 MG tablet, Take 1 tablet (40 mg) by mouth daily., Disp: 90 tablet, Rfl: 3     metoprolol succinate ER (TOPROL XL) 100 MG 24 hr tablet, Take 1 tablet (100 mg) by mouth daily., Disp: 90 tablet, Rfl: 3     TRULICITY 3 MG/0.5ML SOAJ, INJECT 3MG UNDER THE SKIN ONCE A WEEK, Disp: 6 mL, Rfl: 1     Vitamin D3 (CHOLECALCIFEROL) 25 mcg (1000 units) tablet, Take 1 tablet (25 mcg) by mouth daily, Disp: 90 tablet, Rfl: 3      Family History   Problem Relation Age of Onset     Unknown/Adopted Father      Dementia Father      Anesthesia Reaction No family hx of      Deep Vein Thrombosis (DVT) No family hx of    Sister - breast cancer; had thyroidectomy. Mom  of cancer. Sister HTN. No f/h of diabetes. One sister is overweight, no other family members.     Social History  . She has 1 child, a 40 yo son. Smokes since age 18, mostly 1 PPD, now 3/4 PPD. She denies drinking alcohol or using illicit drugs. Occupation: retired.                Vital Signs     Previous Weights:    Wt Readings from Last 10 Encounters:   25 55.8 kg (123 lb)   25 55.9 kg (123 lb 4 oz)   25 57.6 kg (127 lb)   25 58 kg (127 lb 13.9 oz)   25 59  "kg (130 lb)   12/31/24 55.8 kg (123 lb)   11/04/24 55.9 kg (123 lb 4 oz)   09/03/24 54.4 kg (120 lb)   08/06/24 55.5 kg (122 lb 6 oz)   05/14/24 54.4 kg (120 lb)        LMP 12/01/2009     Estimated body mass index is 21.83 kg/m  as calculated from the following:    Height as of 5/19/25: 1.6 m (5' 3\").    Weight as of 5/19/25: 55.9 kg (123 lb 4 oz).    BP Readings from Last 6 Encounters:   05/19/25 139/78   05/08/25 122/64   05/01/25 138/61   02/13/25 (!) 152/80   11/04/24 138/74   09/06/24 128/62       Physical Exam  General Appearance: alert, no distress noted   Eyes: grossly normal to inspection, conjunctivae and sclerae normal, no lid lag or stare   Respiratory: no audible wheeze, cough, or visible cyanosis.  No visible retractions or increased work of breathing.  Able to speak fully in complete sentences.  Neurological: Cranial nerves grossly intact, mentation intact and speech normal; no tremor of the hands   Skin: no lesions on exposed skin   Psychological: mentation appears normal, affect normal, judgement and insight intact, normal speech and appearance well-groomed     Lab Results  I reviewed prior lab results documented in Epic.  TSH   Date Value Ref Range Status   10/12/2022 1.64 0.30 - 4.20 uIU/mL Final   04/15/2019 1.95 0.40 - 4.00 mU/L Final   02/15/2017 1.36 0.40 - 4.00 mU/L Final   04/29/2015 1.35 0.40 - 4.00 mU/L Final   07/30/2013 1.50 0.4 - 5.0 mU/L Final   04/12/2011 1.07 0.4 - 5.0 mU/L Final     The longitudinal plan of care for the diagnosis(es)/condition(s) as documented were addressed during this visit. Due to the added complexity in care, I will continue to support Stephania in the subsequent management and with ongoing continuity of care.    45 minutes spent on the date of the encounter doing chart review, history and exam, documentation and further activities as noted above.    Outcome for 06/27/25 2:06 PM: Left Voicemail   Manda Dhillon MA  Outcome for 06/30/25 9:44 AM: Data obtained via " phone and located below  Manda Dhillon MA    Patient is showing 3/5 MNCM met. A1c not in range and Current tobacco use   Manda Dhillon MA    6/30/25  4:55am: 105    6/29/25  4:49am: 117    6/28/29  10:39am: 120  5:18am: 156    6/27/25  11:05pm: 126  11:22am: 125  5:08am: 121  1:50am: 126    6/26/25  7:11pm: 118  9:45am: 107  6:21am: 159    6/25/25  7:16am: 141    6/24/25  11:53am: 230  5:24am: 135    6/23/25  11:16am: 125  5:52am: 136  12:19am: 113    6/22/25  11:20am: 133  6:20am: 131    6/21/25  6:19am: 148    6/20/25  5:36am: 144    6/19/25  9:31pm: 278  12:34pm: 101  11:11am: 113  7:16am 124    6/18/25  7:02am: 113    6/17/25  6:22am: 137    6/16/25  7:48am: 156  4:13am: 134    6/15/25  1:08pm: 83  7:21am: 158    45 minutes spent on the date of the encounter doing chart review, history and exam, coordinating care and documenting in the EHR, as detailed above, exclusive of CGM time.  The longitudinal plan of care for the diagnosis(es)/condition(s) as documented were addressed during this visit. Due to the added complexity in care, I will continue to support Stephania in the subsequent management and with ongoing continuity of care.    Again, thank you for allowing me to participate in the care of your patient.        Sincerely,        Nani Avalos MD    Electronically signed

## 2025-07-01 NOTE — NURSING NOTE
Current patient location: [address] [place] [id number]    Is the patient currently in the state of [place]? YES    Visit mode: VIDEO    If the visit is dropped, the patient can be reconnected by:VIDEO VISIT: Text to cell phone:   Telephone Information:   Mobile [phone]       Will anyone else be joining the visit? NO  (If patient encounters technical issues they should call [phone])    Are changes needed to the allergy or medication list? No    Are refills needed on medications prescribed by this physician? NO    Rooming Documentation:  Not applicable    Reason for visit: RECHECK    Nadege YEPEZF

## 2025-07-17 ENCOUNTER — OFFICE VISIT (OUTPATIENT)
Dept: FAMILY MEDICINE | Facility: CLINIC | Age: 70
End: 2025-07-17
Payer: COMMERCIAL

## 2025-07-17 VITALS
SYSTOLIC BLOOD PRESSURE: 118 MMHG | WEIGHT: 123.38 LBS | OXYGEN SATURATION: 98 % | RESPIRATION RATE: 14 BRPM | BODY MASS INDEX: 21.86 KG/M2 | HEIGHT: 63 IN | DIASTOLIC BLOOD PRESSURE: 80 MMHG | HEART RATE: 73 BPM | TEMPERATURE: 98.4 F

## 2025-07-17 DIAGNOSIS — R94.4 DECREASED GFR: ICD-10-CM

## 2025-07-17 DIAGNOSIS — E11.9 TYPE 2 DIABETES MELLITUS WITHOUT COMPLICATION, WITHOUT LONG-TERM CURRENT USE OF INSULIN (H): Primary | ICD-10-CM

## 2025-07-17 DIAGNOSIS — Z90.5 SINGLE KIDNEY: ICD-10-CM

## 2025-07-17 DIAGNOSIS — Z72.0 TOBACCO USE: ICD-10-CM

## 2025-07-17 DIAGNOSIS — C64.2 RENAL CELL CARCINOMA, LEFT (H): ICD-10-CM

## 2025-07-17 DIAGNOSIS — Z96.641 STATUS POST RIGHT HIP REPLACEMENT: ICD-10-CM

## 2025-07-17 DIAGNOSIS — I10 ESSENTIAL HYPERTENSION WITH GOAL BLOOD PRESSURE LESS THAN 140/90: ICD-10-CM

## 2025-07-17 LAB
ANION GAP SERPL CALCULATED.3IONS-SCNC: 13 MMOL/L (ref 7–15)
BUN SERPL-MCNC: 27.3 MG/DL (ref 8–23)
CALCIUM SERPL-MCNC: 10.4 MG/DL (ref 8.8–10.4)
CHLORIDE SERPL-SCNC: 103 MMOL/L (ref 98–107)
CHOLEST SERPL-MCNC: 129 MG/DL
CREAT SERPL-MCNC: 1.68 MG/DL (ref 0.51–0.95)
EGFRCR SERPLBLD CKD-EPI 2021: 32 ML/MIN/1.73M2
EST. AVERAGE GLUCOSE BLD GHB EST-MCNC: 166 MG/DL
GLUCOSE SERPL-MCNC: 156 MG/DL (ref 70–99)
HBA1C MFR BLD: 7.4 % (ref 0–5.6)
HCO3 SERPL-SCNC: 22 MMOL/L (ref 22–29)
HDLC SERPL-MCNC: 35 MG/DL
HOLD SPECIMEN: NORMAL
HOLD SPECIMEN: NORMAL
LDLC SERPL CALC-MCNC: 65 MG/DL
NONHDLC SERPL-MCNC: 94 MG/DL
POTASSIUM SERPL-SCNC: 5.7 MMOL/L (ref 3.4–5.3)
SODIUM SERPL-SCNC: 138 MMOL/L (ref 135–145)
TRIGL SERPL-MCNC: 143 MG/DL

## 2025-07-17 RX ORDER — HYDROXYZINE HYDROCHLORIDE 25 MG/1
25 TABLET, FILM COATED ORAL
Qty: 60 TABLET | Refills: 3 | Status: SHIPPED | OUTPATIENT
Start: 2025-07-17

## 2025-07-17 ASSESSMENT — PAIN SCALES - GENERAL: PAINLEVEL_OUTOF10: NO PAIN (0)

## 2025-07-17 NOTE — PROGRESS NOTES
Assessment & Plan     Type 2 diabetes mellitus without complication, without long-term current use of insulin (H)  Fair control  Wants to keep on the same dose for now.   - FOOT EXAM  - Hemoglobin A1c; Future  - Extra Green Top Tube (LAB USE ONLY)  - Extra SST Tube (LAB USE ONLY)  - Hemoglobin A1c  - Lipid panel reflex to direct LDL Fasting; Future  - Lipid panel reflex to direct LDL Fasting    Status post right hip replacement  Still getting some right groin pain every few weeks.  She has pain deep in the groin and into the pelvis.     - hydrOXYzine HCl (ATARAX) 25 MG tablet; Take 1 tablet (25 mg) by mouth nightly as needed for anxiety.  - Extra Green Top Tube (LAB USE ONLY)  - Extra SST Tube (LAB USE ONLY)    Decreased GFR  Recheck.  Does have single kidney after left nephrectomy.   - Basic metabolic panel  (Ca, Cl, CO2, Creat, Gluc, K, Na, BUN); Future  - Basic metabolic panel  (Ca, Cl, CO2, Creat, Gluc, K, Na, BUN)    Single kidney       Renal cell carcinoma, left (H)  S/p left nephrectomy 4/30/2025    Tobacco use  Encourage cessation    Essential hypertension with goal blood pressure less than 140/90  Well controlled        Subjective   Stephania is a 69 year old, presenting for the following health issues:  Diabetes        7/17/2025    10:31 AM   Additional Questions   Roomed by Emily chong CMA   Accompanied by self     History of Present Illness       Reason for visit:  Medication review    She eats 0-1 servings of fruits and vegetables daily.She consumes 0 sweetened beverage(s) daily.She exercises with enough effort to increase her heart rate 10 to 19 minutes per day.  She exercises with enough effort to increase her heart rate 3 or less days per week.   She is taking medications regularly.          Diabetes Follow-up  Glimepiride 2mg every day, trulicity 3mg SubQ weekly  How often are you checking your blood sugar? One time daily  What time of day are you checking your blood sugars (select all that apply)?   "Before meals  Have you had any blood sugars above 200?  No  Have you had any blood sugars below 70?  No  What symptoms do you notice when your blood sugar is low?  None  What concerns do you have today about your diabetes? None   Do you have any of these symptoms? (Select all that apply)  No numbness or tingling in feet.  No redness, sores or blisters on feet.  No complaints of excessive thirst.  No reports of blurry vision.  No significant changes to weight.            BP Readings from Last 2 Encounters:   07/17/25 118/80   05/19/25 139/78     Hemoglobin A1C (%)   Date Value   04/17/2025 8.0 (H)   08/06/2024 7.4 (H)   03/01/2021 6.9 (H)   08/06/2020 7.1 (H)     LDL Cholesterol Calculated (mg/dL)   Date Value   09/09/2024 48   09/13/2023 56   08/06/2020 57   12/16/2019 31           Review of Systems  Constitutional, HEENT, cardiovascular, pulmonary, gi and gu systems are negative, except as otherwise noted.      Objective    /80   Pulse 73   Temp 98.4  F (36.9  C) (Tympanic)   Resp 14   Ht 1.6 m (5' 3\")   Wt 56 kg (123 lb 6 oz)   LMP 12/01/2009   SpO2 98%   BMI 21.85 kg/m    Body mass index is 21.85 kg/m .  Physical Exam   GENERAL: alert and no distress  NECK: no adenopathy, no asymmetry, masses, or scars  RESP: lungs clear to auscultation - no rales, rhonchi or wheezes  CV: regular rate and rhythm, normal S1 S2, no S3 or S4, no murmur, click or rub, no peripheral edema  ABDOMEN: soft, nontender, no hepatosplenomegaly, no masses and bowel sounds normal. No palpable hernia  MS: full range of motion of the hip.  No pain with flexion, extension, internal/external rotation    Diabetic Foot Screen:  Any complaints of increased pain or numbness ? No  Is there a foot ulcer now or a history of foot ulcer? No  Does the foot have an abnormal shape? No  Are the nails thick, too long or ingrown? No  Are there any redness or open areas? No         Sensation Testing done at all points on the diagram with " monofilament     Right Foot: Sensation Normal at all points  Left Foot: Sensation Normal at all points     Risk Category: 0- No loss of protective sensation  Performed by Melissa Pitts MD      Results for orders placed or performed in visit on 07/17/25   Hemoglobin A1c     Status: Abnormal   Result Value Ref Range    Estimated Average Glucose 166 (H) <117 mg/dL    Hemoglobin A1C 7.4 (H) 0.0 - 5.6 %             Signed Electronically by: Melissa Pitts MD

## 2025-07-22 DIAGNOSIS — I10 ESSENTIAL HYPERTENSION, MALIGNANT: Primary | ICD-10-CM

## 2025-07-22 DIAGNOSIS — E11.9 DIABETES MELLITUS (H): ICD-10-CM

## 2025-07-22 DIAGNOSIS — E87.5 HYPERPOTASSEMIA: ICD-10-CM

## 2025-07-31 ENCOUNTER — LAB (OUTPATIENT)
Dept: LAB | Facility: CLINIC | Age: 70
End: 2025-07-31
Payer: COMMERCIAL

## 2025-07-31 DIAGNOSIS — I10 ESSENTIAL HYPERTENSION, MALIGNANT: ICD-10-CM

## 2025-07-31 DIAGNOSIS — E87.5 HYPERPOTASSEMIA: ICD-10-CM

## 2025-07-31 DIAGNOSIS — E11.9 DIABETES MELLITUS (H): ICD-10-CM

## 2025-07-31 LAB
ALBUMIN MFR UR ELPH: 10.7 MG/DL
ALBUMIN SERPL BCG-MCNC: 4.4 G/DL (ref 3.5–5.2)
ALBUMIN UR-MCNC: NEGATIVE MG/DL
ANION GAP SERPL CALCULATED.3IONS-SCNC: 10 MMOL/L (ref 7–15)
APPEARANCE UR: CLEAR
BACTERIA #/AREA URNS HPF: ABNORMAL /HPF
BASOPHILS # BLD AUTO: 0 10E3/UL (ref 0–0.2)
BASOPHILS NFR BLD AUTO: 0 %
BILIRUB UR QL STRIP: NEGATIVE
BUN SERPL-MCNC: 26.5 MG/DL (ref 8–23)
CALCIUM SERPL-MCNC: 10.1 MG/DL (ref 8.8–10.4)
CHLORIDE SERPL-SCNC: 105 MMOL/L (ref 98–107)
COLOR UR AUTO: YELLOW
CREAT SERPL-MCNC: 1.59 MG/DL (ref 0.51–0.95)
CREAT UR-MCNC: 80.6 MG/DL
EGFRCR SERPLBLD CKD-EPI 2021: 35 ML/MIN/1.73M2
EOSINOPHIL # BLD AUTO: 0.8 10E3/UL (ref 0–0.7)
EOSINOPHIL NFR BLD AUTO: 7 %
ERYTHROCYTE [DISTWIDTH] IN BLOOD BY AUTOMATED COUNT: 13.4 % (ref 10–15)
GLUCOSE SERPL-MCNC: 141 MG/DL (ref 70–99)
GLUCOSE UR STRIP-MCNC: NEGATIVE MG/DL
HCO3 SERPL-SCNC: 22 MMOL/L (ref 22–29)
HCT VFR BLD AUTO: 43.3 % (ref 35–47)
HGB BLD-MCNC: 14.1 G/DL (ref 11.7–15.7)
HGB UR QL STRIP: ABNORMAL
IMM GRANULOCYTES # BLD: 0 10E3/UL
IMM GRANULOCYTES NFR BLD: 0 %
KETONES UR STRIP-MCNC: NEGATIVE MG/DL
LEUKOCYTE ESTERASE UR QL STRIP: NEGATIVE
LYMPHOCYTES # BLD AUTO: 2.6 10E3/UL (ref 0.8–5.3)
LYMPHOCYTES NFR BLD AUTO: 22 %
MCH RBC QN AUTO: 30 PG (ref 26.5–33)
MCHC RBC AUTO-ENTMCNC: 32.6 G/DL (ref 31.5–36.5)
MCV RBC AUTO: 92 FL (ref 78–100)
MONOCYTES # BLD AUTO: 0.8 10E3/UL (ref 0–1.3)
MONOCYTES NFR BLD AUTO: 7 %
NEUTROPHILS # BLD AUTO: 7.6 10E3/UL (ref 1.6–8.3)
NEUTROPHILS NFR BLD AUTO: 64 %
NITRATE UR QL: NEGATIVE
PH UR STRIP: 5.5 [PH] (ref 5–7)
PHOSPHATE SERPL-MCNC: 3.6 MG/DL (ref 2.5–4.5)
PLATELET # BLD AUTO: 265 10E3/UL (ref 150–450)
POTASSIUM SERPL-SCNC: 5.4 MMOL/L (ref 3.4–5.3)
PROT/CREAT 24H UR: 0.13 MG/MG CR (ref 0–0.2)
PTH-INTACT SERPL-MCNC: 64 PG/ML (ref 15–65)
RBC # BLD AUTO: 4.7 10E6/UL (ref 3.8–5.2)
RBC #/AREA URNS AUTO: ABNORMAL /HPF
SODIUM SERPL-SCNC: 137 MMOL/L (ref 135–145)
SP GR UR STRIP: 1.01 (ref 1–1.03)
SQUAMOUS #/AREA URNS AUTO: ABNORMAL /LPF
UROBILINOGEN UR STRIP-ACNC: 0.2 E.U./DL
WBC # BLD AUTO: 11.8 10E3/UL (ref 4–11)
WBC #/AREA URNS AUTO: ABNORMAL /HPF

## 2025-08-07 DIAGNOSIS — E87.5 HYPERPOTASSEMIA: Primary | ICD-10-CM

## 2025-08-07 DIAGNOSIS — N18.30 CHRONIC KIDNEY DISEASE, STAGE III (MODERATE) (H): ICD-10-CM

## 2025-08-25 DIAGNOSIS — E87.5 HYPERKALEMIA: ICD-10-CM

## 2025-08-25 DIAGNOSIS — N18.30 CHRONIC KIDNEY DISEASE, STAGE III (MODERATE) (H): Primary | ICD-10-CM

## 2025-08-26 DIAGNOSIS — E87.5 HYPERPOTASSEMIA: ICD-10-CM

## 2025-08-26 DIAGNOSIS — N18.30 CHRONIC KIDNEY DISEASE, STAGE III (MODERATE) (H): Primary | ICD-10-CM

## 2025-08-28 ENCOUNTER — LAB (OUTPATIENT)
Dept: LAB | Facility: CLINIC | Age: 70
End: 2025-08-28
Payer: COMMERCIAL

## 2025-08-28 DIAGNOSIS — N18.30 CHRONIC KIDNEY DISEASE, STAGE III (MODERATE) (H): ICD-10-CM

## 2025-08-28 DIAGNOSIS — E87.5 HYPERKALEMIA: ICD-10-CM

## 2025-08-28 DIAGNOSIS — E87.5 HYPERPOTASSEMIA: ICD-10-CM

## 2025-08-28 LAB
ANION GAP SERPL CALCULATED.3IONS-SCNC: 10 MMOL/L (ref 7–15)
BUN SERPL-MCNC: 27.9 MG/DL (ref 8–23)
CALCIUM SERPL-MCNC: 10.2 MG/DL (ref 8.8–10.4)
CHLORIDE SERPL-SCNC: 105 MMOL/L (ref 98–107)
CREAT SERPL-MCNC: 1.51 MG/DL (ref 0.51–0.95)
EGFRCR SERPLBLD CKD-EPI 2021: 37 ML/MIN/1.73M2
GLUCOSE SERPL-MCNC: 166 MG/DL (ref 70–99)
HCO3 SERPL-SCNC: 24 MMOL/L (ref 22–29)
MAGNESIUM SERPL-MCNC: 1.7 MG/DL (ref 1.7–2.3)
POTASSIUM SERPL-SCNC: 4.5 MMOL/L (ref 3.4–5.3)
SODIUM SERPL-SCNC: 139 MMOL/L (ref 135–145)

## (undated) DEVICE — BLADE CARPAL TUNNEL ENDO 81010-1

## (undated) DEVICE — PREP CHLORHEXIDINE 4% 4OZ (HIBICLENS) 57504

## (undated) DEVICE — KIT ENDO FIRST STEP DISINFECTANT 200ML W/POUCH EP-4

## (undated) DEVICE — PREP PAD ALCOHOL 6818

## (undated) DEVICE — SU MONOCRYL 4-0 PS-2 18" UND Y496G

## (undated) DEVICE — SOL WATER IRRIG 1000ML BOTTLE 07139-09

## (undated) DEVICE — GOWN IMPERVIOUS SPECIALTY XLG/XLONG 32474

## (undated) DEVICE — CLEANER INST PRE-KLENZ SOAK SHIELD TUBE 6 ML MEDIUM 2D66J4

## (undated) DEVICE — DAVINCI XI SEAL UNIVERSAL 5-12MM 470500

## (undated) DEVICE — BLADE SAW SAGITTAL STRK 18X90X1.37MM HD SYS 6 6118-137-090

## (undated) DEVICE — GOWN XLG DISP 9545

## (undated) DEVICE — DRSG AQUACEL AG 3.5X9.75" HYDROFIBER 412011

## (undated) DEVICE — TAPE DURAPORE 3" SILK 1538-3

## (undated) DEVICE — GLOVE PROTEXIS BLUE W/NEU-THERA 8.0  2D73EB80

## (undated) DEVICE — DEVICE RETRIEVER HEWSON 71111579

## (undated) DEVICE — SURGICEL HEMOSTAT 4X8" 1952S

## (undated) DEVICE — BONE CLEANING TIP INTERPULSE  0210-010-000

## (undated) DEVICE — TUBING FILTER TRI-LUMEN AIRSEAL ASC-EVAC1

## (undated) DEVICE — DRAPE SHEET REV FOLD 3/4 9349

## (undated) DEVICE — NDL 18GA 1.5" 305196

## (undated) DEVICE — DAVINCI XI ESU FORCE BIPOLAR 8MM 471405

## (undated) DEVICE — CLIP APPLIER ENDO ROTATING 12MM LG ER420

## (undated) DEVICE — DECANTER FLUID L3 IN TRANSFER STRL LF DYNJDEC03

## (undated) DEVICE — PREP CHLORAPREP 26ML TINTED ORANGE  260815

## (undated) DEVICE — COVER CAMERA IN-LIGHT DISP LT-C02

## (undated) DEVICE — LINEN ORTHO PACK 5446

## (undated) DEVICE — SOLUTION WATER 1000ML R5000-01

## (undated) DEVICE — SU PDO 1 STRATAFIX 36X36CM CTX TAPERPOINT SXPD2B405

## (undated) DEVICE — PREP SKIN SCRUB TRAY 4461A

## (undated) DEVICE — KIT PATIENT POSITIONING PINK PAD EXT 40601

## (undated) DEVICE — LINEN TOWEL PACK X5 5464

## (undated) DEVICE — STPL DAVINCI SUREFORM 45MM CVD TIP RELOAD 12FIRE 480545

## (undated) DEVICE — SUCTION IRR STRYKERFLOW II W/TIP 250-070-520

## (undated) DEVICE — SOLUTION IV 0.9% NACL 1000ML E8000

## (undated) DEVICE — TUBING SUCTION MEDI-VAC 1/4"X20' N620A

## (undated) DEVICE — DRSG STERI STRIP 1/2X4" R1547

## (undated) DEVICE — BNDG ELASTIC 2"X5YDS UNSTERILE 6611-20

## (undated) DEVICE — ENDO POUCH UNIVERSAL RETRIEVAL SYSTEM INZII 12/15MM CD004

## (undated) DEVICE — TOURNIQUET CUFF 18" STERILE

## (undated) DEVICE — PACK HAND

## (undated) DEVICE — SOL NACL 0.9% IRRIG 3000ML BAG 07972-08

## (undated) DEVICE — DECANTER VIAL 2006S

## (undated) DEVICE — GLOVE BIOGEL PI MICRO SZ 7.5 48575

## (undated) DEVICE — BLADE SWITCH SCISSORS TIP 5MM 89-5100B

## (undated) DEVICE — SUTURE ABSORBABLE VICRYL CT-B1 L36 IN BRAID VIOLET JB947H

## (undated) DEVICE — Device

## (undated) DEVICE — DAVINCI XI DRAPE COLUMN 470341

## (undated) DEVICE — SYR 10ML FINGER CONTROL W/O NDL 309695

## (undated) DEVICE — GLOVE BIOGEL PI MICRO INDICATOR UNDERGLOVE SZ 6.5 48965

## (undated) DEVICE — DAVINCI XI REDUCER 8-12MM 470381

## (undated) DEVICE — LINEN DRAPE 54X72" 5467

## (undated) DEVICE — DAVINCI XI DRAPE ARM 470015

## (undated) DEVICE — CATH TRAY FOLEY SURESTEP 16FR WDRAIN BAG STLK LATEX A300316A

## (undated) DEVICE — ENDO TROCAR CONMED AIRSEAL BLADELESS 12X120MM IAS12-120LP

## (undated) DEVICE — GLOVE BIOGEL PI MICRO SZ 6.5 48565

## (undated) DEVICE — SU DERMABOND ADVANCED .7ML DNX12

## (undated) DEVICE — PACK HIP LAKES WITH POUCH

## (undated) DEVICE — SUCTION MANIFOLD NEPTUNE 2 SYS 4 PORT 0702-020-000

## (undated) DEVICE — SYR 20ML LL W/O NDL

## (undated) DEVICE — PREP CHLORAPREP 26ML TINTED HI-LITE ORANGE 930815

## (undated) DEVICE — DAVINCI HOT SHEARS TIP COVER  400180

## (undated) DEVICE — SU STRATAFIX MONOCRYL 3-0 SPIRAL PS-2 30CM SXMP1B106

## (undated) DEVICE — SU MONOCRYL 2-0 CT-1 36" UND Y945H

## (undated) DEVICE — ANTIFOG SOLUTION SEE SHARP 150M TROCAR SWABS 30978 (COI)

## (undated) DEVICE — STRAP UNIVERSAL POSITIONING 2-PIECE 4X47X76" 91-287

## (undated) DEVICE — DAVINCI XI MONOPOLAR SCISSORS HOT SHEARS 8MM 470179

## (undated) DEVICE — SU VICRYL+ 0 27 UR6 VLT VCP603H

## (undated) DEVICE — GLOVE BIOGEL PI MICRO INDICATOR UNDERGLOVE SZ 8.0 48980

## (undated) DEVICE — GLOVE PROTEXIS W/NEU-THERA 8.0  2D73TE80

## (undated) DEVICE — LINEN GOWN X4 5410

## (undated) DEVICE — SU ETHIBOND 5 V-37 4X30" MB66G

## (undated) DEVICE — SUCTION IRR SYSTEM W/TIP INTERPULSE

## (undated) DEVICE — STPL DAVINCI SUREFORM 45MM RELOAD WHITE 48345W

## (undated) DEVICE — ESU PENCIL SMOKE EVAC W/ROCKER SWITCH 0703-047-000

## (undated) DEVICE — DAVINCI XI GRASPER PROGRASP 8MM EXT 471093

## (undated) DEVICE — GLOVE BIOGEL PI MICRO SZ 8.0 48580

## (undated) DEVICE — NDL INSUFFLATION 13GA 120MM C2201

## (undated) DEVICE — DRAPE IOBAN INCISE 23X17" 6650EZ

## (undated) RX ORDER — ACETAMINOPHEN 325 MG/1
TABLET ORAL
Status: DISPENSED
Start: 2025-04-30

## (undated) RX ORDER — CEFAZOLIN SODIUM 2 G/100ML
INJECTION, SOLUTION INTRAVENOUS
Status: DISPENSED
Start: 2021-11-15

## (undated) RX ORDER — FENTANYL CITRATE-0.9 % NACL/PF 10 MCG/ML
PLASTIC BAG, INJECTION (ML) INTRAVENOUS
Status: DISPENSED
Start: 2023-06-22

## (undated) RX ORDER — HYDROMORPHONE HYDROCHLORIDE 1 MG/ML
INJECTION, SOLUTION INTRAMUSCULAR; INTRAVENOUS; SUBCUTANEOUS
Status: DISPENSED
Start: 2025-04-30

## (undated) RX ORDER — BUPIVACAINE HYDROCHLORIDE 5 MG/ML
INJECTION, SOLUTION EPIDURAL; INTRACAUDAL
Status: DISPENSED
Start: 2023-06-22

## (undated) RX ORDER — GABAPENTIN 100 MG/1
CAPSULE ORAL
Status: DISPENSED
Start: 2023-06-22

## (undated) RX ORDER — CEFAZOLIN SODIUM 1 G/3ML
INJECTION, POWDER, FOR SOLUTION INTRAMUSCULAR; INTRAVENOUS
Status: DISPENSED
Start: 2021-11-01

## (undated) RX ORDER — HYDROXYZINE HYDROCHLORIDE 25 MG/1
TABLET, FILM COATED ORAL
Status: DISPENSED
Start: 2025-04-30

## (undated) RX ORDER — LIDOCAINE HYDROCHLORIDE 10 MG/ML
INJECTION, SOLUTION EPIDURAL; INFILTRATION; INTRACAUDAL; PERINEURAL
Status: DISPENSED
Start: 2021-11-15

## (undated) RX ORDER — OXYCODONE HYDROCHLORIDE 5 MG/1
TABLET ORAL
Status: DISPENSED
Start: 2025-04-30

## (undated) RX ORDER — ONDANSETRON 2 MG/ML
INJECTION INTRAMUSCULAR; INTRAVENOUS
Status: DISPENSED
Start: 2021-11-01

## (undated) RX ORDER — FENTANYL CITRATE 50 UG/ML
INJECTION, SOLUTION INTRAMUSCULAR; INTRAVENOUS
Status: DISPENSED
Start: 2021-11-01

## (undated) RX ORDER — DEXAMETHASONE SODIUM PHOSPHATE 4 MG/ML
INJECTION, SOLUTION INTRA-ARTICULAR; INTRALESIONAL; INTRAMUSCULAR; INTRAVENOUS; SOFT TISSUE
Status: DISPENSED
Start: 2025-04-30

## (undated) RX ORDER — FENTANYL CITRATE 50 UG/ML
INJECTION, SOLUTION INTRAMUSCULAR; INTRAVENOUS
Status: DISPENSED
Start: 2023-06-22

## (undated) RX ORDER — DEXAMETHASONE SODIUM PHOSPHATE 4 MG/ML
INJECTION, SOLUTION INTRA-ARTICULAR; INTRALESIONAL; INTRAMUSCULAR; INTRAVENOUS; SOFT TISSUE
Status: DISPENSED
Start: 2023-06-22

## (undated) RX ORDER — PROPOFOL 10 MG/ML
INJECTION, EMULSION INTRAVENOUS
Status: DISPENSED
Start: 2023-06-22

## (undated) RX ORDER — ONDANSETRON 2 MG/ML
INJECTION INTRAMUSCULAR; INTRAVENOUS
Status: DISPENSED
Start: 2025-04-30

## (undated) RX ORDER — ONDANSETRON 2 MG/ML
INJECTION INTRAMUSCULAR; INTRAVENOUS
Status: DISPENSED
Start: 2023-06-22

## (undated) RX ORDER — FENTANYL CITRATE 50 UG/ML
INJECTION, SOLUTION INTRAMUSCULAR; INTRAVENOUS
Status: DISPENSED
Start: 2025-04-30

## (undated) RX ORDER — BUPIVACAINE HYDROCHLORIDE 5 MG/ML
INJECTION, SOLUTION PERINEURAL
Status: DISPENSED
Start: 2021-11-01

## (undated) RX ORDER — BUPIVACAINE HYDROCHLORIDE 2.5 MG/ML
INJECTION, SOLUTION EPIDURAL; INFILTRATION; INTRACAUDAL; PERINEURAL
Status: DISPENSED
Start: 2025-04-30

## (undated) RX ORDER — LIDOCAINE HYDROCHLORIDE 10 MG/ML
INJECTION, SOLUTION INFILTRATION; PERINEURAL
Status: DISPENSED
Start: 2021-11-01

## (undated) RX ORDER — ACETAMINOPHEN 325 MG/1
TABLET ORAL
Status: DISPENSED
Start: 2021-11-15

## (undated) RX ORDER — CEFAZOLIN SODIUM/WATER 2 G/20 ML
SYRINGE (ML) INTRAVENOUS
Status: DISPENSED
Start: 2023-06-22

## (undated) RX ORDER — DIPHENHYDRAMINE HYDROCHLORIDE 50 MG/ML
INJECTION INTRAMUSCULAR; INTRAVENOUS
Status: DISPENSED
Start: 2023-06-22

## (undated) RX ORDER — TRANEXAMIC ACID 650 MG/1
TABLET ORAL
Status: DISPENSED
Start: 2023-06-22

## (undated) RX ORDER — PROPOFOL 10 MG/ML
INJECTION, EMULSION INTRAVENOUS
Status: DISPENSED
Start: 2021-11-15

## (undated) RX ORDER — ACETAMINOPHEN 325 MG/1
TABLET ORAL
Status: DISPENSED
Start: 2023-06-22

## (undated) RX ORDER — GABAPENTIN 100 MG/1
CAPSULE ORAL
Status: DISPENSED
Start: 2021-11-15

## (undated) RX ORDER — ACETAMINOPHEN 325 MG/1
TABLET ORAL
Status: DISPENSED
Start: 2021-11-01